# Patient Record
Sex: MALE | Race: WHITE | NOT HISPANIC OR LATINO | Employment: OTHER | URBAN - METROPOLITAN AREA
[De-identification: names, ages, dates, MRNs, and addresses within clinical notes are randomized per-mention and may not be internally consistent; named-entity substitution may affect disease eponyms.]

---

## 2018-07-01 ENCOUNTER — APPOINTMENT (OUTPATIENT)
Dept: ULTRASOUND IMAGING | Facility: CLINIC | Age: 68
DRG: 392 | End: 2018-07-01
Attending: EMERGENCY MEDICINE
Payer: MEDICARE

## 2018-07-01 ENCOUNTER — APPOINTMENT (OUTPATIENT)
Dept: CT IMAGING | Facility: CLINIC | Age: 68
DRG: 392 | End: 2018-07-01
Attending: EMERGENCY MEDICINE
Payer: MEDICARE

## 2018-07-01 ENCOUNTER — HOSPITAL ENCOUNTER (INPATIENT)
Facility: CLINIC | Age: 68
LOS: 2 days | Discharge: HOME | DRG: 392 | End: 2018-07-03
Attending: EMERGENCY MEDICINE | Admitting: INTERNAL MEDICINE
Payer: MEDICARE

## 2018-07-01 ENCOUNTER — HOSPITAL ENCOUNTER (EMERGENCY)
Facility: CLINIC | Age: 68
Discharge: HOME | DRG: 392 | End: 2018-07-01
Attending: EMERGENCY MEDICINE | Admitting: EMERGENCY MEDICINE
Payer: MEDICARE

## 2018-07-01 VITALS
BODY MASS INDEX: 33.89 KG/M2 | WEIGHT: 287 LBS | HEIGHT: 77 IN | HEART RATE: 61 BPM | RESPIRATION RATE: 16 BRPM | SYSTOLIC BLOOD PRESSURE: 171 MMHG | TEMPERATURE: 97.8 F | OXYGEN SATURATION: 97 % | DIASTOLIC BLOOD PRESSURE: 97 MMHG

## 2018-07-01 DIAGNOSIS — R10.12 LEFT UPPER QUADRANT PAIN: Primary | ICD-10-CM

## 2018-07-01 DIAGNOSIS — I10 ESSENTIAL HYPERTENSION: ICD-10-CM

## 2018-07-01 DIAGNOSIS — R10.12 ABDOMINAL PAIN, LEFT UPPER QUADRANT: ICD-10-CM

## 2018-07-01 DIAGNOSIS — K85.90 ACUTE PANCREATITIS, UNSPECIFIED COMPLICATION STATUS, UNSPECIFIED PANCREATITIS TYPE: ICD-10-CM

## 2018-07-01 DIAGNOSIS — R03.0 ELEVATED BLOOD PRESSURE READING: ICD-10-CM

## 2018-07-01 PROBLEM — R10.9 ABDOMINAL PAIN: Status: ACTIVE | Noted: 2018-07-01

## 2018-07-01 LAB
ALBUMIN SERPL-MCNC: 3.5 G/DL (ref 3.4–5)
ALBUMIN SERPL-MCNC: 3.5 G/DL (ref 3.4–5)
ALBUMIN UR-MCNC: NEGATIVE MG/DL
ALBUMIN UR-MCNC: NEGATIVE MG/DL
ALP SERPL-CCNC: 83 U/L (ref 40–150)
ALP SERPL-CCNC: 84 U/L (ref 40–150)
ALT SERPL W P-5'-P-CCNC: 27 U/L (ref 0–70)
ALT SERPL W P-5'-P-CCNC: 27 U/L (ref 0–70)
ANION GAP SERPL CALCULATED.3IONS-SCNC: 7 MMOL/L (ref 3–14)
ANION GAP SERPL CALCULATED.3IONS-SCNC: 8 MMOL/L (ref 3–14)
APPEARANCE UR: CLEAR
APPEARANCE UR: CLEAR
AST SERPL W P-5'-P-CCNC: 15 U/L (ref 0–45)
AST SERPL W P-5'-P-CCNC: 18 U/L (ref 0–45)
BASOPHILS # BLD AUTO: 0.1 10E9/L (ref 0–0.2)
BASOPHILS # BLD AUTO: 0.1 10E9/L (ref 0–0.2)
BASOPHILS NFR BLD AUTO: 0.8 %
BASOPHILS NFR BLD AUTO: 1 %
BILIRUB SERPL-MCNC: 0.4 MG/DL (ref 0.2–1.3)
BILIRUB SERPL-MCNC: 0.4 MG/DL (ref 0.2–1.3)
BILIRUB UR QL STRIP: NEGATIVE
BILIRUB UR QL STRIP: NEGATIVE
BUN SERPL-MCNC: 16 MG/DL (ref 7–30)
BUN SERPL-MCNC: 16 MG/DL (ref 7–30)
CALCIUM SERPL-MCNC: 8 MG/DL (ref 8.5–10.1)
CALCIUM SERPL-MCNC: 8.6 MG/DL (ref 8.5–10.1)
CHLORIDE SERPL-SCNC: 106 MMOL/L (ref 94–109)
CHLORIDE SERPL-SCNC: 107 MMOL/L (ref 94–109)
CO2 SERPL-SCNC: 27 MMOL/L (ref 20–32)
CO2 SERPL-SCNC: 29 MMOL/L (ref 20–32)
COLOR UR AUTO: ABNORMAL
COLOR UR AUTO: YELLOW
CREAT SERPL-MCNC: 0.65 MG/DL (ref 0.66–1.25)
CREAT SERPL-MCNC: 0.8 MG/DL (ref 0.66–1.25)
DIFFERENTIAL METHOD BLD: NORMAL
DIFFERENTIAL METHOD BLD: NORMAL
EOSINOPHIL # BLD AUTO: 0.4 10E9/L (ref 0–0.7)
EOSINOPHIL # BLD AUTO: 0.4 10E9/L (ref 0–0.7)
EOSINOPHIL NFR BLD AUTO: 3.5 %
EOSINOPHIL NFR BLD AUTO: 4.4 %
ERYTHROCYTE [DISTWIDTH] IN BLOOD BY AUTOMATED COUNT: 13.3 % (ref 10–15)
ERYTHROCYTE [DISTWIDTH] IN BLOOD BY AUTOMATED COUNT: 13.3 % (ref 10–15)
ETHANOL SERPL-MCNC: <0.01 G/DL
GFR SERPL CREATININE-BSD FRML MDRD: >90 ML/MIN/1.7M2
GFR SERPL CREATININE-BSD FRML MDRD: >90 ML/MIN/1.7M2
GLUCOSE SERPL-MCNC: 102 MG/DL (ref 70–99)
GLUCOSE SERPL-MCNC: 97 MG/DL (ref 70–99)
GLUCOSE UR STRIP-MCNC: NEGATIVE MG/DL
GLUCOSE UR STRIP-MCNC: NEGATIVE MG/DL
HCT VFR BLD AUTO: 47.6 % (ref 40–53)
HCT VFR BLD AUTO: 47.6 % (ref 40–53)
HGB BLD-MCNC: 15.9 G/DL (ref 13.3–17.7)
HGB BLD-MCNC: 15.9 G/DL (ref 13.3–17.7)
HGB UR QL STRIP: NEGATIVE
HGB UR QL STRIP: NEGATIVE
IMM GRANULOCYTES # BLD: 0 10E9/L (ref 0–0.4)
IMM GRANULOCYTES # BLD: 0 10E9/L (ref 0–0.4)
IMM GRANULOCYTES NFR BLD: 0.2 %
IMM GRANULOCYTES NFR BLD: 0.2 %
KETONES UR STRIP-MCNC: NEGATIVE MG/DL
KETONES UR STRIP-MCNC: NEGATIVE MG/DL
LACTATE BLD-SCNC: 2 MMOL/L (ref 0.7–2)
LEUKOCYTE ESTERASE UR QL STRIP: NEGATIVE
LEUKOCYTE ESTERASE UR QL STRIP: NEGATIVE
LIPASE SERPL-CCNC: 756 U/L (ref 73–393)
LIPASE SERPL-CCNC: 92 U/L (ref 73–393)
LYMPHOCYTES # BLD AUTO: 3.2 10E9/L (ref 0.8–5.3)
LYMPHOCYTES # BLD AUTO: 3.6 10E9/L (ref 0.8–5.3)
LYMPHOCYTES NFR BLD AUTO: 35.9 %
LYMPHOCYTES NFR BLD AUTO: 36.2 %
MCH RBC QN AUTO: 28.4 PG (ref 26.5–33)
MCH RBC QN AUTO: 28.7 PG (ref 26.5–33)
MCHC RBC AUTO-ENTMCNC: 33.4 G/DL (ref 31.5–36.5)
MCHC RBC AUTO-ENTMCNC: 33.4 G/DL (ref 31.5–36.5)
MCV RBC AUTO: 85 FL (ref 78–100)
MCV RBC AUTO: 86 FL (ref 78–100)
MONOCYTES # BLD AUTO: 0.7 10E9/L (ref 0–1.3)
MONOCYTES # BLD AUTO: 0.8 10E9/L (ref 0–1.3)
MONOCYTES NFR BLD AUTO: 7.8 %
MONOCYTES NFR BLD AUTO: 8.2 %
MUCOUS THREADS #/AREA URNS LPF: PRESENT /LPF
MUCOUS THREADS #/AREA URNS LPF: PRESENT /LPF
NEUTROPHILS # BLD AUTO: 4.5 10E9/L (ref 1.6–8.3)
NEUTROPHILS # BLD AUTO: 5.2 10E9/L (ref 1.6–8.3)
NEUTROPHILS NFR BLD AUTO: 50 %
NEUTROPHILS NFR BLD AUTO: 51.8 %
NITRATE UR QL: NEGATIVE
NITRATE UR QL: NEGATIVE
NRBC # BLD AUTO: 0 10*3/UL
NRBC # BLD AUTO: 0 10*3/UL
NRBC BLD AUTO-RTO: 0 /100
NRBC BLD AUTO-RTO: 0 /100
PH UR STRIP: 7 PH (ref 5–7)
PH UR STRIP: 8 PH (ref 5–7)
PLATELET # BLD AUTO: 209 10E9/L (ref 150–450)
PLATELET # BLD AUTO: 220 10E9/L (ref 150–450)
POTASSIUM SERPL-SCNC: 3.3 MMOL/L (ref 3.4–5.3)
POTASSIUM SERPL-SCNC: 3.5 MMOL/L (ref 3.4–5.3)
PROT SERPL-MCNC: 6.6 G/DL (ref 6.8–8.8)
PROT SERPL-MCNC: 6.6 G/DL (ref 6.8–8.8)
RBC # BLD AUTO: 5.54 10E12/L (ref 4.4–5.9)
RBC # BLD AUTO: 5.59 10E12/L (ref 4.4–5.9)
RBC #/AREA URNS AUTO: 1 /HPF (ref 0–2)
RBC #/AREA URNS AUTO: 1 /HPF (ref 0–2)
SODIUM SERPL-SCNC: 142 MMOL/L (ref 133–144)
SODIUM SERPL-SCNC: 142 MMOL/L (ref 133–144)
SOURCE: ABNORMAL
SOURCE: ABNORMAL
SP GR UR STRIP: 1.01 (ref 1–1.03)
SP GR UR STRIP: 1.01 (ref 1–1.03)
SQUAMOUS #/AREA URNS AUTO: <1 /HPF (ref 0–1)
SQUAMOUS #/AREA URNS AUTO: <1 /HPF (ref 0–1)
TROPONIN I BLD-MCNC: 0.03 UG/L (ref 0–0.1)
UROBILINOGEN UR STRIP-MCNC: 0 MG/DL (ref 0–2)
UROBILINOGEN UR STRIP-MCNC: 2 MG/DL (ref 0–2)
WBC # BLD AUTO: 10.1 10E9/L (ref 4–11)
WBC # BLD AUTO: 8.9 10E9/L (ref 4–11)
WBC #/AREA URNS AUTO: 0 /HPF (ref 0–5)
WBC #/AREA URNS AUTO: <1 /HPF (ref 0–5)

## 2018-07-01 PROCEDURE — 25000128 H RX IP 250 OP 636: Performed by: EMERGENCY MEDICINE

## 2018-07-01 PROCEDURE — 99285 EMERGENCY DEPT VISIT HI MDM: CPT | Mod: 25

## 2018-07-01 PROCEDURE — 25000125 ZZHC RX 250: Performed by: EMERGENCY MEDICINE

## 2018-07-01 PROCEDURE — 83690 ASSAY OF LIPASE: CPT | Performed by: EMERGENCY MEDICINE

## 2018-07-01 PROCEDURE — 99223 1ST HOSP IP/OBS HIGH 75: CPT | Mod: AI | Performed by: INTERNAL MEDICINE

## 2018-07-01 PROCEDURE — 93005 ELECTROCARDIOGRAM TRACING: CPT

## 2018-07-01 PROCEDURE — 80053 COMPREHEN METABOLIC PANEL: CPT | Performed by: EMERGENCY MEDICINE

## 2018-07-01 PROCEDURE — 96361 HYDRATE IV INFUSION ADD-ON: CPT

## 2018-07-01 PROCEDURE — 83605 ASSAY OF LACTIC ACID: CPT | Performed by: EMERGENCY MEDICINE

## 2018-07-01 PROCEDURE — 96375 TX/PRO/DX INJ NEW DRUG ADDON: CPT

## 2018-07-01 PROCEDURE — A9270 NON-COVERED ITEM OR SERVICE: HCPCS | Mod: GY | Performed by: EMERGENCY MEDICINE

## 2018-07-01 PROCEDURE — 81001 URINALYSIS AUTO W/SCOPE: CPT | Performed by: EMERGENCY MEDICINE

## 2018-07-01 PROCEDURE — 74176 CT ABD & PELVIS W/O CONTRAST: CPT

## 2018-07-01 PROCEDURE — 85025 COMPLETE CBC W/AUTO DIFF WBC: CPT | Performed by: EMERGENCY MEDICINE

## 2018-07-01 PROCEDURE — 12000000 ZZH R&B MED SURG/OB

## 2018-07-01 PROCEDURE — 84484 ASSAY OF TROPONIN QUANT: CPT

## 2018-07-01 PROCEDURE — 80320 DRUG SCREEN QUANTALCOHOLS: CPT | Performed by: EMERGENCY MEDICINE

## 2018-07-01 PROCEDURE — 83036 HEMOGLOBIN GLYCOSYLATED A1C: CPT | Performed by: EMERGENCY MEDICINE

## 2018-07-01 PROCEDURE — 96374 THER/PROPH/DIAG INJ IV PUSH: CPT

## 2018-07-01 PROCEDURE — 25000132 ZZH RX MED GY IP 250 OP 250 PS 637: Mod: GY | Performed by: EMERGENCY MEDICINE

## 2018-07-01 PROCEDURE — 76705 ECHO EXAM OF ABDOMEN: CPT

## 2018-07-01 RX ORDER — METOPROLOL TARTRATE 50 MG
50 TABLET ORAL 2 TIMES DAILY
Status: DISCONTINUED | OUTPATIENT
Start: 2018-07-01 | End: 2018-07-02

## 2018-07-01 RX ORDER — HYDRALAZINE HYDROCHLORIDE 20 MG/ML
10 INJECTION INTRAMUSCULAR; INTRAVENOUS EVERY 6 HOURS PRN
Status: DISCONTINUED | OUTPATIENT
Start: 2018-07-01 | End: 2018-07-03

## 2018-07-01 RX ORDER — SODIUM CHLORIDE, SODIUM LACTATE, POTASSIUM CHLORIDE, CALCIUM CHLORIDE 600; 310; 30; 20 MG/100ML; MG/100ML; MG/100ML; MG/100ML
1000 INJECTION, SOLUTION INTRAVENOUS CONTINUOUS
Status: DISCONTINUED | OUTPATIENT
Start: 2018-07-01 | End: 2018-07-01

## 2018-07-01 RX ORDER — NALOXONE HYDROCHLORIDE 0.4 MG/ML
.1-.4 INJECTION, SOLUTION INTRAMUSCULAR; INTRAVENOUS; SUBCUTANEOUS
Status: DISCONTINUED | OUTPATIENT
Start: 2018-07-01 | End: 2018-07-03 | Stop reason: HOSPADM

## 2018-07-01 RX ORDER — AMOXICILLIN 250 MG
1 CAPSULE ORAL 2 TIMES DAILY PRN
Status: DISCONTINUED | OUTPATIENT
Start: 2018-07-01 | End: 2018-07-03 | Stop reason: HOSPADM

## 2018-07-01 RX ORDER — POTASSIUM CHLORIDE 1.5 G/1.58G
20 POWDER, FOR SOLUTION ORAL 2 TIMES DAILY
Status: ON HOLD | COMMUNITY
End: 2018-07-02

## 2018-07-01 RX ORDER — HYDROMORPHONE HYDROCHLORIDE 1 MG/ML
.3-.5 INJECTION, SOLUTION INTRAMUSCULAR; INTRAVENOUS; SUBCUTANEOUS
Status: DISCONTINUED | OUTPATIENT
Start: 2018-07-01 | End: 2018-07-02

## 2018-07-01 RX ORDER — HYDROCODONE BITARTRATE AND ACETAMINOPHEN 5; 325 MG/1; MG/1
1 TABLET ORAL EVERY 4 HOURS PRN
Qty: 10 TABLET | Refills: 0 | Status: SHIPPED | OUTPATIENT
Start: 2018-07-01 | End: 2021-12-03

## 2018-07-01 RX ORDER — POTASSIUM CHLORIDE 29.8 MG/ML
20 INJECTION INTRAVENOUS
Status: DISCONTINUED | OUTPATIENT
Start: 2018-07-01 | End: 2018-07-03 | Stop reason: HOSPADM

## 2018-07-01 RX ORDER — NICOTINE POLACRILEX 4 MG
15-30 LOZENGE BUCCAL
Status: DISCONTINUED | OUTPATIENT
Start: 2018-07-01 | End: 2018-07-03 | Stop reason: HOSPADM

## 2018-07-01 RX ORDER — POTASSIUM CL/LIDO/0.9 % NACL 10MEQ/0.1L
10 INTRAVENOUS SOLUTION, PIGGYBACK (ML) INTRAVENOUS
Status: DISCONTINUED | OUTPATIENT
Start: 2018-07-01 | End: 2018-07-03 | Stop reason: HOSPADM

## 2018-07-01 RX ORDER — MORPHINE SULFATE 4 MG/ML
4 INJECTION, SOLUTION INTRAMUSCULAR; INTRAVENOUS
Status: DISCONTINUED | OUTPATIENT
Start: 2018-07-01 | End: 2018-07-01 | Stop reason: HOSPADM

## 2018-07-01 RX ORDER — ONDANSETRON 2 MG/ML
4 INJECTION INTRAMUSCULAR; INTRAVENOUS EVERY 30 MIN PRN
Status: DISCONTINUED | OUTPATIENT
Start: 2018-07-01 | End: 2018-07-01 | Stop reason: HOSPADM

## 2018-07-01 RX ORDER — KETOROLAC TROMETHAMINE 30 MG/ML
30 INJECTION, SOLUTION INTRAMUSCULAR; INTRAVENOUS ONCE
Status: COMPLETED | OUTPATIENT
Start: 2018-07-01 | End: 2018-07-01

## 2018-07-01 RX ORDER — OXYCODONE HYDROCHLORIDE 5 MG/1
5-10 TABLET ORAL
Status: DISCONTINUED | OUTPATIENT
Start: 2018-07-01 | End: 2018-07-03 | Stop reason: HOSPADM

## 2018-07-01 RX ORDER — POTASSIUM CHLORIDE 1500 MG/1
20-40 TABLET, EXTENDED RELEASE ORAL
Status: DISCONTINUED | OUTPATIENT
Start: 2018-07-01 | End: 2018-07-03 | Stop reason: HOSPADM

## 2018-07-01 RX ORDER — DEXTROSE MONOHYDRATE 25 G/50ML
25-50 INJECTION, SOLUTION INTRAVENOUS
Status: DISCONTINUED | OUTPATIENT
Start: 2018-07-01 | End: 2018-07-03 | Stop reason: HOSPADM

## 2018-07-01 RX ORDER — KETOROLAC TROMETHAMINE 15 MG/ML
10 INJECTION, SOLUTION INTRAMUSCULAR; INTRAVENOUS ONCE
Status: COMPLETED | OUTPATIENT
Start: 2018-07-01 | End: 2018-07-01

## 2018-07-01 RX ORDER — HYDROMORPHONE HYDROCHLORIDE 1 MG/ML
0.5 INJECTION, SOLUTION INTRAMUSCULAR; INTRAVENOUS; SUBCUTANEOUS
Status: DISCONTINUED | OUTPATIENT
Start: 2018-07-01 | End: 2018-07-02

## 2018-07-01 RX ORDER — SODIUM CHLORIDE 9 MG/ML
INJECTION, SOLUTION INTRAVENOUS CONTINUOUS
Status: DISCONTINUED | OUTPATIENT
Start: 2018-07-01 | End: 2018-07-03 | Stop reason: HOSPADM

## 2018-07-01 RX ORDER — MAGNESIUM SULFATE HEPTAHYDRATE 40 MG/ML
4 INJECTION, SOLUTION INTRAVENOUS EVERY 4 HOURS PRN
Status: DISCONTINUED | OUTPATIENT
Start: 2018-07-01 | End: 2018-07-03 | Stop reason: HOSPADM

## 2018-07-01 RX ORDER — POTASSIUM CHLORIDE 7.45 MG/ML
10 INJECTION INTRAVENOUS
Status: DISCONTINUED | OUTPATIENT
Start: 2018-07-01 | End: 2018-07-03 | Stop reason: HOSPADM

## 2018-07-01 RX ORDER — AMOXICILLIN 250 MG
2 CAPSULE ORAL 2 TIMES DAILY PRN
Status: DISCONTINUED | OUTPATIENT
Start: 2018-07-01 | End: 2018-07-03 | Stop reason: HOSPADM

## 2018-07-01 RX ORDER — ONDANSETRON 2 MG/ML
4 INJECTION INTRAMUSCULAR; INTRAVENOUS EVERY 6 HOURS PRN
Status: DISCONTINUED | OUTPATIENT
Start: 2018-07-01 | End: 2018-07-03 | Stop reason: HOSPADM

## 2018-07-01 RX ORDER — ONDANSETRON 4 MG/1
4 TABLET, ORALLY DISINTEGRATING ORAL EVERY 6 HOURS PRN
Status: DISCONTINUED | OUTPATIENT
Start: 2018-07-01 | End: 2018-07-03 | Stop reason: HOSPADM

## 2018-07-01 RX ORDER — POTASSIUM CHLORIDE 1.5 G/1.58G
20-40 POWDER, FOR SOLUTION ORAL
Status: DISCONTINUED | OUTPATIENT
Start: 2018-07-01 | End: 2018-07-03 | Stop reason: HOSPADM

## 2018-07-01 RX ORDER — ONDANSETRON 2 MG/ML
4 INJECTION INTRAMUSCULAR; INTRAVENOUS EVERY 30 MIN PRN
Status: DISCONTINUED | OUTPATIENT
Start: 2018-07-01 | End: 2018-07-02

## 2018-07-01 RX ORDER — POLYETHYLENE GLYCOL 3350 17 G/17G
17 POWDER, FOR SOLUTION ORAL DAILY PRN
Status: DISCONTINUED | OUTPATIENT
Start: 2018-07-01 | End: 2018-07-03 | Stop reason: HOSPADM

## 2018-07-01 RX ADMIN — Medication 0.5 MG: at 22:28

## 2018-07-01 RX ADMIN — SODIUM CHLORIDE, POTASSIUM CHLORIDE, SODIUM LACTATE AND CALCIUM CHLORIDE 1000 ML: 600; 310; 30; 20 INJECTION, SOLUTION INTRAVENOUS at 22:59

## 2018-07-01 RX ADMIN — MORPHINE SULFATE 4 MG: 4 INJECTION INTRAVENOUS at 04:16

## 2018-07-01 RX ADMIN — KETOROLAC TROMETHAMINE 10 MG: 15 INJECTION, SOLUTION INTRAMUSCULAR; INTRAVENOUS at 03:50

## 2018-07-01 RX ADMIN — SODIUM CHLORIDE 1000 ML: 9 INJECTION, SOLUTION INTRAVENOUS at 03:51

## 2018-07-01 RX ADMIN — Medication 0.5 MG: at 22:57

## 2018-07-01 RX ADMIN — ONDANSETRON 4 MG: 2 INJECTION, SOLUTION INTRAMUSCULAR; INTRAVENOUS at 21:22

## 2018-07-01 RX ADMIN — MORPHINE SULFATE 4 MG: 4 INJECTION INTRAVENOUS at 03:50

## 2018-07-01 RX ADMIN — LIDOCAINE HYDROCHLORIDE 30 ML: 20 SOLUTION ORAL; TOPICAL at 21:22

## 2018-07-01 RX ADMIN — SODIUM CHLORIDE, POTASSIUM CHLORIDE, SODIUM LACTATE AND CALCIUM CHLORIDE 1000 ML: 600; 310; 30; 20 INJECTION, SOLUTION INTRAVENOUS at 21:22

## 2018-07-01 RX ADMIN — ONDANSETRON 4 MG: 2 INJECTION INTRAMUSCULAR; INTRAVENOUS at 03:51

## 2018-07-01 RX ADMIN — KETOROLAC TROMETHAMINE 30 MG: 30 INJECTION, SOLUTION INTRAMUSCULAR at 22:28

## 2018-07-01 ASSESSMENT — ENCOUNTER SYMPTOMS
DYSURIA: 0
APPETITE CHANGE: 1
VOMITING: 0
DYSURIA: 0
VOMITING: 0
ABDOMINAL PAIN: 1
DIFFICULTY URINATING: 0
NAUSEA: 1
ABDOMINAL PAIN: 1
NAUSEA: 1
FEVER: 0
HEMATURIA: 0
FLANK PAIN: 1
DIFFICULTY URINATING: 0
FREQUENCY: 0
FLANK PAIN: 0
HEMATURIA: 0
FREQUENCY: 0

## 2018-07-01 NOTE — ED PROVIDER NOTES
"  History     Chief Complaint:  Flank Pain    HPI   Federico Chamberlain is a 67 year old male with a history of HTN, hypercholesterolemia who presents to the emergency department for evaluation of flank pain. The patient reports he has had left flank pain since yesterday which has progressively increased in intensity, prompting his visit today. He indicates the pain is constant and does not appear to radiate a great deal. He states he has had nausea accompanying the pain. He denies any fever, urinary symptoms, recent fall or trauma, or history of the same. No pain with breathing.     Allergies:  NKDA     Medications:    Atorvastatin  Lisinopril  Metformin  Metoprolol  Klor-con    Past Medical History:    Hypercholesterolemia  HTN    Past Surgical History:    Rotator cuff surgery, left shoulder    Family History:    No past pertinent family history.    Social History:  Presents with wife.  Nonsmoker.     Review of Systems   Constitutional: Negative for fever.   Gastrointestinal: Positive for abdominal pain and nausea. Negative for vomiting.   Genitourinary: Positive for flank pain. Negative for difficulty urinating, dysuria, frequency, hematuria and urgency.   All other systems reviewed and are negative.    Physical Exam     Patient Vitals for the past 24 hrs:   BP Temp Temp src Pulse Resp SpO2 Height Weight   07/01/18 0600 - - - - 16 - - -   07/01/18 0551 - - - - - 97 % - -   07/01/18 0550 (!) 171/97 - - - - 97 % - -   07/01/18 0306 (!) 199/114 - - - - - - -   07/01/18 0303 (!) 214/118 97.8  F (36.6  C) Oral 61 18 99 % 1.956 m (6' 5\") 130.2 kg (287 lb)       Physical Exam  Nursing note and vitals reviewed.  Constitutional: Cooperative. Uncomfortable appearing.  HENT:   Mouth/Throat: Mucous membranes are normal.   Cardiovascular: Normal rate, regular rhythm and normal heart sounds.  No murmur.  Pulmonary/Chest: Effort normal and breath sounds normal. No respiratory distress. No wheezes. No rales.   Abdominal: Soft. Normal " appearance and bowel sounds are normal. No distension. LUQ tenderness. There is no rigidity and no guarding.   Musculoskeletal: No LE edema.   Neurological: Alert. Oriented x4  Skin: Skin is warm and dry. No rash noted.   Psychiatric: Normal mood and affect.     Emergency Department Course     Imaging:  Radiographic findings were communicated with the patient who voiced understanding of the findings.    CT Abdomen/Pelvis w/o IV contrast-stone protocol:   1. No acute abnormality. No bowel obstruction or inflammation.  2. Single right renal stone. No ureteral stone or hydronephrosis.  3. Prostate gland enlargement. As per radiology.     Laboratory:  UA with micro: Mucous Present o/w negative    CBC: WBC: 10.1, HGB: 15.9, PLT: 209  CMP: Potassium 3.3 (L), Creatinine 0.65 (L), Protein Total 6.6 (L), o/w WNL   Lipase: 92    Lactic acid: 2.0    Interventions:  0350 Toradol 10 mg IV  0351 NS 1L IV BOLUS   Zofran 4 mg IV  0416 Morphine 4 mg IV    Emergency Department Course:  Nursing notes and vitals reviewed. 0336 I performed an exam of the patient as documented above.     The patient provided a urine sample here in the emergency department. This was sent for laboratory testing, findings above.     IV inserted. Medicine administered as documented above. Blood drawn. This was sent to the lab for further testing, results above.    The patient was sent for a CT Abd/Pelvis while in the emergency department, findings above.     0512 I rechecked the patient and discussed the results of his workup thus far. Patient no longer has tenderness in his LUQ.    Findings and plan explained to the Patient. Patient discharged home with instructions regarding supportive care, medications, and reasons to return. The importance of close follow-up was reviewed. The patient was prescribed Norco.    I personally reviewed the laboratory results with the Patient and answered all related questions prior to discharge.     Impression & Plan       Medical Decision Making:  Federico Chamberlain is a 67 year old male who awoke from sleep with left flank pain. This localized to his left upper quadrant. He was initially tender. No splenic enlargement on exam, and his CT is actually unremarkable. No evidence of hydronephrosis. No evidence of bowel obstruction, inflammatory process, or other acute abnormality. I did consider pulmonary equivalents such as pneumonia or PE. He has no respiratory or pulmonary symptoms. On repeat exam, he has absolutely no tenderness. He has been hypertensive during his stay, but this has been an ongoing issue for him. It sounds like his PCP is adjusting his medications. With him having no pain, and reassuring labs, urine, and imaging, there is no indication for admission at this time. I will send him home with a short course of pain medications and appropriate return precautions. I suspect either a recently passed kidney stone versus partial bowel obstruction that resolves prior to imaging. Certainly, peptic ulcer disease could present with sudden pain as well. But the reassuring lactic acid and resolution of his symptoms, it is unlikely to represent mesenteric ischemia. Patient is comfortable with this discussion, as is his wife. Otherwise will follow up with a regular physician.    Diagnosis:    ICD-10-CM   1. Abdominal pain, left upper quadrant R10.12   2. Elevated blood pressure reading R03.0       Disposition:  discharged to home    Discharge Medications:  New Prescriptions    HYDROCODONE-ACETAMINOPHEN (NORCO) 5-325 MG PER TABLET    Take 1 tablet by mouth every 4 hours as needed for pain     Kentrell ALTAMIRANO, am serving as a scribe on 7/1/2018 at 3:36 AM to personally document services performed by Cristobal Unger MD based on my observations and the provider's statements to me.     Kentrell Stratton  7/1/2018   [facility] EMERGENCY DEPARTMENT       Cristobal Unger MD  07/01/18 0657

## 2018-07-01 NOTE — IP AVS SNAPSHOT
[place] [id number] [facility]    [address]    [facility] [address]    Phone:  [phone]    Fax:  [phone]                                       After Visit Summary   7/1/2018    Federico Chamberlain    MRN: [id number]           After Visit Summary Signature Page     I have received my discharge instructions, and my questions have been answered. I have discussed any challenges I see with this plan with the nurse or doctor.    ..........................................................................................................................................  Patient/Patient Representative Signature      ..........................................................................................................................................  Patient Representative Print Name and Relationship to Patient    ..................................................               ................................................  Date                                            Time    ..........................................................................................................................................  Reviewed by Signature/Title    ...................................................              ..............................................  Date                                                            Time

## 2018-07-01 NOTE — ED NOTES
"Pt's pain has subsided. Pt states \"This is the best I've felt all week. That second shot really did it!\" MD aware.  "

## 2018-07-01 NOTE — ED AVS SNAPSHOT
[facility] Emergency Department    [address] [place] [address]    Phone:  [phone]    Fax:  [phone]                                       Federico Chamberlain   MRN: [id number]    Department:  [facility] Emergency Department   Date of Visit:  7/1/2018           Patient Information     Date Of Birth          1950        Your diagnoses for this visit were:     Abdominal pain, left upper quadrant     Elevated blood pressure reading        You were seen by Cristobal Unger MD.      Follow-up Information     Follow up with Nathalie Quiroz MD.    Specialty:  Internal Medicine    Why:  As needed    Contact information:    [facility]  [address] [place] [address]  [phone]          Discharge Instructions       Discharge Instructions  Abdominal Pain    Abdominal pain (belly pain) can be caused by many things. Your evaluation today does not show the exact cause for your pain. Your provider today has decided that it is unlikely your pain is due to a life threatening problem, or a problem requiring surgery or hospital admission. Sometimes those problems cannot be found right away, so it is very important that you follow up as directed.  Sometimes only the changes which occur over time allow the cause of your pain to be found.    Generally, every Emergency Department visit should have a follow-up clinic visit with either a primary or a specialty clinic/provider. Please follow-up as instructed by your emergency provider today. With abdominal pain, we often recommend very close follow-up, such as the following day.    ADULTS:  Return to the Emergency Department right away if:      You get an oral temperature above 102oF or as directed by your provider.    You have blood in your stools. This may be bright red or appear as black, tarry stools.      You keep vomiting (throwing up) or cannot drink liquids.    You see blood when you vomit.     You cannot have a  "bowel movement or you cannot pass gas.    Your stomach gets bloated or bigger.    Your skin or the whites of your eyes look yellow.    You faint.    You have bloody, frequent or painful urination (peeing).    You have new symptoms or anything that worries you.    MORE INFORMATION:    Appendicitis:  A possible cause of abdominal pain in any person who still has their appendix is acute appendicitis. Appendicitis is often hard to diagnose.  Testing does not always rule out early appendicitis or other causes of abdominal pain. Close follow-up with your provider and re-evaluations may be needed to figure out the reason for your abdominal pain.    Follow-up:  It is very important that you make an appointment with your clinic and go to the appointment.  If you do not follow-up with your primary provider, it may result in missing an important development which could result in permanent injury or disability and/or lasting pain.  If there is any problem keeping your appointment, call your provider or return to the Emergency Department.    Medications:  Take your medications as directed by your provider today.  Before using over-the-counter medications, ask your provider and make sure to take the medications as directed.  If you have any questions about medications, ask your provider.    Diet:  Resume your normal diet as much as possible, but do not eat fried, fatty or spicy foods while you have pain.  Do not drink alcohol or have caffeine.  Do not smoke tobacco.    Probiotics: If you have been given an antibiotic, you may want to also take a probiotic pill or eat yogurt with live cultures. Probiotics have \"good bacteria\" to help your intestines stay healthy. Studies have shown that probiotics help prevent diarrhea (loose stools) and other intestine problems (including C. diff infection) when you take antibiotics. You can buy these without a prescription in the pharmacy section of the store.     If you were given a " prescription for medicine here today, be sure to read all of the information (including the package insert) that comes with your prescription.  This will include important information about the medicine, its side effects, and any warnings that you need to know about.  The pharmacist who fills the prescription can provide more information and answer questions you may have about the medicine.  If you have questions or concerns that the pharmacist cannot address, please call or return to the Emergency Department.       Remember that you can always come back to the Emergency Department if you are not able to see your regular provider in the amount of time listed above, if you get any new symptoms, or if there is anything that worries you.      24 Hour Appointment Hotline       To make an appointment at any [facility], call [phone] ([phone]). If you don't have a family doctor or clinic, we will help you find one. [facility] are conveniently located to serve the needs of you and your family.             Review of your medicines      START taking        Dose / Directions Last dose taken    HYDROcodone-acetaminophen 5-325 MG per tablet   Commonly known as:  NORCO   Dose:  1 tablet   Quantity:  10 tablet        Take 1 tablet by mouth every 4 hours as needed for pain   Refills:  0          Our records show that you are taking the medicines listed below. If these are incorrect, please call your family doctor or clinic.        Dose / Directions Last dose taken    ATORVASTATIN CALCIUM PO        Refills:  0        LISINOPRIL PO        Refills:  0        METFORMIN HCL PO        Refills:  0        METOPROLOL TARTRATE PO        Refills:  0        potassium chloride 20 MEQ Packet   Commonly known as:  KLOR-CON   Dose:  20 mEq        Take 20 mEq by mouth 2 times daily   Refills:  0                Information about OPIOIDS     PRESCRIPTION OPIOIDS: WHAT YOU NEED TO KNOW   We gave you an opioid (narcotic) pain  medicine. It is important to manage your pain, but opioids are not always the best choice. You should first try all the other options your care team gave you. Take this medicine for as short a time (and as few doses) as possible.     These medicines have risks:    DO NOT drive when on new or higher doses of pain medicine. These medicines can affect your alertness and reaction times, and you could be arrested for driving under the influence (DUI). If you need to use opioids long-term, talk to your care team about driving.    DO NOT operate heave machinery    DO NOT do any other dangerous activities while taking these medicines.     DO NOT drink any alcohol while taking these medicines.      If the opioid prescribed includes acetaminophen, DO NOT take with any other medicines that contain acetaminophen. Read all labels carefully. Look for the word  acetaminophen  or  Tylenol.  Ask your pharmacist if you have questions or are unsure.    You can get addicted to pain medicines, especially if you have a history of addiction (chemical, alcohol or substance dependence). Talk to your care team about ways to reduce this risk.    Store your pills in a secure place, locked if possible. We will not replace any lost or stolen medicine. If you don t finish your medicine, please throw away (dispose) as directed by your pharmacist. The [place] [employer]ution [employer] has more information about safe disposal: https://www.pca.[facility].[place].us/living-green/managing-unwanted-medications.     All opioids tend to cause constipation. Drink plenty of water and eat foods that have a lot of fiber, such as fruits, vegetables, prune juice, apple juice and high-fiber cereal. Take a laxative (Miralax, milk of magnesia, Colace, Senna) if you don t move your bowels at least every other day.         Prescriptions were sent or printed at these locations (1 Prescription)                   Other Prescriptions                Printed at Department/Unit  printer (1 of 1)         HYDROcodone-acetaminophen (NORCO) 5-325 MG per tablet                Procedures and tests performed during your visit     CBC with platelets differential    CT Abdomen Pelvis without Contrast (stone protocol)    Comprehensive metabolic panel    Lactic acid whole blood    Lipase    Peripheral IV: Standard    Pulse oximetry nursing    UA with Microscopic      Orders Needing Specimen Collection     None      Pending Results     No orders found from 6/29/2018 to 7/2/2018.            Pending Culture Results     No orders found from 6/29/2018 to 7/2/2018.            Pending Results Instructions     If you had any lab results that were not finalized at the time of your Discharge, you can call the ED Lab Result RN at [phone]. You will be contacted by this team for any positive Lab results or changes in treatment. The nurses are available 7 days a week from 10A to 6:30P.  You can leave a message 24 hours per day and they will return your call.        Test Results From Your Hospital Stay        7/1/2018  4:06 AM      Component Results     Component Value Ref Range & Units Status    WBC 10.1 4.0 - 11.0 10e9/L Final    RBC Count 5.59 4.4 - 5.9 10e12/L Final    Hemoglobin 15.9 13.3 - 17.7 g/dL Final    Hematocrit 47.6 40.0 - 53.0 % Final    MCV 85 78 - 100 fl Final    MCH 28.4 26.5 - 33.0 pg Final    MCHC 33.4 31.5 - 36.5 g/dL Final    RDW 13.3 10.0 - 15.0 % Final    Platelet Count 209 150 - 450 10e9/L Final    Diff Method Automated Method  Final    % Neutrophils 51.8 % Final    % Lymphocytes 35.9 % Final    % Monocytes 7.8 % Final    % Eosinophils 3.5 % Final    % Basophils 0.8 % Final    % Immature Granulocytes 0.2 % Final    Nucleated RBCs 0 0 /100 Final    Absolute Neutrophil 5.2 1.6 - 8.3 10e9/L Final    Absolute Lymphocytes 3.6 0.8 - 5.3 10e9/L Final    Absolute Monocytes 0.8 0.0 - 1.3 10e9/L Final    Absolute Eosinophils 0.4 0.0 - 0.7 10e9/L Final    Absolute Basophils 0.1 0.0 - 0.2 10e9/L  Final    Abs Immature Granulocytes 0.0 0 - 0.4 10e9/L Final    Absolute Nucleated RBC 0.0  Final         7/1/2018  4:12 AM      Component Results     Component Value Ref Range & Units Status    Sodium 142 133 - 144 mmol/L Final    Potassium 3.3 (L) 3.4 - 5.3 mmol/L Final    Chloride 107 94 - 109 mmol/L Final    Carbon Dioxide 27 20 - 32 mmol/L Final    Anion Gap 8 3 - 14 mmol/L Final    Glucose 97 70 - 99 mg/dL Final    Urea Nitrogen 16 7 - 30 mg/dL Final    Creatinine 0.65 (L) 0.66 - 1.25 mg/dL Final    GFR Estimate >90 >60 mL/min/1.7m2 Final    Non  GFR Calc    GFR Estimate If Black >90 >60 mL/min/1.7m2 Final    African American GFR Calc    Calcium 8.6 8.5 - 10.1 mg/dL Final    Bilirubin Total 0.4 0.2 - 1.3 mg/dL Final    Albumin 3.5 3.4 - 5.0 g/dL Final    Protein Total 6.6 (L) 6.8 - 8.8 g/dL Final    Alkaline Phosphatase 83 40 - 150 U/L Final    ALT 27 0 - 70 U/L Final    AST 15 0 - 45 U/L Final         7/1/2018  4:12 AM      Component Results     Component Value Ref Range & Units Status    Lipase 92 73 - 393 U/L Final         7/1/2018  3:50 AM      Component Results     Component Value Ref Range & Units Status    Lactic Acid 2.0 0.7 - 2.0 mmol/L Final         7/1/2018  4:40 AM      Narrative     CT ABDOMEN/PELVIS WITHOUT CONTRAST   7/1/2018 4:14 AM     HISTORY: Left upper quadrant/left flank pain.     TECHNIQUE: Noncontrast CT abdomen and pelvis was performed. Radiation  dose for this scan was reduced using automated exposure control,  adjustment of the mA and/or kV according to patient size, or iterative  reconstruction technique.    COMPARISON: None.    FINDINGS:    Abdomen: The lung bases are unremarkable. Evaluation of the solid  abdominal organs is limited by the lack of intravenous contrast. There  are several small low-attenuation liver lesions which are  indeterminate, but likely cysts. The spleen, gallbladder, pancreas and  adrenal glands are normal in appearance. There is a 0.5 cm  stone in  the mid right kidney. No left-sided urinary stones. No hydronephrosis  on either side. There are a few renal cysts bilaterally. There is no  abdominal or pelvic lymph node enlargement.    Pelvis: The prostate gland is enlarged. The urinary bladder appears  normal. There is a moderate amount of stool throughout the colon.  There is no bowel obstruction or inflammation. No free intraperitoneal  gas or fluid. Postoperative changes in the lumbar spine.        Impression     IMPRESSION:  1. No acute abnormality. No bowel obstruction or inflammation.  2. Single right renal stone. No ureteral stone or hydronephrosis.  3. Prostate gland enlargement.    MANASA ALAN MD         7/1/2018  5:04 AM      Component Results     Component Value Ref Range & Units Status    Color Urine Yellow  Final    Appearance Urine Clear  Final    Glucose Urine Negative NEG^Negative mg/dL Final    Bilirubin Urine Negative NEG^Negative Final    Ketones Urine Negative NEG^Negative mg/dL Final    Specific Gravity Urine 1.015 1.003 - 1.035 Final    Blood Urine Negative NEG^Negative Final    pH Urine 7.0 5.0 - 7.0 pH Final    Protein Albumin Urine Negative NEG^Negative mg/dL Final    Urobilinogen mg/dL 2.0 0.0 - 2.0 mg/dL Final    Nitrite Urine Negative NEG^Negative Final    Leukocyte Esterase Urine Negative NEG^Negative Final    Source Midstream Urine  Final    WBC Urine 0 0 - 5 /HPF Final    RBC Urine 1 0 - 2 /HPF Final    Squamous Epithelial /HPF Urine <1 0 - 1 /HPF Final    Mucous Urine Present (A) NEG^Negative /LPF Final                Clinical Quality Measure: Blood Pressure Screening     Your blood pressure was checked while you were in the emergency department today. The last reading we obtained was  BP: (!) 199/114 . Please read the guidelines below about what these numbers mean and what you should do about them.  If your systolic blood pressure (the top number) is less than 120 and your diastolic blood pressure (the bottom  "number) is less than 80, then your blood pressure is normal. There is nothing more that you need to do about it.  If your systolic blood pressure (the top number) is 120-139 or your diastolic blood pressure (the bottom number) is 80-89, your blood pressure may be higher than it should be. You should have your blood pressure rechecked within a year by a primary care provider.  If your systolic blood pressure (the top number) is 140 or greater or your diastolic blood pressure (the bottom number) is 90 or greater, you may have high blood pressure. High blood pressure is treatable, but if left untreated over time it can put you at risk for heart attack, stroke, or kidney failure. You should have your blood pressure rechecked by a primary care provider within the next 4 weeks.  If your provider in the emergency department today gave you specific instructions to follow-up with your doctor or provider even sooner than that, you should follow that instruction and not wait for up to 4 weeks for your follow-up visit.        Thank you for choosing [facility]       Thank you for choosing [facility] for your care. Our goal is always to provide you with excellent care. Hearing back from our patients is one way we can continue to improve our services. Please take a few minutes to complete the written survey that you may receive in the mail after you visit with us. Thank you!        [employer] Information     [employer] lets you send messages to your doctor, view your test results, renew your prescriptions, schedule appointments and more. To sign up, go to [email or web address] . Click on \"Log in\" on the left side of the screen, which will take you to the Welcome page. Then click on \"Sign up Now\" on the right side of the page.     You will be asked to enter the access code listed below, as well as some personal information. Please follow the directions to create your username and password.     Your access code is: [email or web address]  Expires: " 2018  5:33 AM     Your access code will  in 90 days. If you need help or a new code, please call your [facility] clinic or [phone].        Care EveryWhere ID     This is your Care EveryWhere ID. This could be used by other organizations to access your [facility] medical records  [id number]        Equal Access to Services     JAMES OCAMPO : Jess Fisher, wajorge braswell, qaliliam welchalángel more, carlos escobar . So [facility] [phone].    ATENCIÓN: Si habla español, tiene a mcfadden disposición servicios gratuitos de asistencia lingüística. Llame al [phone].    We comply with applicable federal civil rights laws and [place] laws. We do not discriminate on the basis of race, color, national origin, age, disability, sex, sexual orientation, or gender identity.            After Visit Summary       This is your record. Keep this with you and show to your community pharmacist(s) and doctor(s) at your next visit.

## 2018-07-01 NOTE — ED AVS SNAPSHOT
[facility] Emergency Department    [address]    [facility] [address]    Phone:  [phone]    Fax:  [phone]                                       Federico Chamberlain   MRN: [id number]    Department:  [facility] Emergency Department   Date of Visit:  7/1/2018           After Visit Summary Signature Page     I have received my discharge instructions, and my questions have been answered. I have discussed any challenges I see with this plan with the nurse or doctor.    ..........................................................................................................................................  Patient/Patient Representative Signature      ..........................................................................................................................................  Patient Representative Print Name and Relationship to Patient    ..................................................               ................................................  Date                                            Time    ..........................................................................................................................................  Reviewed by Signature/Title    ...................................................              ..............................................  Date                                                            Time

## 2018-07-01 NOTE — IP AVS SNAPSHOT
MRN:[id number]                      After Visit Summary   7/1/2018    Federico Chamberlain    MRN: [id number]           Thank you!     Thank you for choosing [facility] for your care. Our goal is always to provide you with excellent care. Hearing back from our patients is one way we can continue to improve our services. Please take a few minutes to complete the written survey that you may receive in the mail after you visit. If you would like to speak to someone directly about your visit please contact Patient Relations at [phone]. Thank you!          Patient Information     Date Of Birth          1950        Designated Caregiver       Most Recent Value    Caregiver    Will someone help with your care after discharge? yes    Name of designated caregiver Beryl Oneal [ex-wife]    Phone number of caregiver [phone]    Caregiver address same as pt      About your hospital stay     You were admitted on:  July 1, 2018 You last received care in the:  [place] [address] [facility]    You were discharged on:  July 3, 2018       Who to Call     For medical emergencies, please call 911.  For non-urgent questions about your medical care, please call your primary care provider or clinic, [phone]          Attending Provider     Provider Specialty    Marin Wadsworth MD Emergency Medicine    [facility]       Primary Care Provider Office Phone # Fax #    Nathalie Rendon [phone]      After Care Instructions     Activity       Your activity upon discharge: activity as tolerated            Diet       Follow this diet upon discharge: I recommend clear liquid diet today and advance as tolerated over the next 24-48 hours.                  Follow-up Appointments     Follow-up and recommended labs and tests        Follow up with primary care provider, NATHALIE RENDON, within 7 days for hospital follow- up.  The following labs/tests are  "recommended: BMP and BP evaluation.  Please try the Voltaren gel and Lidoderm patches for pain control.  I am hopeful and optimistic that your abdominal pain will resolve with conservative management and supportive cares over the following few days.  If abdominal pain persists please see her primary care physician to assist with further evaluation.                  Pending Results     No orders found from 2018 to 2018.            Statement of Approval     Ordered          18 1547  I have reviewed and agree with all the recommendations and orders detailed in this document.  EFFECTIVE NOW     Approved and electronically signed by:  Cristobal Stevenson DO             Admission Information     Date & Time Provider Department Dept. Phone    2018 Mayank Barriga MD [place] [address] [facility] [phone]      Your Vitals Were     Blood Pressure Pulse Temperature Respirations Height Weight    159/88 (BP Location: Left arm) 53 97.9  F (36.6  C) (Oral) 16 1.956 m (6' 5\") 134.3 kg (296 lb 1.6 oz)    Pulse Oximetry BMI (Body Mass Index)                96% 35.11 kg/m2          [employer] Information     [employer] lets you send messages to your doctor, view your test results, renew your prescriptions, schedule appointments and more. To sign up, go to [email or web address] . Click on \"Log in\" on the left side of the screen, which will take you to the Welcome page. Then click on \"Sign up Now\" on the right side of the page.     You will be asked to enter the access code listed below, as well as some personal information. Please follow the directions to create your username and password.     Your access code is: [email or web address]  Expires: 2018  5:33 AM     Your access code will  in 90 days. If you need help or a new code, please call your [place] clinic or [phone].        Care EveryWhere ID     This is your Care EveryWhere ID. This could be used by other organizations to access your " [facility] medical records  [id number]        Equal Access to Services     JNGAYE TERRENCE : Hadii aad ku hadligiajuanita Sojuliano, waaxda luqadaha, qaybta kaalmada argenis, carlos benitezstephanieleland cavazos. So [facility] [phone].    ATENCIÓN: Si habla español, tiene a mcfadden disposición servicios gratuitos de asistencia lingüística. Llame al [phone].    We comply with applicable federal civil rights laws and [place] laws. We do not discriminate on the basis of race, color, national origin, age, disability, sex, sexual orientation, or gender identity.               Review of your medicines      START taking        Dose / Directions    amLODIPine 10 MG tablet   Commonly known as:  NORVASC   Used for:  Essential hypertension        Dose:  10 mg   Start taking on:  7/4/2018   Take 1 tablet (10 mg) by mouth daily   Quantity:  30 tablet   Refills:  1       diclofenac 1 % Gel topical gel   Commonly known as:  VOLTAREN        Dose:  2 g   Place 2 g onto the skin 3 times daily   Quantity:  1 Tube   Refills:  1       Lidocaine 4 % Patch   Commonly known as:  LIDOCARE        Dose:  1 patch   Place 1 patch onto the skin every 24 hours   Quantity:  10 patch   Refills:  1       losartan 100 MG tablet   Commonly known as:  COZAAR   Used for:  Essential hypertension        Dose:  100 mg   Start taking on:  7/4/2018   Take 1 tablet (100 mg) by mouth daily   Quantity:  30 tablet   Refills:  1         CONTINUE these medicines which have NOT CHANGED        Dose / Directions    ACETAMINOPHEN PO        Dose:  1000 mg   Take 1,000 mg by mouth nightly as needed for pain   Refills:  0       ASPIRIN PO        Dose:  81 mg   Take 81 mg by mouth daily   Refills:  0       ATORVASTATIN CALCIUM PO        Dose:  40 mg   Take 40 mg by mouth daily   Refills:  0       CHLORTHALIDONE PO        Dose:  25 mg   Take 25 mg by mouth daily   Refills:  0       HYDROcodone-acetaminophen 5-325 MG per tablet   Commonly known as:  NORCO        Dose:  1 tablet    Take 1 tablet by mouth every 4 hours as needed for pain   Quantity:  10 tablet   Refills:  0       IBUPROFEN PO        Take by mouth every 8 hours as needed for moderate pain   Refills:  0       IMITREX PO        Dose:  100 mg   Take 100 mg by mouth as needed for migraine   Refills:  0       KLOR-CON 10 MEQ tablet   Generic drug:  potassium chloride        Dose:  10 mEq   Take 10 mEq by mouth daily   Refills:  0       METFORMIN HCL PO        Dose:  500 mg   Take 500 mg by mouth 2 times daily (with meals)   Refills:  0       MIRAPEX PO        Dose:  0.5 mg   Take 0.5 mg by mouth At Bedtime   Refills:  0       VITAMIN D (CHOLECALCIFEROL) PO        Dose:  2000 Units   Take 2,000 Units by mouth daily   Refills:  0         STOP taking     LISINOPRIL PO           TOPROL XL PO                Where to get your medicines      These medications were sent to [facility] [place], [place] - [address] [address]  [address] [facility] [address]     Phone:  [phone]     amLODIPine 10 MG tablet    diclofenac 1 % Gel topical gel    Lidocaine 4 % Patch    losartan 100 MG tablet                Protect others around you: Learn how to safely use, store and throw away your medicines at www.disposemymeds.org.             Medication List: This is a list of all your medications and when to take them. Check marks below indicate your daily home schedule. Keep this list as a reference.      Medications           Morning Afternoon Evening Bedtime As Needed    ACETAMINOPHEN PO   Take 1,000 mg by mouth nightly as needed for pain   Last time this was given:  975 mg on 7/3/2018 10:11 AM   Next Dose Due:  Take as directed.                                   amLODIPine 10 MG tablet   Commonly known as:  NORVASC   Take 1 tablet (10 mg) by mouth daily   Start taking on:  7/4/2018   Last time this was given:  10 mg on 7/3/2018 10:12 AM   Next Dose Due:  Tomorrow morning, 7/4.                                   ASPIRIN  PO   Take 81 mg by mouth daily   Last time this was given:  Not given during this hospital stay.   Next Dose Due:  Take as directed.                                ATORVASTATIN CALCIUM PO   Take 40 mg by mouth daily   Last time this was given:  Not given during this hospital stay.   Next Dose Due:  Take as directed.                                CHLORTHALIDONE PO   Take 25 mg by mouth daily   Last time this was given:  25 mg on 7/3/2018 10:12 AM   Next Dose Due:  Tomorrow morning, 7/4.                                   diclofenac 1 % Gel topical gel   Commonly known as:  VOLTAREN   Place 2 g onto the skin 3 times daily   Last time this was given:  Not given during this hospital stay.   Next Dose Due:  Take as directed.                                         HYDROcodone-acetaminophen 5-325 MG per tablet   Commonly known as:  NORCO   Take 1 tablet by mouth every 4 hours as needed for pain   Last time this was given:  Not given during this hospital stay.   Next Dose Due:  Take as directed.                                   IBUPROFEN PO   Take by mouth every 8 hours as needed for moderate pain   Last time this was given:  Not given during this hospital stay.   Next Dose Due:  Take as directed.                                IMITREX PO   Take 100 mg by mouth as needed for migraine   Last time this was given:  Not given during this hospital stay.   Next Dose Due:  Take as directed.                                KLOR-CON 10 MEQ tablet   Take 10 mEq by mouth daily   Generic drug:  potassium chloride   Last time this was given:  Not given during this hospital stay.   Next Dose Due:  Take as directed.                                Lidocaine 4 % Patch   Commonly known as:  LIDOCARE   Place 1 patch onto the skin every 24 hours   Last time this was given:  Not given during this hospital stay.   Next Dose Due:  This evening, 7/3.                                   losartan 100 MG tablet   Commonly known as:  COZAAR   Take 1  tablet (100 mg) by mouth daily   Start taking on:  7/4/2018   Last time this was given:  100 mg on 7/3/2018 10:12 AM   Next Dose Due:  Tomorrow morning, 7/4.                                   METFORMIN HCL PO   Take 500 mg by mouth 2 times daily (with meals)   Last time this was given:  Not given during this hospital stay.   Next Dose Due:  Take as directed.                                      MIRAPEX PO   Take 0.5 mg by mouth At Bedtime   Last time this was given:  0.5 mg on 7/2/2018  9:16 PM   Next Dose Due:  Tonight at bedtime.                                   VITAMIN D (CHOLECALCIFEROL) PO   Take 2,000 Units by mouth daily   Last time this was given:  Not given during this hospital stay.   Next Dose Due:  Take as directed.                                          More Information        * Abdominal Pain,Uncertain Cause [Male]  Based on your visit today, the exact cause of your abdominal (stomach) pain is not clear. Your exam and tests do not indicate a dangerous cause at this time. However, the signs of a serious problem may take more time to appear. Although your exam was reassuring today, sometimes early in the course of many conditions, exam and lab tests can appear normal. Therefore, it is important for you to watch for any new symptoms or worsening of your condition.  Causes:  It may not be obvious what caused your symptoms. Pay attention to things that do seem to make your symptoms worse or better and discuss this with your doctor when you follow up.  Diagnosis:  The evaluation of abdominal pain in the emergency department may only require an exam by the doctor or it may include blood, urine or imaging studies, depending on many factors. Sometimes exams and tests can identify a cause but in many cases, a clear cause is not found. Further testing at follow up visits may help to suggest a clear diagnosis.  Home Care:    Rest as much as possible until your next exam.    Try to avoid any medications (unless  otherwise directed by your doctor), foods, activities, or other factors that you may have contributed to your symptoms.    Try to eat foods that you know that you have tolerated well in the past. Certain diets may be recommended for some conditions that cause abdominal pain. However, since the cause of your symptoms may not be clear, discuss your diet more with your primary care provider or specialist for further recommendations.     Eating several small meals per day as opposed to 2 or 3 larger meals may help.    Monitor closely for anything that may make your symptoms worse or better. Pay close attention to symptoms below that may indicate worsening of your condition.  Follow Up And Precautions:  See your doctor or this facility as instructed (or sooner, if your symptoms are not improving). In some cases, you may need more testing.  Contact Your Doctor Or Seek Medical Attention  if any of the following occur:    Pain is becoming worse    You are unable to take your medications because of too much vomiting    Swelling of the abdomen    Fever of 101 F (38.3 C) or higher, or as directed by your health care provider    Blood in vomit or bowel movements (dark red or black color)    Jaundice (yellow color of eyes and skin)    New onset of weakness, dizziness or fainting    New onset of chest, arm, back, neck or jaw pain    4962-1837 [employer]. [address] [place] [address], [place] [address]. All rights reserved. This information is not intended as a substitute for professional medical care. Always follow your healthcare professional's instructions.  This information has been modified by your health care provider with permission from the publisher.                Understanding the Pain Response  Your pain is important. It can slow healing and keep you from being active. You may have acute or chronic pain. Both types of pain respond to treatment. Work with your healthcare professional. Together you can find  relief.  Types of pain  Acute pain is caused by a health problem or injury. The pain usually goes away when its cause is treated. You may have pain:    From an illness or injury that needs emergency care    After an operation, such as heart surgery    During and after the birth of your baby  Chronic pain lasts 3 to 6 months or more. It can be caused by a health problem or injury, like arthritis or a shoulder strain. Chronic pain can also exist without a clear cause.  Your perception of pain  Pain is a complex phenomenon that involves many of the chemicals found naturally in the spinal cord and brain. All pain signals travel to the brain. The brain sends back signals to protect the body. The brain also makes its own painkillers (endorphins). These can help reduce the pain.     1. Pain starts in 1 or more parts of the body. In some cases, the site of the pain is far from its source.  2. Pain signals move through nerves and up the spinal cord.  3. The brain reads the signals as pain. Natural painkillers are released.  4. The feeling of pain can be reduced in this way.  Date Last Reviewed: 5/1/2017 2000-2017 [employer]. [address] [place] [address]. All rights reserved. This information is not intended as a substitute for professional medical care. Always follow your healthcare professional's instructions.                Complementary Care for Pain  You may find pain relief with complementary care. Look for a licensed or certified professional. And always tell your healthcare professional that you are using complementary care.    Massage  Massage can increase circulation and relaxation. This can help relieve stress and pain.  Biofeedback  Biofeedback uses instruments to measure the body's physiological activity, like heart rate and muscle activity. This information is used to help you learn to control certain functions, such as relaxing muscles and helping reduce  pain.   Chiropractic  Chiropractic adjusts the spine and joints. It may help reduce back, neck, or joint pain. Chiropractic may also use mild electrical stimulation, massage, heat, or ultrasound (sound waves).  Acupuncture  Acupuncture uses thin needles to help treat pain. The treatment may release the body s own painkillers.  Distraction  Distraction helps you focus on something besides pain. Try reading a book, watching a movie, or talking with family. Or visit a local attraction.  Meditation  Meditation helps you focus on words, objects, or ideas. Doing this can calm you and decrease stress.  Relaxation  Relaxation includes methods like listening to soothing music or relaxation tapes. You might try slow, deep breathing. Imagine a calm scene, like an ocean or mountain, as you breathe.  Date Last Reviewed: 5/1/2017 2000-2017 [employer]. [address] [place] [address]. All rights reserved. This information is not intended as a substitute for professional medical care. Always follow your healthcare professional's instructions.                Medicine for Pain  Medicines can help to block pain, decrease inflammation, and treat related problems. More than one medicine may be used to treat your pain. Medicines may be changed as you feel better, or if they cause side effects.  Medicines What they do Possible side effects   Non-opioid NSAIDs, aspirin, acetaminophen Reduce pain chemicals at the site of pain. NSAIDs can reduce joint and soft tissue inflammation. Nausea, stomach pain, ulcers, indigestion, bleeding, kidney, and liver problems. Certain NSAIDs may increase the risk for cardiovascular disease in some people. Talk with your healthcare provider.   Opioids (morphine and similar medicines often called narcotics) Reduce feelings or perception of pain. Used for moderate to severe pain. Nausea, vomiting, itching, drowsiness, constipation, slowed breathing   Other medicines (corticosteroids,  antinausea, antidepressant, and antiseizure medicines) Reduce swelling, burning or tingling pain, or certain side effects of pain medicines, such as nausea or vomiting Your healthcare provider will explain the possible side effects of these medicines.   Anesthetics (local, injected) include lidocaine, benzocaine, and medicines used by anesthesiologists Stop pain signals from reaching the brain by blocking feeling in the treated area Nausea, low blood pressure, fever, slowed breathing, fainting, seizures, heart attack   When to call your healthcare provider  Call your healthcare provider right away (or have a family member call) if you have:    Unrelieved pain    Side effects, including constipation or uncontrolled nausea, that interfere with daily activities  If you have extreme sleepiness or breathing problems, call 911.   Other precautions    Ask your healthcare provider or pharmacist how to get rid of your pain medicines safely when you stop using them.    Never share your pain medicines with anyone.    Store your medicines in a safe place so they can t be stolen. If you think your medicine has been stolen or lost, tell your healthcare provider right away.  Date Last Reviewed: 5/1/2017 2000-2017 [employer]. [address] [place] [address]. All rights reserved. This information is not intended as a substitute for professional medical care. Always follow your healthcare professional's instructions.                Acute Pain, Uncertain Cause  Pain can be caused by many conditions that range from very minor to very serious. In some cases, though, pain comes and goes with no apparent cause.  We were not able to find the exact cause for your pain. At this time there is no sign of any serious illness causing your pain. More tests may be needed to determine the cause. In many cases, pain like this goes away by itself.  Home care  Take any medicines as prescribed. If another medicine was not  prescribed for pain, you can take an over-the-counter pain medicine such as ibuprofen or acetaminophen. Use these as directed on the label.    Follow-up care  Follow up with your healthcare provider or our staff as directed.  When to seek medical advice  Call your healthcare provider for any of the following:    Pain changes in pattern    Pain doesn't lessen or gets worse    New symptoms appear    Fever of 100.4 F (38 C) or higher, or as directed by your healthcare provider  Date Last Reviewed: 7/26/2015 2000-2017 [employer]. [address] [place] [address]. All rights reserved. This information is not intended as a substitute for professional medical care. Always follow your healthcare professional's instructions.

## 2018-07-01 NOTE — ED TRIAGE NOTES
Pt reports left flank pain starting yesterday, got much worse today. Pt tried half an old percocet with no relief. Pt is nauseated, no vomiting.

## 2018-07-02 ENCOUNTER — APPOINTMENT (OUTPATIENT)
Dept: CT IMAGING | Facility: CLINIC | Age: 68
DRG: 392 | End: 2018-07-02
Attending: INTERNAL MEDICINE
Payer: MEDICARE

## 2018-07-02 PROBLEM — Z71.89 ACP (ADVANCE CARE PLANNING): Chronic | Status: ACTIVE | Noted: 2018-07-02

## 2018-07-02 LAB
ALBUMIN SERPL-MCNC: 3 G/DL (ref 3.4–5)
ALP SERPL-CCNC: 66 U/L (ref 40–150)
ALT SERPL W P-5'-P-CCNC: 23 U/L (ref 0–70)
ANION GAP SERPL CALCULATED.3IONS-SCNC: 4 MMOL/L (ref 3–14)
AST SERPL W P-5'-P-CCNC: 15 U/L (ref 0–45)
BASOPHILS # BLD AUTO: 0.1 10E9/L (ref 0–0.2)
BASOPHILS NFR BLD AUTO: 0.8 %
BILIRUB SERPL-MCNC: 0.6 MG/DL (ref 0.2–1.3)
BUN SERPL-MCNC: 16 MG/DL (ref 7–30)
CALCIUM SERPL-MCNC: 7.6 MG/DL (ref 8.5–10.1)
CHLORIDE SERPL-SCNC: 107 MMOL/L (ref 94–109)
CHOLEST SERPL-MCNC: 92 MG/DL
CO2 SERPL-SCNC: 30 MMOL/L (ref 20–32)
CREAT SERPL-MCNC: 0.73 MG/DL (ref 0.66–1.25)
DIFFERENTIAL METHOD BLD: NORMAL
EOSINOPHIL # BLD AUTO: 0.3 10E9/L (ref 0–0.7)
EOSINOPHIL NFR BLD AUTO: 4.4 %
ERYTHROCYTE [DISTWIDTH] IN BLOOD BY AUTOMATED COUNT: 13.5 % (ref 10–15)
GFR SERPL CREATININE-BSD FRML MDRD: >90 ML/MIN/1.7M2
GLUCOSE BLDC GLUCOMTR-MCNC: 83 MG/DL (ref 70–99)
GLUCOSE BLDC GLUCOMTR-MCNC: 89 MG/DL (ref 70–99)
GLUCOSE BLDC GLUCOMTR-MCNC: 91 MG/DL (ref 70–99)
GLUCOSE BLDC GLUCOMTR-MCNC: 92 MG/DL (ref 70–99)
GLUCOSE BLDC GLUCOMTR-MCNC: 98 MG/DL (ref 70–99)
GLUCOSE BLDC GLUCOMTR-MCNC: 99 MG/DL (ref 70–99)
GLUCOSE SERPL-MCNC: 91 MG/DL (ref 70–99)
HBA1C MFR BLD: 6.1 % (ref 0–5.6)
HCT VFR BLD AUTO: 43 % (ref 40–53)
HDLC SERPL-MCNC: 32 MG/DL
HGB BLD-MCNC: 14.3 G/DL (ref 13.3–17.7)
IMM GRANULOCYTES # BLD: 0 10E9/L (ref 0–0.4)
IMM GRANULOCYTES NFR BLD: 0.3 %
INTERPRETATION ECG - MUSE: NORMAL
LDLC SERPL CALC-MCNC: <1 MG/DL
LIPASE SERPL-CCNC: 136 U/L (ref 73–393)
LYMPHOCYTES # BLD AUTO: 2.5 10E9/L (ref 0.8–5.3)
LYMPHOCYTES NFR BLD AUTO: 33.5 %
MCH RBC QN AUTO: 28.5 PG (ref 26.5–33)
MCHC RBC AUTO-ENTMCNC: 33.3 G/DL (ref 31.5–36.5)
MCV RBC AUTO: 86 FL (ref 78–100)
MONOCYTES # BLD AUTO: 0.6 10E9/L (ref 0–1.3)
MONOCYTES NFR BLD AUTO: 7.4 %
NEUTROPHILS # BLD AUTO: 4 10E9/L (ref 1.6–8.3)
NEUTROPHILS NFR BLD AUTO: 53.6 %
NONHDLC SERPL-MCNC: 60 MG/DL
NRBC # BLD AUTO: 0 10*3/UL
NRBC BLD AUTO-RTO: 0 /100
PLATELET # BLD AUTO: 183 10E9/L (ref 150–450)
POTASSIUM SERPL-SCNC: 3.2 MMOL/L (ref 3.4–5.3)
POTASSIUM SERPL-SCNC: 4 MMOL/L (ref 3.4–5.3)
PROT SERPL-MCNC: 5.6 G/DL (ref 6.8–8.8)
RBC # BLD AUTO: 5.02 10E12/L (ref 4.4–5.9)
SODIUM SERPL-SCNC: 141 MMOL/L (ref 133–144)
TRIGL SERPL-MCNC: 297 MG/DL
WBC # BLD AUTO: 7.4 10E9/L (ref 4–11)

## 2018-07-02 PROCEDURE — 12000000 ZZH R&B MED SURG/OB

## 2018-07-02 PROCEDURE — A9270 NON-COVERED ITEM OR SERVICE: HCPCS | Mod: GY | Performed by: INTERNAL MEDICINE

## 2018-07-02 PROCEDURE — 80061 LIPID PANEL: CPT | Performed by: INTERNAL MEDICINE

## 2018-07-02 PROCEDURE — A9270 NON-COVERED ITEM OR SERVICE: HCPCS | Mod: GY | Performed by: HOSPITALIST

## 2018-07-02 PROCEDURE — 99233 SBSQ HOSP IP/OBS HIGH 50: CPT | Performed by: HOSPITALIST

## 2018-07-02 PROCEDURE — 25000132 ZZH RX MED GY IP 250 OP 250 PS 637: Mod: GY | Performed by: HOSPITALIST

## 2018-07-02 PROCEDURE — 80053 COMPREHEN METABOLIC PANEL: CPT | Performed by: INTERNAL MEDICINE

## 2018-07-02 PROCEDURE — 25000128 H RX IP 250 OP 636: Performed by: INTERNAL MEDICINE

## 2018-07-02 PROCEDURE — 25000132 ZZH RX MED GY IP 250 OP 250 PS 637: Mod: GY | Performed by: INTERNAL MEDICINE

## 2018-07-02 PROCEDURE — 36415 COLL VENOUS BLD VENIPUNCTURE: CPT | Performed by: HOSPITALIST

## 2018-07-02 PROCEDURE — 74160 CT ABDOMEN W/CONTRAST: CPT

## 2018-07-02 PROCEDURE — 83690 ASSAY OF LIPASE: CPT | Performed by: INTERNAL MEDICINE

## 2018-07-02 PROCEDURE — 25000128 H RX IP 250 OP 636: Performed by: HOSPITALIST

## 2018-07-02 PROCEDURE — 74178 CT ABD&PLV WO CNTR FLWD CNTR: CPT

## 2018-07-02 PROCEDURE — 00000146 ZZHCL STATISTIC GLUCOSE BY METER IP

## 2018-07-02 PROCEDURE — 85025 COMPLETE CBC W/AUTO DIFF WBC: CPT | Performed by: INTERNAL MEDICINE

## 2018-07-02 PROCEDURE — 84132 ASSAY OF SERUM POTASSIUM: CPT | Performed by: HOSPITALIST

## 2018-07-02 RX ORDER — PRAMIPEXOLE DIHYDROCHLORIDE 0.5 MG/1
0.5 TABLET ORAL AT BEDTIME
Status: DISCONTINUED | OUTPATIENT
Start: 2018-07-02 | End: 2018-07-03 | Stop reason: HOSPADM

## 2018-07-02 RX ORDER — CHLORTHALIDONE 25 MG/1
25 TABLET ORAL DAILY
Status: DISCONTINUED | OUTPATIENT
Start: 2018-07-02 | End: 2018-07-03 | Stop reason: HOSPADM

## 2018-07-02 RX ORDER — MORPHINE SULFATE 4 MG/ML
2-4 INJECTION, SOLUTION INTRAMUSCULAR; INTRAVENOUS
Status: DISCONTINUED | OUTPATIENT
Start: 2018-07-02 | End: 2018-07-03 | Stop reason: HOSPADM

## 2018-07-02 RX ORDER — IOPAMIDOL 755 MG/ML
100 INJECTION, SOLUTION INTRAVASCULAR ONCE
Status: COMPLETED | OUTPATIENT
Start: 2018-07-02 | End: 2018-07-02

## 2018-07-02 RX ORDER — POTASSIUM CHLORIDE 750 MG/1
20 TABLET, EXTENDED RELEASE ORAL DAILY
COMMUNITY
End: 2023-01-01

## 2018-07-02 RX ORDER — PRAMIPEXOLE DIHYDROCHLORIDE 1 MG/1
3 TABLET ORAL AT BEDTIME
COMMUNITY

## 2018-07-02 RX ORDER — PRAMIPEXOLE DIHYDROCHLORIDE 0.25 MG/1
0.5 TABLET ORAL
Status: DISCONTINUED | OUTPATIENT
Start: 2018-07-02 | End: 2018-07-02

## 2018-07-02 RX ORDER — LOSARTAN POTASSIUM 50 MG/1
50 TABLET ORAL DAILY
Status: DISCONTINUED | OUTPATIENT
Start: 2018-07-02 | End: 2018-07-03

## 2018-07-02 RX ADMIN — Medication 0.5 MG: at 00:52

## 2018-07-02 RX ADMIN — MORPHINE SULFATE 4 MG: 4 INJECTION INTRAVENOUS at 21:21

## 2018-07-02 RX ADMIN — CHLORTHALIDONE 25 MG: 25 TABLET ORAL at 10:43

## 2018-07-02 RX ADMIN — SODIUM CHLORIDE, PRESERVATIVE FREE 65 ML: 5 INJECTION INTRAVENOUS at 16:29

## 2018-07-02 RX ADMIN — IOPAMIDOL 100 ML: 755 INJECTION, SOLUTION INTRAVENOUS at 16:29

## 2018-07-02 RX ADMIN — OXYCODONE HYDROCHLORIDE 10 MG: 5 TABLET ORAL at 04:59

## 2018-07-02 RX ADMIN — PRAMIPEXOLE DIHYDROCHLORIDE 0.5 MG: 0.25 TABLET ORAL at 00:57

## 2018-07-02 RX ADMIN — SODIUM CHLORIDE: 9 INJECTION, SOLUTION INTRAVENOUS at 19:59

## 2018-07-02 RX ADMIN — HYDRALAZINE HYDROCHLORIDE 10 MG: 20 INJECTION INTRAMUSCULAR; INTRAVENOUS at 00:59

## 2018-07-02 RX ADMIN — Medication 1 MG: at 21:21

## 2018-07-02 RX ADMIN — Medication 1 MG: at 00:58

## 2018-07-02 RX ADMIN — MORPHINE SULFATE 4 MG: 4 INJECTION INTRAVENOUS at 12:31

## 2018-07-02 RX ADMIN — SODIUM CHLORIDE: 9 INJECTION, SOLUTION INTRAVENOUS at 00:55

## 2018-07-02 RX ADMIN — LOSARTAN POTASSIUM 50 MG: 50 TABLET ORAL at 10:43

## 2018-07-02 RX ADMIN — Medication 0.5 MG: at 06:01

## 2018-07-02 RX ADMIN — PRAMIPEXOLE DIHYDROCHLORIDE 0.5 MG: 0.5 TABLET ORAL at 21:16

## 2018-07-02 RX ADMIN — Medication 10 MEQ: at 10:45

## 2018-07-02 RX ADMIN — Medication 10 MEQ: at 12:04

## 2018-07-02 RX ADMIN — Medication 10 MEQ: at 14:27

## 2018-07-02 RX ADMIN — SENNOSIDES AND DOCUSATE SODIUM 1 TABLET: 8.6; 5 TABLET ORAL at 21:21

## 2018-07-02 RX ADMIN — OXYCODONE HYDROCHLORIDE 10 MG: 5 TABLET ORAL at 20:32

## 2018-07-02 RX ADMIN — MORPHINE SULFATE 2 MG: 4 INJECTION INTRAVENOUS at 09:33

## 2018-07-02 RX ADMIN — Medication 10 MEQ: at 13:11

## 2018-07-02 RX ADMIN — Medication 0.5 MG: at 04:05

## 2018-07-02 RX ADMIN — SODIUM CHLORIDE: 9 INJECTION, SOLUTION INTRAVENOUS at 07:21

## 2018-07-02 ASSESSMENT — PAIN DESCRIPTION - DESCRIPTORS: DESCRIPTORS: CRAMPING

## 2018-07-02 ASSESSMENT — ACTIVITIES OF DAILY LIVING (ADL)
ADLS_ACUITY_SCORE: 15
ADLS_ACUITY_SCORE: 9

## 2018-07-02 NOTE — ED PROVIDER NOTES
"  History     Chief Complaint:  Abdominal Pain    HPI   Federico Chamberlain is a 67 year old male with a medical history of hypertension who presents with abdominal pain. The patient reports an onset of localized left upper quadrant pain that he describes as sharp, and would intermittently get a \"jolt\" of pain. He was seen last night in the emergency department for these symptoms and had a CT abdomen pelvis, as well as basic laboratory studies including lactic acid and urine specimen were obtained. Findings are noted below. He was given Toradol, IV fluids, and morphine during his stay. He was discharged home with a prescription for Norco, but states that it has not helped his pain. Patient currently has some nausea but has not vomited. He notes he hasn't had a bowel movement in 1.5 days but states he does not feel like he has to go and has decreased appetite. No known alleviating or exacerbating factors. Denies urinary symptoms. Of note, his blood pressure has been in 140s systolic and had an appointment with his primary care doctor 2 weeks ago and states his blood pressure was high between 160-170s systolic without any pain. He also states his metoprolol was adjusted.    ED Visit laboratory and CT findings (06/30/18)  CT Abdomen/Pelvis w/o IV contrast-stone protocol:   1. No acute abnormality. No bowel obstruction or inflammation.  2. Single right renal stone. No ureteral stone or hydronephrosis.  3. Prostate gland enlargement. As per radiology.      UA with micro: Mucous Present o/w negative  CBC: WBC: 10.1, HGB: 15.9, PLT: 209  CMP: Potassium 3.3 (L), Creatinine 0.65 (L), Protein Total 6.6 (L), o/w WNL   Lipase: 92  Lactic acid: 2.0    Allergies:  No known drug allergies     Medications:    Atorvastatin calcium  Lisinopril  Metoprolol tartrate    Past Medical History:    Hypercholesterolemia   Hypertension    Past Surgical History:    Appendectomy  Back surgery  Orthopedic surgery    Family History:    History " "reviewed. No pertinent family history.      Social History:  Smoking status: Never smoker  Alcohol use: No   Marital Status:   [4]  PCP: Nathalie Quiroz      Review of Systems   Constitutional: Positive for appetite change.   Gastrointestinal: Positive for abdominal pain and nausea. Negative for vomiting.   Genitourinary: Negative for difficulty urinating, dysuria, flank pain, frequency and hematuria.   All other systems reviewed and are negative.    Physical Exam   BP (!) 187/98  Pulse 59  Temp 97.7  F (36.5  C) (Oral)  Resp 20  Ht 1.956 m (6' 5\")  Wt 131.5 kg (290 lb)  SpO2 100%  BMI 34.39 kg/m2      BP (!) 191/103  Pulse 59  Temp 97.7  F (36.5  C) (Oral)  Resp 20  Ht 1.956 m (6' 5\")  Wt 131.5 kg (290 lb)  SpO2 100%  BMI 34.39 kg/m2     Physical Exam  General: The patient is alert, in no respiratory distress.    HENT: Mucous membranes moist.    Cardiovascular: Regular rate and rhythm. Good pulses in all four extremities. Normal capillary refill and skin turgor.     Respiratory: Lungs are clear. No nasal flaring. No retractions. No wheezing, no crackles.    Gastrointestinal: Left upper quadrant tenderness. No guarding, no rebound. No palpable hernias.     Musculoskeletal: No gross deformity.     Skin: No rashes or petechiae.     Neurologic: The patient is alert and oriented x3. GCS 15. No testable cranial nerve deficit. Follows commands with clear and appropriate speech. Gives appropriate answers. Good strength in all extremities. No gross neurologic deficit. Gross sensation intact. Pupils are round and reactive. No meningismus.     Lymphatic: No cervical adenopathy. No lower extremity swelling.    Psychiatric: The patient is non-tearful.     Emergency Department Course   ECG (21:19:04)  Sinus bradycardia. Right bundle branch block. Left anterior fascicular block. Bifascicular block. Moderate voltage criteria for LVH, may be normal variant.   Agree with computer interpretation.   Read at 2121.   Rate " 56 bpm. AR interval 198. QRS duration 164. QT/QTc 528/509. P-R-T axes 34 -59 -48.     Imaging:  Radiographic findings were communicated with the patient who voiced understanding of the findings.  US Abdomen Limited  Negative abdominal ultrasound.  As read by Radiology.     Laboratory:  CBC: WNL (WBC 8.9, HGB 15.9, )   CMP: Glucose 102 (H), Calcium 8.0 (L), Protein total 6.6 (L) (Creatinine 0.80)  Lipase: 756 (H)  Alcohol ethyl: <0.01  Troponin (2127): 0.03  UA: pH 8.0 (L), Mucous Present    Interventions:  2122: Lactated ringers bolus 1L IV  2122: Zofran 4 mg IV  2122: GI cocktail 30 mLs oral  2228: Toradol 30 mg IV    Emergency Department Course:  Past medical records, nursing notes, and vitals reviewed.  2057: I performed an exam of the patient and obtained history, as documented above.   2119: ECG obtained, findings as noted above.    2127: IV inserted and blood drawn for basic laboratory. Troponin obtained. Results as noted above.     Patient was given the above medications here in the emergency department.   The patient was sent for a US abdomen while in the emergency department, findings above.   2212: Lactated ringers bolus 1L IV given.  2245: I discussed the case with Dr. Barriga, hospitalist service who accepts the patient for further care, monitoring, and treatment.     Impression & Plan    Medical Decision Making:  The patient was evaluated last night for abdominal pain. It was felt to be more flank, he did have a kidney stone inside the right kidney. There were no signs of any ureteral stones, diverticulitis, or other issues. He was sent home with pain medication. He reports pain is continued. He's had nausea but no vomiting. I question about alcohol and did ultrasound him considering pancreatitis. He in fact does have pancreatitis here but no signs of alcoholic induced nor traumatic nor infective. Otherwise he is stable. Due to his need for pain control and continued IV fluids, he was admitted  to the hospital in good condition.     Diagnosis:    ICD-10-CM   1. Acute pancreatitis, unspecified complication status, unspecified pancreatitis type K85.90   2. Essential hypertension I10     Disposition:  Admitted to the hospitalist service under the care of Dr. Barriga.    Ami Burton  7/1/2018   [facility] EMERGENCY DEPARTMENT  I, Ami Burton, am serving as a scribe at 8:57 PM on 7/1/2018 to document services personally performed by Marin Wadsworth MD based on my observations and the provider's statements to me.       Marin Wadsworth MD  07/02/18 2128

## 2018-07-02 NOTE — CONSULTS
GASTROENTEROLOGY CONSULTATION      Federico Chamberlain  [id number] [facility] [address]  67 year old male     Admission Date/Time: 7/1/2018  Primary Care Provider: Nathalie Quiroz  Referring / Attending Physician:  Dr. Barriga     We were asked to see the patient in consultation by Dr. Barriga for evaluation of abdominal pain.        HPI:  Federico Chamberlain is a 67 year old male with medical history of diabetes mellitus, hypertension, hyperlipidemia, and depression who presents to the hospital with ongoing left upper quadrant abdominal pain.    Patient reports he has been experiencing intermittent pain in his upper abdomen for 2-3 days.  He did go to the emergency room for the first time 2 days ago.  A noncontrast CT scan looking for kidney stones was largely unremarkable.  He then had ultrasound without any gallbladder findings and no biliary dilation.  There was mention of moderate fatty liver disease.  LFTs are normal.  He was sent home but then returned back to the emergency room with more severe abdominal pain and nausea.  Repeat evaluation did show an elevated lipase at 756.  This has since returned to normal.  No fevers or chills.  No diarrhea.  He does tend towards constipation.  Does not smoke.  He estimates to servings of alcohol per month.  Triglycerides have been mildly elevated in the past.  He has been requiring IV pain medication.  No previous episodes of pancreatitis.  He denies any history of gallbladder disease.       PAST MEDICAL HISTORY:  Patient Active Problem List    Diagnosis Date Noted     Abdominal pain 07/01/2018     Priority: Medium          ROS: A comprehensive ten point review of systems was negative aside from those in mentioned in the HPI.       MEDICATIONS:   Prior to Admission medications    Medication Sig Start Date End Date Taking? Authorizing Provider   ACETAMINOPHEN PO Take 1,000 mg by mouth nightly as needed for pain   Yes Unknown, Entered By History   ASPIRIN PO Take 81 mg by mouth  "daily   Yes Unknown, Entered By History   ATORVASTATIN CALCIUM PO Take 40 mg by mouth daily    Yes Reported, Patient   CHLORTHALIDONE PO Take 25 mg by mouth daily   Yes Unknown, Entered By History   HYDROcodone-acetaminophen (NORCO) 5-325 MG per tablet Take 1 tablet by mouth every 4 hours as needed for pain 7/1/18  Yes Cristobal Unger MD   IBUPROFEN PO Take by mouth every 8 hours as needed for moderate pain   Yes Unknown, Entered By History   LISINOPRIL PO Take 40 mg by mouth daily    Yes Reported, Patient   METFORMIN HCL PO Take 500 mg by mouth 2 times daily (with meals)    Yes Reported, Patient   Metoprolol Succinate (TOPROL XL PO) Take 100 mg by mouth daily   Yes Unknown, Entered By History   potassium chloride (KLOR-CON) 10 MEQ tablet Take 10 mEq by mouth daily   Yes Unknown, Entered By History   Pramipexole Dihydrochloride (MIRAPEX PO) Take 0.5 mg by mouth At Bedtime   Yes Unknown, Entered By History   SUMAtriptan Succinate (IMITREX PO) Take 100 mg by mouth as needed for migraine    Yes Unknown, Entered By History   VITAMIN D, CHOLECALCIFEROL, PO Take 2,000 Units by mouth daily   Yes Unknown, Entered By History        ALLERGIES: No Known Allergies     SOCIAL HISTORY:  Social History   Substance Use Topics     Smoking status: Never Smoker     Smokeless tobacco: Never Used     Alcohol use No        FAMILY HISTORY: Non contributory        PHYSICAL EXAM:     /78  Pulse 50  Temp 96.4  F (35.8  C) (Oral)  Resp 16  Ht 1.956 m (6' 5\")  Wt 134.3 kg (296 lb 1.6 oz)  SpO2 96%  BMI 35.11 kg/m2     PHYSICAL EXAM:  GENERAL: NAD  SKIN: no suspicious lesions, rashes, jaundice  HEAD: Normocephalic. Atraumatic.  NECK: Neck supple. No adenopathy.   EYES: No scleral icterus  RESPIRATORY: Good transmission. CTA bilaterally.   CARDIOVASCULAR: RRR, normal S1, S2,  No murmur appreciated  GASTROINTESTINAL: +BS, soft, mild LUQ TTP, non distended, no guarding/rebound  JOINT/EXTREMITIES:  no gross deformities noted, normal " muscle tone  NEURO: CN 2-12 grossly intact, no focal deficits  PSYCH: Normal affect       ADDITIONAL COMMENTS:   I reviewed the patient's new clinical lab test results.   Recent Labs   Lab Test  07/02/18 0636 07/01/18 2128 07/01/18   0345   WBC  7.4  8.9  10.1   HGB  14.3  15.9  15.9   MCV  86  86  85   PLT  183  220  209     Recent Labs   Lab Test  07/02/18 0636 07/01/18 2128 07/01/18   0345   POTASSIUM  3.2*  3.5  3.3*   CHLORIDE  107  106  107   CO2  30  29  27   BUN  16  16  16   ANIONGAP  4  7  8     Recent Labs   Lab Test  07/02/18 0636 07/01/18 2228 07/01/18 2128 07/01/18 0448  07/01/18   0345   ALBUMIN  3.0*   --   3.5   --   3.5   BILITOTAL  0.6   --   0.4   --   0.4   ALT  23   --   27   --   27   AST  15   --   18   --   15   PROTEIN   --   Negative   --   Negative   --    LIPASE  136   --   756*   --   92        IMAGING / ENDOSCOPY    ULTRASOUND ABDOMEN LIMITED  7/1/2018 10:16 PM   FINDINGS:  Liver is moderately fatty infiltrated as evidenced by a  diffuse increase in echogenicity without focal lesions. Gallbladder is  normal without stones or sludge. Extrahepatic bile duct is normal in  diameter. Pancreas is completely obscured. Right kidney is normal in  size. There is no hydronephrosis. Simple cysts in the liver and  kidney. Proximal aorta and IVC are nonaneurysmal.   IMPRESSION: Negative abdominal ultrasound.    CONSULTATION ASSESSMENT AND PLAN:    Federico Chamberlain is a 67 year old with medical history of diabetes mellitus, hypertension, hyperlipidemia, and depression who presents to the hospital with ongoing left upper quadrant abdominal pain.    1.  Acute pancreatitis: Diagnosis based on left upper quadrant pain and lipase of 756.  Unclear etiology at this time.  Will check for autoimmune etiology with IgG4.  No fevers or chills.  No new medications or supplements.  He does use lisinopril.  Plan will be to continue IV fluids, pain control, p.o.  Can try clear liquids at  lunchtime if abdominal pain improving.    --If worsening of abdominal discomfort is low threshold for CT of his abdomen and pelvis with IV contrast.  --Suggest outpatient endoscopic ultrasound in approximately 6 weeks.      I discussed the patient plan with Dr. Crooks. Thank you for asking us to participate in the care of this patient.    Trupti Marcos PA-C  [facility]

## 2018-07-02 NOTE — ED TRIAGE NOTES
Seen last night for same  Left upper abd pain  rates 8/10  Not getting any better worse today  Has tried the norco but no improvement  Slight nausea no vomiting here for eval

## 2018-07-02 NOTE — ED NOTES
[facility]  ED Nurse Handoff Report    Federico Chamberlain is a 67 year old male   ED Chief complaint: Abdominal Pain  . ED Diagnosis:   Final diagnoses:   Acute pancreatitis, unspecified complication status, unspecified pancreatitis type   Essential hypertension     Allergies: No Known Allergies    Code Status: Full Code  Activity level - Baseline/Home:  Independent. Activity Level - Current:   Stand with Assist. Lift room needed: No. Bariatric: No   Needed: No   Isolation: No. Infection: Not Applicable.     Vital Signs:   Vitals:    07/01/18 2237 07/01/18 2238 07/01/18 2245 07/01/18 2246   BP:       Pulse:       Resp:       Temp:       TempSrc:       SpO2: 99% 99% 99% 100%   Weight:       Height:           Cardiac Rhythm:  ,      Pain level: 0-10 Pain Scale: 8  Patient confused: No. Patient Falls Risk: Yes.   Elimination Status: Has voided , ambulatory to bathroom   Patient Report - Initial Complaint: Abdominal pain. Focused Assessment:   21:30 Gastrointestinal Gastrointestinal - GI WDL:  WDL except; all Nausea/Vomiting Signs/Symptoms: nausea intermittent All Quadrants Bowel Sounds: audible and active in all quadrants Last Bowel Movement:  (Pt reports no changes in bowel or bladder. ) GI Signs/Symptoms: abdominal pain; nausea Gastrointestinal Comment: Pt reports onset LUQ abdominal pain starting two nights ago, accompanied nausea, but denies emesis. Pt also denies chest pain and SOB, had workup yesterday, dx with right kidney stone.         Tests Performed: US, Labs, UA. Abnormal Results:   Labs Ordered and Resulted from Time of ED Arrival Up to the Time of Departure from the ED   COMPREHENSIVE METABOLIC PANEL - Abnormal; Notable for the following:        Result Value    Glucose 102 (*)     Calcium 8.0 (*)     Protein Total 6.6 (*)     All other components within normal limits   LIPASE - Abnormal; Notable for the following:     Lipase 756 (*)     All other components within normal limits    ROUTINE UA WITH MICROSCOPIC - Abnormal; Notable for the following:     pH Urine 8.0 (*)     Mucous Urine Present (*)     All other components within normal limits   CBC WITH PLATELETS DIFFERENTIAL   ALCOHOL ETHYL   VITAL SIGNS   PERIPHERAL IV CATHETER   ISTAT TROPONIN NURSING POCT   TROPONIN POCT     US Abdomen Limited   Final Result   IMPRESSION: Negative abdominal ultrasound.      TRACI GORDILLO MD      .   Treatments provided: Pain control, monitoring, IV fluids   Family Comments: Family not at bedside   OBS brochure/video discussed/provided to patient:  N/A  ED Medications:   Medications   lactated ringers BOLUS 1,000 mL (1,000 mLs Intravenous New Bag 7/1/18 2122)     Followed by   lactated ringers infusion (not administered)   ondansetron (ZOFRAN) injection 4 mg (4 mg Intravenous Given 7/1/18 2122)   HYDROmorphone (PF) (DILAUDID) injection 0.5 mg (0.5 mg Intravenous Given 7/1/18 2228)   lidocaine (viscous) (XYLOCAINE) 2 % 15 mL, alum & mag hydroxide-simethicone (MYLANTA ES/MAALOX  ES) 15 mL GI Cocktail (30 mLs Oral Given 7/1/18 2122)   ketorolac (TORADOL) injection 30 mg (30 mg Intravenous Given 7/1/18 2228)     Drips infusing:  No  For the majority of the shift, the patient's behavior Green. Interventions performed were NA.     Severe Sepsis OR Septic Shock Diagnosis Present: No      ED Nurse Name/Phone Number: Bobby Garza,   10:51 PM  RECEIVING UNIT ED HANDOFF REVIEW    Above ED Nurse Handoff Report was reviewed: Yes  Reviewed by: Prachi Baca on July 1, 2018 at 11:10 PM

## 2018-07-02 NOTE — PROVIDER NOTIFICATION
"Paged MD: Pt is very anxious about CT results, asking if MD can look at it from today. Also demanding food, \"I'm going to rip out my IV to go down to get food.\" Explained to patient that the CT scan results are pending, takes sometime to look them over.     MD ordered clear liquid diet.     Paged MD: Pt is demanding to talk to MD about CT scan. He is very upset at the moment.    MD talked to patient and explained we will give CT results to him as soon as possible.  "

## 2018-07-02 NOTE — PROGRESS NOTES
Patient is heading to CT via wheelchair, saline locked IV and patient voided before leaving. Will resume potassium replacement when patient comes back to unit.

## 2018-07-02 NOTE — PHARMACY-ADMISSION MEDICATION HISTORY
Admission medication history interview status for this patient is complete. See [facility] admission navigator for allergy information, prior to admission medications and immunization status.     Medication history interview source(s):Patient  Medication history resources (including written lists, pill bottles, clinic record):called pharmacy/Care Everywhere  Primary pharmacy:[facility]    Changes made to PTA medication list:  Added: Mirapex, Imitrex, Chlorthalidone, ASA, Vitamin D, Ibuprofen, Acetaminophen  Deleted:   Changed: Potassium, lisinopril, Atorvastatin, metformin, metoprolol    Actions taken by pharmacist (provider contacted, etc):sticky note for MD     Additional medication history information:Patient had fluoxeting RX filled - only took a couple and is not taking any longer    Medication reconciliation/reorder completed by provider prior to medication history? No    Do you take OTC medications (eg tylenol, ibuprofen, fish oil, eye/ear drops, etc)? Y(Y/N)    For patients on insulin therapy: NA (Y/N)      Prior to Admission medications    Medication Sig Last Dose Taking? Auth Provider   ACETAMINOPHEN PO Take 1,000 mg by mouth nightly as needed for pain Past Week at Unknown time Yes Unknown, Entered By History   ASPIRIN PO Take 81 mg by mouth daily 7/1/2018 at Unknown time Yes Unknown, Entered By History   ATORVASTATIN CALCIUM PO Take 40 mg by mouth daily  7/1/2018 at Unknown time Yes Reported, Patient   CHLORTHALIDONE PO Take 25 mg by mouth daily 7/1/2018 at Unknown time Yes Unknown, Entered By History   HYDROcodone-acetaminophen (NORCO) 5-325 MG per tablet Take 1 tablet by mouth every 4 hours as needed for pain 7/1/2018 at Unknown time Yes Cristobal Unger MD   IBUPROFEN PO Take by mouth every 8 hours as needed for moderate pain Past Month at Unknown time Yes Unknown, Entered By History   LISINOPRIL PO Take 40 mg by mouth daily  7/1/2018 at Unknown time Yes Reported, Patient   METFORMIN HCL PO Take 500 mg by mouth  2 times daily (with meals)  7/1/2018 at Unknown time Yes Reported, Patient   Metoprolol Succinate (TOPROL XL PO) Take 100 mg by mouth daily 7/1/2018 at Unknown time Yes Unknown, Entered By History   potassium chloride (KLOR-CON) 10 MEQ tablet Take 10 mEq by mouth daily 7/1/2018 at Unknown time Yes Unknown, Entered By History   Pramipexole Dihydrochloride (MIRAPEX PO) Take 0.5 mg by mouth At Bedtime 6/30/2018 Yes Unknown, Entered By History   SUMAtriptan Succinate (IMITREX PO) Take 100 mg by mouth as needed for migraine  Past Month at Unknown time Yes Unknown, Entered By History   VITAMIN D, CHOLECALCIFEROL, PO Take 2,000 Units by mouth daily 7/1/2018 at Unknown time Yes Unknown, Entered By History

## 2018-07-02 NOTE — PLAN OF CARE
Problem: Patient Care Overview  Goal: Plan of Care/Patient Progress Review  Outcome: No Change  Alert and oriented. Complains of abdominal pain. Dilaudid and oxycodone given. Patient states less intense pain, but not really improving to less than 5/10. MD notified and changed to Morphine. Up in room independently. Glucose overnight. 99 and 98, no insulin needed.

## 2018-07-02 NOTE — PLAN OF CARE
Problem: Patient Care Overview  Goal: Plan of Care/Patient Progress Review  Outcome: No Change  A&Ox4, up independently, NPO except meds and ice chips, voiding without difficulty, abd.pain controlled with 4mg of morphine, IVF, K replaced- recheck this evening, blood sugars- 92 & 83 this shift.

## 2018-07-02 NOTE — PROGRESS NOTES
[facility]    Hospitalist Progress Note  Name: Federico Chamberlain    MRN: [id number]  Provider:  Cristobal Stevenson DO, MPH  Date of Service: 07/02/2018    Summary of Stay: Federico Chamberlain is a 67 year old male with a history of hypertension, diabetes mellitus, hyperlipidemia, restless leg syndrome, depression admitted on 7/1/2018 with left upper quadrant abdominal pain found to have elevated lipase of 700 concerning for acute pancreatitis.    Problem List:   1. Left upper quadrant abdominal pain: Lipase is elevated to 700.  Potential etiology includes acute pancreatitis.  CT of the abdomen is unremarkable.  Ultrasound shows no biliary obstruction.  Alcohol and biliary obstruction appear unlikely explanations.  Triglyceride level is reasonable.  IgG4 being drawn to evaluate for autoimmune explanation.  Continue n.p.o. and IV fluids.  Analgesics available as well.  Appreciate GI consultation.  We will also ask pain team to evaluate.  Musculoskeletal etiology could also be considered given somewhat atypical presentation for acute pink otitis.  Request pain team evaluation as he would like to discontinue opiate medications.  2. Hypertension: Blood pressure uncontrolled.  Has bradycardia as well.  Stop metoprolol.  Continue chlorthalidone and initiate losartan.  Hold lisinopril as potential etiology of pancreatitis.  3. Diabetes mellitus: Hold metformin.  Low sliding scale insulin.  Blood sugars reasonable.  4. Restless leg syndrome: Continue Mirapex.    DVT Prophylaxis: Pneumatic Compression Devices  Code Status: Full Code  Disposition: Expected discharge in 2 days to home. Goals prior to discharge include monitor pain.   Incidental Findings: None.  Family updated today: No     Interval History   Assumed care from previous hospitalist. The history was fully reviewed.  The patient reports doing well.  Still some left upper quadrant tenderness.  No chest pain or shortness of breath.  No nausea, vomiting, diarrhea,  constipation.  No fevers.  No other specific complaints identified.     -Data reviewed today: I personally reviewed all new labs and imaging results over the last 24 hours.     Physical Exam   Temp: 96.4  F (35.8  C) Temp src: Oral BP: (!) 161/96 Pulse: 50 Heart Rate: 51 Resp: 16 SpO2: 96 % O2 Device: None (Room air)    Vitals:    07/01/18 2048 07/01/18 2334   Weight: 131.5 kg (290 lb) 134.3 kg (296 lb 1.6 oz)     Vital Signs with Ranges  Temp:  [96.4  F (35.8  C)-98  F (36.7  C)] 96.4  F (35.8  C)  Pulse:  [50-63] 50  Heart Rate:  [48-58] 51  Resp:  [16-20] 16  BP: (145-203)/() 161/96  SpO2:  [96 %-100 %] 96 %  I/O last 3 completed shifts:  In: -   Out: 150 [Urine:150]    GENERAL: No apparent distress. Awake, alert, and fully oriented.  HEENT: Normocephalic, atraumatic. Extraocular movements intact.  CARDIOVASCULAR: Regular rate and rhythm without murmurs or rubs. No S3.  PULMONARY: Clear bilaterally.  GASTROINTESTINAL: Soft, LUQ TTP, non-distended. Bowel sounds normoactive.   EXTREMITIES: No cyanosis or clubbing. No edema.  NEUROLOGICAL: CN 2-12 grossly intact, no focal neurological deficits.  DERMATOLOGICAL: No rash, ulcer, bruising, nor jaundice.     Medications     sodium chloride 150 mL/hr at 07/02/18 0721       chlorthalidone (HYGROTON) tablet 25 mg  25 mg Oral Daily     insulin aspart  1-4 Units Subcutaneous Q4H     losartan  50 mg Oral Daily     pramipexole  0.5 mg Oral At Bedtime     Data     Laboratory:    Recent Labs  Lab 07/02/18  0636 07/01/18 2128 07/01/18  0345   WBC 7.4 8.9 10.1   HGB 14.3 15.9 15.9   HCT 43.0 47.6 47.6   MCV 86 86 85    220 209       Recent Labs  Lab 07/02/18  0636 07/01/18 2128 07/01/18  0345    142 142   POTASSIUM 3.2* 3.5 3.3*   CHLORIDE 107 106 107   CO2 30 29 27   ANIONGAP 4 7 8   GLC 91 102* 97   BUN 16 16 16   CR 0.73 0.80 0.65*   GFRESTIMATED >90 >90 >90   GFRESTBLACK >90 >90 >90   LAURA 7.6* 8.0* 8.6       Recent Labs  Lab 07/02/18  0636 07/01/18  9702  07/01/18  0345   AST 15 18 15   ALT 23 27 27   ALKPHOS 66 84 83   BILITOTAL 0.6 0.4 0.4       Recent Labs  Lab 07/02/18  0636 07/01/18  2128 07/01/18  0345   LIPASE 136 756* 92     No results for input(s): CULT in the last 168 hours.    Imaging:  Recent Results (from the past 24 hour(s))   US Abdomen Limited    Narrative    ULTRASOUND ABDOMEN LIMITED  7/1/2018 10:16 PM     HISTORY: RUQ pain.     COMPARISON: None.    FINDINGS:  Liver is moderately fatty infiltrated as evidenced by a  diffuse increase in echogenicity without focal lesions. Gallbladder is  normal without stones or sludge. Extrahepatic bile duct is normal in  diameter. Pancreas is completely obscured. Right kidney is normal in  size. There is no hydronephrosis. Simple cysts in the liver and  kidney. Proximal aorta and IVC are nonaneurysmal.      Impression    IMPRESSION: Negative abdominal ultrasound.    MD Cristobal SMALLS DO MPH  [facility] Hospitalist  [address] [place] [address]  Pager: [phone]  07/02/2018

## 2018-07-02 NOTE — CONSULTS
Discharge Planner   Discharge Plans in progress: Met with pt. Consult placed because pt wanted to ensure [facility] had his insurance  Barriers to discharge plan: none. Pt has Medicare and a BC BS supplement,.   Follow up plan: will clear consult. No other needs at this time       Entered by: Corinne C. White 07/02/2018 1:16 PM

## 2018-07-02 NOTE — PROGRESS NOTES
"[facility] HEALTH SERVICES  SPIRITUAL ASSESSMENT Progress Note  [facility] [address]    PRIMARY FOCUS:     Goals of care    Symptom/pain management    Emotional/spiritual/Rastafari distress    Support for coping    ILLNESS CIRCUMSTANCES:   Reviewed documentation. Reflective conversation shared with Federico which integrated elements of illness and family narratives.     Context of Serious Illness/Symptom(s) - Federico was admitted for abdominal pain of unknown etiology    Persons/Resources Involved - Son    DISTRESS:     Emotional/Existential/Relational Distress - Federico talked about the difficulty of being estranged from his oldest son.    Spiritual/Taoism Distress - Federico talked about his disillusionment with the Amish Nondenominational. He switched to Amish when he got  and now that he is  he is not feeling that he fits in the [employer]ish Nond[employer] anymore. He is struggling to find where he does fit. He is exploring Methodism and Yazidi currently.    Social/Cultural/Economic Distress - Federico just retired last Friday which leaves insurance in question.    SPIRIT (Coping):     Quaker/Lexy - Questioning right now where he belongs    Spiritual Practice(s) - None currently    Emotional/Existential/Relational Connections - His son. He did mention that he still lives with his ex-wife.    SENSE-MAKING:    Goals of Care - Federico would like a diagnosis as to what is causing his abdominal pain. He has restorative goals. He would like to go home and enjoy [facility]. Federico had requested HCD information. I provided him the form and discussed it with him. He wants his son in [place] to be his HCA so he chose to take the form with him until he and his son can discuss his wishes.    Meaning/Sense-Making - Federico stated that he is \"searching\" right now for a deeper meaning to life. He is currently Amish but says he is a \"non-practicing Amish.\" He says he doesn't know where he fits or belongs, yet he wants a sense of community. He is considering " "both [employer] and the local [employer] [facility] in [place]. Federico just retired from work  as   last Friday, and ended up in the hospital 2 days later. He would like to get this abdominal pain resolved so he can enjoy his half-way.  He did say that he still lives with his ex-wife so they have a divorce that \"works.\"    PLAN: Federico has the HCD form and was advised that if he needs any further assistance [facility] is available to help.We are also available for any further spiritual or emotional needs we can assist with.    Rea Jacob  Chaplain Resident  Pager [phone]    "

## 2018-07-02 NOTE — H&P
[facility]  Hospitalist Admission Note  Name: Federico Chamberlain    MRN: [id number]  YOB: 1950    Age: 67 year old  Date of admission: 7/1/2018  Primary care provider: Nathalie Quiroz            Assessment and Plan:   Federico Chamberlain is a 67 year old male with known history significant for diabetes mellitus type 2 non-insulin requiring, hypertension, major depressive disorder, restless leg syndrome, dyslipidemia, known hypertriglyceridemia and no longer on maintenance medication, chronic left foot drop,  who presented for the second time the emergency room due to recurrent abdominal pain of at least 2 days duration    1.  Abdominal pain likely secondary to acute pancreatitis.  -Patient denies heavy alcohol use.  He stated that he drinks likely 1-2 beers in a month.  -No obvious gallbladder pathology and imaging done earlier.  -Patient has known prior history of hypertriglyceridemia and no longer on any medications for it.  We will recheck his triglycerides as his last level earlier this year was more than 222.  -He is on numerous blood pressure medications and has been on lisinopril for quite some time..  This potentially can also be possible etiology of his pancreatitis.  However I am leaning towards more on his hypertriglyceridemia.  -Requested formal GI evaluation as well and will await their further recommendations.  -Continue pain control, keep him n.p.o., aggressive and generous IV fluid support.  -As needed antiemetics.  -IV Pepcid.    2.  History of diabetes mellitus type 2 non-insulin-requiring.  -Hold his home regimen of metformin.  Monitor glucose levels and insulin sliding scale on board    3.  Hypertension-resume back his home regimen of metoprolol.  Holding his diuretics and current lisinopril.  -IV hydralazine if needed with parameters in place.  4.  History of restless leg syndrome-resume his home Mirapex    Current working diagnosis, plan of care, need for hospitalization were  discussed in detail with our patient.  He was provided with the avenue to ask questions that I have answered the best of my ability.    Code status: full  Admit to inpatient  Prophylaxis: PCD's  Disposition: Home but needing at least 2 inpatient hospitalization days          Chief Complaint:   Abdominal pain of almost 2 days duration       Source of Information:   Patient with good reliability  Discussion with ED physician  Review of E chart records         History of Present Illness:   Federico Chamberlain is a 67 year old male with known history significant for diabetes mellitus type 2 non-insulin requiring, hypertension, major depressive disorder, restless leg syndrome, dyslipidemia, known hypertriglyceridemia and no longer on maintenance medication, chronic left foot drop,  who presented for the second time the emergency room due to recurrent abdominal pain of at least 2 days duration.  He he initially described his abdominal pain as left flank pain and was workup for renal stones last night with normal labs and findings of nonobstructive renal stone on the right side and was subsequently discharged.  He presented back in the emergency room for complaints of abdominal pain, with earlier nausea but no actual vomiting.  He also noted that his blood pressure was creeping up and presented with uncontrolled blood pressure levels.  During his stay in the emergency room multiple workup were pursued with unremarkable abdominal ultrasound but lipase level was elevated at more than 700 as compared to normal findings last night.  He was diagnosed with acute pancreatitis hence this referral to us for further management care.  During the time exam he already received pain regimen, generous IV fluids started to feel a little better with his earlier abdominal pain.          Past Medical History:     Past Medical History:   Diagnosis Date     Hypercholesterolemia      Hypertension              Past Surgical History:     Past Surgical  "History:   Procedure Laterality Date     APPENDECTOMY       BACK SURGERY       ORTHOPEDIC SURGERY               Social History:     Social History   Substance Use Topics     Smoking status: Never Smoker     Smokeless tobacco: Never Used     Alcohol use No             Family History:   Family history was fully reviewed and non-contributory in this case.         Allergies:   No Known Allergies          Medications:     Prior to Admission medications    Medication Sig Last Dose Taking? Auth Provider   ATORVASTATIN CALCIUM PO    Reported, Patient   HYDROcodone-acetaminophen (NORCO) 5-325 MG per tablet Take 1 tablet by mouth every 4 hours as needed for pain   Cristobal Unger MD   LISINOPRIL PO    Reported, Patient   METFORMIN HCL PO    Reported, Patient   METOPROLOL TARTRATE PO    Reported, Patient   potassium chloride (KLOR-CON) 20 MEQ Packet Take 20 mEq by mouth 2 times daily   Reported, Patient             Review of Systems:   A Comprehensive greater than 10 system review of systems was carried out.  Pertinent positives and negatives are noted above.  Otherwise negative for contributory information.           Physical Exam:   Blood pressure (!) 175/92, pulse 59, temperature 98  F (36.7  C), temperature source Oral, resp. rate 18, height 1.956 m (6' 5\"), weight 134.3 kg (296 lb 1.6 oz), SpO2 97 %.  Wt Readings from Last 1 Encounters:   07/01/18 134.3 kg (296 lb 1.6 oz)     Exam:  GENERAL: No apparent distress. Awake, alert, and fully oriented.  HEENT: Normocephalic, atraumatic. Extraocular movements intact.  CARDIOVASCULAR: Regular rate and rhythm without murmurs or rubs. No JVD  PULMONARY: Clear to auscultation, no wheezes, crackles  ABDOMINAL: Soft, tender epigastric area, no hepatomegaly  EXTREMITIES: No cyanosis or clubbing. No edema.  NEUROLOGICAL: CN 2-12 grossly intact, awake and alert x3, spontaneous and coherent speech. no focal neurological deficits.  DERMATOLOGICAL: No rash, ulcer, ecchymoses, " jaundice.  Psych: not agitation, not combative, pleasant mood  Lymph nodes: no obvious palpable  cervical or axillary lymphadenopathy         Data:   EKG:    Sinus bradycardia at 56 bpm, right bundle branch block, left anterior fascicular block  Imaging:  Results for orders placed or performed during the hospital encounter of 07/01/18   US Abdomen Limited    Narrative    ULTRASOUND ABDOMEN LIMITED  7/1/2018 10:16 PM     HISTORY: RUQ pain.     COMPARISON: None.    FINDINGS:  Liver is moderately fatty infiltrated as evidenced by a  diffuse increase in echogenicity without focal lesions. Gallbladder is  normal without stones or sludge. Extrahepatic bile duct is normal in  diameter. Pancreas is completely obscured. Right kidney is normal in  size. There is no hydronephrosis. Simple cysts in the liver and  kidney. Proximal aorta and IVC are nonaneurysmal.      Impression    IMPRESSION: Negative abdominal ultrasound.    TRACI GORDILLO MD       Labs:  No results for input(s): CULT in the last 168 hours.    Recent Labs  Lab 07/01/18 2128 07/01/18  0345   WBC 8.9 10.1   HGB 15.9 15.9   HCT 47.6 47.6   MCV 86 85    209       Recent Labs  Lab 07/01/18 2128 07/01/18  0345    142   POTASSIUM 3.5 3.3*   CHLORIDE 106 107   CO2 29 27   ANIONGAP 7 8   * 97   BUN 16 16   CR 0.80 0.65*   GFRESTIMATED >90 >90   GFRESTBLACK >90 >90   LAURA 8.0* 8.6   PROTTOTAL 6.6* 6.6*   ALBUMIN 3.5 3.5   BILITOTAL 0.4 0.4   ALKPHOS 84 83   AST 18 15   ALT 27 27     No results for input(s): SED, CRP in the last 168 hours.    Recent Labs  Lab 07/01/18 2128 07/01/18  0345   * 97     No results for input(s): INR in the last 168 hours.    Recent Labs  Lab 07/01/18 2128 07/01/18  0345   LIPASE 756* 92       Recent Labs  Lab 07/01/18 2127   TROPONIN 0.03       Recent Labs  Lab 07/01/18 2228   COLOR Straw   APPEARANCE Clear   URINEGLC Negative   URINEBILI Negative   URINEKETONE Negative   SG 1.011   UBLD Negative   URINEPH 8.0*    PROTEIN Negative   NITRITE Negative   LEUKEST Negative   RBCU 1   WBCU <1

## 2018-07-03 VITALS
WEIGHT: 296.1 LBS | BODY MASS INDEX: 34.96 KG/M2 | RESPIRATION RATE: 16 BRPM | HEIGHT: 77 IN | DIASTOLIC BLOOD PRESSURE: 88 MMHG | TEMPERATURE: 97.9 F | SYSTOLIC BLOOD PRESSURE: 159 MMHG | HEART RATE: 53 BPM | OXYGEN SATURATION: 96 %

## 2018-07-03 LAB
ANION GAP SERPL CALCULATED.3IONS-SCNC: 8 MMOL/L (ref 3–14)
BUN SERPL-MCNC: 10 MG/DL (ref 7–30)
CALCIUM SERPL-MCNC: 8.3 MG/DL (ref 8.5–10.1)
CHLORIDE SERPL-SCNC: 106 MMOL/L (ref 94–109)
CO2 SERPL-SCNC: 27 MMOL/L (ref 20–32)
CREAT SERPL-MCNC: 0.64 MG/DL (ref 0.66–1.25)
ERYTHROCYTE [DISTWIDTH] IN BLOOD BY AUTOMATED COUNT: 13.5 % (ref 10–15)
GFR SERPL CREATININE-BSD FRML MDRD: >90 ML/MIN/1.7M2
GLUCOSE BLDC GLUCOMTR-MCNC: 100 MG/DL (ref 70–99)
GLUCOSE BLDC GLUCOMTR-MCNC: 101 MG/DL (ref 70–99)
GLUCOSE BLDC GLUCOMTR-MCNC: 103 MG/DL (ref 70–99)
GLUCOSE BLDC GLUCOMTR-MCNC: 89 MG/DL (ref 70–99)
GLUCOSE BLDC GLUCOMTR-MCNC: 96 MG/DL (ref 70–99)
GLUCOSE SERPL-MCNC: 94 MG/DL (ref 70–99)
HCT VFR BLD AUTO: 49.9 % (ref 40–53)
HGB BLD-MCNC: 16.6 G/DL (ref 13.3–17.7)
IGG SERPL-MCNC: 912 MG/DL (ref 695–1620)
IGG1 SER-MCNC: 621 MG/DL (ref 300–856)
IGG2 SER-MCNC: 186 MG/DL (ref 158–761)
IGG3 SER-MCNC: 25 MG/DL (ref 24–192)
IGG4 SER-MCNC: 24 MG/DL (ref 11–86)
INTERPRETATION ECG - MUSE: NORMAL
MAGNESIUM SERPL-MCNC: 1.9 MG/DL (ref 1.6–2.3)
MCH RBC QN AUTO: 28.6 PG (ref 26.5–33)
MCHC RBC AUTO-ENTMCNC: 33.3 G/DL (ref 31.5–36.5)
MCV RBC AUTO: 86 FL (ref 78–100)
PLATELET # BLD AUTO: 198 10E9/L (ref 150–450)
POTASSIUM SERPL-SCNC: 3.5 MMOL/L (ref 3.4–5.3)
RBC # BLD AUTO: 5.81 10E12/L (ref 4.4–5.9)
SODIUM SERPL-SCNC: 141 MMOL/L (ref 133–144)
TROPONIN I SERPL-MCNC: 0.02 UG/L (ref 0–0.04)
WBC # BLD AUTO: 8 10E9/L (ref 4–11)

## 2018-07-03 PROCEDURE — 25000132 ZZH RX MED GY IP 250 OP 250 PS 637: Mod: GY | Performed by: HOSPITALIST

## 2018-07-03 PROCEDURE — 84484 ASSAY OF TROPONIN QUANT: CPT | Performed by: HOSPITALIST

## 2018-07-03 PROCEDURE — A9270 NON-COVERED ITEM OR SERVICE: HCPCS | Mod: GY | Performed by: NURSE PRACTITIONER

## 2018-07-03 PROCEDURE — 82787 IGG 1 2 3 OR 4 EACH: CPT | Performed by: HOSPITALIST

## 2018-07-03 PROCEDURE — 40000275 ZZH STATISTIC RCP TIME EA 10 MIN

## 2018-07-03 PROCEDURE — 83735 ASSAY OF MAGNESIUM: CPT | Performed by: HOSPITALIST

## 2018-07-03 PROCEDURE — 25000128 H RX IP 250 OP 636: Performed by: HOSPITALIST

## 2018-07-03 PROCEDURE — 00000146 ZZHCL STATISTIC GLUCOSE BY METER IP

## 2018-07-03 PROCEDURE — 99239 HOSP IP/OBS DSCHRG MGMT >30: CPT | Performed by: HOSPITALIST

## 2018-07-03 PROCEDURE — A9270 NON-COVERED ITEM OR SERVICE: HCPCS | Mod: GY | Performed by: INTERNAL MEDICINE

## 2018-07-03 PROCEDURE — 25000128 H RX IP 250 OP 636: Performed by: INTERNAL MEDICINE

## 2018-07-03 PROCEDURE — 82784 ASSAY IGA/IGD/IGG/IGM EACH: CPT | Performed by: HOSPITALIST

## 2018-07-03 PROCEDURE — 99222 1ST HOSP IP/OBS MODERATE 55: CPT | Performed by: NURSE PRACTITIONER

## 2018-07-03 PROCEDURE — A9270 NON-COVERED ITEM OR SERVICE: HCPCS | Mod: GY | Performed by: HOSPITALIST

## 2018-07-03 PROCEDURE — 93005 ELECTROCARDIOGRAM TRACING: CPT

## 2018-07-03 PROCEDURE — 25000132 ZZH RX MED GY IP 250 OP 250 PS 637: Mod: GY | Performed by: NURSE PRACTITIONER

## 2018-07-03 PROCEDURE — 25000125 ZZHC RX 250: Performed by: HOSPITALIST

## 2018-07-03 PROCEDURE — 25000132 ZZH RX MED GY IP 250 OP 250 PS 637: Mod: GY | Performed by: INTERNAL MEDICINE

## 2018-07-03 PROCEDURE — 85027 COMPLETE CBC AUTOMATED: CPT | Performed by: HOSPITALIST

## 2018-07-03 PROCEDURE — 80048 BASIC METABOLIC PNL TOTAL CA: CPT | Performed by: HOSPITALIST

## 2018-07-03 RX ORDER — LIDOCAINE 4 G/G
1 PATCH TOPICAL EVERY 24 HOURS
Qty: 10 PATCH | Refills: 1 | Status: SHIPPED | OUTPATIENT
Start: 2018-07-03 | End: 2022-12-19

## 2018-07-03 RX ORDER — AMLODIPINE BESYLATE 10 MG/1
10 TABLET ORAL DAILY
Qty: 30 TABLET | Refills: 1 | Status: ON HOLD | OUTPATIENT
Start: 2018-07-04 | End: 2022-12-20

## 2018-07-03 RX ORDER — MAGNESIUM CARB/ALUMINUM HYDROX 105-160MG
296 TABLET,CHEWABLE ORAL ONCE
Status: DISCONTINUED | OUTPATIENT
Start: 2018-07-03 | End: 2018-07-03 | Stop reason: HOSPADM

## 2018-07-03 RX ORDER — LOSARTAN POTASSIUM 100 MG/1
100 TABLET ORAL DAILY
Status: DISCONTINUED | OUTPATIENT
Start: 2018-07-03 | End: 2018-07-03 | Stop reason: HOSPADM

## 2018-07-03 RX ORDER — LIDOCAINE 4 G/G
1 PATCH TOPICAL
Status: DISCONTINUED | OUTPATIENT
Start: 2018-07-03 | End: 2018-07-03 | Stop reason: HOSPADM

## 2018-07-03 RX ORDER — CLONIDINE HYDROCHLORIDE 0.1 MG/1
0.1 TABLET ORAL EVERY 4 HOURS PRN
Status: DISCONTINUED | OUTPATIENT
Start: 2018-07-03 | End: 2018-07-03 | Stop reason: HOSPADM

## 2018-07-03 RX ORDER — HYDROXYZINE HYDROCHLORIDE 25 MG/1
25 TABLET, FILM COATED ORAL EVERY 6 HOURS PRN
Status: DISCONTINUED | OUTPATIENT
Start: 2018-07-03 | End: 2018-07-03 | Stop reason: HOSPADM

## 2018-07-03 RX ORDER — MAGNESIUM CARB/ALUMINUM HYDROX 105-160MG
296 TABLET,CHEWABLE ORAL ONCE
Status: COMPLETED | OUTPATIENT
Start: 2018-07-03 | End: 2018-07-03

## 2018-07-03 RX ORDER — ACETAMINOPHEN 325 MG/1
975 TABLET ORAL 3 TIMES DAILY
Status: DISCONTINUED | OUTPATIENT
Start: 2018-07-03 | End: 2018-07-03 | Stop reason: HOSPADM

## 2018-07-03 RX ORDER — LOSARTAN POTASSIUM 100 MG/1
100 TABLET ORAL DAILY
Qty: 30 TABLET | Refills: 1 | Status: ON HOLD | OUTPATIENT
Start: 2018-07-04 | End: 2018-07-08

## 2018-07-03 RX ORDER — AMLODIPINE BESYLATE 10 MG/1
10 TABLET ORAL DAILY
Status: DISCONTINUED | OUTPATIENT
Start: 2018-07-03 | End: 2018-07-03 | Stop reason: HOSPADM

## 2018-07-03 RX ADMIN — ONDANSETRON 4 MG: 2 INJECTION INTRAMUSCULAR; INTRAVENOUS at 04:38

## 2018-07-03 RX ADMIN — LOSARTAN POTASSIUM 100 MG: 100 TABLET ORAL at 10:12

## 2018-07-03 RX ADMIN — ACETAMINOPHEN 975 MG: 325 TABLET, FILM COATED ORAL at 10:11

## 2018-07-03 RX ADMIN — MORPHINE SULFATE 4 MG: 4 INJECTION INTRAVENOUS at 04:35

## 2018-07-03 RX ADMIN — AMLODIPINE BESYLATE 10 MG: 10 TABLET ORAL at 10:12

## 2018-07-03 RX ADMIN — CHLORTHALIDONE 25 MG: 25 TABLET ORAL at 10:12

## 2018-07-03 RX ADMIN — MAGESIUM CITRATE 296 ML: 1.75 LIQUID ORAL at 10:39

## 2018-07-03 RX ADMIN — SODIUM CHLORIDE: 9 INJECTION, SOLUTION INTRAVENOUS at 02:41

## 2018-07-03 RX ADMIN — SODIUM CHLORIDE: 9 INJECTION, SOLUTION INTRAVENOUS at 10:18

## 2018-07-03 RX ADMIN — PANTOPRAZOLE SODIUM 40 MG: 40 INJECTION, POWDER, FOR SOLUTION INTRAVENOUS at 10:12

## 2018-07-03 RX ADMIN — HYDRALAZINE HYDROCHLORIDE 10 MG: 20 INJECTION INTRAMUSCULAR; INTRAVENOUS at 04:04

## 2018-07-03 RX ADMIN — CLONIDINE HYDROCHLORIDE 0.1 MG: 0.1 TABLET ORAL at 11:33

## 2018-07-03 RX ADMIN — OXYCODONE HYDROCHLORIDE 10 MG: 5 TABLET ORAL at 00:04

## 2018-07-03 RX ADMIN — ACETAMINOPHEN 975 MG: 325 TABLET, FILM COATED ORAL at 16:10

## 2018-07-03 ASSESSMENT — ACTIVITIES OF DAILY LIVING (ADL)
ADLS_ACUITY_SCORE: 9

## 2018-07-03 ASSESSMENT — PAIN DESCRIPTION - DESCRIPTORS: DESCRIPTORS: SHARP

## 2018-07-03 NOTE — PLAN OF CARE
Problem: Patient Care Overview  Goal: Plan of Care/Patient Progress Review  Outcome: Improving  Ambulatory Status:  Pt up independent.  VS:  /90 (pt was anxious all shift), P 50, R 16, 98% on RA, and temp 97.8F  Pain: stomach pain, PO oxycodone 10mg given x1 and IV morphine given x1  Resp: LS clear  GI:  no nausea and poor appetite w/ clear diet, cramping increased after eating (encouraged patient go back to NPO with ice chips).  Passing flatus.  Last BM days ago, started senna at HS.  :  Voiding well  Skin:  WDL  Tx:  NS running at 150 mL/hr, BS checks, and CT scan (pending results)  Labs:  BS 91 and 89, K was 3.2 (replaced IV) recheck was 4, and lipase in 130s  Consults:  GI/Pain  Disposition:  TBD

## 2018-07-03 NOTE — PLAN OF CARE
"Problem: Pancreatitis, Acute/Chronic (Adult)  Goal: Signs and Symptoms of Listed Potential Problems Will be Absent, Minimized or Managed (Pancreatitis, Acute/Chronic)  Signs and symptoms of listed potential problems will be absent, minimized or managed by discharge/transition of care (reference Pancreatitis, Acute/Chronic (Adult) CPG).   Outcome: Declining  A&Ox4, up ad bette  Anxious, repeated requests  LDA: PIV infusing  BPmax 190/97, PRN hydralazine, recheck 168/88  Pt c/o \"chest tightness\", see previous note  Pain: LUQ, PRN oxy & IV morphine  C/o nausea, PRN zofran x1  BG 89 & 101, no SSI needed   CMS intact  Voiding freq amt, +gas, no BM  CLD, encouraging only sips & ice chips d/t pain  Followed by GI   Plan: Continue IVF, pain control, monitor BPs  Will continue to monitor          "

## 2018-07-03 NOTE — PROGRESS NOTES
[facility]    Hospitalist Progress Note  Name: Federico Chamberlain    MRN: [id number]  Provider:  Cristobal Stevenson DO, MPH  Date of Service: 07/03/2018    Summary of Stay: Federico Chamberlain is a 67 year old male with a history of hypertension, diabetes mellitus, hyperlipidemia, restless leg syndrome, depression admitted on 7/1/2018 with left upper quadrant abdominal pain of unclear etiology.    Problem List:   1. Left upper quadrant abdominal pain: Unclear etiology.  Not classic symptoms nor presentation for acute pancreatitis.  CT scan unremarkable.  Nonspecific elevation in the lipase but now resolved.  Discussed with Dr. Crooks of gastroenterology and Mehnaz Charles of the palliative and pain service.  Will give magnesium citrate in an effort to clean out his bowel as constipation might be contributed.  Also some concern about possible functional abdominal pain.  Adjusting pain medications today.  2. Hypertension: Blood pressure uncontrolled.  Has bradycardia as well.  We will continue chlorthalidone 25 mg daily.  Increase losartan to 100 mg's daily.  Start amlodipine 10 mg daily.  Anxiety and pain are likely contributing.  3. Diabetes mellitus: Hold metformin.  Low sliding scale insulin.  Blood sugars reasonable.  4. Restless leg syndrome: Continue Mirapex.    DVT Prophylaxis: Pneumatic Compression Devices  Code Status: Full Code  Disposition: Expected discharge in 1 days to home. Goals prior to discharge include monitor pain and advance diet.   Incidental Findings: None.  Family updated today: No     Interval History   The patient reports doing well.  Still some left upper quadrant tenderness.  No chest pain or shortness of breath.  No nausea, vomiting, diarrhea, constipation.  No fevers.  Quite anxious.  No other specific complaints identified.     -Data reviewed today: I personally reviewed all new labs and imaging results over the last 24 hours.     Physical Exam   Temp: 97.6  F (36.4  C) Temp src: Oral BP: (!)  185/102 Pulse: 53 Heart Rate: 60 Resp: 24 SpO2: 97 % O2 Device: None (Room air)    Vitals:    07/01/18 2048 07/01/18 2334   Weight: 131.5 kg (290 lb) 134.3 kg (296 lb 1.6 oz)     Vital Signs with Ranges  Temp:  [97.6  F (36.4  C)-97.8  F (36.6  C)] 97.6  F (36.4  C)  Pulse:  [53] 53  Heart Rate:  [49-60] 60  Resp:  [16-24] 24  BP: (168-195)/() 185/102  SpO2:  [97 %-98 %] 97 %  I/O last 3 completed shifts:  In: 3215 [P.O.:220; I.V.:2995]  Out: 2200 [Urine:2200]    GENERAL: No apparent distress. Awake, alert, and fully oriented.  Anxious.  HEENT: Normocephalic, atraumatic. Extraocular movements intact.  CARDIOVASCULAR: Regular rate and rhythm without murmurs or rubs. No S3.  PULMONARY: Clear bilaterally.  GASTROINTESTINAL: Soft, LUQ TTP, non-distended. Bowel sounds normoactive.   EXTREMITIES: No cyanosis or clubbing. No edema.  NEUROLOGICAL: CN 2-12 grossly intact, no focal neurological deficits.  DERMATOLOGICAL: No rash, ulcer, bruising, nor jaundice.     Medications     sodium chloride 100 mL/hr at 07/03/18 1018       acetaminophen  975 mg Oral TID     amLODIPine  10 mg Oral Daily     chlorthalidone (HYGROTON) tablet 25 mg  25 mg Oral Daily     diclofenac  2 g Transdermal TID     insulin aspart  1-4 Units Subcutaneous Q4H     lidocaine  1 patch Transdermal Q24h    And     [START ON 7/4/2018] lidocaine   Transdermal Q24H    And     lidocaine   Transdermal Q8H     losartan  100 mg Oral Daily     pantoprazole (PROTONIX) IV  40 mg Intravenous Daily with breakfast     pramipexole  0.5 mg Oral At Bedtime     Data     Laboratory:    Recent Labs  Lab 07/03/18  0555 07/02/18  0636 07/01/18 2128   WBC 8.0 7.4 8.9   HGB 16.6 14.3 15.9   HCT 49.9 43.0 47.6   MCV 86 86 86    183 220       Recent Labs  Lab 07/03/18  0555 07/02/18 2039 07/02/18 0636 07/01/18 2128     --  141 142   POTASSIUM 3.5 4.0 3.2* 3.5   CHLORIDE 106  --  107 106   CO2 27  --  30 29   ANIONGAP 8  --  4 7   GLC 94  --  91 102*   BUN 10   --  16 16   CR 0.64*  --  0.73 0.80   GFRESTIMATED >90  --  >90 >90   GFRESTBLACK >90  --  >90 >90   LAURA 8.3*  --  7.6* 8.0*       Recent Labs  Lab 07/02/18  0636 07/01/18 2128 07/01/18  0345   AST 15 18 15   ALT 23 27 27   ALKPHOS 66 84 83   BILITOTAL 0.6 0.4 0.4       Recent Labs  Lab 07/02/18  0636 07/01/18 2128 07/01/18  0345   LIPASE 136 756* 92     No results for input(s): CULT in the last 168 hours.    Imaging:  Recent Results (from the past 24 hour(s))   CT Abdomen w/o & w & Pelvis w Contrast    Narrative    CT ABDOMEN WITH AND WITHOUT, CT PELVIS WITH CONTRAST July 2, 2018 4:36  PM     HISTORY: Left upper quadrant pain.      COMPARISON: CT abdomen/pelvis without contrast 7/1/2018.    TECHNIQUE: Initial noncontrast axial images are performed through the  pancreas. Thin section axial images are then performed during  pancreatic arterial phase following intravenous contrast  administration. Portal venous phase images are then performed from the  lung bases to the symphysis. Additional coronal reformatted images are  performed. 100 mL of Isovue 370 is given intravenously. Radiation dose  for this scan was reduced using automated exposure control, adjustment  of the mA and/or kV according to patient size, or iterative  reconstruction technique.     FINDINGS: The lung bases are clear.    Abdomen: Initial thin section noncontrast imaging through the pancreas  shows no evidence of focal mass or fluid collection. No abnormal  calcifications are appreciated. Spleen is normal in size. No  perisplenic hemorrhage. No ascites. Bilateral renal cysts are present  with a nonobstructing stone right renal collecting system on series 2,  image 120 measuring 0.5 cm as seen on recent noncontrast CT. The renal  cysts bilaterally measure approximately 2-3 cm in size and all appear  to be water density consistent with simple cysts. A few scattered  calcified plaques are noted involving the abdominal aorta. A few  low-attenuation  liver lesions are present again most likely cysts  unchanged since prior study.    Following contrast administration, imaging through the abdomen and  pelvis during pancreatic phase of the contrast bolus shows no evidence  of pancreatic mass or surrounding inflammation. No obvious pancreatic  ductal dilatation. No enhancement is appreciated within the liver or  renal cysts. No decreased cortical enhancement in the kidneys to  suggest pyelonephritis. No gastric wall thickening or mass is  appreciated. Duodenum is normal in caliber and course. Fecal debris is  scattered throughout the colon without significant distention.  Possible mild constipation. No evidence of diverticulitis. Appendix  not visualized. The spleen enhances normally without evidence of  infarct. The liver, gallbladder and adrenal glands are otherwise  within normal limits. No enlarged lymph nodes. No evidence of aortic  aneurysm. Portal veins and hepatic veins appear patent.    Pelvis: Bladder is decompressed. The prostate gland and rectum are  unremarkable. There is a trace amount of free pelvic fluid of  uncertain etiology. No enlarged pelvic or inguinal lymph nodes. No  evidence of abdominal wall hernia. Bone window examination  demonstrates posterior spinal fusion hardware from L3 through L5.  Degenerative spine changes are also noted. Large hook osteophytes are  noted in the lumbar spine above the level of the fusion causing some  degree of narrowing of the spinal canal. A larger osteophyte posterior  right facet at T11-T12 is present causing more significant spinal  canal stenosis on series 3, image 21.      Impression    IMPRESSION:  1. No acute intra-abdominal or pelvic abnormality to account for  patient's symptoms. Liver and renal cysts are present. Nonobstructing  right renal collecting system stone is unchanged. No pancreatic mass,  cyst or inflammation is appreciated. Spleen enhances normally.  2. Mid to lower lumbar fusion with  posterior hardware. Degenerative  facet changes in the upper lumbar and lower thoracic spine as  described causing some degree of spinal canal narrowing at these  levels. Follow-up lumbar spine MRI if clinically warranted for further  assessment.  3. Trace amount of free pelvic fluid of uncertain etiology. Mild  constipation but no bowel obstruction or diverticulitis. Appendix not  visualized.    MD Cristobal HESTER DO MPH  [facility] Hospitalist  [address] [place] [place] [address]  Pager: [phone]  07/03/2018

## 2018-07-03 NOTE — PROGRESS NOTES
"GASTROENTEROLOGY PROGRESS NOTE        SUBJECTIVE:  Ongoing LUQ pain, no nausea/ vomiting, fever or chills.      OBJECTIVE:    BP (!) 195/95 (BP Location: Left arm)  Pulse 50  Temp 97.6  F (36.4  C) (Oral)  Resp 24  Ht 1.956 m (6' 5\")  Wt 134.3 kg (296 lb 1.6 oz)  SpO2 97%  BMI 35.11 kg/m2  Temp (24hrs), Av.7  F (36.5  C), Min:97.6  F (36.4  C), Max:97.8  F (36.6  C)    Patient Vitals for the past 72 hrs:   Weight   18 2334 134.3 kg (296 lb 1.6 oz)   18 2048 131.5 kg (290 lb)       Intake/Output Summary (Last 24 hours) at 18 1010  Last data filed at 18 0600   Gross per 24 hour   Intake             3215 ml   Output             2200 ml   Net             1015 ml        PHYSICAL EXAM     Constitutional: NAD  Abdomen: soft, mild LUQ TTP         Additional Comments:  ROS, FH, SH: See initial GI consult for details.     I have reviewed the patient's new clinical lab results:     Recent Labs   Lab Test  18   0555  18   0636  18   WBC  8.0  7.4  8.9   HGB  16.6  14.3  15.9   MCV  86  86  86   PLT  198  183  220     Recent Labs   Lab Test  18   0555  189  18   0636  18   2128   POTASSIUM  3.5  4.0  3.2*  3.5   CHLORIDE  106   --   107  106   CO2  27   --   30  29   BUN  10   --   16  16   ANIONGAP  8   --   4  7     Recent Labs   Lab Test  18   0636  18   2228  18   2128  18   0448  18   0345   ALBUMIN  3.0*   --   3.5   --   3.5   BILITOTAL  0.6   --   0.4   --   0.4   ALT  23   --   27   --   27   AST  15   --   18   --   15   PROTEIN   --   Negative   --   Negative   --    LIPASE  136   --   756*   --   92        ASSESSMENT/ PLAN  Federico Chamberlain is a 67 year old with medical history of diabetes mellitus, hypertension, hyperlipidemia, and depression who presents to the hospital with ongoing left upper quadrant abdominal pain.     1.  LUQ abdominal pain : Initial thought to be acute pancreatitis although CT " without evidence of pancreatitis. Initial lipase of 756 on admission although now normal. Will check for autoimmune etiology with IgG4. Agree with pain management consult. Would also give magnesium citrate to treat possible constipation.     Discussed with Dr. Crooks.  Trupti Marcos PA-C  [facility]

## 2018-07-03 NOTE — PLAN OF CARE
Problem: Patient Care Overview  Goal: Plan of Care/Patient Progress Review  Outcome: Adequate for Discharge Date Met: 07/03/18  Pt to D/C to home.  Pt provided with d/c instructions, including new medications, when medications were last given, and when to take them again.  Pt also informed to f/u with primary care provider in 1 week. Gave patient handout about pain control and pancreatisis. Explained clear liquid diet for today and advance as tolerated and other information on discharge packet. Pt verbalized understanding of all d/c and f/u instructions.  All questions were answered at this time.  Copy of paperwork sent with pt.  Medications were sent with pt (norvasc, voltaren gel, lidocare patch, and cozaar).  Patient to provide transport.  All personal belongings sent with pt.

## 2018-07-03 NOTE — PROGRESS NOTES
Feeling better off narcotics.  Still no clear reason for pain.  Has not had results from mag citrate and had problems with constipation after narcotics in the past.  Will give another bottle of mag citrate now.  If this is not helping, can do Miralax at home.  He wants to go home.

## 2018-07-03 NOTE — PROVIDER NOTIFICATION
MD paged concerning pt report of chest tightness. Pt relating it to IV hydralazine for high BP. Also given IV morphine & zofran. BP still elevated, 190/97. Will await any new orders.     Addendum: MD to order stat EKG & troponin. Pt aware of care. Will continue to monitor.

## 2018-07-03 NOTE — PROGRESS NOTES
Discussed with Dr rCooks, plan to clean out bowel and address possible constipation as issue with Mg citrate. EGD/c-scope thought to be low yield.  Cristobal Stevenson  July 3, 2018

## 2018-07-03 NOTE — PLAN OF CARE
Problem: Patient Care Overview  Goal: Plan of Care/Patient Progress Review  Pain rated 3-4 all shift and declined any narcotics, c/o constipation and seen by GI, completed 1 bottle of mag citrate and around 3pm was able to start having a BM, no c/o nausea and tolerating clear liquids, up independently in room, patients observed behavior is anxious, BP elevated and received scheduled BP meds and prn clonidine, he did not like the prn hydralazine given earlier as it caused him chest discomfort, no c/o headache, probable d/c later today if ok with Dr. Stevenson, passed along to Deepa INFANTE to let MD know he was able to have BM and hoping to go home

## 2018-07-05 ENCOUNTER — APPOINTMENT (OUTPATIENT)
Dept: GENERAL RADIOLOGY | Facility: CLINIC | Age: 68
End: 2018-07-05
Attending: EMERGENCY MEDICINE
Payer: MEDICARE

## 2018-07-05 ENCOUNTER — HOSPITAL ENCOUNTER (EMERGENCY)
Facility: CLINIC | Age: 68
Discharge: HOME | End: 2018-07-05
Attending: EMERGENCY MEDICINE | Admitting: EMERGENCY MEDICINE
Payer: MEDICARE

## 2018-07-05 VITALS
BODY MASS INDEX: 34.39 KG/M2 | TEMPERATURE: 98 F | DIASTOLIC BLOOD PRESSURE: 148 MMHG | SYSTOLIC BLOOD PRESSURE: 166 MMHG | OXYGEN SATURATION: 98 % | WEIGHT: 290 LBS | RESPIRATION RATE: 14 BRPM

## 2018-07-05 DIAGNOSIS — R07.9 CHEST PAIN, UNSPECIFIED TYPE: ICD-10-CM

## 2018-07-05 DIAGNOSIS — B02.9 HERPES ZOSTER WITHOUT COMPLICATION: ICD-10-CM

## 2018-07-05 DIAGNOSIS — E87.6 HYPOKALEMIA: ICD-10-CM

## 2018-07-05 LAB
ANION GAP SERPL CALCULATED.3IONS-SCNC: 10 MMOL/L (ref 3–14)
BASOPHILS # BLD AUTO: 0.1 10E9/L (ref 0–0.2)
BASOPHILS NFR BLD AUTO: 1 %
BUN SERPL-MCNC: 19 MG/DL (ref 7–30)
CALCIUM SERPL-MCNC: 8.2 MG/DL (ref 8.5–10.1)
CHLORIDE SERPL-SCNC: 110 MMOL/L (ref 94–109)
CO2 SERPL-SCNC: 24 MMOL/L (ref 20–32)
CREAT SERPL-MCNC: 0.68 MG/DL (ref 0.66–1.25)
DIFFERENTIAL METHOD BLD: NORMAL
EOSINOPHIL # BLD AUTO: 0.3 10E9/L (ref 0–0.7)
EOSINOPHIL NFR BLD AUTO: 4.4 %
ERYTHROCYTE [DISTWIDTH] IN BLOOD BY AUTOMATED COUNT: 13.6 % (ref 10–15)
GFR SERPL CREATININE-BSD FRML MDRD: >90 ML/MIN/1.7M2
GLUCOSE SERPL-MCNC: 106 MG/DL (ref 70–99)
HCT VFR BLD AUTO: 47.2 % (ref 40–53)
HGB BLD-MCNC: 15.9 G/DL (ref 13.3–17.7)
IMM GRANULOCYTES # BLD: 0 10E9/L (ref 0–0.4)
IMM GRANULOCYTES NFR BLD: 0.1 %
LYMPHOCYTES # BLD AUTO: 2.3 10E9/L (ref 0.8–5.3)
LYMPHOCYTES NFR BLD AUTO: 28.9 %
MCH RBC QN AUTO: 28.5 PG (ref 26.5–33)
MCHC RBC AUTO-ENTMCNC: 33.7 G/DL (ref 31.5–36.5)
MCV RBC AUTO: 85 FL (ref 78–100)
MONOCYTES # BLD AUTO: 0.7 10E9/L (ref 0–1.3)
MONOCYTES NFR BLD AUTO: 8.9 %
NEUTROPHILS # BLD AUTO: 4.4 10E9/L (ref 1.6–8.3)
NEUTROPHILS NFR BLD AUTO: 56.7 %
NRBC # BLD AUTO: 0 10*3/UL
NRBC BLD AUTO-RTO: 0 /100
PLATELET # BLD AUTO: 220 10E9/L (ref 150–450)
POTASSIUM SERPL-SCNC: 3.3 MMOL/L (ref 3.4–5.3)
RBC # BLD AUTO: 5.58 10E12/L (ref 4.4–5.9)
SODIUM SERPL-SCNC: 144 MMOL/L (ref 133–144)
TROPONIN I SERPL-MCNC: 0.02 UG/L (ref 0–0.04)
WBC # BLD AUTO: 7.8 10E9/L (ref 4–11)

## 2018-07-05 PROCEDURE — 80048 BASIC METABOLIC PNL TOTAL CA: CPT | Performed by: EMERGENCY MEDICINE

## 2018-07-05 PROCEDURE — 25000132 ZZH RX MED GY IP 250 OP 250 PS 637: Mod: GY | Performed by: EMERGENCY MEDICINE

## 2018-07-05 PROCEDURE — 99285 EMERGENCY DEPT VISIT HI MDM: CPT | Mod: 25

## 2018-07-05 PROCEDURE — 85025 COMPLETE CBC W/AUTO DIFF WBC: CPT | Performed by: EMERGENCY MEDICINE

## 2018-07-05 PROCEDURE — 71046 X-RAY EXAM CHEST 2 VIEWS: CPT

## 2018-07-05 PROCEDURE — 84484 ASSAY OF TROPONIN QUANT: CPT | Performed by: EMERGENCY MEDICINE

## 2018-07-05 PROCEDURE — 93005 ELECTROCARDIOGRAM TRACING: CPT

## 2018-07-05 PROCEDURE — A9270 NON-COVERED ITEM OR SERVICE: HCPCS | Mod: GY | Performed by: EMERGENCY MEDICINE

## 2018-07-05 RX ORDER — POTASSIUM CHLORIDE 1500 MG/1
20 TABLET, EXTENDED RELEASE ORAL DAILY
Qty: 7 TABLET | Refills: 0 | Status: ON HOLD | OUTPATIENT
Start: 2018-07-05 | End: 2018-07-08

## 2018-07-05 RX ORDER — POTASSIUM CHLORIDE 1500 MG/1
40 TABLET, EXTENDED RELEASE ORAL ONCE
Status: COMPLETED | OUTPATIENT
Start: 2018-07-05 | End: 2018-07-05

## 2018-07-05 RX ADMIN — POTASSIUM CHLORIDE 40 MEQ: 1500 TABLET, EXTENDED RELEASE ORAL at 15:21

## 2018-07-05 ASSESSMENT — ENCOUNTER SYMPTOMS
DIAPHORESIS: 0
SHORTNESS OF BREATH: 0
NAUSEA: 0
VOMITING: 0

## 2018-07-05 NOTE — ED AVS SNAPSHOT
[facility] Emergency Department    [address] [place] [address]    Phone:  [phone]    Fax:  [phone]                                       Federico Chamberlain   MRN: [id number]    Department:  [facility] Emergency Department   Date of Visit:  7/5/2018           Patient Information     Date Of Birth          1950        Your diagnoses for this visit were:     Chest pain, unspecified type     Herpes zoster without complication     Hypokalemia        You were seen by Marin Beaulieu MD.      Follow-up Information     Follow up with Nathalie Quiroz MD.    Specialty:  Internal Medicine    Contact information:    [facility]  [address] [facility]  [place] [address]  [phone]          Follow up with [facility] Emergency Department.    Specialty:  EMERGENCY MEDICINE    Why:  As needed    Contact information:    [address] [place] [id number] [phone]        Discharge Instructions       Take the below medications as prescribed.      New Prescriptions    POTASSIUM CHLORIDE ER 20 MEQ TBCR    Take 1 tablet (20 mEq) by mouth daily for 7 days         Take valacyclovir as previously prescribed.    You will be called to schedule an outpatient stress test.    Please return to the emergency department as needed for new or worsening symptoms including fainting, new or worsening chest pain, severe shortness of breath, vomiting and unable to keep anything down, any other concerning symptoms.      Discharge Instructions  Chest Pain    You have been seen today for chest pain or discomfort.  At this time, your provider has found no signs that your chest pain is due to a serious or life-threatening condition, (or you have declined more testing and/or admission to the hospital). However, sometimes there is a serious problem that does not show up right away. Your evaluation today may not be complete and you may need further testing  and evaluation.     Generally, every Emergency Department visit should have a follow-up clinic visit with either a primary or a specialty clinic/provider. Please follow-up as instructed by your emergency provider today.  Return to the Emergency Department if:    Your chest pain changes, gets worse, starts to happen more often, or comes with less activity.    You are newly short of breath.    You get very weak or tired.    You pass out or faint.    You have any new symptoms, like fever, cough, numb legs, or you cough up blood.    You have anything else that worries you.    Until you follow-up with your regular provider, please do the following:    Take one aspirin daily unless you have an allergy or are told not to by your provider.    If a stress test appointment has been made, go to the appointment.    If you have questions, contact your regular provider.    Follow-up with your regular provider/clinic as directed; this is very important.    If you were given a prescription for medicine here today, be sure to read all of the information (including the package insert) that comes with your prescription.  This will include important information about the medicine, its side effects, and any warnings that you need to know about.  The pharmacist who fills the prescription can provide more information and answer questions you may have about the medicine.  If you have questions or concerns that the pharmacist cannot address, please call or return to the Emergency Department.       Remember that you can always come back to the Emergency Department if you are not able to see your regular provider in the amount of time listed above, if you get any new symptoms, or if there is anything that worries you.      Discharge References/Attachments     SHINGLES (HERPES ZOSTER) (ENGLISH)      24 Hour Appointment Hotline       To make an appointment at any [facility], call [phone] ([phone]). If you don't have a family  doctor or clinic, we will help you find one. [place] clinics are conveniently located to serve the needs of you and your family.          ED Discharge Orders     Follow-Up with Cardiologist           NM Lexiscan stress test (nuc card)                    Review of your medicines      CONTINUE these medicines which may have CHANGED, or have new prescriptions. If we are uncertain of the size of tablets/capsules you have at home, strength may be listed as something that might have changed.        Dose / Directions Last dose taken    * KLOR-CON 10 MEQ tablet   Dose:  10 mEq   What changed:  Another medication with the same name was added. Make sure you understand how and when to take each.   Generic drug:  potassium chloride        Take 10 mEq by mouth daily   Refills:  0        * Potassium Chloride ER 20 MEQ Tbcr   Dose:  20 mEq   What changed:  You were already taking a medication with the same name, and this prescription was added. Make sure you understand how and when to take each.   Quantity:  7 tablet        Take 1 tablet (20 mEq) by mouth daily for 7 days   Refills:  0        * Notice:  This list has 2 medication(s) that are the same as other medications prescribed for you. Read the directions carefully, and ask your doctor or other care provider to review them with you.      Our records show that you are taking the medicines listed below. If these are incorrect, please call your family doctor or clinic.        Dose / Directions Last dose taken    ACETAMINOPHEN PO   Dose:  1000 mg        Take 1,000 mg by mouth nightly as needed for pain   Refills:  0        amLODIPine 10 MG tablet   Commonly known as:  NORVASC   Dose:  10 mg   Quantity:  30 tablet        Take 1 tablet (10 mg) by mouth daily   Refills:  1        ASPIRIN PO   Dose:  81 mg        Take 81 mg by mouth daily   Refills:  0        ATORVASTATIN CALCIUM PO   Dose:  40 mg        Take 40 mg by mouth daily   Refills:  0        CHLORTHALIDONE PO   Dose:  25 mg         Take 25 mg by mouth daily   Refills:  0        diclofenac 1 % Gel topical gel   Commonly known as:  VOLTAREN   Dose:  2 g   Quantity:  1 Tube        Place 2 g onto the skin 3 times daily   Refills:  1        HYDROcodone-acetaminophen 5-325 MG per tablet   Commonly known as:  NORCO   Dose:  1 tablet   Quantity:  10 tablet        Take 1 tablet by mouth every 4 hours as needed for pain   Refills:  0        IBUPROFEN PO        Take by mouth every 8 hours as needed for moderate pain   Refills:  0        IMITREX PO   Dose:  100 mg        Take 100 mg by mouth as needed for migraine   Refills:  0        Lidocaine 4 % Patch   Commonly known as:  LIDOCARE   Dose:  1 patch   Quantity:  10 patch        Place 1 patch onto the skin every 24 hours   Refills:  1        losartan 100 MG tablet   Commonly known as:  COZAAR   Dose:  100 mg   Quantity:  30 tablet        Take 1 tablet (100 mg) by mouth daily   Refills:  1        METFORMIN HCL PO   Dose:  500 mg        Take 500 mg by mouth 2 times daily (with meals)   Refills:  0        MIRAPEX PO   Dose:  0.5 mg        Take 0.5 mg by mouth At Bedtime   Refills:  0        VITAMIN D (CHOLECALCIFEROL) PO   Dose:  2000 Units        Take 2,000 Units by mouth daily   Refills:  0                Prescriptions were sent or printed at these locations (1 Prescription)                   Other Prescriptions                Printed at Department/Unit printer (1 of 1)         Potassium Chloride ER 20 MEQ TBCR                Procedures and tests performed during your visit     Basic metabolic panel    CBC with platelets differential    EKG 12 lead    Troponin I    XR Chest 2 Views      Orders Needing Specimen Collection     None      Pending Results     Date and Time Order Name Status Description    7/5/2018 1343 EKG 12 lead Preliminary             Pending Culture Results     No orders found from 7/3/2018 to 7/6/2018.            Pending Results Instructions     If you had any lab results that were  not finalized at the time of your Discharge, you can call the ED Lab Result RN at [phone]. You will be contacted by this team for any positive Lab results or changes in treatment. The nurses are available 7 days a week from 10A to 6:30P.  You can leave a message 24 hours per day and they will return your call.        Test Results From Your Hospital Stay        7/5/2018  2:09 PM      Component Results     Component Value Ref Range & Units Status    WBC 7.8 4.0 - 11.0 10e9/L Final    RBC Count 5.58 4.4 - 5.9 10e12/L Final    Hemoglobin 15.9 13.3 - 17.7 g/dL Final    Hematocrit 47.2 40.0 - 53.0 % Final    MCV 85 78 - 100 fl Final    MCH 28.5 26.5 - 33.0 pg Final    MCHC 33.7 31.5 - 36.5 g/dL Final    RDW 13.6 10.0 - 15.0 % Final    Platelet Count 220 150 - 450 10e9/L Final    Diff Method Automated Method  Final    % Neutrophils 56.7 % Final    % Lymphocytes 28.9 % Final    % Monocytes 8.9 % Final    % Eosinophils 4.4 % Final    % Basophils 1.0 % Final    % Immature Granulocytes 0.1 % Final    Nucleated RBCs 0 0 /100 Final    Absolute Neutrophil 4.4 1.6 - 8.3 10e9/L Final    Absolute Lymphocytes 2.3 0.8 - 5.3 10e9/L Final    Absolute Monocytes 0.7 0.0 - 1.3 10e9/L Final    Absolute Eosinophils 0.3 0.0 - 0.7 10e9/L Final    Absolute Basophils 0.1 0.0 - 0.2 10e9/L Final    Abs Immature Granulocytes 0.0 0 - 0.4 10e9/L Final    Absolute Nucleated RBC 0.0  Final         7/5/2018  2:28 PM      Component Results     Component Value Ref Range & Units Status    Sodium 144 133 - 144 mmol/L Final    Potassium 3.3 (L) 3.4 - 5.3 mmol/L Final    Chloride 110 (H) 94 - 109 mmol/L Final    Carbon Dioxide 24 20 - 32 mmol/L Final    Anion Gap 10 3 - 14 mmol/L Final    Glucose 106 (H) 70 - 99 mg/dL Final    Urea Nitrogen 19 7 - 30 mg/dL Final    Creatinine 0.68 0.66 - 1.25 mg/dL Final    GFR Estimate >90 >60 mL/min/1.7m2 Final    Non  GFR Calc    GFR Estimate If Black >90 >60 mL/min/1.7m2 Final    African American GFR  Calc    Calcium 8.2 (L) 8.5 - 10.1 mg/dL Final         7/5/2018  2:28 PM      Component Results     Component Value Ref Range & Units Status    Troponin I ES 0.021 0.000 - 0.045 ug/L Final    The 99th percentile for upper reference range is 0.045 ug/L.  Troponin values   in the range of 0.045 - 0.120 ug/L may be associated with risks of adverse   clinical events.           7/5/2018  2:20 PM      Narrative     XR CHEST 2 VW 7/5/2018 2:10 PM    COMPARISON: None.    HISTORY: Chest pain.        Impression     IMPRESSION: Cardiac silhouette and pulmonary vasculature are within  normal limits. No focal airspace disease, pleural effusion or  pneumothorax.    CHARLA SANCHEZ MD                Clinical Quality Measure: Blood Pressure Screening     Your blood pressure was checked while you were in the emergency department today. The last reading we obtained was  BP: (!) 166/148 . Please read the guidelines below about what these numbers mean and what you should do about them.  If your systolic blood pressure (the top number) is less than 120 and your diastolic blood pressure (the bottom number) is less than 80, then your blood pressure is normal. There is nothing more that you need to do about it.  If your systolic blood pressure (the top number) is 120-139 or your diastolic blood pressure (the bottom number) is 80-89, your blood pressure may be higher than it should be. You should have your blood pressure rechecked within a year by a primary care provider.  If your systolic blood pressure (the top number) is 140 or greater or your diastolic blood pressure (the bottom number) is 90 or greater, you may have high blood pressure. High blood pressure is treatable, but if left untreated over time it can put you at risk for heart attack, stroke, or kidney failure. You should have your blood pressure rechecked by a primary care provider within the next 4 weeks.  If your provider in the emergency department today gave you specific  "instructions to follow-up with your doctor or provider even sooner than that, you should follow that instruction and not wait for up to 4 weeks for your follow-up visit.        Thank you for choosing [facility]       Thank you for choosing [facility] for your care. Our goal is always to provide you with excellent care. Hearing back from our patients is one way we can continue to improve our services. Please take a few minutes to complete the written survey that you may receive in the mail after you visit with us. Thank you!        [facility] Information     [employer] lets you send messages to your doctor, view your test results, renew your prescriptions, schedule appointments and more. To sign up, go to [email or web address] . Click on \"Log in\" on the left side of the screen, which will take you to the Welcome page. Then click on \"Sign up Now\" on the right side of the page.     You will be asked to enter the access code listed below, as well as some personal information. Please follow the directions to create your username and password.     Your access code is: [email or web address]  Expires: 2018  5:33 AM     Your access code will  in 90 days. If you need help or a new code, please call your [facility] clinic or [phone].        Care EveryWhere ID     This is your Care EveryWhere ID. This could be used by other organizations to access your [facility] medical records  [id number]        Equal Access to Services     JAMES OCAMPO : Hadii juanpablo Fisher, wajenniferda michaelle, qareinaldota rebekahalmacarlos matute . So [facility] [phone].    ATENCIÓN: Si habla español, tiene a mcfadden disposición servicios gratuitos de asistencia lingüística. Llame al [phone].    We comply with applicable federal civil rights laws and [place] laws. We do not discriminate on the basis of race, color, national origin, age, disability, sex, sexual orientation, or gender identity.            After Visit " Summary       This is your record. Keep this with you and show to your community pharmacist(s) and doctor(s) at your next visit.

## 2018-07-05 NOTE — DISCHARGE SUMMARY
Hospitalist Discharge Summary  [facility]    Federico Chamberlain MRN# [id number]   YOB: 1950 Age: 67 year old     Date of Admission:  7/1/2018  Date of Discharge:  7/3/2018  4:54 PM  Admitting Physician:  Mayank Barriga MD  Discharge Physician:  Cristobal Stevenson  Discharging Service:  Hospitalist     Primary Provider: Nathalie Quiroz          Discharge Diagnosis:   Left upper quadrant abdominal pain, unclear etiology but possibly constipation related  Elevated lipase, resolved  Hypertension, uncontrolled  Diabetes mellitus  Restless leg syndrome  Anxiety and depression             Discharge Disposition:   Discharged to home           Allergies:   No Known Allergies           Discharge Medications:   Discharge Medication List as of 7/3/2018  4:00 PM      START taking these medications    Details   amLODIPine (NORVASC) 10 MG tablet Take 1 tablet (10 mg) by mouth daily, Disp-30 tablet, R-1, E-Prescribe      diclofenac (VOLTAREN) 1 % GEL topical gel Place 2 g onto the skin 3 times dailyDisp-1 Tube, [id number]      Lidocaine (LIDOCARE) 4 % Patch Place 1 patch onto the skin every 24 hoursDisp-10 patch, [id number]      losartan (COZAAR) 100 MG tablet Take 1 tablet (100 mg) by mouth daily, Disp-30 tablet, R-1, E-Prescribe         CONTINUE these medications which have NOT CHANGED    Details   ACETAMINOPHEN PO Take 1,000 mg by mouth nightly as needed for pain, Historical      ASPIRIN PO Take 81 mg by mouth daily, Historical      ATORVASTATIN CALCIUM PO Take 40 mg by mouth daily , Historical      CHLORTHALIDONE PO Take 25 mg by mouth daily, Historical      HYDROcodone-acetaminophen (NORCO) 5-325 MG per tablet Take 1 tablet by mouth every 4 hours as needed for pain, Disp-10 tablet, R-0, Local Print      IBUPROFEN PO Take by mouth every 8 hours as needed for moderate pain, Historical      METFORMIN HCL PO Take 500 mg by mouth 2 times daily (with meals) , Historical      potassium chloride  "(KLOR-CON) 10 MEQ tablet Take 10 mEq by mouth daily, Historical      Pramipexole Dihydrochloride (MIRAPEX PO) Take 0.5 mg by mouth At Bedtime, Historical      SUMAtriptan Succinate (IMITREX PO) Take 100 mg by mouth as needed for migraine , Historical      VITAMIN D, CHOLECALCIFEROL, PO Take 2,000 Units by mouth daily, Historical         STOP taking these medications       LISINOPRIL PO Comments:   Reason for Stopping:         Metoprolol Succinate (TOPROL XL PO) Comments:   Reason for Stopping:                      Condition on Discharge:   Discharge condition: Stable   Discharge vitals: Blood pressure 159/88, pulse 53, temperature 97.9  F (36.6  C), temperature source Oral, resp. rate 16, height 1.956 m (6' 5\"), weight 134.3 kg (296 lb 1.6 oz), SpO2 96 %.   Code status on discharge: Full Code      BASIC PHYSICAL EXAMINATION:  GENERAL: No apparent distress.  CARDIOVASCULAR: Regular rate and rhythm without murmurs.  PULMONARY: Clear to auscultation bilaterally.   GASTROINTESTINAL: Abdomen soft, non-tender.  EXTREMITIES: No edema, pulses intact.  NEUROLOGIC: No focal deficits.            History of Illness:   See detailed admission note for full details.               Procedures excluding imaging which is summarized below:   Please see details in the electronic medical record.           Consultations:   GASTROENTEROLOGY IP CONSULT  ADVANCE DIRECTIVE IP CONSULT  SOCIAL WORK IP CONSULT  SOCIAL WORK IP CONSULT  PAIN MANAGEMENT ADULT IP CONSULT          Significant Results:   Results for orders placed or performed during the hospital encounter of 07/01/18   US Abdomen Limited    Narrative    ULTRASOUND ABDOMEN LIMITED  7/1/2018 10:16 PM     HISTORY: RUQ pain.     COMPARISON: None.    FINDINGS:  Liver is moderately fatty infiltrated as evidenced by a  diffuse increase in echogenicity without focal lesions. Gallbladder is  normal without stones or sludge. Extrahepatic bile duct is normal in  diameter. Pancreas is completely " obscured. Right kidney is normal in  size. There is no hydronephrosis. Simple cysts in the liver and  kidney. Proximal aorta and IVC are nonaneurysmal.      Impression    IMPRESSION: Negative abdominal ultrasound.    TRACI GORDILLO MD   CT Abdomen w/o & w & Pelvis w Contrast    Narrative    CT ABDOMEN WITH AND WITHOUT, CT PELVIS WITH CONTRAST July 2, 2018 4:36  PM     HISTORY: Left upper quadrant pain.      COMPARISON: CT abdomen/pelvis without contrast 7/1/2018.    TECHNIQUE: Initial noncontrast axial images are performed through the  pancreas. Thin section axial images are then performed during  pancreatic arterial phase following intravenous contrast  administration. Portal venous phase images are then performed from the  lung bases to the symphysis. Additional coronal reformatted images are  performed. 100 mL of Isovue 370 is given intravenously. Radiation dose  for this scan was reduced using automated exposure control, adjustment  of the mA and/or kV according to patient size, or iterative  reconstruction technique.     FINDINGS: The lung bases are clear.    Abdomen: Initial thin section noncontrast imaging through the pancreas  shows no evidence of focal mass or fluid collection. No abnormal  calcifications are appreciated. Spleen is normal in size. No  perisplenic hemorrhage. No ascites. Bilateral renal cysts are present  with a nonobstructing stone right renal collecting system on series 2,  image 120 measuring 0.5 cm as seen on recent noncontrast CT. The renal  cysts bilaterally measure approximately 2-3 cm in size and all appear  to be water density consistent with simple cysts. A few scattered  calcified plaques are noted involving the abdominal aorta. A few  low-attenuation liver lesions are present again most likely cysts  unchanged since prior study.    Following contrast administration, imaging through the abdomen and  pelvis during pancreatic phase of the contrast bolus shows no evidence  of  pancreatic mass or surrounding inflammation. No obvious pancreatic  ductal dilatation. No enhancement is appreciated within the liver or  renal cysts. No decreased cortical enhancement in the kidneys to  suggest pyelonephritis. No gastric wall thickening or mass is  appreciated. Duodenum is normal in caliber and course. Fecal debris is  scattered throughout the colon without significant distention.  Possible mild constipation. No evidence of diverticulitis. Appendix  not visualized. The spleen enhances normally without evidence of  infarct. The liver, gallbladder and adrenal glands are otherwise  within normal limits. No enlarged lymph nodes. No evidence of aortic  aneurysm. Portal veins and hepatic veins appear patent.    Pelvis: Bladder is decompressed. The prostate gland and rectum are  unremarkable. There is a trace amount of free pelvic fluid of  uncertain etiology. No enlarged pelvic or inguinal lymph nodes. No  evidence of abdominal wall hernia. Bone window examination  demonstrates posterior spinal fusion hardware from L3 through L5.  Degenerative spine changes are also noted. Large hook osteophytes are  noted in the lumbar spine above the level of the fusion causing some  degree of narrowing of the spinal canal. A larger osteophyte posterior  right facet at T11-T12 is present causing more significant spinal  canal stenosis on series 3, image 21.      Impression    IMPRESSION:  1. No acute intra-abdominal or pelvic abnormality to account for  patient's symptoms. Liver and renal cysts are present. Nonobstructing  right renal collecting system stone is unchanged. No pancreatic mass,  cyst or inflammation is appreciated. Spleen enhances normally.  2. Mid to lower lumbar fusion with posterior hardware. Degenerative  facet changes in the upper lumbar and lower thoracic spine as  described causing some degree of spinal canal narrowing at these  levels. Follow-up lumbar spine MRI if clinically warranted for  further  assessment.  3. Trace amount of free pelvic fluid of uncertain etiology. Mild  constipation but no bowel obstruction or diverticulitis. Appendix not  visualized.    LISBETH LEVINE MD       Transthoracic Echocardiogram Results:  No results found for this or any previous visit (from the past 4320 hour(s)).             Pending Results:   Unresulted Labs Ordered in the Past 30 Days of this Admission     No orders found from 5/2/2018 to 7/2/2018.                      Discharge Instructions and Follow-Up:   Discharge instructions and follow-up:   Discharge Procedure Orders  Follow-up and recommended labs and tests    Order Comments: Follow up with primary care provider, DANILO RENDON, within 7 days for hospital follow- up.  The following labs/tests are recommended: BMP and BP evaluation.  Please try the Voltaren gel and Lidoderm patches for pain control.  I am hopeful and optimistic that your abdominal pain will resolve with conservative management and supportive cares over the following few days.  If abdominal pain persists please see her primary care physician to assist with further evaluation.     Activity   Order Comments: Your activity upon discharge: activity as tolerated   Order Specific Question Answer Comments   Is discharge order? Yes      Full Code     Diet   Order Comments: Follow this diet upon discharge: I recommend clear liquid diet today and advance as tolerated over the next 24-48 hours.   Order Specific Question Answer Comments   Is discharge order? Yes                Hospital Course:   67-year-old male with a history of hypertension, diabetes mellitus, hyperlipidemia, restless leg syndrome, anxiety and depression was admitted 7/1 with left upper quadrant abdominal pain.  Liver function tests were normal.  Lipase was elevated at 700.  CT scan was recently performed and unremarkable.  Abdominal ultrasound was also unremarkable.  He was admitted initially with a working diagnosis of acute pancreatitis.   GI has been consulted.  CT scan was repeated and again is unremarkable.  His symptoms do not appear classic for acute pancreatitis.  Lipase elevation is now resolved.  He was given magnesium citrate for possible component of constipation and now feels better.      The patient does appear quite anxious about lack of a definitive diagnosis.  I have reassured him that no catastrophic process has been identified and I anticipate and am optimistic that his symptoms will respond to conservative therapy.  The pain team was also consulted.  The patient is requesting to eliminate narcotics which I think is a very good idea.  He is comfortable with discharge today using acetaminophen and topical products including Lidoderm and diclofenac for pain control.  He will monitor his symptoms and return to his PCP or the ED if they worsen.  He has had a good bowel movement and now feels better.      He also had significantly elevated blood pressure during his hospitalization and his antihypertensive regimen was altered.  He was bradycardic so I have discontinued his metoprolol.  I think he should remain on chlorthalidone and have replaced lisinopril with losartan as initially there was concern that lisinopril might be causing acute pancreatitis.  We have also reinitiated amlodipine which she was previously taking.  He will follow-up with his primary care provider for potentially further adjustments in this regimen.    The patient was seen, examined, and counseled on this day. The hospitalization and plan of care was reviewed with the patient extensively. All questions were addressed and the patient agreed to follow-up as noted above.      Total time spent in face to face contact with the patient and coordinating discharge was:  45 Minutes    Cristobal Stevenson DO, MPH  [facility]  [address] [place] [address]  Pager: [phone]  07/05/2018

## 2018-07-05 NOTE — ED NOTES
Pt was at . sent pt to ED due to EKG changes.  Also, pt c/o left sided chest pain.  Pt was given 162 mg ASA at clinic

## 2018-07-05 NOTE — ED AVS SNAPSHOT
[facility] Emergency Department    [address]    [facility] [address]    Phone:  [phone]    Fax:  [phone]                                       Federico Chamberlain   MRN: [id number]    Department:  [facility] Emergency Department   Date of Visit:  7/5/2018           After Visit Summary Signature Page     I have received my discharge instructions, and my questions have been answered. I have discussed any challenges I see with this plan with the nurse or doctor.    ..........................................................................................................................................  Patient/Patient Representative Signature      ..........................................................................................................................................  Patient Representative Print Name and Relationship to Patient    ..................................................               ................................................  Date                                            Time    ..........................................................................................................................................  Reviewed by Signature/Title    ...................................................              ..............................................  Date                                                            Time

## 2018-07-05 NOTE — DISCHARGE INSTRUCTIONS
Take the below medications as prescribed.      New Prescriptions    POTASSIUM CHLORIDE ER 20 MEQ TBCR    Take 1 tablet (20 mEq) by mouth daily for 7 days         Take valacyclovir as previously prescribed.    You will be called to schedule an outpatient stress test.    Please return to the emergency department as needed for new or worsening symptoms including fainting, new or worsening chest pain, severe shortness of breath, vomiting and unable to keep anything down, any other concerning symptoms.      Discharge Instructions  Chest Pain    You have been seen today for chest pain or discomfort.  At this time, your provider has found no signs that your chest pain is due to a serious or life-threatening condition, (or you have declined more testing and/or admission to the hospital). However, sometimes there is a serious problem that does not show up right away. Your evaluation today may not be complete and you may need further testing and evaluation.     Generally, every Emergency Department visit should have a follow-up clinic visit with either a primary or a specialty clinic/provider. Please follow-up as instructed by your emergency provider today.  Return to the Emergency Department if:    Your chest pain changes, gets worse, starts to happen more often, or comes with less activity.    You are newly short of breath.    You get very weak or tired.    You pass out or faint.    You have any new symptoms, like fever, cough, numb legs, or you cough up blood.    You have anything else that worries you.    Until you follow-up with your regular provider, please do the following:    Take one aspirin daily unless you have an allergy or are told not to by your provider.    If a stress test appointment has been made, go to the appointment.    If you have questions, contact your regular provider.    Follow-up with your regular provider/clinic as directed; this is very important.    If you were given a prescription for medicine  here today, be sure to read all of the information (including the package insert) that comes with your prescription.  This will include important information about the medicine, its side effects, and any warnings that you need to know about.  The pharmacist who fills the prescription can provide more information and answer questions you may have about the medicine.  If you have questions or concerns that the pharmacist cannot address, please call or return to the Emergency Department.       Remember that you can always come back to the Emergency Department if you are not able to see your regular provider in the amount of time listed above, if you get any new symptoms, or if there is anything that worries you.

## 2018-07-06 LAB — INTERPRETATION ECG - MUSE: NORMAL

## 2018-07-08 ENCOUNTER — HOSPITAL ENCOUNTER (OUTPATIENT)
Facility: CLINIC | Age: 68
Setting detail: OBSERVATION
Discharge: HOME | End: 2018-07-09
Attending: EMERGENCY MEDICINE | Admitting: INTERNAL MEDICINE
Payer: MEDICARE

## 2018-07-08 ENCOUNTER — APPOINTMENT (OUTPATIENT)
Dept: CT IMAGING | Facility: CLINIC | Age: 68
End: 2018-07-08
Attending: EMERGENCY MEDICINE
Payer: MEDICARE

## 2018-07-08 ENCOUNTER — APPOINTMENT (OUTPATIENT)
Dept: MRI IMAGING | Facility: CLINIC | Age: 68
End: 2018-07-08
Attending: EMERGENCY MEDICINE
Payer: MEDICARE

## 2018-07-08 DIAGNOSIS — B02.9 HERPES ZOSTER WITHOUT COMPLICATION: ICD-10-CM

## 2018-07-08 DIAGNOSIS — I16.0 HYPERTENSIVE URGENCY, MALIGNANT: Primary | ICD-10-CM

## 2018-07-08 DIAGNOSIS — N17.9 ACUTE KIDNEY INJURY (H): ICD-10-CM

## 2018-07-08 DIAGNOSIS — R47.89 EPISODE OF CHANGE IN SPEECH: ICD-10-CM

## 2018-07-08 LAB
ANION GAP SERPL CALCULATED.3IONS-SCNC: 8 MMOL/L (ref 3–14)
APTT PPP: 32 SEC (ref 22–37)
BASOPHILS # BLD AUTO: 0.1 10E9/L (ref 0–0.2)
BASOPHILS NFR BLD AUTO: 0.8 %
BUN SERPL-MCNC: 31 MG/DL (ref 7–30)
CALCIUM SERPL-MCNC: 9.4 MG/DL (ref 8.5–10.1)
CHLORIDE SERPL-SCNC: 101 MMOL/L (ref 94–109)
CO2 SERPL-SCNC: 28 MMOL/L (ref 20–32)
CREAT BLD-MCNC: 1.4 MG/DL (ref 0.66–1.25)
CREAT SERPL-MCNC: 1.33 MG/DL (ref 0.66–1.25)
DIFFERENTIAL METHOD BLD: ABNORMAL
EOSINOPHIL # BLD AUTO: 0.4 10E9/L (ref 0–0.7)
EOSINOPHIL NFR BLD AUTO: 3.6 %
ERYTHROCYTE [DISTWIDTH] IN BLOOD BY AUTOMATED COUNT: 13.7 % (ref 10–15)
GFR SERPL CREATININE-BSD FRML MDRD: 51 ML/MIN/1.7M2
GFR SERPL CREATININE-BSD FRML MDRD: 54 ML/MIN/1.7M2
GLUCOSE BLDC GLUCOMTR-MCNC: 100 MG/DL (ref 70–99)
GLUCOSE BLDC GLUCOMTR-MCNC: 131 MG/DL (ref 70–99)
GLUCOSE BLDC GLUCOMTR-MCNC: 95 MG/DL (ref 70–99)
GLUCOSE SERPL-MCNC: 100 MG/DL (ref 70–99)
HBA1C MFR BLD: 6 % (ref 0–5.6)
HCT VFR BLD AUTO: 52.1 % (ref 40–53)
HGB BLD-MCNC: 17.6 G/DL (ref 13.3–17.7)
IMM GRANULOCYTES # BLD: 0 10E9/L (ref 0–0.4)
IMM GRANULOCYTES NFR BLD: 0.3 %
INR PPP: 0.97 (ref 0.86–1.14)
LYMPHOCYTES # BLD AUTO: 2.8 10E9/L (ref 0.8–5.3)
LYMPHOCYTES NFR BLD AUTO: 29 %
MCH RBC QN AUTO: 28.5 PG (ref 26.5–33)
MCHC RBC AUTO-ENTMCNC: 33.8 G/DL (ref 31.5–36.5)
MCV RBC AUTO: 84 FL (ref 78–100)
MONOCYTES # BLD AUTO: 0.9 10E9/L (ref 0–1.3)
MONOCYTES NFR BLD AUTO: 8.8 %
NEUTROPHILS # BLD AUTO: 5.6 10E9/L (ref 1.6–8.3)
NEUTROPHILS NFR BLD AUTO: 57.5 %
NRBC # BLD AUTO: 0 10*3/UL
NRBC BLD AUTO-RTO: 0 /100
PLATELET # BLD AUTO: 278 10E9/L (ref 150–450)
POTASSIUM SERPL-SCNC: 3 MMOL/L (ref 3.4–5.3)
RBC # BLD AUTO: 6.18 10E12/L (ref 4.4–5.9)
SODIUM SERPL-SCNC: 137 MMOL/L (ref 133–144)
TROPONIN I SERPL-MCNC: <0.015 UG/L (ref 0–0.04)
WBC # BLD AUTO: 9.8 10E9/L (ref 4–11)

## 2018-07-08 PROCEDURE — 25000128 H RX IP 250 OP 636: Performed by: EMERGENCY MEDICINE

## 2018-07-08 PROCEDURE — 83036 HEMOGLOBIN GLYCOSYLATED A1C: CPT | Performed by: EMERGENCY MEDICINE

## 2018-07-08 PROCEDURE — 96374 THER/PROPH/DIAG INJ IV PUSH: CPT | Mod: 59

## 2018-07-08 PROCEDURE — 25000132 ZZH RX MED GY IP 250 OP 250 PS 637: Mod: GY | Performed by: INTERNAL MEDICINE

## 2018-07-08 PROCEDURE — A9270 NON-COVERED ITEM OR SERVICE: HCPCS | Mod: GY | Performed by: INTERNAL MEDICINE

## 2018-07-08 PROCEDURE — 70496 CT ANGIOGRAPHY HEAD: CPT

## 2018-07-08 PROCEDURE — 85610 PROTHROMBIN TIME: CPT | Performed by: EMERGENCY MEDICINE

## 2018-07-08 PROCEDURE — 93005 ELECTROCARDIOGRAM TRACING: CPT

## 2018-07-08 PROCEDURE — 99285 EMERGENCY DEPT VISIT HI MDM: CPT | Mod: 25

## 2018-07-08 PROCEDURE — 80048 BASIC METABOLIC PNL TOTAL CA: CPT | Performed by: EMERGENCY MEDICINE

## 2018-07-08 PROCEDURE — G0508 CRIT CARE TELEHEA CONSULT 60: HCPCS | Performed by: PSYCHIATRY & NEUROLOGY

## 2018-07-08 PROCEDURE — 99223 1ST HOSP IP/OBS HIGH 75: CPT | Mod: AI | Performed by: INTERNAL MEDICINE

## 2018-07-08 PROCEDURE — 70553 MRI BRAIN STEM W/O & W/DYE: CPT

## 2018-07-08 PROCEDURE — 85730 THROMBOPLASTIN TIME PARTIAL: CPT | Performed by: EMERGENCY MEDICINE

## 2018-07-08 PROCEDURE — A9585 GADOBUTROL INJECTION: HCPCS | Performed by: EMERGENCY MEDICINE

## 2018-07-08 PROCEDURE — 25000128 H RX IP 250 OP 636: Performed by: INTERNAL MEDICINE

## 2018-07-08 PROCEDURE — 84484 ASSAY OF TROPONIN QUANT: CPT | Performed by: EMERGENCY MEDICINE

## 2018-07-08 PROCEDURE — 82565 ASSAY OF CREATININE: CPT

## 2018-07-08 PROCEDURE — 70460 CT HEAD/BRAIN W/DYE: CPT

## 2018-07-08 PROCEDURE — A9270 NON-COVERED ITEM OR SERVICE: HCPCS | Mod: GY | Performed by: EMERGENCY MEDICINE

## 2018-07-08 PROCEDURE — 25000132 ZZH RX MED GY IP 250 OP 250 PS 637: Mod: GY | Performed by: EMERGENCY MEDICINE

## 2018-07-08 PROCEDURE — 85025 COMPLETE CBC W/AUTO DIFF WBC: CPT | Performed by: EMERGENCY MEDICINE

## 2018-07-08 PROCEDURE — 12000007 ZZH R&B INTERMEDIATE

## 2018-07-08 PROCEDURE — 96375 TX/PRO/DX INJ NEW DRUG ADDON: CPT

## 2018-07-08 PROCEDURE — 00000146 ZZHCL STATISTIC GLUCOSE BY METER IP

## 2018-07-08 PROCEDURE — 70450 CT HEAD/BRAIN W/O DYE: CPT

## 2018-07-08 PROCEDURE — 96361 HYDRATE IV INFUSION ADD-ON: CPT

## 2018-07-08 PROCEDURE — 25000131 ZZH RX MED GY IP 250 OP 636 PS 637: Mod: GY | Performed by: INTERNAL MEDICINE

## 2018-07-08 RX ORDER — OXYCODONE HYDROCHLORIDE 5 MG/1
10 TABLET ORAL ONCE
Status: COMPLETED | OUTPATIENT
Start: 2018-07-08 | End: 2018-07-08

## 2018-07-08 RX ORDER — AMOXICILLIN 250 MG
1 CAPSULE ORAL 2 TIMES DAILY PRN
Status: DISCONTINUED | OUTPATIENT
Start: 2018-07-08 | End: 2018-07-09 | Stop reason: HOSPADM

## 2018-07-08 RX ORDER — DEXTROSE MONOHYDRATE 25 G/50ML
25-50 INJECTION, SOLUTION INTRAVENOUS
Status: DISCONTINUED | OUTPATIENT
Start: 2018-07-08 | End: 2018-07-09 | Stop reason: HOSPADM

## 2018-07-08 RX ORDER — HYDROMORPHONE HYDROCHLORIDE 1 MG/ML
0.2 INJECTION, SOLUTION INTRAMUSCULAR; INTRAVENOUS; SUBCUTANEOUS
Status: DISCONTINUED | OUTPATIENT
Start: 2018-07-08 | End: 2018-07-09 | Stop reason: HOSPADM

## 2018-07-08 RX ORDER — BISACODYL 10 MG
10 SUPPOSITORY, RECTAL RECTAL DAILY PRN
Status: DISCONTINUED | OUTPATIENT
Start: 2018-07-08 | End: 2018-07-09 | Stop reason: HOSPADM

## 2018-07-08 RX ORDER — NALOXONE HYDROCHLORIDE 0.4 MG/ML
.1-.4 INJECTION, SOLUTION INTRAMUSCULAR; INTRAVENOUS; SUBCUTANEOUS
Status: DISCONTINUED | OUTPATIENT
Start: 2018-07-08 | End: 2018-07-09 | Stop reason: HOSPADM

## 2018-07-08 RX ORDER — LORAZEPAM 2 MG/ML
1 INJECTION INTRAMUSCULAR ONCE
Status: COMPLETED | OUTPATIENT
Start: 2018-07-08 | End: 2018-07-08

## 2018-07-08 RX ORDER — POTASSIUM CHLORIDE 29.8 MG/ML
20 INJECTION INTRAVENOUS
Status: DISCONTINUED | OUTPATIENT
Start: 2018-07-08 | End: 2018-07-09 | Stop reason: HOSPADM

## 2018-07-08 RX ORDER — AMLODIPINE BESYLATE 10 MG/1
10 TABLET ORAL DAILY
Status: DISCONTINUED | OUTPATIENT
Start: 2018-07-09 | End: 2018-07-09 | Stop reason: HOSPADM

## 2018-07-08 RX ORDER — HEPARIN SODIUM 5000 [USP'U]/.5ML
5000 INJECTION, SOLUTION INTRAVENOUS; SUBCUTANEOUS EVERY 12 HOURS
Status: DISCONTINUED | OUTPATIENT
Start: 2018-07-08 | End: 2018-07-09 | Stop reason: HOSPADM

## 2018-07-08 RX ORDER — POLYETHYLENE GLYCOL 3350 17 G/17G
17 POWDER, FOR SOLUTION ORAL DAILY PRN
Status: DISCONTINUED | OUTPATIENT
Start: 2018-07-08 | End: 2018-07-09 | Stop reason: HOSPADM

## 2018-07-08 RX ORDER — CARVEDILOL 6.25 MG/1
6.25 TABLET ORAL 2 TIMES DAILY WITH MEALS
Status: DISCONTINUED | OUTPATIENT
Start: 2018-07-08 | End: 2018-07-09 | Stop reason: HOSPADM

## 2018-07-08 RX ORDER — METOCLOPRAMIDE HYDROCHLORIDE 5 MG/ML
10 INJECTION INTRAMUSCULAR; INTRAVENOUS ONCE
Status: COMPLETED | OUTPATIENT
Start: 2018-07-08 | End: 2018-07-08

## 2018-07-08 RX ORDER — ONDANSETRON 2 MG/ML
4 INJECTION INTRAMUSCULAR; INTRAVENOUS EVERY 6 HOURS PRN
Status: DISCONTINUED | OUTPATIENT
Start: 2018-07-08 | End: 2018-07-09 | Stop reason: HOSPADM

## 2018-07-08 RX ORDER — NICOTINE POLACRILEX 4 MG
15-30 LOZENGE BUCCAL
Status: DISCONTINUED | OUTPATIENT
Start: 2018-07-08 | End: 2018-07-09 | Stop reason: HOSPADM

## 2018-07-08 RX ORDER — HYDRALAZINE HYDROCHLORIDE 20 MG/ML
10 INJECTION INTRAMUSCULAR; INTRAVENOUS EVERY 4 HOURS PRN
Status: DISCONTINUED | OUTPATIENT
Start: 2018-07-08 | End: 2018-07-09 | Stop reason: HOSPADM

## 2018-07-08 RX ORDER — ATORVASTATIN CALCIUM 40 MG/1
40 TABLET, FILM COATED ORAL DAILY
Status: DISCONTINUED | OUTPATIENT
Start: 2018-07-09 | End: 2018-07-09 | Stop reason: HOSPADM

## 2018-07-08 RX ORDER — LANOLIN ALCOHOL/MO/W.PET/CERES
6 CREAM (GRAM) TOPICAL
Status: DISCONTINUED | OUTPATIENT
Start: 2018-07-08 | End: 2018-07-09 | Stop reason: HOSPADM

## 2018-07-08 RX ORDER — AMOXICILLIN 250 MG
2 CAPSULE ORAL 2 TIMES DAILY PRN
Status: DISCONTINUED | OUTPATIENT
Start: 2018-07-08 | End: 2018-07-09 | Stop reason: HOSPADM

## 2018-07-08 RX ORDER — POTASSIUM CHLORIDE 1500 MG/1
20-40 TABLET, EXTENDED RELEASE ORAL
Status: DISCONTINUED | OUTPATIENT
Start: 2018-07-08 | End: 2018-07-09 | Stop reason: HOSPADM

## 2018-07-08 RX ORDER — ASPIRIN 81 MG/1
162 TABLET, CHEWABLE ORAL DAILY
Status: DISCONTINUED | OUTPATIENT
Start: 2018-07-09 | End: 2018-07-09 | Stop reason: HOSPADM

## 2018-07-08 RX ORDER — DOCUSATE SODIUM 100 MG/1
100 CAPSULE, LIQUID FILLED ORAL 2 TIMES DAILY
Status: DISCONTINUED | OUTPATIENT
Start: 2018-07-08 | End: 2018-07-09 | Stop reason: HOSPADM

## 2018-07-08 RX ORDER — SODIUM CHLORIDE 9 MG/ML
INJECTION, SOLUTION INTRAVENOUS CONTINUOUS
Status: DISCONTINUED | OUTPATIENT
Start: 2018-07-08 | End: 2018-07-09 | Stop reason: HOSPADM

## 2018-07-08 RX ORDER — DIPHENHYDRAMINE HYDROCHLORIDE 50 MG/ML
25 INJECTION INTRAMUSCULAR; INTRAVENOUS ONCE
Status: COMPLETED | OUTPATIENT
Start: 2018-07-08 | End: 2018-07-08

## 2018-07-08 RX ORDER — ONDANSETRON 4 MG/1
4 TABLET, ORALLY DISINTEGRATING ORAL EVERY 6 HOURS PRN
Status: DISCONTINUED | OUTPATIENT
Start: 2018-07-08 | End: 2018-07-09 | Stop reason: HOSPADM

## 2018-07-08 RX ORDER — LABETALOL HYDROCHLORIDE 5 MG/ML
10 INJECTION, SOLUTION INTRAVENOUS
Status: DISCONTINUED | OUTPATIENT
Start: 2018-07-08 | End: 2018-07-09 | Stop reason: HOSPADM

## 2018-07-08 RX ORDER — HYDROCODONE BITARTRATE AND ACETAMINOPHEN 5; 325 MG/1; MG/1
1-2 TABLET ORAL EVERY 4 HOURS PRN
Status: CANCELLED | OUTPATIENT
Start: 2018-07-08

## 2018-07-08 RX ORDER — IOPAMIDOL 755 MG/ML
500 INJECTION, SOLUTION INTRAVASCULAR ONCE
Status: COMPLETED | OUTPATIENT
Start: 2018-07-08 | End: 2018-07-08

## 2018-07-08 RX ORDER — MAGNESIUM SULFATE HEPTAHYDRATE 40 MG/ML
4 INJECTION, SOLUTION INTRAVENOUS EVERY 4 HOURS PRN
Status: DISCONTINUED | OUTPATIENT
Start: 2018-07-08 | End: 2018-07-09 | Stop reason: HOSPADM

## 2018-07-08 RX ORDER — PRAMIPEXOLE DIHYDROCHLORIDE 0.25 MG/1
0.5 TABLET ORAL AT BEDTIME
Status: DISCONTINUED | OUTPATIENT
Start: 2018-07-08 | End: 2018-07-09 | Stop reason: HOSPADM

## 2018-07-08 RX ORDER — POTASSIUM CHLORIDE 7.45 MG/ML
10 INJECTION INTRAVENOUS
Status: DISCONTINUED | OUTPATIENT
Start: 2018-07-08 | End: 2018-07-09 | Stop reason: HOSPADM

## 2018-07-08 RX ORDER — POTASSIUM CHLORIDE 1.5 G/1.58G
20-40 POWDER, FOR SOLUTION ORAL
Status: DISCONTINUED | OUTPATIENT
Start: 2018-07-08 | End: 2018-07-09 | Stop reason: HOSPADM

## 2018-07-08 RX ORDER — POTASSIUM CL/LIDO/0.9 % NACL 10MEQ/0.1L
10 INTRAVENOUS SOLUTION, PIGGYBACK (ML) INTRAVENOUS
Status: DISCONTINUED | OUTPATIENT
Start: 2018-07-08 | End: 2018-07-09 | Stop reason: HOSPADM

## 2018-07-08 RX ORDER — ACETAMINOPHEN 325 MG/1
650 TABLET ORAL EVERY 4 HOURS PRN
Status: DISCONTINUED | OUTPATIENT
Start: 2018-07-08 | End: 2018-07-09 | Stop reason: HOSPADM

## 2018-07-08 RX ORDER — HYDROXYZINE HYDROCHLORIDE 25 MG/1
25 TABLET, FILM COATED ORAL EVERY 6 HOURS PRN
Status: DISCONTINUED | OUTPATIENT
Start: 2018-07-08 | End: 2018-07-09 | Stop reason: HOSPADM

## 2018-07-08 RX ORDER — OXYCODONE HYDROCHLORIDE 5 MG/1
5-10 TABLET ORAL EVERY 4 HOURS PRN
Status: DISCONTINUED | OUTPATIENT
Start: 2018-07-08 | End: 2018-07-09 | Stop reason: HOSPADM

## 2018-07-08 RX ORDER — VALACYCLOVIR HYDROCHLORIDE 1 G/1
1000 TABLET, FILM COATED ORAL 3 TIMES DAILY
Status: DISCONTINUED | OUTPATIENT
Start: 2018-07-08 | End: 2018-07-09 | Stop reason: HOSPADM

## 2018-07-08 RX ORDER — GADOBUTROL 604.72 MG/ML
15 INJECTION INTRAVENOUS ONCE
Status: COMPLETED | OUTPATIENT
Start: 2018-07-08 | End: 2018-07-08

## 2018-07-08 RX ORDER — HYDROXYZINE HYDROCHLORIDE 50 MG/1
50 TABLET, FILM COATED ORAL EVERY 6 HOURS PRN
Status: DISCONTINUED | OUTPATIENT
Start: 2018-07-08 | End: 2018-07-09 | Stop reason: HOSPADM

## 2018-07-08 RX ADMIN — ACETAMINOPHEN 650 MG: 325 TABLET, FILM COATED ORAL at 22:58

## 2018-07-08 RX ADMIN — POTASSIUM CHLORIDE 20 MEQ: 1500 TABLET, EXTENDED RELEASE ORAL at 21:19

## 2018-07-08 RX ADMIN — DIPHENHYDRAMINE HYDROCHLORIDE 25 MG: 50 INJECTION, SOLUTION INTRAMUSCULAR; INTRAVENOUS at 14:44

## 2018-07-08 RX ADMIN — SODIUM CHLORIDE 1000 ML: 9 INJECTION, SOLUTION INTRAVENOUS at 14:40

## 2018-07-08 RX ADMIN — ACETAMINOPHEN 650 MG: 325 TABLET, FILM COATED ORAL at 18:44

## 2018-07-08 RX ADMIN — Medication 10 MG: at 18:59

## 2018-07-08 RX ADMIN — SODIUM CHLORIDE 80 ML: 900 INJECTION, SOLUTION INTRAVENOUS at 14:12

## 2018-07-08 RX ADMIN — CARVEDILOL 6.25 MG: 6.25 TABLET, FILM COATED ORAL at 18:49

## 2018-07-08 RX ADMIN — OXYCODONE HYDROCHLORIDE 10 MG: 5 TABLET ORAL at 16:53

## 2018-07-08 RX ADMIN — POTASSIUM CHLORIDE 40 MEQ: 1500 TABLET, EXTENDED RELEASE ORAL at 19:00

## 2018-07-08 RX ADMIN — DOCUSATE SODIUM 100 MG: 100 CAPSULE, LIQUID FILLED ORAL at 21:18

## 2018-07-08 RX ADMIN — OXYCODONE HYDROCHLORIDE 5 MG: 5 TABLET ORAL at 18:44

## 2018-07-08 RX ADMIN — PRAMIPEXOLE DIHYDROCHLORIDE 0.5 MG: 0.25 TABLET ORAL at 21:19

## 2018-07-08 RX ADMIN — IOPAMIDOL 120 ML: 755 INJECTION, SOLUTION INTRAVENOUS at 14:12

## 2018-07-08 RX ADMIN — LORAZEPAM 1 MG: 2 INJECTION INTRAMUSCULAR; INTRAVENOUS at 15:29

## 2018-07-08 RX ADMIN — OXYCODONE HYDROCHLORIDE 5 MG: 5 TABLET ORAL at 22:58

## 2018-07-08 RX ADMIN — GADOBUTROL 15 ML: 604.72 INJECTION INTRAVENOUS at 15:37

## 2018-07-08 RX ADMIN — HEPARIN SODIUM 5000 UNITS: 5000 INJECTION, SOLUTION INTRAVENOUS; SUBCUTANEOUS at 21:19

## 2018-07-08 RX ADMIN — SODIUM CHLORIDE: 9 INJECTION, SOLUTION INTRAVENOUS at 19:06

## 2018-07-08 RX ADMIN — METOCLOPRAMIDE HYDROCHLORIDE 10 MG: 5 INJECTION INTRAMUSCULAR; INTRAVENOUS at 14:51

## 2018-07-08 ASSESSMENT — ENCOUNTER SYMPTOMS
SPEECH DIFFICULTY: 1
LIGHT-HEADEDNESS: 1

## 2018-07-08 ASSESSMENT — PAIN DESCRIPTION - DESCRIPTORS: DESCRIPTORS: SHARP

## 2018-07-08 ASSESSMENT — ACTIVITIES OF DAILY LIVING (ADL): ADLS_ACUITY_SCORE: 10

## 2018-07-08 NOTE — IP AVS SNAPSHOT
[place] [address] [facility]    [address]    [facility] [address]    Phone:  [phone]    Fax:  [phone]                                       After Visit Summary   7/8/2018    Federico Chamberlain    MRN: [id number]           After Visit Summary Signature Page     I have received my discharge instructions, and my questions have been answered. I have discussed any challenges I see with this plan with the nurse or doctor.    ..........................................................................................................................................  Patient/Patient Representative Signature      ..........................................................................................................................................  Patient Representative Print Name and Relationship to Patient    ..................................................               ................................................  Date                                            Time    ..........................................................................................................................................  Reviewed by Signature/Title    ...................................................              ..............................................  Date                                                            Time

## 2018-07-08 NOTE — ED NOTES
Family came to the registration desk and told staff that patient isn't acting normal. Pt was found to be crying in the lobby and states he can't talk. Pt was able to get out of the chair normally and on his own into a wheelchair. Pt squinting eyes at this time.

## 2018-07-08 NOTE — ED NOTES
ED Nursing Note:    On the way to being roomed, patient started to complain of having difficulty speaking.  Speech noted to be hesitant and he wasw answering questions but not correctly, e.g. Could not remember his birthdate.      18 gauge IV started immediately.  ED MD evaluation within 5 minutes of arrival in room ( 1345).  Taken to CT at 1355, monitoring in place and accompanied by RN.  Once CT completed, he stated that he suddenly could speak better.  Vomited X1.  Continues to be somewhat nauseated.      Telemedicine evaluation is in progress now.

## 2018-07-08 NOTE — ED NOTES
"ED Nursing Note:    Benadryl and Reglan given for nausea.  Within a few seconds of the reglan administration, he suddenly stopped talking and indicated that he was unable to speak.  ED MD consulted and evaluated immediately.  This episode lasted a total of about 4 minutes and then resolved and he is now again speaking.  States that he still has a headache, but that his speech is \"80%\".      Remains in sinus bradycardia without dysrhythmia.    "

## 2018-07-08 NOTE — IP AVS SNAPSHOT
MRN:[id number]                      After Visit Summary   7/8/2018    Federico Chamberlain    MRN: [id number]           Thank you!     Thank you for choosing [facility] for your care. Our goal is always to provide you with excellent care. Hearing back from our patients is one way we can continue to improve our services. Please take a few minutes to complete the written survey that you may receive in the mail after you visit. If you would like to speak to someone directly about your visit please contact Patient Relations at [phone]. Thank you!          Patient Information     Date Of Birth          1950        Designated Caregiver       Most Recent Value    Caregiver    Will someone help with your care after discharge? yes    Name of designated caregiver Beryl    Phone number of caregiver [phone]    Caregiver address same at patient      About your hospital stay     You were admitted on:  July 8, 2018 You last received care in the:  [place] [address] [facility]    You were discharged on:  July 9, 2018        Reason for your hospital stay       Dr. Pa thought that the symptoms are strongly consistent with a Complex Migraine.                  Who to Call     For medical emergencies, please call 911.  For non-urgent questions about your medical care, please call your primary care provider or clinic, [phone]          Attending Provider     Provider Specialty    Marin Beaulieu MD Emergency Medicine    [facility], Alfonso Leyva MD Internal Medicine    Miguel Joseph MD Internal Medicine       Primary Care Provider Office Phone # Fax #    Nathalie Quiroz [phone]      After Care Instructions     Activity       Your activity upon discharge: activity as tolerated            Diet       Follow this diet upon discharge: Regular                  Follow-up Appointments     Follow-up and recommended labs and tests        Follow up with Dr. Quiroz or  "partners as planned.   Follow up with Neurology as planned for further discussion of Migraine control.                  Pending Results     Date and Time Order Name Status Description    2018 0927 Vitamin D In process             Statement of Approval     Ordered          18 1210  I have reviewed and agree with all the recommendations and orders detailed in this document.  EFFECTIVE NOW     Approved and electronically signed by:  Miguel Joseph MD             Admission Information     Date & Time Provider Department Dept. Phone    2018 Miguel Joseph MD [place] [address] [facility] [phone]      Your Vitals Were     Blood Pressure Pulse Temperature Respirations Height Weight    130/72 (BP Location: Right arm) 57 97  F (36.1  C) (Oral) 16 1.981 m (6' 6\") 131.5 kg (290 lb)    Pulse Oximetry BMI (Body Mass Index)                99% 33.51 kg/m2          MyChart Information     "[employer] lets you send messages to your doctor, view your test results, renew your prescriptions, schedule appointments and more. To sign up, go to [email or web address] . Click on \"Log in\" on the left side of the screen, which will take you to the Welcome page. Then click on \"Sign up Now\" on the right side of the page.     You will be asked to enter the access code listed below, as well as some personal information. Please follow the directions to create your username and password.     Your access code is: [email or web address]  Expires: 2018  5:33 AM     Your access code will  in 90 days. If you need help or a new code, please call your [facility] clinic or [phone].        Care EveryWhere ID     This is your Care EveryWhere ID. This could be used by other organizations to access your [facility] medical records  [id number]        Equal Access to Services     JAMES OCAMPO : Jess Fisher, lucila braswell, carlos diego. So [facility] " [phone].    ATENCIÓN: Si hermann espinoza, tiene a mcfadden disposición servicios gratuitos de asistencia lingüística. Jayro casanova [phone].    We comply with applicable federal civil rights laws and [place] laws. We do not discriminate on the basis of race, color, national origin, age, disability, sex, sexual orientation, or gender identity.               Review of your medicines      START taking        Dose / Directions    carvedilol 6.25 MG tablet   Commonly known as:  COREG        Dose:  6.25 mg   Take 1 tablet (6.25 mg) by mouth 2 times daily (with meals)   Quantity:  60 tablet   Refills:  0       gabapentin 300 MG capsule   Commonly known as:  NEURONTIN        Dose:  300 mg   Take 1 capsule (300 mg) by mouth At Bedtime   Quantity:  270 capsule   Refills:  1       oxyCODONE IR 5 MG tablet   Commonly known as:  ROXICODONE        Dose:  5 mg   Take 1 tablet (5 mg) by mouth every 4 hours as needed for moderate to severe pain   Quantity:  10 tablet   Refills:  0         CONTINUE these medicines which have NOT CHANGED        Dose / Directions    amLODIPine 10 MG tablet   Commonly known as:  NORVASC   Used for:  Essential hypertension        Dose:  10 mg   Take 1 tablet (10 mg) by mouth daily   Quantity:  30 tablet   Refills:  1       ASPIRIN PO        Dose:  81 mg   Take 81 mg by mouth daily   Refills:  0       ATORVASTATIN CALCIUM PO        Dose:  40 mg   Take 40 mg by mouth daily   Refills:  0       diclofenac 1 % Gel topical gel   Commonly known as:  VOLTAREN   Used for:  Left upper quadrant pain        Dose:  2 g   Place 2 g onto the skin 3 times daily   Quantity:  1 Tube   Refills:  1       diphenhydrAMINE-acetaminophen  MG tablet   Commonly known as:  TYLENOL PM        Dose:  2 tablet   Take 2 tablets by mouth nightly as needed for sleep   Refills:  0       HYDROcodone-acetaminophen 5-325 MG per tablet   Commonly known as:  NORCO        Dose:  1 tablet   Take 1 tablet by mouth every 4 hours as needed for  pain   Quantity:  10 tablet   Refills:  0       KLOR-CON 10 MEQ tablet   Generic drug:  potassium chloride        Dose:  10 mEq   Take 10 mEq by mouth daily   Refills:  0       Lidocaine 4 % Patch   Commonly known as:  LIDOCARE   Used for:  Left upper quadrant pain        Dose:  1 patch   Place 1 patch onto the skin every 24 hours   Quantity:  10 patch   Refills:  1       LISINOPRIL PO        Dose:  40 mg   Take 40 mg by mouth daily   Refills:  0       MELATONIN PO        Dose:  6 mg   Take 6 mg by mouth nightly as needed (sleep)   Refills:  0       METFORMIN HCL PO        Dose:  500 mg   Take 500 mg by mouth 2 times daily (with meals)   Refills:  0       MIRAPEX PO        Dose:  0.5 mg   Take 0.5 mg by mouth At Bedtime   Refills:  0       VALTREX PO   Indication:  Infection caused by the Varicella Zoster Virus        Dose:  1000 mg   Take 1,000 mg by mouth 3 times daily   Refills:  0       VITAMIN D (CHOLECALCIFEROL) PO        Dose:  2000 Units   Take 2,000 Units by mouth daily   Refills:  0         STOP taking     CHLORTHALIDONE PO                Where to get your medicines      These medications were sent to [facility] [address] [address]     Phone:  [phone]     carvedilol 6.25 MG tablet         Some of these will need a paper prescription and others can be bought over the counter. Ask your nurse if you have questions.     Bring a paper prescription for each of these medications     oxyCODONE IR 5 MG tablet       You don't need a prescription for these medications     gabapentin 300 MG capsule                Protect others around you: Learn how to safely use, store and throw away your medicines at www.disposemymeds.org.        ANTIBIOTIC INSTRUCTION     You've Been Prescribed an Antibiotic - Now What?  Your healthcare team thinks that you or your loved one might have an infection. Some infections can be treated with  antibiotics, which are powerful, life-saving drugs. Like all medications, antibiotics have side effects and should only be used when necessary. There are some important things you should know about your antibiotic treatment.      Your healthcare team may run tests before you start taking an antibiotic.    Your team may take samples (e.g., from your blood, urine or other areas) to run tests to look for bacteria. These test can be important to determine if you need an antibiotic at all and, if you do, which antibiotic will work best.      Within a few days, your healthcare team might change or even stop your antibiotic.    Your team may start you on an antibiotic while they are working to find out what is making you sick.    Your team might change your antibiotic because test results show that a different antibiotic would be better to treat your infection.    In some cases, once your team has more information, they learn that you do not need an antibiotic at all. They may find out that you don't have an infection, or that the antibiotic you're taking won't work against your infection. For example, an infection caused by a virus can't be treated with antibiotics. Staying on an antibiotic when you don't need it is more likely to be harmful than helpful.      You may experience side effects from your antibiotic.    Like all medications, antibiotics have side effects. Some of these can be serious.    Let you healthcare team know if you have any known allergies when you are admitted to the hospital.    One significant side effect of nearly all antibiotics is the risk of severe and sometimes deadly diarrhea caused by Clostridium difficile (C. Difficile). This occurs when a person takes antibiotics because some good germs are destroyed. Antibiotic use allows C. diificile to take over, putting patients at high risk for this serious infection.    As a patient or caregiver, it is important to understand your or your loved one's  antibiotic treatment. It is especially important for caregivers to speak up when patients can't speak for themselves. Here are some important questions to ask your healthcare team.    What infection is this antibiotic treating and how do you know I have that infection?    What side effects might occur from this antibiotic?    How long will I need to take this antibiotic?    Is it safe to take this antibiotic with other medications or supplements (e.g., vitamins) that I am taking?     Are there any special directions I need to know about taking this antibiotic? For example, should I take it with food?    How will I be monitored to know whether my infection is responding to the antibiotic?    What tests may help to make sure the right antibiotic is prescribed for me?      Information provided by:  www.cdc.gov/getsmart  U.S. Department of Health and Human Services  Centers for disease Control and Prevention  National Center for Emerging and Zoonotic Infectious Diseases  Division of Healthcare Quality Promotion        Information about OPIOIDS     PRESCRIPTION OPIOIDS: WHAT YOU NEED TO KNOW   We gave you an opioid (narcotic) pain medicine. It is important to manage your pain, but opioids are not always the best choice. You should first try all the other options your care team gave you. Take this medicine for as short a time (and as few doses) as possible.     These medicines have risks:    DO NOT drive when on new or higher doses of pain medicine. These medicines can affect your alertness and reaction times, and you could be arrested for driving under the influence (DUI). If you need to use opioids long-term, talk to your care team about driving.    DO NOT operate heave machinery    DO NOT do any other dangerous activities while taking these medicines.     DO NOT drink any alcohol while taking these medicines.      If the opioid prescribed includes acetaminophen, DO NOT take with any other medicines that contain  acetaminophen. Read all labels carefully. Look for the word  acetaminophen  or  Tylenol.  Ask your pharmacist if you have questions or are unsure.    You can get addicted to pain medicines, especially if you have a history of addiction (chemical, alcohol or substance dependence). Talk to your care team about ways to reduce this risk.    Store your pills in a secure place, locked if possible. We will not replace any lost or stolen medicine. If you don t finish your medicine, please throw away (dispose) as directed by your pharmacist. The [place] Pollution Control Agency has more information about safe disposal: https://www.pca.[facility].[place].us/living-green/managing-unwanted-medications.     All opioids tend to cause constipation. Drink plenty of water and eat foods that have a lot of fiber, such as fruits, vegetables, prune juice, apple juice and high-fiber cereal. Take a laxative (Miralax, milk of magnesia, Colace, Senna) if you don t move your bowels at least every other day.              Medication List: This is a list of all your medications and when to take them. Check marks below indicate your daily home schedule. Keep this list as a reference.      Medications           Morning Afternoon Evening Bedtime As Needed    amLODIPine 10 MG tablet   Commonly known as:  NORVASC   Take 1 tablet (10 mg) by mouth daily   Last time this was given:  10 mg on 7/9/2018  8:14 AM            8 AM                       ASPIRIN PO   Take 81 mg by mouth daily   Last time this was given:  162 mg on 7/9/2018  8:13 AM            8 AM                       ATORVASTATIN CALCIUM PO   Take 40 mg by mouth daily   Last time this was given:  40 mg on 7/9/2018  8:13 AM            8 AM                       carvedilol 6.25 MG tablet   Commonly known as:  COREG   Take 1 tablet (6.25 mg) by mouth 2 times daily (with meals)   Last time this was given:  6.25 mg on 7/9/2018  8:13 AM            8 AM           6 PM               diclofenac 1 % Gel  topical gel   Commonly known as:  VOLTAREN   Place 2 g onto the skin 3 times daily                                         diphenhydrAMINE-acetaminophen  MG tablet   Commonly known as:  TYLENOL PM   Take 2 tablets by mouth nightly as needed for sleep                                   gabapentin 300 MG capsule   Commonly known as:  NEURONTIN   Take 1 capsule (300 mg) by mouth At Bedtime                                   HYDROcodone-acetaminophen 5-325 MG per tablet   Commonly known as:  NORCO   Take 1 tablet by mouth every 4 hours as needed for pain                                   KLOR-CON 10 MEQ tablet   Take 10 mEq by mouth daily   Generic drug:  potassium chloride            8 AM                       Lidocaine 4 % Patch   Commonly known as:  LIDOCARE   Place 1 patch onto the skin every 24 hours                                   LISINOPRIL PO   Take 40 mg by mouth daily   Last time this was given:  40 mg on 7/9/2018 10:45 AM            8 AM                       MELATONIN PO   Take 6 mg by mouth nightly as needed (sleep)                                   METFORMIN HCL PO   Take 500 mg by mouth 2 times daily (with meals)            8 AM           6PM               MIRAPEX PO   Take 0.5 mg by mouth At Bedtime   Last time this was given:  0.5 mg on 7/8/2018  9:19 PM                                   oxyCODONE IR 5 MG tablet   Commonly known as:  ROXICODONE   Take 1 tablet (5 mg) by mouth every 4 hours as needed for moderate to severe pain   Last time this was given:  5 mg on 7/9/2018 11:37 AM                                   VALTREX PO   Take 1,000 mg by mouth 3 times daily   Last time this was given:  1,000 mg on 7/9/2018  8:13 AM                                         VITAMIN D (CHOLECALCIFEROL) PO   Take 2,000 Units by mouth daily                                             More Information        Patient Education    Carvedilol Oral capsule, extended-release    Carvedilol Oral tablet  Carvedilol Oral  tablet  What is this medicine?  CARVEDILOL (MARIO ve dil ol) is a beta-blocker. Beta-blockers reduce the workload on the heart and help it to beat more regularly. This medicine is used to treat high blood pressure and heart failure.  This medicine may be used for other purposes; ask your health care provider or pharmacist if you have questions.  What should I tell my health care provider before I take this medicine?  They need to know if you have any of these conditions:    circulation problems    diabetes    history of heart attack or heart disease    liver disease    lung or breathing disease, like asthma or emphysema    pheochromocytoma    slow or irregular heartbeat    thyroid disease    an unusual or allergic reaction to carvedilol, other beta-blockers, medicines, foods, dyes, or preservatives    pregnant or trying to get pregnant    breast-feeding  How should I use this medicine?  Take this medicine by mouth with a glass of water. Follow the directions on the prescription label. It is best to take the tablets with food. Take your doses at regular intervals. Do not take your medicine more often than directed. Do not stop taking except on the advice of your doctor or health care professional.  Talk to your pediatrician regarding the use of this medicine in children. Special care may be needed.  Overdosage: If you think you have taken too much of this medicine contact a poison control center or emergency room at once.  NOTE: This medicine is only for you. Do not share this medicine with others.  What if I miss a dose?  If you miss a dose, take it as soon as you can. If it is almost time for your next dose, take only that dose. Do not take double or extra doses.  What may interact with this medicine?  This medicine may interact with the following medications:    certain medicines for blood pressure, heart disease, irregular heart beat    certain medicines for depression, like fluoxetine or paroxetine    certain  medicines for diabetes, like glipizide or glyburide    cimetidine    clonidine    cyclosporine    digoxin    MAOIs like Carbex, Eldepryl, Marplan, Nardil, and Parnate    reserpine    rifampin  This list may not describe all possible interactions. Give your health care provider a list of all the medicines, herbs, non-prescription drugs, or dietary supplements you use. Also tell them if you smoke, drink alcohol, or use illegal drugs. Some items may interact with your medicine.  What should I watch for while using this medicine?  Check your heart rate and blood pressure regularly while you are taking this medicine. Ask your doctor or health care professional what your heart rate and blood pressure should be, and when you should contact him or her. Do not stop taking this medicine suddenly. This could lead to serious heart-related effects.  Contact your doctor or health care professional if you have difficulty breathing while taking this drug.  Check your weight daily. Ask your doctor or health care professional when you should notify him/her of any weight gain.  You may get drowsy or dizzy. Do not drive, use machinery, or do anything that requires mental alertness until you know how this medicine affects you. To reduce the risk of dizzy or fainting spells, do not sit or stand up quickly. Alcohol can make you more drowsy, and increase flushing and rapid heartbeats. Avoid alcoholic drinks.  If you have diabetes, check your blood sugar as directed. Tell your doctor if you have changes in your blood sugar while you are taking this medicine.  If you are going to have surgery, tell your doctor or health care professional that you are taking this medicine.  What side effects may I notice from receiving this medicine?  Side effects that you should report to your doctor or health care professional as soon as possible:    allergic reactions like skin rash, itching or hives, swelling of the face, lips, or tongue    breathing  problems    dark urine    irregular heartbeat    swollen legs or ankles    vomiting    yellowing of the eyes or skin  Side effects that usually do not require medical attention (report to your doctor or health care professional if they continue or are bothersome):    change in sex drive or performance    diarrhea    dry eyes (especially if wearing contact lenses)    dry, itching skin    headache    nausea    unusually tired  This list may not describe all possible side effects. Call your doctor for medical advice about side effects. You may report side effects to FDA at 6-522-VFO-0524.  Where should I keep my medicine?  Keep out of the reach of children.  Store at room temperature below 30 degrees C (86 degrees F). Protect from moisture. Keep container tightly closed. Throw away any unused medicine after the expiration date.  NOTE:This sheet is a summary. It may not cover all possible information. If you have questions about this medicine, talk to your doctor, pharmacist, or health care provider. Copyright  2016 Gold Standard                Gabapentin capsules or tablets  Brand Names: Active-PAC with Gabapentin, Neurontin  What is this medicine?  GABAPENTIN (GA ba pen tin) is used to control partial seizures in adults with epilepsy. It is also used to treat certain types of nerve pain.  How should I use this medicine?  Take this medicine by mouth with a glass of water. Follow the directions on the prescription label. You can take it with or without food. If it upsets your stomach, take it with food.Take your medicine at regular intervals. Do not take it more often than directed. Do not stop taking except on your doctor's advice.  If you are directed to break the 600 or 800 mg tablets in half as part of your dose, the extra half tablet should be used for the next dose. If you have not used the extra half tablet within 28 days, it should be thrown away.  A special MedGuide will be given to you by the pharmacist with  each prescription and refill. Be sure to read this information carefully each time.  Talk to your pediatrician regarding the use of this medicine in children. Special care may be needed.  What side effects may I notice from receiving this medicine?  Side effects that you should report to your doctor or health care professional as soon as possible:    allergic reactions like skin rash, itching or hives, swelling of the face, lips, or tongue    worsening of mood, thoughts or actions of suicide or dying  Side effects that usually do not require medical attention (report to your doctor or health care professional if they continue or are bothersome):    constipation    difficulty walking or controlling muscle movements    dizziness    nausea    slurred speech    tiredness    tremors    weight gain  What may interact with this medicine?  Do not take this medicine with any of the following medications:    other gabapentin products  This medicine may also interact with the following medications:    alcohol    antacids    antihistamines for allergy, cough and cold    certain medicines for anxiety or sleep    certain medicines for depression or psychotic disturbances    homatropine; hydrocodone    naproxen    narcotic medicines (opiates) for pain    phenothiazines like chlorpromazine, mesoridazine, prochlorperazine, thioridazine  What if I miss a dose?  If you miss a dose, take it as soon as you can. If it is almost time for your next dose, take only that dose. Do not take double or extra doses.  Where should I keep my medicine?  Keep out of reach of children.  This medicine may cause accidental overdose and death if it taken by other adults, children, or pets. Mix any unused medicine with a substance like cat litter or coffee grounds. Then throw the medicine away in a sealed container like a sealed bag or a coffee can with a lid. Do not use the medicine after the expiration date.  Store at room temperature between 15 and 30  degrees C (59 and 86 degrees F).  What should I tell my health care provider before I take this medicine?  They need to know if you have any of these conditions:    kidney disease    suicidal thoughts, plans, or attempt; a previous suicide attempt by you or a family member    an unusual or allergic reaction to gabapentin, other medicines, foods, dyes, or preservatives    pregnant or trying to get pregnant    breast-feeding  What should I watch for while using this medicine?  Visit your doctor or health care professional for regular checks on your progress. You may want to keep a record at home of how you feel your condition is responding to treatment. You may want to share this information with your doctor or health care professional at each visit. You should contact your doctor or health care professional if your seizures get worse or if you have any new types of seizures. Do not stop taking this medicine or any of your seizure medicines unless instructed by your doctor or health care professional. Stopping your medicine suddenly can increase your seizures or their severity.  Wear a medical identification bracelet or chain if you are taking this medicine for seizures, and carry a card that lists all your medications.  You may get drowsy, dizzy, or have blurred vision. Do not drive, use machinery, or do anything that needs mental alertness until you know how this medicine affects you. To reduce dizzy or fainting spells, do not sit or stand up quickly, especially if you are an older patient. Alcohol can increase drowsiness and dizziness. Avoid alcoholic drinks.  Your mouth may get dry. Chewing sugarless gum or sucking hard candy, and drinking plenty of water will help.  The use of this medicine may increase the chance of suicidal thoughts or actions. Pay special attention to how you are responding while on this medicine. Any worsening of mood, or thoughts of suicide or dying should be reported to your health care  professional right away.  Women who become pregnant while using this medicine may enroll in the [place] Antiepileptic Drug Pregnancy Registry by calling 1-723.289.7316. This registry collects information about the safety of antiepileptic drug use during pregnancy.  NOTE:This sheet is a summary. It may not cover all possible information. If you have questions about this medicine, talk to your doctor, pharmacist, or health care provider. Copyright  2018 [employer]                Oxycodone tablets or capsules  Brand Names: Dazidox, Endocodone, Oxaydo, OxyIR, Percolone, Roxicodone, ROXYBOND  What is this medicine?  OXYCODONE (ox i KOE done) is a pain reliever. It is used to treat moderate to severe pain.  How should I use this medicine?  Take this medicine by mouth with a glass of water. Follow the directions on the prescription label. You can take it with or without food. If it upsets your stomach, take it with food. Take your medicine at regular intervals. Do not take it more often than directed. Do not stop taking except on your doctor's advice.  Some brands of this medicine, like Oxecta, have special instructions. Ask your doctor or pharmacist if these directions are for you: Do not cut, crush or chew this medicine. Swallow only one tablet at a time. Do not wet, soak, or lick the tablet before you take it.  A special MedGuide will be given to you by the pharmacist with each prescription and refill. Be sure to read this information carefully each time.  Talk to your pediatrician regarding the use of this medicine in children. Special care may be needed.  What side effects may I notice from receiving this medicine?  Side effects that you should report to your doctor or health care professional as soon as possible:    allergic reactions like skin rash, itching or hives, swelling of the face, lips, or tongue    breathing problems    confusion    signs and symptoms of low blood pressure like dizziness; feeling faint  or lightheaded, falls; unusually weak or tired    trouble passing urine or change in the amount of urine    trouble swallowing  Side effects that usually do not require medical attention (report to your doctor or health care professional if they continue or are bothersome):    constipation    dry mouth    nausea, vomiting    tiredness  What may interact with this medicine?  This medicine may interact with the following medications:    alcohol    antihistamines for allergy, cough and cold    antiviral medicines for HIV or AIDS    atropine    certain antibiotics like clarithromycin, erythromycin, linezolid, rifampin    certain medicines for anxiety or sleep    certain medicines for bladder problems like oxybutynin, tolterodine    certain medicines for depression like amitriptyline, fluoxetine, sertraline    certain medicines for fungal infections like ketoconazole, itraconazole, voriconazole    certain medicines for migraine headache like almotriptan, eletriptan, frovatriptan, naratriptan, rizatriptan, sumatriptan, zolmitriptan    certain medicines for nausea or vomiting like dolasetron, ondansetron, palonosetron    certain medicines for Parkinson's disease like benztropine, trihexyphenidyl    certain medicines for seizures like phenobarbital, phenytoin, primidone    certain medicines for stomach problems like dicyclomine, hyoscyamine    certain medicines for travel sickness like scopolamine    diuretics    general anesthetics like halothane, isoflurane, methoxyflurane, propofol    ipratropium    local anesthetics like lidocaine, pramoxine, tetracaine    MAOIs like Carbex, Eldepryl, Marplan, Nardil, and Parnate    medicines that relax muscles for surgery    methylene blue    nilotinib    other narcotic medicines for pain or cough    phenothiazines like chlorpromazine, mesoridazine, prochlorperazine, thioridazine  What if I miss a dose?  If you miss a dose, take it as soon as you can. If it is almost time for your next  dose, take only that dose. Do not take double or extra doses.  Where should I keep my medicine?  Keep out of the reach of children. This medicine can be abused. Keep your medicine in a safe place to protect it from theft. Do not share this medicine with anyone. Selling or giving away this medicine is dangerous and against the law.  Store at room temperature between 15 and 30 degrees C (59 and 86 degrees F). Protect from light. Keep container tightly closed.  This medicine may cause accidental overdose and death if it is taken by other adults, children, or pets. Flush any unused medicine down the toilet to reduce the chance of harm. Do not use the medicine after the expiration date.  What should I tell my health care provider before I take this medicine?  They need to know if you have any of these conditions:    Ron's disease    brain tumor    head injury    heart disease    history of drug or alcohol abuse problem    if you often drink alcohol    kidney disease    liver disease    lung or breathing disease, like asthma    mental illness    pancreatic disease    seizures    thyroid disease    an unusual or allergic reaction to oxycodone, codeine, hydrocodone, morphine, other medicines, foods, dyes, or preservatives    pregnant or trying to get pregnant    breast-feeding  What should I watch for while using this medicine?  Tell your doctor or health care professional if your pain does not go away, if it gets worse, or if you have new or a different type of pain. You may develop tolerance to the medicine. Tolerance means that you will need a higher dose of the medicine for pain relief. Tolerance is normal and is expected if you take this medicine for a long time.  Do not suddenly stop taking your medicine because you may develop a severe reaction. Your body becomes used to the medicine. This does NOT mean you are addicted. Addiction is a behavior related to getting and using a drug for a non-medical reason. If you  have pain, you have a medical reason to take pain medicine. Your doctor will tell you how much medicine to take. If your doctor wants you to stop the medicine, the dose will be slowly lowered over time to avoid any side effects.  There are different types of narcotic medicines (opiates). If you take more than one type at the same time or if you are taking another medicine that also causes drowsiness, you may have more side effects. Give your health care provider a list of all medicines you use. Your doctor will tell you how much medicine to take. Do not take more medicine than directed. Call emergency for help if you have problems breathing or unusual sleepiness.  You may get drowsy or dizzy. Do not drive, use machinery, or do anything that needs mental alertness until you know how the medicine affects you. Do not stand or sit up quickly, especially if you are an older patient. This reduces the risk of dizzy or fainting spells. Alcohol may interfere with the effect of this medicine. Avoid alcoholic drinks.  This medicine will cause constipation. Try to have a bowel movement at least every 2 to 3 days. If you do not have a bowel movement for 3 days, call your doctor or health care professional.  Your mouth may get dry. Chewing sugarless gum or sucking hard candy, and drinking plenty of water may help. Contact your doctor if the problem does not go away or is severe.  NOTE:This sheet is a summary. It may not cover all possible information. If you have questions about this medicine, talk to your doctor, pharmacist, or health care provider. Copyright  2018 [employer]

## 2018-07-08 NOTE — H&P
[facility]  Hospitalist Admission Note  Name: Federico Chamberlain    MRN: [id number]  YOB: 1950    Age: 67 year old  Date of admission: 7/8/2018  Primary care provider: Nathalie Quiroz    Chief Complaint: Altered mental status    Federico Chamberlain is a 67 year old male with PMH including hypertension, migraines, dyslipidemia, type 2 diabetes who presents with speech difficulties, visual disturbance and headache.  He was diagnosed with shingles 2 days ago.  He has had increased pain and noted that around 1 PM today he was having visual changes and seeing spots when looking at his phone.  Reportedly he was talking with his ex-wife earlier today and was having difficulty expressing himself and speaking in so-called gibberish.    Code stroke was called in the emergency room and stroke neurology was contacted to direct his initial workup.  Reportedly had a nonfocal neuro exam other than some word finding difficulties in the emergency room.    Here in the emergency room his blood pressure was as high as 207/114.  CT scan of his head with and without contrast showed no acute intracranial pathology.  CT angiogram showed no significant stenoses.    Further workup was remarkable for creatinine of 1.4.  His last creatinine from July 5 was actually 0.68.    MRI has now returned negative as well.  He is being admitted for speech difficulties with visual disturbances which seem to rapidly resolved.  It is unclear if these are related to TIA, hypertensive emergency or complex migraine.  Stroke neurology will be consulted and overnight will control his blood pressure with goal systolic pressure of 160 and monitor for any recurrence of symptoms.    Assessment and Plan:   1. Speech difficulties with vision disturbance: As above.  MRI is negative for stroke which suggests this could be a TIA, complex migraine or related to encephalopathy from hypertensive emergency.  --stroke neurology following, will place formal  consult  --We will set goal blood pressure overnight to 160 systolic given negative MRI and resolution of symptoms though I defer to neurology if they recommended a phone call.  --IV hydration overnight  --Monitor on telemetry  --Neurochecks  --Continue aspirin  --TTE w/ bubble study    2.   Hypertensive emergency: It is unclear if this could be secondary to a migraine headache, a vascular event such as a TIA, medication noncompliance or pain related to his zoster infection. would favor keeping his blood pressure 160 systolic overnight with as needed blood pressure medications.  During his recent stay his metoprolol was stopped due to some bradycardia and lisinopril was changed to losartan due to some concern regarding possible pancreatitis though ultimately it seems that diagnosis was felt unlikely.  Chlorthalidone was added as well.  --Continue amlodipine 10 mg  --We will try Coreg 6.125 mg twice daily and monitor on telemetry for any evidence of bradycardia.  He notes that he was actually on 100 mg of metoprolol when he was having issues with side effects and bradycardia before.  I am hopeful that he will tolerate this better than metoprolol and it also might help with some anxiety and/or migraine or prevention.  --Hold chlorthalidone and losartan overnight.  Pending kidney function hopefully we can restart those soon.  --As needed IV hydralazine and IV labetalol available for use as needed.  --Could consider adding clonidine or Imdur if his blood pressure is difficult to control.    3.   RILEY: cr was 0.68 on 7/5, now is 1.4 today.  For now we will hold his losartan and chlorthalidone and hydrate overnight.  We will recheck a BMP in the morning.    4.   History of migraine: It is possible he could be having a complex migraine.     5.   Type 2 diabetes on metformin: We will hold his metformin overnight and treated with medium sliding scale insulin as needed.    6.  Recent diagnosis of zoster: He has a fairly  classic shingles type rash on his left lower chest/upper abdomen in a dermatomal distribution.  This is red but there is no apparent drainage noted.  He was started on acyclovir as an outpatient and will resume that here and monitor kidney function closely.  He does request pain medications as he has been unable to sleep.  --Scheduled Tylenol  --As needed oxycodone  --As needed hydroxyzine  --Continue home acyclovir, recheck kidney function to determine if dosing needs to be adjusted tomorrow.  He did miss 1 dose today which is likely okay given his RILEY.    DVT Prophylaxis: Pneumatic Compression Devices  Code Status: Full Code  Discharge Dispo: admit to inpatient status      History of Present Illness:  Federico Chamberlain is a 67 year old male with PMH including hypertension, migraines, dyslipidemia, type 2 diabetes who presents with altered mental status and headache.  He was diagnosed with shingles 2 days ago.  He has had increased pain and noted that around 1 PM today he was having visual changes and seeing spots when looking at his phone.  Reportedly he was talking with his ex-wife earlier today and was having difficulty expressing himself and speaking in so-called gibberish.    Code stroke was called in the emergency room and stroke neurology was contacted to direct his initial workup.  Reportedly had a nonfocal neuro exam other than some word finding difficulties in the emergency room.    Here in the emergency room his blood pressure was as high as 207/114.  CT scan of his head with and without contrast showed no acute intracranial pathology.  CT angiogram showed no significant stenoses.    Further workup was remarkable for creatinine of 1.4.  His last creatinine from July 5 was actually 0.68.    Of note he was actually recently admitted earlier this month for left upper quadrant abdominal pain, possibly related to acute pancreatitis though CT scan was reportedly unremarkable.  GI was consulted and it sounds as  though when he was discharged but diagnosis was not totally clear but he certainly was felt stable for discharge home.  His blood pressure regimen was adjusted during that admission with transition from lisinopril to losartan and also cessation of metoprolol due to some bradycardia.  Chlorthalidone appears to have been added to his regimen during that stay as well.    He was seen again in the emergency room on July 5 for chest pain and diagnosed with likely herpes zoster.  He was noted to be hypertensive at that time and he did admit to forgetting to take at least one of his antihypertensives.       Past Medical History:  Past Medical History:   Diagnosis Date     Hypercholesterolemia      Hypertension    Also notable for migraine, type 2 diabetes.  Past Surgical History:  Past Surgical History:   Procedure Laterality Date     APPENDECTOMY       BACK SURGERY       ORTHOPEDIC SURGERY       Social History:  Social History   Substance Use Topics     Smoking status: Never Smoker     Smokeless tobacco: Never Used     Alcohol use No     Social History     Social History Narrative     Family History:  No pertinent family history.    Allergies:  No Known Allergies  Medications:  Prior to Admission Medications   Prescriptions Last Dose Informant Patient Reported? Taking?   ACETAMINOPHEN PO   Yes No   Sig: Take 1,000 mg by mouth nightly as needed for pain   ASPIRIN PO   Yes No   Sig: Take 81 mg by mouth daily   ATORVASTATIN CALCIUM PO   Yes No   Sig: Take 40 mg by mouth daily    CHLORTHALIDONE PO   Yes No   Sig: Take 25 mg by mouth daily   HYDROcodone-acetaminophen (NORCO) 5-325 MG per tablet   No No   Sig: Take 1 tablet by mouth every 4 hours as needed for pain   Lidocaine (LIDOCARE) 4 % Patch   No No   Sig: Place 1 patch onto the skin every 24 hours   METFORMIN HCL PO   Yes No   Sig: Take 500 mg by mouth 2 times daily (with meals)    Potassium Chloride ER 20 MEQ TBCR   No No   Sig: Take 1 tablet (20 mEq) by mouth daily for  "7 days   Pramipexole Dihydrochloride (MIRAPEX PO)   Yes No   Sig: Take 0.5 mg by mouth At Bedtime   SUMAtriptan Succinate (IMITREX PO)   Yes No   Sig: Take 100 mg by mouth as needed for migraine    VITAMIN D, CHOLECALCIFEROL, PO   Yes No   Sig: Take 2,000 Units by mouth daily   amLODIPine (NORVASC) 10 MG tablet   No No   Sig: Take 1 tablet (10 mg) by mouth daily   diclofenac (VOLTAREN) 1 % GEL topical gel   No No   Sig: Place 2 g onto the skin 3 times daily   losartan (COZAAR) 100 MG tablet   No No   Sig: Take 1 tablet (100 mg) by mouth daily   potassium chloride (KLOR-CON) 10 MEQ tablet   Yes No   Sig: Take 10 mEq by mouth daily      Facility-Administered Medications: None         Review of Systems:  A Comprehensive greater than 10 system review of systems was carried out.  Pertinent positives and negatives are noted above.  Otherwise negative for contributory information.     Physical Exam:  Blood pressure (!) 178/93, pulse 60, temperature 96.5  F (35.8  C), temperature source Temporal, resp. rate 12, height 1.981 m (6' 6\"), weight 131.5 kg (290 lb), SpO2 100 %.  Wt Readings from Last 1 Encounters:   07/08/18 131.5 kg (290 lb)     Exam:  General: Alert, awake, no acute distress.  HEENT: NC/AT, eyes anicteric, external occular movements intact, face symmetric.  Dentition WNL, MM moist.  Cardiac: RRR, S1, S2.  No murmurs appreciated.  Pulmonary: Normal chest rise, normal work of breathing.  Lungs CTA BL  Abdomen: Left upper abdomen with rash compatible shingles.  This is red and papular with confluent areas of plaques.  No apparent drainage, follows a dermatomal distribution.  Soft, non-tender, non-distended.  Bowel Sounds Present.  No guarding.  Extremities: no deformities.  Warm, well perfused.  Skin: no rashes or lesions noted.  Warm and Dry.  Neuro: No focal deficits noted.  Speech clear.  Coordination and strength grossly normal.  Face symmetric.  Cranial nerves II through XII intact.  Psych: Appropriate " affect.    Data:    Imaging:  Recent Results (from the past 48 hour(s))   CT Head w/o Contrast    Narrative    CT SCAN OF THE HEAD WITHOUT CONTRAST   7/8/2018 2:09 PM     HISTORY: Code Stroke.     TECHNIQUE: Axial images of the head and coronal reformations without  IV contrast material. Radiation dose for this scan was reduced using  automated exposure control, adjustment of the mA and/or kV according  to patient size, or iterative reconstruction technique.    COMPARISON: Head CT 11/9/2007.    FINDINGS: There is no evidence of intracranial hemorrhage, mass, or  anomaly. The ventricles are normal in size, shape and configuration.  The brain parenchyma and subarachnoid spaces are normal.     The visualized portions of the sinuses and mastoids appear normal. The  bony calvarium and bones of the skull base appear intact.       Impression    IMPRESSION: No evidence of acute intracranial hemorrhage, mass, or  herniation. CT angiogram to follow.      MIKA MEREDITH MD   CT Head w Contrast    Narrative    CT BRAIN PERFUSION 7/8/2018 2:18 PM    HISTORY: Code Stroke.      TECHNIQUE: Time sequential axial CT images of the head were acquired  during the administration of 120mL Isovue-370 IV. Color perfusion maps  of the brain were created from this time sequential axial source data.      Radiation dose for this scan was reduced using automated exposure  control, adjustment of the mA and/or kV according to patient size, or  iterative reconstruction technique.    COMPARISON: None.    FINDINGS: Perfusion images failed due to poor time input curve. Images  are nondiagnostic.      Impression    IMPRESSION: Nondiagnostic CT perfusion images of the brain.     Results discussed with Marin Beaulieu at 2:34 PM on 7/8/2018.     MIKA MEREDITH MD   CTA Angiogram Head Neck    Narrative    CT ANGIOGRAM OF THE HEAD AND NECK WITH CONTRAST  7/8/2018 2:19 PM     HISTORY: Code Stroke.      TECHNIQUE: CT angiography with an injection of 120 mL  Isovue-370 IV  with scans through the head and neck. Images were transferred to a  separate 3-D workstation where multiplanar reformations and 3-D images  were created. Estimates of carotid stenoses are made relative to the  distal internal carotid artery diameters except as noted. Radiation  dose for this scan was reduced using automated exposure control,  adjustment of the mA and/or kV according to patient size, or iterative  reconstruction technique.    COMPARISON: None.     CT HEAD FINDINGS: No contrast enhancing lesions. Cerebral blood flow  is grossly normal.     CT ANGIOGRAM HEAD FINDINGS: The major intracranial arteries including  the proximal branches of the anterior cerebral, middle cerebral, and  posterior cerebral arteries appear patent without vascular cutoff. No  aneurysm identified. No significant stenosis.    The P1 segment of the right posterior cerebral artery is not  visualized and is likely hypoplastic or congenitally absent. The right  posterior cerebral artery is supplied by the patent right posterior  communicating artery.    CT ANGIOGRAM NECK FINDINGS:   Normal origin of the great vessels from the aortic arch.     Right carotid artery: The right common and internal carotid arteries  are patent. Mild atherosclerotic disease at the carotid bifurcation  and proximal internal carotid artery without stenosis.     Left carotid artery: The left common and internal carotid arteries are  patent. Mild atherosclerotic disease at the carotid bifurcation and  proximal internal carotid artery without stenosis.     Vertebral arteries: Vertebral arteries are patent without evidence of  dissection. No significant stenosis. The right vertebral artery is  dominant.    Other findings: Multilevel degenerative changes in the spine.       Impression    IMPRESSION:   1. Patent arteries in the neck without evidence of dissection. Mild  atherosclerotic disease in the carotid arteries bilaterally without  significant  stenosis by NASCET criteria.  2. Patent proximal major intracranial arteries without vascular  cutoff. No significant stenosis. No aneurysm identified.     Results discussed with Marin Beaulieu at 2:34 PM on 7/8/2018.     MIKA MEREDITH MD     Labs:    Recent Labs  Lab 07/08/18  1356 07/05/18  1351 07/03/18  0555   WBC 9.8 7.8 8.0   HGB 17.6 15.9 16.6   HCT 52.1 47.2 49.9   MCV 84 85 86    220 198          Lab Results   Component Value Date     07/08/2018     07/05/2018     07/03/2018    Lab Results   Component Value Date    CHLORIDE 101 07/08/2018    CHLORIDE 110 07/05/2018    CHLORIDE 106 07/03/2018    Lab Results   Component Value Date    BUN 31 07/08/2018    BUN 19 07/05/2018    BUN 10 07/03/2018      Lab Results   Component Value Date    POTASSIUM 3.0 07/08/2018    POTASSIUM 3.3 07/05/2018    POTASSIUM 3.5 07/03/2018    Lab Results   Component Value Date    CO2 28 07/08/2018    CO2 24 07/05/2018    CO2 27 07/03/2018    Lab Results   Component Value Date    CR 1.33 07/08/2018    CR 0.68 07/05/2018    CR 0.64 07/03/2018          Recent Labs  Lab 07/08/18  1356   INR 0.97     No results for input(s): LACT in the last 168 hours.    Recent Labs  Lab 07/02/18  0636 07/01/18  2128   LIPASE [id number]*         Rush Burns MD  Hospitalist  [facility]

## 2018-07-08 NOTE — ED NOTES
[facility]  ED Nurse Handoff Report    Federico Chamberlain is a 67 year old male   ED Chief complaint: Shingles and Altered Mental Status  . ED Diagnosis:   Final diagnoses:   Acute kidney injury (H)   Herpes zoster without complication   Episode of change in speech     Allergies: No Known Allergies    Code Status: Full Code  Activity level - Baseline/Home:  Independent. Activity Level - Current:   Independent. Lift room needed: No. Bariatric: No   Needed: No   Isolation: Yes. Infection:  Active Shingles  Other .     Vital Signs:   Vitals:    07/08/18 1448 07/08/18 1500 07/08/18 1645 07/08/18 1650   BP: (!) 207/114 (!) 178/93     Pulse:       Resp: 12 12 10 16   Temp:       TempSrc:       SpO2: 100% 100% 98% 100%   Weight:       Height:           Cardiac Rhythm:  ,      Pain level: 0-10 Pain Scale: 7  Patient confused: No. Patient Falls Risk: Yes.   Elimination Status: Has voided   Patient Report - Initial Complaint: Shingles Pain, headache.   Focused Assessment:  On the way to being roomed, patient started to complain of having difficulty speaking.  Speech noted to be hesitant and he wasw answering questions but not correctly, e.g. Could not remember his birthdate.       18 gauge IV started immediately.  ED MD evaluation within 5 minutes of arrival in room ( 1345).  Taken to CT at 1355, monitoring in place and accompanied by RN.  Once CT completed, he stated that he suddenly could speak better.  Vomited X1.  Continues to be somewhat nauseated.    Tests Performed: CT Head, MRI Head, CBC, BMP. Abnormal Results: Creatinine elevated 1.3  Treatments provided:   Family Comments: Ex wife here with patient  OBS brochure/video discussed/provided to patient:  N/A  ED Medications:   Medications   0.9% sodium chloride BOLUS (0 mLs Intravenous Stopped 7/8/18 1413)   iopamidol (ISOVUE-370) solution 500 mL (120 mLs Intravenous Given 7/8/18 1412)   0.9% sodium chloride BOLUS (0 mLs Intravenous Stopped 7/8/18  1642)   metoclopramide (REGLAN) injection 10 mg (10 mg Intravenous Given 7/8/18 1451)   diphenhydrAMINE (BENADRYL) injection 25 mg (25 mg Intravenous Given 7/8/18 1444)   LORazepam (ATIVAN) injection 1 mg (1 mg Intravenous Given 7/8/18 1529)   gadobutrol (GADAVIST) injection 15 mL (15 mLs Intravenous Given 7/8/18 1537)   oxyCODONE IR (ROXICODONE) tablet 10 mg (10 mg Oral Given 7/8/18 1653)     Drips infusing:  No  For the majority of the shift, the patient's behavior Green. Interventions performed were analgesia.     Severe Sepsis OR Septic Shock Diagnosis Present: No      ED Nurse Name/Phone Number: Pamela Reyes,   5:01 PM    RECEIVING UNIT ED HANDOFF REVIEW    Above ED Nurse Handoff Report was reviewed: Yes  Reviewed by: Monae Gallardo on July 8, 2018 at 5:18 PM

## 2018-07-08 NOTE — PHARMACY-ADMISSION MEDICATION HISTORY
Admission medication history interview status for this patient is complete. See [facility] admission navigator for allergy information, prior to admission medications and immunization status.     Medication history interview source(s):Patient  Medication history resources (including written lists, pill bottles, clinic record):None  Primary pharmacy: [facility] - [place], [place]    Changes made to PTA medication list:  Added: melatonin, lisinopril, Valtrex  Deleted: ibuprofen (pt uses Tylenol now), potassium chloride (removed duplicate entry, wrong dose), sumatriptan (pt discontinued due to adverse effects), losartan (pt does not recall taking this, see note below)  Changed: none    Actions taken by pharmacist (provider contacted, etc):None     Additional medication history information: Federico states he was prescribes Voltaren gel and lidocaine patch prior to his Shingles diagnosis and that he hasn't used Voltaren and only used 1 lidocaine patch which did not help. He said he uses lisinopril, chlorthalidone and amlodipine for blood pressure. He does not recognize losartan (Cozaar) and denies taking it. He doesn't take metoprolol either and said he was told to change while in the hospital recently but did not and will continue to take his blood pressure meds until he discusses with his primary care provider. He also said sumatriptan doesn't help with his migraines at all and just gave his adverse effects. He said Tylenol #3 was effective at treating his migraines but he ran out of that and the hydrocodone-APAP recently; hydrocodone-APAP prescribed for Shingles pain.    Medication reconciliation/reorder completed by provider prior to medication history? No    Do you take OTC medications (eg tylenol, ibuprofen, fish oil, eye/ear drops, etc)? Y     Prior to Admission medications    Medication Sig Last Dose Taking? Auth Provider   amLODIPine (NORVASC) 10 MG tablet Take 1 tablet (10 mg) by mouth daily 7/8/2018 at 0630 Yes Cristobal Stevenson  DO Abdirashid   ASPIRIN PO Take 81 mg by mouth daily 7/8/2018 at 0630 Yes Unknown, Entered By History   ATORVASTATIN CALCIUM PO Take 40 mg by mouth daily  7/8/2018 at 0630 Yes Reported, Patient   CHLORTHALIDONE PO Take 25 mg by mouth daily 7/8/2018 at 0630 Yes Unknown, Entered By History   diphenhydrAMINE-acetaminophen (TYLENOL PM)  MG tablet Take 2 tablets by mouth nightly as needed for sleep 7/7/2018 at PM Yes Reported, Patient   HYDROcodone-acetaminophen (NORCO) 5-325 MG per tablet Take 1 tablet by mouth every 4 hours as needed for pain 7/8/2018 at 0630 Yes Cristobal Unger MD   LISINOPRIL PO Take 40 mg by mouth daily 7/8/2018 at 0630 Yes Reported, Patient   MELATONIN PO Take 6 mg by mouth nightly as needed (sleep) 7/7/2018 at PM Yes Reported, Patient   METFORMIN HCL PO Take 500 mg by mouth 2 times daily (with meals)  7/8/2018 at 0630 Yes Reported, Patient   potassium chloride (KLOR-CON) 10 MEQ tablet Take 10 mEq by mouth daily 7/8/2018 at 0630 Yes Unknown, Entered By History   Pramipexole Dihydrochloride (MIRAPEX PO) Take 0.5 mg by mouth At Bedtime 7/7/2018 at PM Yes Unknown, Entered By History   ValACYclovir HCl (VALTREX PO) Take 1,000 mg by mouth 3 times daily 7/8/2018 at 0630 Yes Reported, Patient   VITAMIN D, CHOLECALCIFEROL, PO Take 2,000 Units by mouth daily 7/8/2018 at 0630 Yes Unknown, Entered By History   diclofenac (VOLTAREN) 1 % GEL topical gel Place 2 g onto the skin 3 times daily not started  Cristobal Stevenson DO   Lidocaine (LIDOCARE) 4 % Patch Place 1 patch onto the skin every 24 hours Past week  Cristobal Stevenson DO

## 2018-07-08 NOTE — CONSULTS
"[facility]      Stroke Code Note    Federico Chamberlain is a 67 year old male who with migraine aura, HTN, HL, DM who presents with pain for shingles.  This occurred at 12:45 while waiting in the  [facility] ED for evaluation of abdominal pain secondary to shingles.  His ex-wife is with him and reports that while waiting in the ED he told her \"I'm really f'ed up\" he had previously had difficulties with vision and then had difficulty speaking, with intact comprehension.  He has had language difficulties with migraines in the past per his wife.   A stroke code was activated for emergent evaluation.    His Head CT was unremarkable.  CT angiography shows no hemodynamically significant stenosis or proximal vessel occlusion, however, there is some irregularity intracranially which could be artifact or most commonly secondary to intracranial atherosclerosis vs. other process, CT Perfusion with artifact, but no clear areas of hypoperfusion.  Upon return from his scan, his language deficits had resolved.  He was nauseous and vomiting.  Glucose 100.        Stroke Code Data  Time notified 13:53   Time of first response 13:56   Time tele service began 14:13 (pt. still in scanner)   Time tele service ended 14:38   Last known normal 12:45   Time of discovery (or onset of symptoms)  12:45   Time head CT read by me 14:08   Was stroke code de-escalated? 14:55, due to symptoms resolution       Telestroke Service Details  Type of service telemedicine diagnostic assessment of acute neurological changes   Reason telemedicine is appropriate patient required immediate assistance from specialist   Mode of transmission secure interactive audio and video communication per Fauzia   Originating site (patient location) [facility]    Distant site (provider location) [facility]       TPA Treatment  Not given due to minor / isolated / quickly resolving stroke symptoms.    Stroke Scales      National Institutes " [facility] Stroke Scale (at presentation)   NIHSS done at:  time patient seen      Score    Level of consciousness:  (0)   Alert, keenly responsive    LOC questions:  (0)   Answers both questions correctly    LOC commands:      Best gaze:  (0)   Normal    Visual:  (0)   No visual loss    Facial palsy:  (0)   Normal symmetrical movements    Motor arm (left):  (0)   No drift    Motor arm (right):  (0)   No drift    Motor leg (left):  (0)   No drift    Motor leg (right):  (0)   No drift    Limb ataxia:  (0)   Absent    Sensory:  (0)   Normal- no sensory loss    Best language:  (0)   Normal- no aphasia    Dysarthria:  (0)   Normal    Extinction and inattention:  (0)   No abnormality        NIHSS Total Score:  0        Impression:  1. Rapidly resolving aphasia: TIA vs. Migraine aura    Recommendations  1. MRI Brain w/wo contrast  --further recommendations based upon findings  2. For shingles and post-herpetic neuralgia, consider upward titration of Gabapentin with acute pain relief if indicated      Marin Malone MD, MS  Vascular Neurology  July 8, 2018    Please contact the stroke service with any questions: link to Text page    I spent 65 minutes of critical care time supervising the patient's code stroke activation.   I personally reviewed all relevant labs and neuroimaging.    ____________________________________________________________________      Past Medical History:   Diagnosis Date     Hypercholesterolemia      Hypertension        No current facility-administered medications for this encounter.     Current Outpatient Prescriptions:      ACETAMINOPHEN PO, Take 1,000 mg by mouth nightly as needed for pain, Disp: , Rfl:      amLODIPine (NORVASC) 10 MG tablet, Take 1 tablet (10 mg) by mouth daily, Disp: 30 tablet, Rfl: 1     ASPIRIN PO, Take 81 mg by mouth daily, Disp: , Rfl:      ATORVASTATIN CALCIUM PO, Take 40 mg by mouth daily , Disp: , Rfl:      CHLORTHALIDONE PO, Take 25 mg by mouth daily, Disp: , Rfl:       diclofenac (VOLTAREN) 1 % GEL topical gel, Place 2 g onto the skin 3 times daily, Disp: 1 Tube, Rfl: 1     HYDROcodone-acetaminophen (NORCO) 5-325 MG per tablet, Take 1 tablet by mouth every 4 hours as needed for pain, Disp: 10 tablet, Rfl: 0     IBUPROFEN PO, Take by mouth every 8 hours as needed for moderate pain, Disp: , Rfl:      Lidocaine (LIDOCARE) 4 % Patch, Place 1 patch onto the skin every 24 hours, Disp: 10 patch, Rfl: 1     losartan (COZAAR) 100 MG tablet, Take 1 tablet (100 mg) by mouth daily, Disp: 30 tablet, Rfl: 1     METFORMIN HCL PO, Take 500 mg by mouth 2 times daily (with meals) , Disp: , Rfl:      potassium chloride (KLOR-CON) 10 MEQ tablet, Take 10 mEq by mouth daily, Disp: , Rfl:      Potassium Chloride ER 20 MEQ TBCR, Take 1 tablet (20 mEq) by mouth daily for 7 days, Disp: 7 tablet, Rfl: 0     Pramipexole Dihydrochloride (MIRAPEX PO), Take 0.5 mg by mouth At Bedtime, Disp: , Rfl:      SUMAtriptan Succinate (IMITREX PO), Take 100 mg by mouth as needed for migraine , Disp: , Rfl:      VITAMIN D, CHOLECALCIFEROL, PO, Take 2,000 Units by mouth daily, Disp: , Rfl:     Social history & family history reviewed, please refer to admission H&P    ROS: completed in detail and negative unless otherwise noted above.    Vital Signs:  B/P: 146/87, T: 96.5, P: 60, R: 16     Neuro:  Mental status: Awake, alert, attentive, oriented x3. Speech is fluent, comprehension and repetition intact. No dysarthria.  Good historian.  No neglect.  Cranial nerves: EOMI, visual fields full, face symmetric, facial sensation intact, shoulder shrug strong, tongue/uvula midline, hearing intact to conversation.  Motor: 5/5 strength in all 4 extremities without drift.  Sensory: Intact to light touch in face, arms, and legs  Coordination: Finger to nose intact bilaterally without dysmetria.  Normal heel-shin test bilaterally  Gait: deferred    Labs/Studies:  CBC:     Recent Labs  Lab 07/05/18  1351 07/03/18  0555 07/02/18  0636    WBC 7.8 8.0 7.4   RBC 5.58 5.81 5.02   HGB 15.9 16.6 14.3   HCT 47.2 49.9 43.0    198 183     Basic Metabolic Panel:   Recent Labs   Lab Test  07/05/18   1351  07/03/18   0555  07/02/18 2039 07/02/18   0636   NA  144  141   --   141   POTASSIUM  3.3*  3.5  4.0  3.2*   CHLORIDE  110*  106   --   107   CO2  24  27   --   30   BUN  19  10   --   16   CR  0.68  0.64*   --   0.73   GLC  106*  94   --   91   LAURA  8.2*  8.3*   --   7.6*     Liver panel:  Recent Labs   Lab Test  07/02/18   0636  07/01/18 2128  07/01/18   0345   PROTTOTAL  5.6*  6.6*  6.6*   ALBUMIN  3.0*  3.5  3.5   BILITOTAL  0.6  0.4  0.4   ALKPHOS  66  84  83   AST  15  18  15   ALT  23  27  27     INR:No lab results found.   Lipid Profile:  Recent Labs   Lab Test  07/02/18   0636   CHOL  92   HDL  32*   LDL  <1   TRIG  297*     A1C:   Recent Labs   Lab Test  07/01/18 2128   A1C  6.1*     Troponin I:   Recent Labs   Lab Test  07/05/18   1351  07/03/18   0555   TROPI  0.021  0.023         Imaging:  Relevant findings as per the Impression above.

## 2018-07-08 NOTE — ED NOTES
ED Nursing Note:    Returned from MRI.  States speech seems pretty normal now, but states that his headache is worse and rates it as 5/10.  States shingles pain is unchanged and rates it 7/10.    Wondering if he can have analgesia.  Message relayed to ED MD.

## 2018-07-09 VITALS
BODY MASS INDEX: 33.55 KG/M2 | TEMPERATURE: 97 F | OXYGEN SATURATION: 99 % | HEIGHT: 78 IN | HEART RATE: 57 BPM | WEIGHT: 290 LBS | SYSTOLIC BLOOD PRESSURE: 130 MMHG | RESPIRATION RATE: 16 BRPM | DIASTOLIC BLOOD PRESSURE: 72 MMHG

## 2018-07-09 PROBLEM — G43.109 MIGRAINE WITH AURA AND WITHOUT STATUS MIGRAINOSUS, NOT INTRACTABLE: Status: ACTIVE | Noted: 2018-07-09

## 2018-07-09 PROBLEM — G45.9 TIA (TRANSIENT ISCHEMIC ATTACK): Status: ACTIVE | Noted: 2018-07-09

## 2018-07-09 PROBLEM — B02.9 HERPES ZOSTER WITHOUT COMPLICATION: Status: ACTIVE | Noted: 2018-07-09

## 2018-07-09 PROBLEM — I16.0 HYPERTENSIVE URGENCY, MALIGNANT: Status: ACTIVE | Noted: 2018-07-09

## 2018-07-09 PROBLEM — N17.9 ACUTE KIDNEY INJURY (H): Status: ACTIVE | Noted: 2018-07-09

## 2018-07-09 LAB
ANION GAP SERPL CALCULATED.3IONS-SCNC: 4 MMOL/L (ref 3–14)
BUN SERPL-MCNC: 21 MG/DL (ref 7–30)
CALCIUM SERPL-MCNC: 8.2 MG/DL (ref 8.5–10.1)
CHLORIDE SERPL-SCNC: 105 MMOL/L (ref 94–109)
CO2 SERPL-SCNC: 29 MMOL/L (ref 20–32)
CREAT SERPL-MCNC: 0.91 MG/DL (ref 0.66–1.25)
DEPRECATED CALCIDIOL+CALCIFEROL SERPL-MC: 27 UG/L (ref 20–75)
ERYTHROCYTE [DISTWIDTH] IN BLOOD BY AUTOMATED COUNT: 13.5 % (ref 10–15)
GFR SERPL CREATININE-BSD FRML MDRD: 83 ML/MIN/1.7M2
GLUCOSE BLDC GLUCOMTR-MCNC: 79 MG/DL (ref 70–99)
GLUCOSE BLDC GLUCOMTR-MCNC: 95 MG/DL (ref 70–99)
GLUCOSE SERPL-MCNC: 91 MG/DL (ref 70–99)
HCT VFR BLD AUTO: 47.6 % (ref 40–53)
HGB BLD-MCNC: 15.8 G/DL (ref 13.3–17.7)
INTERPRETATION ECG - MUSE: NORMAL
MCH RBC QN AUTO: 28.2 PG (ref 26.5–33)
MCHC RBC AUTO-ENTMCNC: 33.2 G/DL (ref 31.5–36.5)
MCV RBC AUTO: 85 FL (ref 78–100)
PLATELET # BLD AUTO: 204 10E9/L (ref 150–450)
POTASSIUM SERPL-SCNC: 2.8 MMOL/L (ref 3.4–5.3)
POTASSIUM SERPL-SCNC: 3.3 MMOL/L (ref 3.4–5.3)
RBC # BLD AUTO: 5.61 10E12/L (ref 4.4–5.9)
SODIUM SERPL-SCNC: 138 MMOL/L (ref 133–144)
T4 FREE SERPL-MCNC: 1.29 NG/DL (ref 0.76–1.46)
TSH SERPL DL<=0.005 MIU/L-ACNC: 5.4 MU/L (ref 0.4–4)
WBC # BLD AUTO: 6.6 10E9/L (ref 4–11)

## 2018-07-09 PROCEDURE — 84439 ASSAY OF FREE THYROXINE: CPT | Performed by: INTERNAL MEDICINE

## 2018-07-09 PROCEDURE — G0378 HOSPITAL OBSERVATION PER HR: HCPCS

## 2018-07-09 PROCEDURE — 99217 ZZC OBSERVATION CARE DISCHARGE: CPT | Performed by: INTERNAL MEDICINE

## 2018-07-09 PROCEDURE — 00000146 ZZHCL STATISTIC GLUCOSE BY METER IP

## 2018-07-09 PROCEDURE — 36415 COLL VENOUS BLD VENIPUNCTURE: CPT | Performed by: INTERNAL MEDICINE

## 2018-07-09 PROCEDURE — 25000132 ZZH RX MED GY IP 250 OP 250 PS 637: Mod: GY | Performed by: INTERNAL MEDICINE

## 2018-07-09 PROCEDURE — 82306 VITAMIN D 25 HYDROXY: CPT | Performed by: INTERNAL MEDICINE

## 2018-07-09 PROCEDURE — 25000128 H RX IP 250 OP 636: Performed by: INTERNAL MEDICINE

## 2018-07-09 PROCEDURE — 84443 ASSAY THYROID STIM HORMONE: CPT | Performed by: INTERNAL MEDICINE

## 2018-07-09 PROCEDURE — A9270 NON-COVERED ITEM OR SERVICE: HCPCS | Mod: GY | Performed by: INTERNAL MEDICINE

## 2018-07-09 PROCEDURE — 80048 BASIC METABOLIC PNL TOTAL CA: CPT | Performed by: INTERNAL MEDICINE

## 2018-07-09 PROCEDURE — 84132 ASSAY OF SERUM POTASSIUM: CPT | Performed by: INTERNAL MEDICINE

## 2018-07-09 PROCEDURE — 85027 COMPLETE CBC AUTOMATED: CPT | Performed by: INTERNAL MEDICINE

## 2018-07-09 PROCEDURE — 84132 ASSAY OF SERUM POTASSIUM: CPT | Mod: 91 | Performed by: INTERNAL MEDICINE

## 2018-07-09 RX ORDER — GABAPENTIN 300 MG/1
300 CAPSULE ORAL AT BEDTIME
Qty: 270 CAPSULE | Refills: 1
Start: 2018-07-09 | End: 2022-12-19

## 2018-07-09 RX ORDER — OXYCODONE HYDROCHLORIDE 5 MG/1
5 TABLET ORAL EVERY 4 HOURS PRN
Qty: 10 TABLET | Refills: 0 | Status: SHIPPED | OUTPATIENT
Start: 2018-07-09 | End: 2021-12-03

## 2018-07-09 RX ORDER — CARVEDILOL 6.25 MG/1
6.25 TABLET ORAL 2 TIMES DAILY WITH MEALS
Qty: 60 TABLET | Refills: 0 | Status: ON HOLD | OUTPATIENT
Start: 2018-07-09 | End: 2022-12-20

## 2018-07-09 RX ORDER — LISINOPRIL 40 MG/1
40 TABLET ORAL DAILY
Status: DISCONTINUED | OUTPATIENT
Start: 2018-07-09 | End: 2018-07-09 | Stop reason: HOSPADM

## 2018-07-09 RX ORDER — CARVEDILOL 6.25 MG/1
6.25 TABLET ORAL 2 TIMES DAILY WITH MEALS
Qty: 60 TABLET | Refills: 0 | Status: SHIPPED | OUTPATIENT
Start: 2018-07-09 | End: 2018-07-09

## 2018-07-09 RX ADMIN — ASPIRIN 81 MG 162 MG: 81 TABLET ORAL at 08:13

## 2018-07-09 RX ADMIN — ATORVASTATIN CALCIUM 40 MG: 40 TABLET, FILM COATED ORAL at 08:13

## 2018-07-09 RX ADMIN — SODIUM CHLORIDE: 9 INJECTION, SOLUTION INTRAVENOUS at 04:39

## 2018-07-09 RX ADMIN — CARVEDILOL 6.25 MG: 6.25 TABLET, FILM COATED ORAL at 08:13

## 2018-07-09 RX ADMIN — OXYCODONE HYDROCHLORIDE 5 MG: 5 TABLET ORAL at 06:38

## 2018-07-09 RX ADMIN — POTASSIUM CHLORIDE 40 MEQ: 1500 TABLET, EXTENDED RELEASE ORAL at 08:22

## 2018-07-09 RX ADMIN — VALACYCLOVIR HYDROCHLORIDE 1000 MG: 1 TABLET, FILM COATED ORAL at 08:13

## 2018-07-09 RX ADMIN — VALACYCLOVIR HYDROCHLORIDE 1000 MG: 1 TABLET, FILM COATED ORAL at 00:39

## 2018-07-09 RX ADMIN — ACETAMINOPHEN 650 MG: 325 TABLET, FILM COATED ORAL at 11:37

## 2018-07-09 RX ADMIN — HEPARIN SODIUM 5000 UNITS: 5000 INJECTION, SOLUTION INTRAVENOUS; SUBCUTANEOUS at 08:16

## 2018-07-09 RX ADMIN — OXYCODONE HYDROCHLORIDE 5 MG: 5 TABLET ORAL at 11:37

## 2018-07-09 RX ADMIN — POTASSIUM CHLORIDE 40 MEQ: 1500 TABLET, EXTENDED RELEASE ORAL at 01:58

## 2018-07-09 RX ADMIN — DOCUSATE SODIUM 100 MG: 100 CAPSULE, LIQUID FILLED ORAL at 08:13

## 2018-07-09 RX ADMIN — ACETAMINOPHEN 650 MG: 325 TABLET, FILM COATED ORAL at 06:41

## 2018-07-09 RX ADMIN — AMLODIPINE BESYLATE 10 MG: 10 TABLET ORAL at 08:14

## 2018-07-09 RX ADMIN — POTASSIUM CHLORIDE 20 MEQ: 1500 TABLET, EXTENDED RELEASE ORAL at 10:45

## 2018-07-09 RX ADMIN — POTASSIUM CHLORIDE 40 MEQ: 1500 TABLET, EXTENDED RELEASE ORAL at 04:34

## 2018-07-09 RX ADMIN — LISINOPRIL 40 MG: 40 TABLET ORAL at 10:45

## 2018-07-09 ASSESSMENT — ACTIVITIES OF DAILY LIVING (ADL)
ADLS_ACUITY_SCORE: 10

## 2018-07-09 NOTE — PROVIDER NOTIFICATION
"Paged admitting hospitalist   /100, labetalol given, recheck 145/80. Pt also reported feeling \"down\"  "

## 2018-07-09 NOTE — UTILIZATION REVIEW
"Admission Status; Secondary Review Determination     Under the authority of the Utilization Management Committee, the utilization review process indicated a secondary review on the above patient.  The review outcome is based on review of the medical records, discussions with staff, and applying clinical experience noted on the date of the review.       (x) Observation Status Appropriate - This patient does not meet hospital inpatient criteria and is placed in observation status. If this patient's primary payer is Medicare and was admitted as an inpatient, Condition Code 44 should be used and patient status changed to \"observation\".     RATIONALE FOR DETERMINATION:  68-year-old male with history of migraines, hypertension who presented to the hospital due to painful shingles.  While awaiting visit developed some difficulties with speech visual disturbance and headache.  A code stroke was called with negative findings, initial acute kidney injury and hypertensive response to the pain was noted.  Patient is appropriate for observation care to ensure resolution of neurological symptoms as well as correction of acute kidney injury prior to discharge.      The severity of illness, intensity of service provided, expected LOS and risk for adverse outcome make the care appropriate for further observation; however, doesn't meet criteria for hospital inpatient admission. This was discussed with attending physician who concurred with this determination.    The information on this document is developed by the utilization review team in order for the business office to ensure compliance.  This only denotes the appropriateness of proper admission status and does not reflect the quality of care rendered.         The definitions of Inpatient Status and Observation Status used in making the determination above are those provided in the CMS Coverage Manual, Chapter 1 and Chapter 6, section 70.4.      Sincerely,     Daryn Vanegas MD  "   Physician Advisor  Utilization Review/ Case Management  [facility].

## 2018-07-09 NOTE — PLAN OF CARE
Problem: Pain, Acute (Adult)  Goal: Identify Related Risk Factors and Signs and Symptoms  Related risk factors and signs and symptoms are identified upon initiation of Human Response Clinical Practice Guideline (CPG).   Outcome: Improving  VSS. Shingles rash to left upper abdomen. Continuing valtrex. Oxycodone and tylenol x1 for pain. Tele - SB with 1st AVB and BBB. Neuros intact. K recheck 3.3 - 60 meq K given - recheck at 1445. NS at 100 ml/hr. Neurology following. Independent in room. Plan: discharging later today.       AVS reviewed with patient. Questions and concerns answered at this time. Patient packed up belongings and took with him. Discharge medications sent with patient. Patient discharged to front entrance at 2:11pm.

## 2018-07-09 NOTE — DISCHARGE SUMMARY
[facility] Discharge Summary    Federico Chamberlain MRN# [id number]   Age: 67 year old YOB: 1950     Date of Admission:  7/8/2018  Date of Discharge::  7/9/2018  Admitting Physician:  Miguel Joseph MD  Discharge Physician:  Miguel Joseph MD     Home clinic: [facility] [place]          Admission Diagnoses:   Acute kidney injury (H) [N17.9]  Episode of change in speech [R47.89]  Herpes zoster without complication [B02.9]          Discharge Diagnosis:   Principal Problem:    Migraine with aura and without status migrainosus, not intractable  Active Problems:    Word finding difficulty    Acute kidney injury (H)    Herpes zoster without complication    Hypertensive urgency, malignant    TIA (transient ischemic attack)            Procedures:   CXR  CT Head with and without contrast.   CTA Head and neck.  MR Brain with and without contrast.             Medications Prior to Admission:     Prescriptions Prior to Admission   Medication Sig Dispense Refill Last Dose     amLODIPine (NORVASC) 10 MG tablet Take 1 tablet (10 mg) by mouth daily 30 tablet 1 7/8/2018 at 0630     ASPIRIN PO Take 81 mg by mouth daily   7/8/2018 at 0630     ATORVASTATIN CALCIUM PO Take 40 mg by mouth daily    7/8/2018 at 0630     diphenhydrAMINE-acetaminophen (TYLENOL PM)  MG tablet Take 2 tablets by mouth nightly as needed for sleep   7/7/2018 at PM     HYDROcodone-acetaminophen (NORCO) 5-325 MG per tablet Take 1 tablet by mouth every 4 hours as needed for pain 10 tablet 0 7/8/2018 at 0630     LISINOPRIL PO Take 40 mg by mouth daily   7/8/2018 at 0630     MELATONIN PO Take 6 mg by mouth nightly as needed (sleep)   7/7/2018 at PM     METFORMIN HCL PO Take 500 mg by mouth 2 times daily (with meals)    7/8/2018 at 0630     potassium chloride (KLOR-CON) 10 MEQ tablet Take 10 mEq by mouth daily   7/8/2018 at 0630     Pramipexole Dihydrochloride (MIRAPEX PO) Take 0.5 mg by mouth At Bedtime   7/7/2018 at PM     ValACYclovir  HCl (VALTREX PO) Take 1,000 mg by mouth 3 times daily   7/8/2018 at 0630     VITAMIN D, CHOLECALCIFEROL, PO Take 2,000 Units by mouth daily   7/8/2018 at 0630     diclofenac (VOLTAREN) 1 % GEL topical gel Place 2 g onto the skin 3 times daily 1 Tube 1 not started     Lidocaine (LIDOCARE) 4 % Patch Place 1 patch onto the skin every 24 hours 10 patch 1 Past week     [DISCONTINUED] CHLORTHALIDONE PO Take 25 mg by mouth daily   7/8/2018 at 0630             Discharge Medications:     Current Discharge Medication List      START taking these medications    Details   carvedilol (COREG) 6.25 MG tablet Take 1 tablet (6.25 mg) by mouth 2 times daily (with meals)  Qty: 60 tablet, Refills: 0    Associated Diagnoses: Hypertensive urgency, malignant      gabapentin (NEURONTIN) 300 MG capsule Take 1 capsule (300 mg) by mouth At Bedtime  Qty: 270 capsule, Refills: 1    Associated Diagnoses: Herpes zoster without complication      oxyCODONE IR (ROXICODONE) 5 MG tablet Take 1 tablet (5 mg) by mouth every 4 hours as needed for moderate to severe pain  Qty: 10 tablet, Refills: 0    Associated Diagnoses: Herpes zoster without complication         CONTINUE these medications which have NOT CHANGED    Details   amLODIPine (NORVASC) 10 MG tablet Take 1 tablet (10 mg) by mouth daily  Qty: 30 tablet, Refills: 1    Associated Diagnoses: Essential hypertension      ASPIRIN PO Take 81 mg by mouth daily      ATORVASTATIN CALCIUM PO Take 40 mg by mouth daily       diphenhydrAMINE-acetaminophen (TYLENOL PM)  MG tablet Take 2 tablets by mouth nightly as needed for sleep      HYDROcodone-acetaminophen (NORCO) 5-325 MG per tablet Take 1 tablet by mouth every 4 hours as needed for pain  Qty: 10 tablet, Refills: 0      LISINOPRIL PO Take 40 mg by mouth daily      MELATONIN PO Take 6 mg by mouth nightly as needed (sleep)      METFORMIN HCL PO Take 500 mg by mouth 2 times daily (with meals)       potassium chloride (KLOR-CON) 10 MEQ tablet Take 10  mEq by mouth daily      Pramipexole Dihydrochloride (MIRAPEX PO) Take 0.5 mg by mouth At Bedtime      ValACYclovir HCl (VALTREX PO) Take 1,000 mg by mouth 3 times daily      VITAMIN D, CHOLECALCIFEROL, PO Take 2,000 Units by mouth daily      diclofenac (VOLTAREN) 1 % GEL topical gel Place 2 g onto the skin 3 times daily  Qty: 1 Tube, Refills: 1    Associated Diagnoses: Left upper quadrant pain      Lidocaine (LIDOCARE) 4 % Patch Place 1 patch onto the skin every 24 hours  Qty: 10 patch, Refills: 1    Associated Diagnoses: Left upper quadrant pain         STOP taking these medications       CHLORTHALIDONE PO Comments:   Reason for Stopping:                     Consultations:   Consultation during this admission received from Stroke Neurology Team.          Hospital Course:   I refer the reader to the apprehensive history physical examination completed by Dr. Burns at the time of admission for details of the initial evaluation.    Mr. Chamberlain was admitted to a medical bed on telemetry and remained without additional or recurrent neurologic symptoms.  He was, however, quite bothered by the shingles pain over the left in approximately the T6-T9 dermatomes.    After his initial consultation with the stroke neurology service from [facility], the patient was followed up by Dr. Remberto Pa who also reviewed the history with the patient.  It was Dr. Pa's sense, especially considering the MRI was negative for stroke, the initial visual disturbance was probably an aura and that the speech difficulty fit best with a complex migraine.      We spent much of our time on the date of discharge reviewing his rather complex recent history of chest pain due to Shingles, management of his Migraines and his evident severely elevated BP.  He seems to have tolerated the Coreg well, which is the only change in his antihypertensive regimen.     I did give him a total of 10 tablets of Oxycodone IR 5 mg, and he is aware of the risks  "associated with that medication. He has agreed to try the Gabapentin again, but noted that he did not tolerate it before.    /72 (BP Location: Right arm)  Pulse 57  Temp 97  F (36.1  C) (Oral)  Resp 16  Ht 1.981 m (6' 6\")  Wt 131.5 kg (290 lb)  SpO2 99%  BMI 33.51 kg/m2  At the time of discharge, Mr. Chamberlain is alert, coherent and in NAD. He is articulate and fully appropriate. He was able to get up and get changed without assistance. He was able to ambulate in the room also without evident instability or weakness.           Discharge Instructions and Follow-Up:   Discharge diet: Regular   Discharge activity: Activity as tolerated   Discharge follow-up: As planned with PMD and with Neurology.           Discharge Disposition:   Discharged to home      Attestation:  I have reviewed today's vital signs, notes, medications, labs and imaging.  Total time: 45 minutes    Miguel Joseph MD     "

## 2018-07-09 NOTE — PLAN OF CARE
Problem: Patient Care Overview  Goal: Plan of Care/Patient Progress Review  Outcome: No Change  A&Ox4, independent, tolerating mod cho diet, blood sugars- 95 & 131, tele- SB w/ 1st AVB/BBB/ long QT, lungs clear, BS active, voiding without difficulty, shingles rash on upper left side of abdomen, pain managed with tylenol and oxycodone, IVF, wears brace on left foot for foot drop, potassium replaced- recheck 0120 tomorrow

## 2018-07-09 NOTE — PLAN OF CARE
Problem: Patient Care Overview  Goal: Plan of Care/Patient Progress Review  Outcome: No Change  A/Ox4, independent in room, mod cho diet, BS 79, tele- SB - BBB, lungs clear, shingles rash on upper left side of abdomen, pain managed with tylenol and oxycodone, IVF, wears brace on left foot for foot drop, potassium 2.8, oral replacement recheck 0830, continue with plan of care.

## 2020-08-04 ENCOUNTER — HOSPITAL ENCOUNTER (EMERGENCY)
Facility: CLINIC | Age: 70
Discharge: HOME | End: 2020-08-04
Attending: EMERGENCY MEDICINE | Admitting: EMERGENCY MEDICINE
Payer: COMMERCIAL

## 2020-08-04 ENCOUNTER — APPOINTMENT (OUTPATIENT)
Dept: GENERAL RADIOLOGY | Facility: CLINIC | Age: 70
End: 2020-08-04
Attending: EMERGENCY MEDICINE
Payer: COMMERCIAL

## 2020-08-04 VITALS
WEIGHT: 315 LBS | SYSTOLIC BLOOD PRESSURE: 143 MMHG | HEART RATE: 45 BPM | HEIGHT: 78 IN | RESPIRATION RATE: 28 BRPM | OXYGEN SATURATION: 99 % | TEMPERATURE: 97.6 F | DIASTOLIC BLOOD PRESSURE: 95 MMHG | BODY MASS INDEX: 36.45 KG/M2

## 2020-08-04 DIAGNOSIS — R06.02 SHORTNESS OF BREATH: ICD-10-CM

## 2020-08-04 DIAGNOSIS — R94.31 NONSPECIFIC ABNORMAL ELECTROCARDIOGRAM (ECG) (EKG): ICD-10-CM

## 2020-08-04 DIAGNOSIS — R00.1 BRADYCARDIA: ICD-10-CM

## 2020-08-04 DIAGNOSIS — E87.6 HYPOKALEMIA: ICD-10-CM

## 2020-08-04 LAB
ANION GAP SERPL CALCULATED.3IONS-SCNC: 7 MMOL/L (ref 3–14)
BASOPHILS # BLD AUTO: 0.1 10E9/L (ref 0–0.2)
BASOPHILS NFR BLD AUTO: 0.9 %
BUN SERPL-MCNC: 18 MG/DL (ref 7–30)
CALCIUM SERPL-MCNC: 8.6 MG/DL (ref 8.5–10.1)
CHLORIDE SERPL-SCNC: 108 MMOL/L (ref 94–109)
CO2 SERPL-SCNC: 28 MMOL/L (ref 20–32)
CREAT SERPL-MCNC: 0.92 MG/DL (ref 0.66–1.25)
DIFFERENTIAL METHOD BLD: NORMAL
EOSINOPHIL # BLD AUTO: 0.5 10E9/L (ref 0–0.7)
EOSINOPHIL NFR BLD AUTO: 6 %
ERYTHROCYTE [DISTWIDTH] IN BLOOD BY AUTOMATED COUNT: 13.8 % (ref 10–15)
GFR SERPL CREATININE-BSD FRML MDRD: 85 ML/MIN/{1.73_M2}
GLUCOSE SERPL-MCNC: 106 MG/DL (ref 70–99)
HCT VFR BLD AUTO: 44.8 % (ref 40–53)
HGB BLD-MCNC: 14.6 G/DL (ref 13.3–17.7)
IMM GRANULOCYTES # BLD: 0 10E9/L (ref 0–0.4)
IMM GRANULOCYTES NFR BLD: 0.4 %
LYMPHOCYTES # BLD AUTO: 2.5 10E9/L (ref 0.8–5.3)
LYMPHOCYTES NFR BLD AUTO: 30.8 %
MCH RBC QN AUTO: 29 PG (ref 26.5–33)
MCHC RBC AUTO-ENTMCNC: 32.6 G/DL (ref 31.5–36.5)
MCV RBC AUTO: 89 FL (ref 78–100)
MONOCYTES # BLD AUTO: 0.6 10E9/L (ref 0–1.3)
MONOCYTES NFR BLD AUTO: 7.5 %
NEUTROPHILS # BLD AUTO: 4.4 10E9/L (ref 1.6–8.3)
NEUTROPHILS NFR BLD AUTO: 54.4 %
NRBC # BLD AUTO: 0 10*3/UL
NRBC BLD AUTO-RTO: 0 /100
NT-PROBNP SERPL-MCNC: 483 PG/ML (ref 0–900)
PLATELET # BLD AUTO: 234 10E9/L (ref 150–450)
POTASSIUM SERPL-SCNC: 3.2 MMOL/L (ref 3.4–5.3)
RBC # BLD AUTO: 5.04 10E12/L (ref 4.4–5.9)
SODIUM SERPL-SCNC: 143 MMOL/L (ref 133–144)
TROPONIN I SERPL-MCNC: <0.015 UG/L (ref 0–0.04)
WBC # BLD AUTO: 8.1 10E9/L (ref 4–11)

## 2020-08-04 PROCEDURE — 25000132 ZZH RX MED GY IP 250 OP 250 PS 637: Mod: GY | Performed by: EMERGENCY MEDICINE

## 2020-08-04 PROCEDURE — 83880 ASSAY OF NATRIURETIC PEPTIDE: CPT | Performed by: EMERGENCY MEDICINE

## 2020-08-04 PROCEDURE — 93005 ELECTROCARDIOGRAM TRACING: CPT

## 2020-08-04 PROCEDURE — 71046 X-RAY EXAM CHEST 2 VIEWS: CPT

## 2020-08-04 PROCEDURE — 85025 COMPLETE CBC W/AUTO DIFF WBC: CPT | Performed by: EMERGENCY MEDICINE

## 2020-08-04 PROCEDURE — 80048 BASIC METABOLIC PNL TOTAL CA: CPT | Performed by: EMERGENCY MEDICINE

## 2020-08-04 PROCEDURE — 84484 ASSAY OF TROPONIN QUANT: CPT | Performed by: EMERGENCY MEDICINE

## 2020-08-04 PROCEDURE — 99285 EMERGENCY DEPT VISIT HI MDM: CPT | Mod: 25

## 2020-08-04 RX ORDER — POTASSIUM CHLORIDE 1500 MG/1
40 TABLET, EXTENDED RELEASE ORAL ONCE
Status: COMPLETED | OUTPATIENT
Start: 2020-08-04 | End: 2020-08-04

## 2020-08-04 RX ADMIN — POTASSIUM CHLORIDE 40 MEQ: 1500 TABLET, EXTENDED RELEASE ORAL at 21:44

## 2020-08-04 ASSESSMENT — ENCOUNTER SYMPTOMS
SHORTNESS OF BREATH: 1
FEVER: 0
COUGH: 0

## 2020-08-04 ASSESSMENT — MIFFLIN-ST. JEOR: SCORE: 2354.76

## 2020-08-04 NOTE — ED AVS SNAPSHOT
[facility] Emergency Department  [address]  [facility] [address]  Phone:  [phone]  Fax:  [phone]                                    Federico Chamberlain   MRN: [id number]    Department:  [facility] Emergency Department   Date of Visit:  8/4/2020           After Visit Summary Signature Page    I have received my discharge instructions, and my questions have been answered. I have discussed any challenges I see with this plan with the nurse or doctor.    ..........................................................................................................................................  Patient/Patient Representative Signature      ..........................................................................................................................................  Patient Representative Print Name and Relationship to Patient    ..................................................               ................................................  Date                                   Time    ..........................................................................................................................................  Reviewed by Signature/Title    ...................................................              ..............................................  Date                                               Time          22EPIC Rev 08/18

## 2020-08-05 LAB — INTERPRETATION ECG - MUSE: NORMAL

## 2020-08-05 NOTE — ED PROVIDER NOTES
History   Chief Complaint  Shortness of Breath    The history is provided by the patient.      Federico Chamberlain is a 70 year old male with a history of hypertension, hypercholesteremia, and type 2 diabetes who presents for evaluation of dyspnea. Federico describes a couple of months of shortness of breath that has increased in frequency and worsened in severity. His dyspnea is somewhat episodic and seems to improve with activity though he has noticed it at rest and with exertion.  He is unsure if it is worse when supine. He has had a few months of bilateral leg edema although he has not noticed a recent increase in his weight. He denies chest pain with occasional chest tightness when he feels like he can't take a full breath. He denies fever, cough, recent travel or surgery. He has no known sick contacts and already had a negative COVID-19 07/16/20. He was referred to the ER by nurse triage today when he called to schedule an appointment with his primary care provider.     Allergies  No Known Allergies    Medications  Amlodipine  Atorvastatin  Gabapentin  Lisinopril   Metformin  Mirapex  Aspirin 81 mg  Hydralazine   Atenolol  Chlorthalidone  Metoprolol succinate  Lexapro    Past Medical History  Hypercholesterolemia  Hypertension   Prostate cancer  Bilateral carpal tunnel syndrome  Insomnia  Depression  Left foot drop  Erectile dysfunction  Type 2 diabetes  Adenomatous polyp of colon  Osteoarthritis  Gout     Past Surgical History  Appendectomy   Lumbar spine fusion  Lumbar laminectomy  Tonsillectomy and adenoidectomy   Orthopedic surgery     Family History  History reviewed. No pertinent family history.    Social History  The patient was unaccompanied to the ED.  Smoking Status: never  Smokeless Tobacco: never  Alcohol Use: no  Drug Use: no    Review of Systems   Constitutional: Negative for fever and unexpected weight change.   Respiratory: Positive for chest tightness and shortness of breath. Negative for cough.   "  Cardiovascular: Positive for leg swelling. Negative for chest pain.   All other systems reviewed and are negative.    Physical Exam     Patient Vitals for the past 24 hrs:   BP Temp Temp src Pulse Heart Rate Resp SpO2 Height Weight   08/04/20 2130 (!) 143/95 -- -- (!) 45 (!) 43 -- 99 % -- --   08/04/20 2100 136/85 -- -- (!) 47 (!) 48 28 97 % -- --   08/04/20 2045 (!) 141/80 -- -- 50 -- -- 100 % -- --   08/04/20 2030 (!) 151/97 -- -- (!) 48 (!) 49 28 99 % -- --   08/04/20 1945 (!) 184/87 97.6  F (36.4  C) Temporal -- 55 18 100 % 1.981 m (6' 6\") 145.2 kg (320 lb)       Physical Exam  General: Well-developed and well-nourished. Well appearing elderly  man. Cooperative.  Head:  Atraumatic.  Eyes:  Conjunctivae, lids, and sclerae are normal.  Neck:  Supple. Normal range of motion.  CV:  Bradycardic rate and regular rhythm. Normal heart sounds with no murmurs, rubs, or gallops detected.  Resp:  No respiratory distress. Clear to auscultation bilaterally without decreased breath sounds, wheezing, rales, or rhonchi.  GI:  Soft. Non-distended. Non-tender.    MS:  Normal ROM. Trace - 1+ bilateral lower extremity edema.  Skin:  Warm. Non-diaphoretic. No pallor.  Neuro:  Awake. A&Ox3. Normal strength.  Psych: Normal mood and affect. Normal speech.  Vitals reviewed.    Emergency Department Course   EKG  Indication: shortness of breath  Time: 2057  Rate 46 bpm. DE interval 216. QRS duration 180. QT/QTc 570/498.   Sinus bradycardia with 1st degree AV block  Right bundle branch block  Left anterior fascicular block   ** Bifascicular block **  Moderate voltage criteria for LVH, may be normal variant  T wave abnormality, consider inferior ischemia   Abnormal ECG   No acute ST changes.  No significant change compared to prior, dated 07/08/2018.  Read time: 2058  Read by Edith Mccabe MD    Imaging:  Radiology findings were communicated with the patient who voiced understanding of the findings.    XR chest 2 " views:  IMPRESSION: Negative chest.  Readings per Radiology    Laboratory:  Laboratory findings were communicated with the patient who voiced understanding of the findings.    BMP: potassium 3.2 (L), glucose 106 (H) o/w WNL (Creatinine 0.92)  BNP: 483  Troponin (Collected 2030): <0.015  CBC: AWNL (WBC 8.1, HGB 14.6, )    Interventions:  2144 Klor-Con 40 mEq PO    Emergency Department Course:  Past medical records, nursing notes, and vitals reviewed.    2035 I physically examined the patient as documented above.    EKG obtained in the ED, see results above.     IV was inserted and blood was drawn for laboratory testing, results above.    The patient was sent for radiographs while in the emergency department, results above.     2205 Ambulatory pulse oximetry was 97-99%.     2212 I rechecked the patient and discussed the findings of his workup thus far. He is feeling about same.      Findings and plan explained to the patient. He was discharged home with instructions regarding supportive care, medications, and reasons to return. The importance of close follow-up was reviewed.     I personally reviewed the laboratory and imaging results with the patient and answered all related questions prior to discharge.     Impression & Plan   Covid-19  Federico Chamberlain was evaluated during a global COVID-19 pandemic, which necessitated consideration that the patient might be at risk for infection with the SARS-CoV-2 virus that causes COVID-19.   Applicable protocols for evaluation were followed during the patient's care. COVID-19 was considered as part of the patient's evaluation. The plan for testing is: a test was obtained at a previous visit and reviewed & considered today.    Medical Decision Making:  Federico is a 70-year-old man who has had a couple months of shortness of breath that is worsened in frequency and severity ultimately prompting his call to nurse triage who referred him to the emergency department.  This is not  "simply with exertion nor is it simply when supine remarking that it seemed bad when he was sitting in triage.  He does have a couple of months of lower extremity edema without weight change, chest pain, fever, cough, or any other new or concerning symptoms.  He appears well on exam.  His lungs are clear and he is in no respiratory distress.  He is not hypoxic.  He does have trace to 1+ pitting edema but does not appear grossly significantly volume overloaded.  EKG is reassuring without acute ST changes or arrhythmias.  He does have a bifascicular block but has had this in the past.  Indeed T wave changes seen on prior EKG actually appear improved.  He is bradycardic.  I highly doubt pulmonary embolism given intermittent nature of his shortness of breath and chronicity of symptoms, particularly without hypoxia or tachycardia.  His BNP is normal but I suspect that his shortness of breath could be related to heart failure.  Troponin is undetectable.  His laboratory studies are otherwise significant for hypokalemia to 3.2 which was repleted with 40 mEq potassium chloride by mouth.  He ambulated with pulse oximetry and had no hypoxia.  Chest x-ray is without acute pathology such as pneumonia, pneumothorax, pleural effusion, or pulmonary edema.  The exact etiology for his dyspnea is unclear.  Again, I have some concern for heart failure and he agrees to outpatient echocardiogram.  However, his dyspnea might actually be related to anxiety or some other more benign pathology as he describes it as \"episodes\" that make him need to sit or stand and has improvement with some activity.  He agrees to follow-up with his primary care provider as soon as possible and to return if he has worsening symptoms or new concerns.  All his questions were answered and he verbalized understanding.  Amenable to discharge with outpatient echocardiogram.    Diagnosis:    ICD-10-CM    1. Shortness of breath  R06.02 Echocardiogram Complete   2. " Hypokalemia  E87.6    3. Bradycardia  R00.1 Echocardiogram Complete   4. Nonspecific abnormal electrocardiogram (ECG) (EKG)  R94.31 Echocardiogram Complete     Disposition:  Discharged home.    Scribe Disclosure:  IRosalia, am serving as a scribe at 8:31 PM on 8/4/2020 to document services personally performed by Edith Mccabe MD based on my observations and the provider's statements to me.      Edith Mccabe MD  08/06/20 2983

## 2020-08-05 NOTE — ED TRIAGE NOTES
Shortness of breath for the past two months. Negative Covid test one week ago at [facility] in [place]. States symptoms are progressing; the shortness of breath seems worse whenever he has an  Episode and the episodes are getting closer together.     Denies fevers, cough, diarrhea or rashes.

## 2020-08-05 NOTE — DISCHARGE INSTRUCTIONS
You will be called to arrange outpatient echocardiogram.  Increase dietary potassium.  Return immediately with worsening symptoms of any kind or development of any new concerns.  Follow-up with primary care provider as soon as possible.

## 2020-08-05 NOTE — ED NOTES
"Patient up to walk in garcia, stated \"feels better when standing\" oxygen sats  97-99% for duration of short walk  "

## 2020-08-06 ASSESSMENT — ENCOUNTER SYMPTOMS
UNEXPECTED WEIGHT CHANGE: 0
CHEST TIGHTNESS: 1

## 2021-12-03 ENCOUNTER — HOSPITAL ENCOUNTER (EMERGENCY)
Facility: CLINIC | Age: 71
Discharge: HOME | End: 2021-12-03
Attending: EMERGENCY MEDICINE | Admitting: EMERGENCY MEDICINE
Payer: COMMERCIAL

## 2021-12-03 VITALS
BODY MASS INDEX: 33.51 KG/M2 | SYSTOLIC BLOOD PRESSURE: 134 MMHG | WEIGHT: 290 LBS | RESPIRATION RATE: 16 BRPM | DIASTOLIC BLOOD PRESSURE: 74 MMHG | OXYGEN SATURATION: 99 % | HEART RATE: 78 BPM | TEMPERATURE: 97.2 F

## 2021-12-03 DIAGNOSIS — R11.2 NON-INTRACTABLE VOMITING WITH NAUSEA, UNSPECIFIED VOMITING TYPE: ICD-10-CM

## 2021-12-03 LAB
ALBUMIN SERPL-MCNC: 3.2 G/DL (ref 3.4–5)
ALP SERPL-CCNC: 80 U/L (ref 40–150)
ALT SERPL W P-5'-P-CCNC: 25 U/L (ref 0–70)
ANION GAP SERPL CALCULATED.3IONS-SCNC: 7 MMOL/L (ref 3–14)
AST SERPL W P-5'-P-CCNC: 14 U/L (ref 0–45)
BASOPHILS # BLD AUTO: 0 10E3/UL (ref 0–0.2)
BASOPHILS NFR BLD AUTO: 0 %
BILIRUB SERPL-MCNC: 1.2 MG/DL (ref 0.2–1.3)
BUN SERPL-MCNC: 18 MG/DL (ref 7–30)
CALCIUM SERPL-MCNC: 8.5 MG/DL (ref 8.5–10.1)
CHLORIDE BLD-SCNC: 107 MMOL/L (ref 94–109)
CO2 SERPL-SCNC: 26 MMOL/L (ref 20–32)
CREAT SERPL-MCNC: 0.76 MG/DL (ref 0.66–1.25)
EOSINOPHIL # BLD AUTO: 0.2 10E3/UL (ref 0–0.7)
EOSINOPHIL NFR BLD AUTO: 3 %
ERYTHROCYTE [DISTWIDTH] IN BLOOD BY AUTOMATED COUNT: 13.2 % (ref 10–15)
GFR SERPL CREATININE-BSD FRML MDRD: >90 ML/MIN/1.73M2
GLUCOSE BLD-MCNC: 100 MG/DL (ref 70–99)
HCT VFR BLD AUTO: 48.3 % (ref 40–53)
HGB BLD-MCNC: 15.6 G/DL (ref 13.3–17.7)
IMM GRANULOCYTES # BLD: 0 10E3/UL
IMM GRANULOCYTES NFR BLD: 0 %
LIPASE SERPL-CCNC: 56 U/L (ref 73–393)
LYMPHOCYTES # BLD AUTO: 0.9 10E3/UL (ref 0.8–5.3)
LYMPHOCYTES NFR BLD AUTO: 12 %
MCH RBC QN AUTO: 28.1 PG (ref 26.5–33)
MCHC RBC AUTO-ENTMCNC: 32.3 G/DL (ref 31.5–36.5)
MCV RBC AUTO: 87 FL (ref 78–100)
MONOCYTES # BLD AUTO: 0.5 10E3/UL (ref 0–1.3)
MONOCYTES NFR BLD AUTO: 7 %
NEUTROPHILS # BLD AUTO: 5.6 10E3/UL (ref 1.6–8.3)
NEUTROPHILS NFR BLD AUTO: 78 %
NRBC # BLD AUTO: 0 10E3/UL
NRBC BLD AUTO-RTO: 0 /100
PLATELET # BLD AUTO: 217 10E3/UL (ref 150–450)
POTASSIUM BLD-SCNC: 3.2 MMOL/L (ref 3.4–5.3)
PROT SERPL-MCNC: 6.8 G/DL (ref 6.8–8.8)
RBC # BLD AUTO: 5.56 10E6/UL (ref 4.4–5.9)
SODIUM SERPL-SCNC: 140 MMOL/L (ref 133–144)
WBC # BLD AUTO: 7.3 10E3/UL (ref 4–11)

## 2021-12-03 PROCEDURE — 83690 ASSAY OF LIPASE: CPT | Performed by: EMERGENCY MEDICINE

## 2021-12-03 PROCEDURE — 250N000011 HC RX IP 250 OP 636: Performed by: EMERGENCY MEDICINE

## 2021-12-03 PROCEDURE — 84075 ASSAY ALKALINE PHOSPHATASE: CPT | Performed by: EMERGENCY MEDICINE

## 2021-12-03 PROCEDURE — 96374 THER/PROPH/DIAG INJ IV PUSH: CPT

## 2021-12-03 PROCEDURE — 96361 HYDRATE IV INFUSION ADD-ON: CPT

## 2021-12-03 PROCEDURE — 258N000003 HC RX IP 258 OP 636: Performed by: EMERGENCY MEDICINE

## 2021-12-03 PROCEDURE — 85004 AUTOMATED DIFF WBC COUNT: CPT | Performed by: EMERGENCY MEDICINE

## 2021-12-03 PROCEDURE — 36415 COLL VENOUS BLD VENIPUNCTURE: CPT | Performed by: EMERGENCY MEDICINE

## 2021-12-03 PROCEDURE — 99284 EMERGENCY DEPT VISIT MOD MDM: CPT | Mod: 25

## 2021-12-03 RX ORDER — ONDANSETRON 4 MG/1
4 TABLET, ORALLY DISINTEGRATING ORAL EVERY 8 HOURS PRN
Qty: 10 TABLET | Refills: 0 | Status: SHIPPED | OUTPATIENT
Start: 2021-12-03 | End: 2021-12-06

## 2021-12-03 RX ORDER — LORAZEPAM 2 MG/ML
0.5 INJECTION INTRAMUSCULAR ONCE
Status: DISCONTINUED | OUTPATIENT
Start: 2021-12-03 | End: 2021-12-03

## 2021-12-03 RX ORDER — ONDANSETRON 4 MG/1
4 TABLET, ORALLY DISINTEGRATING ORAL ONCE
Status: COMPLETED | OUTPATIENT
Start: 2021-12-03 | End: 2021-12-03

## 2021-12-03 RX ORDER — ONDANSETRON 2 MG/ML
4 INJECTION INTRAMUSCULAR; INTRAVENOUS ONCE
Status: COMPLETED | OUTPATIENT
Start: 2021-12-03 | End: 2021-12-03

## 2021-12-03 RX ADMIN — SODIUM CHLORIDE 1000 ML: 9 INJECTION, SOLUTION INTRAVENOUS at 04:50

## 2021-12-03 RX ADMIN — ONDANSETRON 4 MG: 4 TABLET, ORALLY DISINTEGRATING ORAL at 03:23

## 2021-12-03 RX ADMIN — ONDANSETRON 4 MG: 2 INJECTION INTRAMUSCULAR; INTRAVENOUS at 04:50

## 2021-12-03 ASSESSMENT — ENCOUNTER SYMPTOMS
VOMITING: 1
NAUSEA: 1
CHILLS: 0
DYSURIA: 0
COUGH: 0
DIARRHEA: 0
ABDOMINAL PAIN: 0
FEVER: 0

## 2021-12-03 NOTE — ED PROVIDER NOTES
History   Chief Complaint:  Nausea & Vomiting       HPI   Federico Chamberlain is a 71 year old male who presents for evaluation of nausea and vomiting.  He felt a little unwell last night but did not have any nausea or vomiting.  Around midnight, he developed multiple episodes of vomiting.  He has not had any diarrhea.  He does not have any abdominal pain.  No fevers or chills.  No known sick contacts.    Review of Systems   Constitutional: Negative for chills and fever.   Respiratory: Negative for cough.    Cardiovascular: Negative for chest pain.   Gastrointestinal: Positive for nausea and vomiting. Negative for abdominal pain and diarrhea.   Genitourinary: Negative for dysuria.   All other systems reviewed and are negative.        Allergies:  No Known Allergies    Medications:  Amlodipine   Aspirin 81 mg  Atorvastatin   Carvedilol   Gabapentin   Lisinopril   Melatonin   Metformin   Potassium chloride   Pramipexole Dihydrochloride    Valacyclovir      Past Medical History:     Acute kidney injury  Aortic dilation   Hypercholesterolemia  Hypertension  Hypertensive urgency  Migraine with aura  Osteoarthritis   Prostate cancer   TIA  Type 2 diabetes mellitus     Past Surgical History:    Appendectomy   Lumbar spinal fusion   Orthopedic surgery    Family History:    Heart failure  MI    Social History:  Presents unaccompanied     Physical Exam     Patient Vitals for the past 24 hrs:   BP Temp Temp src Pulse Resp SpO2 Weight   12/03/21 0321 (!) 149/85 -- -- -- -- -- --   12/03/21 0320 -- 97.2  F (36.2  C) Temporal 76 16 97 % 131.5 kg (290 lb)       Physical Exam  Constitutional: Well appearing.  HEENT: Atraumatic.  Moist mucous membranes.  Neck: Soft.  Supple.   Cardiac: Regular rate and rhythm.  No murmur or rub.  Respiratory: Clear to auscultation bilaterally.  No respiratory distress.   Abdomen: Soft with minimal RLQ tenderness to palpation.  No rebound or guarding.  Nondistended.  Musculoskeletal: No edema.  Normal  range of motion.  Neurologic: Alert and oriented x3.  Normal tone and bulk.  Normal gait.  Skin: No rashes.  No edema.  Psych: Normal affect.  Normal behavior.    Emergency Department Course     Laboratory:  Labs Ordered and Resulted from Time of ED Arrival to Time of ED Departure   COMPREHENSIVE METABOLIC PANEL - Abnormal       Result Value    Sodium 140      Potassium 3.2 (*)     Chloride 107      Carbon Dioxide (CO2) 26      Anion Gap 7      Urea Nitrogen 18      Creatinine 0.76      Calcium 8.5      Glucose 100 (*)     Alkaline Phosphatase 80      AST 14      ALT 25      Protein Total 6.8      Albumin 3.2 (*)     Bilirubin Total 1.2      GFR Estimate >90     LIPASE - Abnormal    Lipase 56 (*)    CBC WITH PLATELETS AND DIFFERENTIAL    WBC Count 7.3      RBC Count 5.56      Hemoglobin 15.6      Hematocrit 48.3      MCV 87      MCH 28.1      MCHC 32.3      RDW 13.2      Platelet Count 217      % Neutrophils 78      % Lymphocytes 12      % Monocytes 7      % Eosinophils 3      % Basophils 0      % Immature Granulocytes 0      NRBCs per 100 WBC 0      Absolute Neutrophils 5.6      Absolute Lymphocytes 0.9      Absolute Monocytes 0.5      Absolute Eosinophils 0.2      Absolute Basophils 0.0      Absolute Immature Granulocytes 0.0      Absolute NRBCs 0.0     ROUTINE UA WITH MICROSCOPIC REFLEX TO CULTURE        Procedures      Emergency Department Course:  Reviewed:  I reviewed nursing notes, vitals, past medical history, Care Everywhere and [facility]    Interventions:  Medications   ondansetron (ZOFRAN-ODT) ODT tab 4 mg (4 mg Oral Given 12/3/21 0323)   0.9% sodium chloride BOLUS (0 mLs Intravenous Stopped 12/3/21 0622)   ondansetron (ZOFRAN) injection 4 mg (4 mg Intravenous Given 12/3/21 0450)       Disposition:  The patient was discharged to home.     Impression & Plan   Medical Decision Making:  Federico Chamberlain is a 71-year-old man who is afebrile and hemodynamically stable.  His abdomen soft with minimal right lower  quadrant tenderness to palpation.  Did not have any pain in that area prior to palpation.  We discussed plan for lab work-up and CT scan and he is in agreement.  Labs are noted as above and are revealing.  He is given the above intervention with good improvement.  CT scan was ordered, however, the patient stated he was feeling a lot better and did not want the CT scan.  On reevaluation, he has no abdominal tenderness to palpation.  I discussed the option of continue with CT scan versus discharge home after surgery shows admission discussion, he preferred discharge home.  He will go home with Zofran and supportive care and close primary care follow-up.  Discussed likely viral GI illness at this time.  He was given strict return precautions including change his mind about a CT scan.  He is in no distress at time of discharge.    Diagnosis:    ICD-10-CM    1. Non-intractable vomiting with nausea, unspecified vomiting type  R11.2        Discharge Medications:  Discharge Medication List as of 12/3/2021  6:22 AM      START taking these medications    Details   ondansetron (ZOFRAN ODT) 4 MG ODT tab Take 1 tablet (4 mg) by mouth every 8 hours as needed for nausea or vomiting, Disp-10 tablet, R-0, E-Prescribe             Scribe Disclosure:  Sabi ALTAMIRANO, am serving as a scribe at 3:51 AM on 12/3/2021 to document services personally performed by Giancarlo Garrison MD based on my observations and the provider's statements to me.              Giancarlo Garrison MD  12/03/21 0751       Giancarlo Garrison MD  12/23/21 0011

## 2022-12-19 ENCOUNTER — APPOINTMENT (OUTPATIENT)
Dept: MRI IMAGING | Facility: CLINIC | Age: 72
End: 2022-12-19
Attending: NURSE PRACTITIONER
Payer: COMMERCIAL

## 2022-12-19 ENCOUNTER — HOSPITAL ENCOUNTER (OUTPATIENT)
Facility: CLINIC | Age: 72
Setting detail: OBSERVATION
Discharge: HOME | End: 2022-12-20
Attending: NURSE PRACTITIONER | Admitting: INTERNAL MEDICINE
Payer: COMMERCIAL

## 2022-12-19 DIAGNOSIS — R79.89 ELEVATED TROPONIN: ICD-10-CM

## 2022-12-19 DIAGNOSIS — I16.0 HYPERTENSIVE URGENCY, MALIGNANT: ICD-10-CM

## 2022-12-19 DIAGNOSIS — S01.01XA SCALP LACERATION, INITIAL ENCOUNTER: ICD-10-CM

## 2022-12-19 DIAGNOSIS — R55 SYNCOPE: ICD-10-CM

## 2022-12-19 DIAGNOSIS — I10 BENIGN ESSENTIAL HYPERTENSION: Primary | ICD-10-CM

## 2022-12-19 DIAGNOSIS — M62.81 GENERALIZED MUSCLE WEAKNESS: ICD-10-CM

## 2022-12-19 PROBLEM — H57.813 BROW PTOSIS, BILATERAL: Status: ACTIVE | Noted: 2021-06-23

## 2022-12-19 PROBLEM — F33.1 MODERATE EPISODE OF RECURRENT MAJOR DEPRESSIVE DISORDER (H): Status: ACTIVE | Noted: 2018-02-15

## 2022-12-19 PROBLEM — G47.9 SLEEP DIFFICULTIES: Status: ACTIVE | Noted: 2018-08-20

## 2022-12-19 PROBLEM — H02.403 DROOPY EYELID, BILATERAL: Status: ACTIVE | Noted: 2021-06-23

## 2022-12-19 PROBLEM — G89.29 CHRONIC HEADACHES: Status: ACTIVE | Noted: 2018-02-14

## 2022-12-19 PROBLEM — G25.81 RESTLESS LEGS SYNDROME (RLS): Status: ACTIVE | Noted: 2022-10-25

## 2022-12-19 PROBLEM — R51.9 CHRONIC HEADACHES: Status: ACTIVE | Noted: 2018-02-14

## 2022-12-19 PROBLEM — R29.898 LEFT LEG WEAKNESS: Status: ACTIVE | Noted: 2019-07-12

## 2022-12-19 PROBLEM — H53.40 VISUAL FIELD DEFECT: Status: ACTIVE | Noted: 2021-06-23

## 2022-12-19 PROBLEM — C61 PROSTATE CANCER (H): Status: ACTIVE | Noted: 2019-08-08

## 2022-12-19 PROBLEM — G56.03 BILATERAL CARPAL TUNNEL SYNDROME: Status: ACTIVE | Noted: 2019-06-17

## 2022-12-19 PROBLEM — T23.001A BURN OF RIGHT HAND: Status: ACTIVE | Noted: 2019-06-12

## 2022-12-19 PROBLEM — H57.819 BROW PTOSIS: Status: ACTIVE | Noted: 2021-07-25

## 2022-12-19 PROBLEM — I77.819 AORTIC DILATATION (H): Status: ACTIVE | Noted: 2020-08-17

## 2022-12-19 PROBLEM — M21.372 LEFT FOOT DROP: Status: ACTIVE | Noted: 2018-02-14

## 2022-12-19 PROBLEM — G47.00 INSOMNIA: Status: ACTIVE | Noted: 2018-02-15

## 2022-12-19 PROBLEM — Z98.1 S/P LUMBAR SPINAL FUSION: Status: ACTIVE | Noted: 2018-02-14

## 2022-12-19 LAB
ALBUMIN SERPL BCG-MCNC: 4.1 G/DL (ref 3.5–5.2)
ALP SERPL-CCNC: 80 U/L (ref 40–129)
ALT SERPL W P-5'-P-CCNC: 29 U/L (ref 10–50)
ANION GAP SERPL CALCULATED.3IONS-SCNC: 13 MMOL/L (ref 7–15)
AST SERPL W P-5'-P-CCNC: 24 U/L (ref 10–50)
BASOPHILS # BLD AUTO: 0 10E3/UL (ref 0–0.2)
BASOPHILS NFR BLD AUTO: 1 %
BILIRUB SERPL-MCNC: 0.7 MG/DL
BUN SERPL-MCNC: 28.7 MG/DL (ref 8–23)
CALCIUM SERPL-MCNC: 9.1 MG/DL (ref 8.8–10.2)
CHLORIDE SERPL-SCNC: 103 MMOL/L (ref 98–107)
CK SERPL-CCNC: 123 U/L (ref 39–308)
CREAT SERPL-MCNC: 0.85 MG/DL (ref 0.67–1.17)
DEPRECATED HCO3 PLAS-SCNC: 25 MMOL/L (ref 22–29)
EOSINOPHIL # BLD AUTO: 0.3 10E3/UL (ref 0–0.7)
EOSINOPHIL NFR BLD AUTO: 4 %
ERYTHROCYTE [DISTWIDTH] IN BLOOD BY AUTOMATED COUNT: 14.3 % (ref 10–15)
GFR SERPL CREATININE-BSD FRML MDRD: >90 ML/MIN/1.73M2
GLUCOSE SERPL-MCNC: 137 MG/DL (ref 70–99)
HCT VFR BLD AUTO: 45.9 % (ref 40–53)
HGB BLD-MCNC: 14.6 G/DL (ref 13.3–17.7)
HOLD SPECIMEN: NORMAL
HOLD SPECIMEN: NORMAL
IMM GRANULOCYTES # BLD: 0 10E3/UL
IMM GRANULOCYTES NFR BLD: 0 %
LYMPHOCYTES # BLD AUTO: 1.7 10E3/UL (ref 0.8–5.3)
LYMPHOCYTES NFR BLD AUTO: 22 %
MCH RBC QN AUTO: 28.7 PG (ref 26.5–33)
MCHC RBC AUTO-ENTMCNC: 31.8 G/DL (ref 31.5–36.5)
MCV RBC AUTO: 90 FL (ref 78–100)
MONOCYTES # BLD AUTO: 0.5 10E3/UL (ref 0–1.3)
MONOCYTES NFR BLD AUTO: 7 %
NEUTROPHILS # BLD AUTO: 5.1 10E3/UL (ref 1.6–8.3)
NEUTROPHILS NFR BLD AUTO: 66 %
NRBC # BLD AUTO: 0 10E3/UL
NRBC BLD AUTO-RTO: 0 /100
PLATELET # BLD AUTO: 206 10E3/UL (ref 150–450)
POTASSIUM SERPL-SCNC: 3.6 MMOL/L (ref 3.4–5.3)
PROT SERPL-MCNC: 6.3 G/DL (ref 6.4–8.3)
RBC # BLD AUTO: 5.09 10E6/UL (ref 4.4–5.9)
SODIUM SERPL-SCNC: 141 MMOL/L (ref 136–145)
TROPONIN T SERPL HS-MCNC: 24 NG/L
TROPONIN T SERPL HS-MCNC: 26 NG/L
TROPONIN T SERPL HS-MCNC: 29 NG/L
WBC # BLD AUTO: 7.7 10E3/UL (ref 4–11)

## 2022-12-19 PROCEDURE — G0378 HOSPITAL OBSERVATION PER HR: HCPCS

## 2022-12-19 PROCEDURE — 70544 MR ANGIOGRAPHY HEAD W/O DYE: CPT

## 2022-12-19 PROCEDURE — A9585 GADOBUTROL INJECTION: HCPCS | Performed by: NURSE PRACTITIONER

## 2022-12-19 PROCEDURE — 96361 HYDRATE IV INFUSION ADD-ON: CPT

## 2022-12-19 PROCEDURE — 99220 PR INITIAL OBSERVATION CARE,LEVEL III: CPT | Performed by: INTERNAL MEDICINE

## 2022-12-19 PROCEDURE — 250N000013 HC RX MED GY IP 250 OP 250 PS 637: Performed by: INTERNAL MEDICINE

## 2022-12-19 PROCEDURE — 84484 ASSAY OF TROPONIN QUANT: CPT | Performed by: NURSE PRACTITIONER

## 2022-12-19 PROCEDURE — 70549 MR ANGIOGRAPH NECK W/O&W/DYE: CPT

## 2022-12-19 PROCEDURE — 36415 COLL VENOUS BLD VENIPUNCTURE: CPT | Performed by: NURSE PRACTITIONER

## 2022-12-19 PROCEDURE — 255N000002 HC RX 255 OP 636: Performed by: NURSE PRACTITIONER

## 2022-12-19 PROCEDURE — 70553 MRI BRAIN STEM W/O & W/DYE: CPT

## 2022-12-19 PROCEDURE — 250N000013 HC RX MED GY IP 250 OP 250 PS 637: Performed by: NURSE PRACTITIONER

## 2022-12-19 PROCEDURE — 96360 HYDRATION IV INFUSION INIT: CPT

## 2022-12-19 PROCEDURE — 99285 EMERGENCY DEPT VISIT HI MDM: CPT | Mod: 25

## 2022-12-19 PROCEDURE — 258N000003 HC RX IP 258 OP 636: Performed by: NURSE PRACTITIONER

## 2022-12-19 PROCEDURE — 85025 COMPLETE CBC W/AUTO DIFF WBC: CPT | Performed by: NURSE PRACTITIONER

## 2022-12-19 PROCEDURE — 36415 COLL VENOUS BLD VENIPUNCTURE: CPT | Performed by: INTERNAL MEDICINE

## 2022-12-19 PROCEDURE — 82550 ASSAY OF CK (CPK): CPT | Performed by: NURSE PRACTITIONER

## 2022-12-19 PROCEDURE — 80053 COMPREHEN METABOLIC PANEL: CPT | Performed by: NURSE PRACTITIONER

## 2022-12-19 PROCEDURE — 84484 ASSAY OF TROPONIN QUANT: CPT | Performed by: INTERNAL MEDICINE

## 2022-12-19 PROCEDURE — 93005 ELECTROCARDIOGRAM TRACING: CPT

## 2022-12-19 RX ORDER — SILDENAFIL CITRATE 20 MG/1
20 TABLET ORAL PRN
COMMUNITY
End: 2023-01-01

## 2022-12-19 RX ORDER — HYDRALAZINE HYDROCHLORIDE 50 MG/1
25 TABLET, FILM COATED ORAL 3 TIMES DAILY
Status: ON HOLD | COMMUNITY
End: 2022-12-20

## 2022-12-19 RX ORDER — ONDANSETRON 2 MG/ML
4 INJECTION INTRAMUSCULAR; INTRAVENOUS EVERY 6 HOURS PRN
Status: DISCONTINUED | OUTPATIENT
Start: 2022-12-19 | End: 2022-12-20 | Stop reason: HOSPADM

## 2022-12-19 RX ORDER — PROCHLORPERAZINE MALEATE 5 MG
5 TABLET ORAL EVERY 6 HOURS PRN
Status: DISCONTINUED | OUTPATIENT
Start: 2022-12-19 | End: 2022-12-20 | Stop reason: HOSPADM

## 2022-12-19 RX ORDER — PROCHLORPERAZINE 25 MG
12.5 SUPPOSITORY, RECTAL RECTAL EVERY 12 HOURS PRN
Status: DISCONTINUED | OUTPATIENT
Start: 2022-12-19 | End: 2022-12-20 | Stop reason: HOSPADM

## 2022-12-19 RX ORDER — PRAMIPEXOLE DIHYDROCHLORIDE 1 MG/1
3 TABLET ORAL AT BEDTIME
Status: DISCONTINUED | OUTPATIENT
Start: 2022-12-19 | End: 2022-12-20 | Stop reason: HOSPADM

## 2022-12-19 RX ORDER — MULTIPLE VITAMINS W/ MINERALS TAB 9MG-400MCG
1 TAB ORAL AT BEDTIME
Status: ON HOLD | COMMUNITY
End: 2023-09-24

## 2022-12-19 RX ORDER — ASPIRIN 81 MG/1
81 TABLET ORAL DAILY
Status: DISCONTINUED | OUTPATIENT
Start: 2022-12-20 | End: 2022-12-20 | Stop reason: HOSPADM

## 2022-12-19 RX ORDER — CYCLOBENZAPRINE HCL 5 MG
5 TABLET ORAL 3 TIMES DAILY PRN
Status: ON HOLD | COMMUNITY
End: 2023-09-24

## 2022-12-19 RX ORDER — ACETAMINOPHEN 325 MG/1
650 TABLET ORAL EVERY 6 HOURS PRN
Status: DISCONTINUED | OUTPATIENT
Start: 2022-12-19 | End: 2022-12-20 | Stop reason: HOSPADM

## 2022-12-19 RX ORDER — CARVEDILOL 12.5 MG/1
12.5 TABLET ORAL 2 TIMES DAILY WITH MEALS
Status: DISCONTINUED | OUTPATIENT
Start: 2022-12-19 | End: 2022-12-20 | Stop reason: HOSPADM

## 2022-12-19 RX ORDER — GABAPENTIN 800 MG/1
800 TABLET ORAL 3 TIMES DAILY
COMMUNITY

## 2022-12-19 RX ORDER — ASPIRIN 325 MG
325 TABLET ORAL ONCE
Status: COMPLETED | OUTPATIENT
Start: 2022-12-19 | End: 2022-12-19

## 2022-12-19 RX ORDER — AMOXICILLIN 250 MG
1 CAPSULE ORAL 2 TIMES DAILY
Status: DISCONTINUED | OUTPATIENT
Start: 2022-12-19 | End: 2022-12-20 | Stop reason: HOSPADM

## 2022-12-19 RX ORDER — TADALAFIL 5 MG/1
5 TABLET ORAL EVERY MORNING
Status: ON HOLD | COMMUNITY
End: 2023-01-06

## 2022-12-19 RX ORDER — LISINOPRIL 40 MG/1
40 TABLET ORAL AT BEDTIME
Status: DISCONTINUED | OUTPATIENT
Start: 2022-12-19 | End: 2022-12-20 | Stop reason: HOSPADM

## 2022-12-19 RX ORDER — CHLORTHALIDONE 25 MG/1
25 TABLET ORAL DAILY
Status: ON HOLD | COMMUNITY
End: 2022-12-20

## 2022-12-19 RX ORDER — ATORVASTATIN CALCIUM 40 MG/1
40 TABLET, FILM COATED ORAL AT BEDTIME
Status: DISCONTINUED | OUTPATIENT
Start: 2022-12-19 | End: 2022-12-20 | Stop reason: HOSPADM

## 2022-12-19 RX ORDER — AMOXICILLIN 250 MG
2 CAPSULE ORAL 2 TIMES DAILY
Status: DISCONTINUED | OUTPATIENT
Start: 2022-12-19 | End: 2022-12-20 | Stop reason: HOSPADM

## 2022-12-19 RX ORDER — CYCLOBENZAPRINE HCL 5 MG
5 TABLET ORAL EVERY MORNING
COMMUNITY
End: 2023-01-01

## 2022-12-19 RX ORDER — IBUPROFEN 800 MG/1
800 TABLET, FILM COATED ORAL EVERY 8 HOURS PRN
COMMUNITY
End: 2023-01-01

## 2022-12-19 RX ORDER — SILVER SULFADIAZINE 10 MG/G
CREAM TOPICAL DAILY PRN
Status: ON HOLD | COMMUNITY
End: 2023-09-24

## 2022-12-19 RX ORDER — FERROUS SULFATE 325(65) MG
325 TABLET ORAL
COMMUNITY

## 2022-12-19 RX ORDER — GABAPENTIN 400 MG/1
800 CAPSULE ORAL 3 TIMES DAILY
Status: DISCONTINUED | OUTPATIENT
Start: 2022-12-19 | End: 2022-12-20 | Stop reason: HOSPADM

## 2022-12-19 RX ORDER — ONDANSETRON 4 MG/1
4 TABLET, ORALLY DISINTEGRATING ORAL EVERY 6 HOURS PRN
Status: DISCONTINUED | OUTPATIENT
Start: 2022-12-19 | End: 2022-12-20 | Stop reason: HOSPADM

## 2022-12-19 RX ORDER — IBUPROFEN 400 MG/1
800 TABLET, FILM COATED ORAL EVERY 8 HOURS PRN
Status: DISCONTINUED | OUTPATIENT
Start: 2022-12-19 | End: 2022-12-20 | Stop reason: HOSPADM

## 2022-12-19 RX ORDER — ASPIRIN 81 MG/1
81 TABLET ORAL DAILY
COMMUNITY

## 2022-12-19 RX ORDER — POLYETHYLENE GLYCOL 3350 17 G/17G
17 POWDER, FOR SOLUTION ORAL DAILY PRN
Status: DISCONTINUED | OUTPATIENT
Start: 2022-12-19 | End: 2022-12-20 | Stop reason: HOSPADM

## 2022-12-19 RX ORDER — SPIRONOLACTONE 25 MG/1
25 TABLET ORAL DAILY
Status: ON HOLD | COMMUNITY
End: 2023-01-06

## 2022-12-19 RX ORDER — ACETAMINOPHEN 650 MG/1
650 SUPPOSITORY RECTAL EVERY 6 HOURS PRN
Status: DISCONTINUED | OUTPATIENT
Start: 2022-12-19 | End: 2022-12-20 | Stop reason: HOSPADM

## 2022-12-19 RX ORDER — SEMAGLUTIDE 1.34 MG/ML
0.5 INJECTION, SOLUTION SUBCUTANEOUS
COMMUNITY

## 2022-12-19 RX ORDER — CYCLOBENZAPRINE HCL 5 MG
5 TABLET ORAL 3 TIMES DAILY PRN
Status: DISCONTINUED | OUTPATIENT
Start: 2022-12-19 | End: 2022-12-20 | Stop reason: HOSPADM

## 2022-12-19 RX ORDER — GADOBUTROL 604.72 MG/ML
10 INJECTION INTRAVENOUS ONCE
Status: COMPLETED | OUTPATIENT
Start: 2022-12-19 | End: 2022-12-19

## 2022-12-19 RX ADMIN — GABAPENTIN 800 MG: 400 CAPSULE ORAL at 22:01

## 2022-12-19 RX ADMIN — PRAMIPEXOLE DIHYDROCHLORIDE 3 MG: 1 TABLET ORAL at 22:44

## 2022-12-19 RX ADMIN — CARVEDILOL 12.5 MG: 12.5 TABLET, FILM COATED ORAL at 22:02

## 2022-12-19 RX ADMIN — ATORVASTATIN CALCIUM 40 MG: 40 TABLET, FILM COATED ORAL at 22:02

## 2022-12-19 RX ADMIN — ASPIRIN 325 MG ORAL TABLET 325 MG: 325 PILL ORAL at 19:20

## 2022-12-19 RX ADMIN — LISINOPRIL 40 MG: 40 TABLET ORAL at 22:01

## 2022-12-19 RX ADMIN — GADOBUTROL 10 ML: 604.72 INJECTION INTRAVENOUS at 17:05

## 2022-12-19 RX ADMIN — SENNOSIDES AND DOCUSATE SODIUM 2 TABLET: 50; 8.6 TABLET ORAL at 22:01

## 2022-12-19 RX ADMIN — SODIUM CHLORIDE 1000 ML: 9 INJECTION, SOLUTION INTRAVENOUS at 13:22

## 2022-12-19 RX ADMIN — SODIUM CHLORIDE 1000 ML: 9 INJECTION, SOLUTION INTRAVENOUS at 19:36

## 2022-12-19 ASSESSMENT — ENCOUNTER SYMPTOMS
CHILLS: 0
ARTHRALGIAS: 0
SHORTNESS OF BREATH: 0
FEVER: 0
VOICE CHANGE: 0
TREMORS: 0
HEADACHES: 0
NECK PAIN: 0
STRIDOR: 0
FATIGUE: 0
WHEEZING: 0
CONFUSION: 0
PHOTOPHOBIA: 0
NAUSEA: 1
WOUND: 1
CHEST TIGHTNESS: 1
VOMITING: 1
NECK STIFFNESS: 0
COUGH: 0
LIGHT-HEADEDNESS: 1
FACIAL ASYMMETRY: 0
DIAPHORESIS: 0
ABDOMINAL PAIN: 0
SPEECH DIFFICULTY: 0
SORE THROAT: 0
PALPITATIONS: 1
SEIZURES: 0
TROUBLE SWALLOWING: 0
NUMBNESS: 0
BACK PAIN: 1
DIZZINESS: 0
WEAKNESS: 0

## 2022-12-19 ASSESSMENT — ACTIVITIES OF DAILY LIVING (ADL)
ADLS_ACUITY_SCORE: 35
ADLS_ACUITY_SCORE: 20
ADLS_ACUITY_SCORE: 35
ADLS_ACUITY_SCORE: 35

## 2022-12-19 NOTE — PHARMACY-ADMISSION MEDICATION HISTORY
Admission medication history interview status for this patient is complete. See [facility] admission navigator for allergy information, prior to admission medications and immunization status.     Medication history interview done, indicate source(s): Patient  Medication history resources (including written lists, pill bottles, clinic record):med list  Pharmacy: -    Changes made to PTA medication list:  Added: hydralazine, tadalafil, spironolactone, chlorthalidone, iron, flexeril, mvi, ozempic, sildenafil, silver sulf.  Changed: potassium chloride, carvedilol, amlodipine, pramipexole, voltaren, gabapentin  Reported as Not Taking: -  Removed: melatonin, lidoderm, valtrex    Actions taken by pharmacist (provider contacted, etc):None     Additional medication history information:None    Medication reconciliation/reorder completed by provider prior to medication history?  no   (Y/N)     For patients on insulin therapy:   Do you use sliding scale insulin based on blood sugars?   What is your pre-meal insulin coverage?    Do you typically eat three meals a day?   How many times do you check your blood glucose per day?   How many episodes of hypoglycemia do you typically have per month?   Do you have a Continuous Glucose Monitor (CGM)?      Prior to Admission medications    Medication Sig Last Dose Taking? Auth Provider Long Term End Date   amLODIPine (NORVASC) 10 MG tablet Take 1 tablet (10 mg) by mouth daily  Patient taking differently: Take 5 mg by mouth At Bedtime 12/18/2022 Yes Cristobal Stevenson, DO Yes    aspirin 81 MG EC tablet Take 81 mg by mouth daily 12/19/2022 at am Yes Unknown, Entered By History     ATORVASTATIN CALCIUM PO Take 40 mg by mouth At Bedtime 12/18/2022 Yes Reported, Patient Yes    carvedilol (COREG) 6.25 MG tablet Take 1 tablet (6.25 mg) by mouth 2 times daily (with meals)  Patient taking differently: Take 12.5 mg by mouth 2 times daily (with meals) 12/19/2022 at x1 Yes Miguel Joseph MD Yes     chlorthalidone (HYGROTON) 25 MG tablet Take 25 mg by mouth daily 12/19/2022 at am Yes Unknown, Entered By History No    cyclobenzaprine (FLEXERIL) 5 MG tablet Take 5 mg by mouth every morning 12/19/2022 Yes Unknown, Entered By History     cyclobenzaprine (FLEXERIL) 5 MG tablet Take 5 mg by mouth 3 times daily as needed for muscle spasms  Yes Unknown, Entered By History     diclofenac (VOLTAREN) 1 % GEL topical gel Place 2 g onto the skin 3 times daily  Patient taking differently: Place 2 g onto the skin 3 times daily as needed  Yes Cristobal Stevenson,      ferrous sulfate (FEROSUL) 325 (65 Fe) MG tablet Take 325 mg by mouth daily (with breakfast) 12/19/2022 Yes Unknown, Entered By History     gabapentin (NEURONTIN) 800 MG tablet Take 800 mg by mouth 3 times daily 12/19/2022 at x1 Yes Unknown, Entered By History Yes    hydrALAZINE (APRESOLINE) 50 MG tablet Take 25 mg by mouth 3 times daily 12/19/2022 at x1 Yes Unknown, Entered By History     ibuprofen (ADVIL/MOTRIN) 800 MG tablet Take 800 mg by mouth every 8 hours as needed for moderate pain (4-6)  Yes Unknown, Entered By History No    LISINOPRIL PO Take 40 mg by mouth At Bedtime 12/18/2022 Yes Reported, Patient No    METFORMIN HCL PO Take 500 mg by mouth daily (with breakfast) 12/19/2022 at am Yes Reported, Patient Yes    multivitamin w/minerals (MULTI-VITAMIN) tablet Take 1 tablet by mouth At Bedtime 12/18/2022 Yes Unknown, Entered By History     potassium chloride ER (K-TAB/KLOR-CON) 10 MEQ CR tablet Take 10 mEq by mouth 2 times daily 12/19/2022 at x1 Yes Unknown, Entered By History     Pramipexole Dihydrochloride (MIRAPEX PO) Take 3 mg by mouth At Bedtime 12/18/2022 Yes Unknown, Entered By History     semaglutide (OZEMPIC, 1 MG/DOSE,) 2 MG/1.5ML pen Inject 1 mg Subcutaneous every 7 days On saturday Past Week Yes Unknown, Entered By History     sildenafil (REVATIO) 20 MG tablet Take 20 mg by mouth as needed  Yes Unknown, Entered By History Yes    silver  sulfADIAZINE (SILVADENE) 1 % external cream Apply topically daily as needed  Yes Unknown, Entered By History     spironolactone (ALDACTONE) 25 MG tablet Take 25 mg by mouth daily 12/19/2022 at am Yes Unknown, Entered By History Yes    tadalafil (CIALIS) 5 MG tablet Take 5 mg by mouth every morning 12/19/2022 Yes Unknown, Entered By History Yes    VITAMIN D, CHOLECALCIFEROL, PO Take 4,000 Units by mouth daily 12/19/2022 at am Yes Unknown, Entered By History

## 2022-12-19 NOTE — ED TRIAGE NOTES
Patient states he fell and hit head at 3 am. Syncope after blood pressure dropping 87 systolic. Patient states he does not fully remember the fall and had LOC. Laceration op of head. Patient had another eisode of almost passing out yesterday afternoon.   ABC intact alert and no distress.     Triage Assessment     Row Name 12/19/22 1235       Triage Assessment (Adult)    Airway WDL WDL       Respiratory WDL    Respiratory WDL WDL       Cardiac WDL    Cardiac WDL WDL       Peripheral/Neurovascular WDL    Peripheral Neurovascular WDL WDL       Cognitive/Neuro/Behavioral WDL    Cognitive/Neuro/Behavioral WDL WDL

## 2022-12-19 NOTE — CONSULTS
"    [facility]    Stroke Telephone Note    I was called by Pat Flanagan on 12/19/22 regarding patient Federico Chamberlain. The patient is a 72 year old male hx of TIA migraine with aura. Yesterday afternoon 2 pm he was sitting and he felt \"adrenaline rush\" and could not move any extremities for 10 minutes. 3 AM 12/19/22 he was working at computer and had similar episode was able to stand up and walk to bathroom and had syncope and late on vomited.     Imaging Findings   Pending    Impression  Syncope   Transient neurologic deficits    Recommendations   MRI brain with and without contrast  MRA head and neck with and without contrast  Call once MRI is completed. Based on description symptoms appear less likely to be vascular in nature. However the possibility of stroke can not be rule out completely. If there is no acute stroke on MRI then further evaluation of event should be considered.     6:33 PM  MRI negative for acute stroke  MRA showed R A3 stenosis on comparison with CTA in 2018 this appears better ( factoring in difference in imaging modalities).  Continue home aspirin  Given lack of focal findings on description less likely to be TIA  Further work up per primary team      My recommendations are based on the information provided over the phone by Federico Chamberlain's in-person providers. They are not intended to replace the clinical judgment of his in-person providers. I was not requested to personally see or examine the patient at this time.    John Michel MD  Vascular Neurology  To page me or covering stroke neurology team member, click here: AMCOM   Choose \"On Call\" tab at top, then search dropdown box for \"Neurology Adult\", select location, press Enter, then look for stroke/neuro ICU/telestroke.           "

## 2022-12-19 NOTE — ED PROVIDER NOTES
"  History     Chief Complaint:  Head Injury and Syncope       HPI   Federico Chamberlain is a 72 year old male, with history of TIA (7/8/18)(atorvastain), migraine with aura, hypertension (amlodipine, lisinopril), DM II, prostate cancer, aortic dilitation ( who presents with episodes of altered mental status and syncope.  Patient states that 2 PM yesterday he was sitting on some steps when he felt \"a weird feeling that I think was my heart\" and states that he was unable to move for approximately 10 minutes.  He denies syncope, palpitations, headache, vision changes at that time, but states he felt as if he had a \"huge adrenaline rush\".  After 10 minutes this episode passed and he was able to get up and perform his normal activities.  He was doing work at 3 AM last night when he had a similar feeling of \"adrenaline rush\".  He took his blood pressure which was 87/50.  He then got up to go to the bathroom and had an episode of syncope.  He does not know how long this episode lasted but woke up on the floor and crawled to the bathroom.  He then had an episode of vomiting.  He noted blood on his forehead and has a laceration to his forehead.  He went to sleep after that and when he woke this morning he denies focal numbness or weakness, vision changes, speech changes, chest pain, shortness of breath, and pain other than over his head where he struck his head.  He drove himself to the emergency department.    8/13/20 Echocardiogram  CONCLUSIONS:  Global and regional left ventricular function is normal.   Left ventricular ejection fraction is visually estimated at 55%.   Moderate dilation of the aorta is present involving the ascending   aorta. (Maximal dimension 4.8 cm).   Mild mitral regurgitation.      ROS:  Review of Systems   Constitutional: Negative for chills, diaphoresis, fatigue and fever.   HENT: Negative for congestion, sore throat, tinnitus, trouble swallowing and voice change.    Eyes: Negative for photophobia and " visual disturbance.   Respiratory: Positive for chest tightness. Negative for cough, shortness of breath, wheezing and stridor.    Cardiovascular: Positive for palpitations. Negative for chest pain.   Gastrointestinal: Positive for nausea and vomiting. Negative for abdominal pain.   Musculoskeletal: Positive for back pain (chronic). Negative for arthralgias, neck pain and neck stiffness.   Skin: Positive for wound.   Neurological: Positive for syncope and light-headedness. Negative for dizziness, tremors, seizures, facial asymmetry, speech difficulty, weakness, numbness and headaches.   Psychiatric/Behavioral: Negative for confusion.   All other systems reviewed and are negative.    Allergies:  No Known Allergies     Medications:    amLODIPine (NORVASC) 10 MG tablet  ASPIRIN PO  ATORVASTATIN CALCIUM PO  carvedilol (COREG) 6.25 MG tablet  diclofenac (VOLTAREN) 1 % GEL topical gel  gabapentin (NEURONTIN) 300 MG capsule  Lidocaine (LIDOCARE) 4 % Patch  LISINOPRIL PO  MELATONIN PO  METFORMIN HCL PO  potassium chloride (KLOR-CON) 10 MEQ tablet  Pramipexole Dihydrochloride (MIRAPEX PO)  ValACYclovir HCl (VALTREX PO)  VITAMIN D, CHOLECALCIFEROL, PO        Past Medical History:    Past Medical History:   Diagnosis Date     Hypercholesterolemia      Hypertension        Past Surgical History:    Past Surgical History:   Procedure Laterality Date     APPENDECTOMY       BACK SURGERY       ORTHOPEDIC SURGERY          Family History:    family history is not on file.    Social History:   reports that he has never smoked. He has never used smokeless tobacco. He reports that he does not drink alcohol and does not use drugs.  PCP: Luis Alberto Aponte     Physical Exam     Patient Vitals for the past 24 hrs:   BP Temp Temp src Pulse Resp SpO2   12/19/22 1234 122/73 97  F (36.1  C) Temporal 67 20 100 %        Physical Exam  Nursing notes reviewed. Vitals reviewed.  General: Alert. Well kept.  Eyes:  Conjunctiva non-injected,  non-icteric.  Neck/Throat: Moist mucous membranes. Normal voice.  Cardiac: Regular rhythm. Normal heart sounds.  Pulmonary: Clear and equal breath sounds bilaterally.   Abdomen: soft and non-tender.  Musculoskeletal: Normal gross range of motion of all 4 extremities.   Skin: Warm and dry.  3 cm laceration at the midline scalp without gaping but with tissue loss.+  Psych: Affect normal. Good eye contact.  Neurological:  Mental: alert and oriented x 4, follows commands, no aphasia or dysarthria.   Cranial Nerves:  CN II: visual acuity - able to accurately count fingers with each eye. Visual fields intact  CN III, IV, VI: EOM intact, pupils equal and reactive  CN V: facial sensation nl  CN VII: face symmetric, no facial droop  CN VIII: hearing normal  CN IX: palate elevation symmetric, uvula at midline  CN XI SCM normal, shoulder shrug nl  CN XII: tongue midline  Motor: Strength: 5/5 in all major groups of all extremities. Normal tone. No abnormal movements. No pronator drift b/l..  Coordination: FNF and heel-shin tests intact b/l.   Gait:  Normal.  +  Emergency Department Course   EC: 49: 31  Sinus rhythm with first-degree AV block  Right bundle branch block  Left anterior fascicular block  ** Bifascicular Block**  Minimal voltage criteria for LVH, may be normal variant  Septal infarct, age indeterminate  Ventricular rate 60  FL interval 232  QRS duration 190  QT/QTc 516/516  PRT axes 98 -56 -30  No significant change compared to 2020 read by  Dr. Tiera Leyva [id number].    Imaging:  MRA Neck (Carotids) wo & w Contrast   Final Result   IMPRESSION:   HEAD MRI:    1.  No acute intracranial finding.      HEAD MRA:    1.  Mild to moderate stenosis of the A3 segment of the right SHERIDAN.   2.  No large vessel occlusion or aneurysm.      NECK MRA:   1.  No carotid or vertebral artery hemodynamically significant stenosis, dissection, or pseudoaneurysm.      MRA Brain ([place]) wo Contrast   Final Result    IMPRESSION:   HEAD MRI:    1.  No acute intracranial finding.      HEAD MRA:    1.  Mild to moderate stenosis of the A3 segment of the right SHERIDAN.   2.  No large vessel occlusion or aneurysm.      NECK MRA:   1.  No carotid or vertebral artery hemodynamically significant stenosis, dissection, or pseudoaneurysm.      MR Brain w/o & w Contrast   Final Result   IMPRESSION:   HEAD MRI:    1.  No acute intracranial finding.      HEAD MRA:    1.  Mild to moderate stenosis of the A3 segment of the right SHERIDAN.   2.  No large vessel occlusion or aneurysm.      NECK MRA:   1.  No carotid or vertebral artery hemodynamically significant stenosis, dissection, or pseudoaneurysm.       Report per radiology    Laboratory:  Labs Ordered and Resulted from Time of ED Arrival to Time of ED Departure   TROPONIN T, HIGH SENSITIVITY - Abnormal       Result Value    Troponin T, High Sensitivity 29 (*)    COMPREHENSIVE METABOLIC PANEL - Abnormal    Sodium 141      Potassium 3.6      Chloride 103      Carbon Dioxide (CO2) 25      Anion Gap 13      Urea Nitrogen 28.7 (*)     Creatinine 0.85      Calcium 9.1      Glucose 137 (*)     Alkaline Phosphatase 80      AST 24      ALT 29      Protein Total 6.3 (*)     Albumin 4.1      Bilirubin Total 0.7      GFR Estimate >90     TROPONIN T, HIGH SENSITIVITY - Abnormal    Troponin T, High Sensitivity 24 (*)    CK TOTAL - Normal         CBC WITH PLATELETS AND DIFFERENTIAL    WBC Count 7.7      RBC Count 5.09      Hemoglobin 14.6      Hematocrit 45.9      MCV 90      MCH 28.7      MCHC 31.8      RDW 14.3      Platelet Count 206      % Neutrophils 66      % Lymphocytes 22      % Monocytes 7      % Eosinophils 4      % Basophils 1      % Immature Granulocytes 0      NRBCs per 100 WBC 0      Absolute Neutrophils 5.1      Absolute Lymphocytes 1.7      Absolute Monocytes 0.5      Absolute Eosinophils 0.3      Absolute Basophils 0.0      Absolute Immature Granulocytes 0.0      Absolute NRBCs 0.0           Emergency Department Course:  Reviewed:  I reviewed nursing notes, vitals, past medical history and Care Everywhere    Assessments:  1254 I obtained history and examined the patient as noted above.   1345 I rechecked the patient and explained findings.     Consults:   1330 I spoke with John Michel, stroke neurology, who recommended MRI  1829 I spoke with John Michel, stroke neurology, who reviewed imaging results and notes findings are not likely the cause of his symptoms and no intervention needed from stroke neurology at this time.  1900 I spoke with Dr. Ramirez, hospitalist.    Interventions:  Medications   0.9% sodium chloride BOLUS (1,000 mLs Intravenous New Bag 12/19/22 1936)   0.9% sodium chloride BOLUS (0 mLs Intravenous Stopped 12/19/22 1400)   gadobutrol (GADAVIST) injection 10 mL (10 mLs Intravenous Given 12/19/22 1705)   aspirin (ASA) tablet 325 mg (325 mg Oral Given 12/19/22 1920)      Disposition:  The patient was admitted to the hospital under the care of Dr. Ramirez.     Impression & Plan    Medical Decision Making:  Federico Chamberlain is a 72 year old male, with history of TIA (7/8/18)(atorvastain), migraine with aura, hypertension (amlodipine, lisinopril), DM II, prostate cancer, aortic dilitation ( who presents with episodes of altered mental status and syncope.  The patient's neurologic exam is without acute deficit on initial examination.  I did speak with stroke neurology who recommended MRI/MRA which was completed and showed acute intracranial finding, but did show mild to moderate stenosis of the A3 segment of the right ICA without occlusion.  I did discuss these findings with stroke neurology who noted this is unlikely to be cause of his symptoms and no intervention needed at this time as patient is on aspirin.  EKG shows sinus rhythm with a first-degree AV block, right bundle branch block, left anterior fascicular block.  There is no significant change from prior.  His troponin does  return for mild elevation pain.  Lab work is otherwise unremarkable.  Patient has not had echocardiogram since 2020 and has never had episode of syncope.  With concern for his seated syncope, I spoke with Dr. Ramirez, hospitalist, who accepts for observation admission.  The patient is in agreement with this plan.  His laceration was cleaned and dressed.  I am unable to close the scalp laceration due to tissue loss and will need to heal by secondary intention.  This was discussed with the patient and he is in agreement with this plan.    Diagnosis:    ICD-10-CM    1. Syncope  R55       2. Generalized muscle weakness  M62.81     episodic resolved      3. Elevated troponin  R77.8       4. Scalp laceration, initial encounter  S01.01XA            12/19/2022   Pat Flanagan, Pat Genao, CNP  12/19/22 1955       Pat Flanagan CNP  12/19/22 2004

## 2022-12-20 ENCOUNTER — APPOINTMENT (OUTPATIENT)
Dept: CARDIOLOGY | Facility: CLINIC | Age: 72
End: 2022-12-20
Attending: INTERNAL MEDICINE
Payer: COMMERCIAL

## 2022-12-20 ENCOUNTER — APPOINTMENT (OUTPATIENT)
Dept: CARDIOLOGY | Facility: CLINIC | Age: 72
End: 2022-12-20
Attending: PHYSICIAN ASSISTANT
Payer: COMMERCIAL

## 2022-12-20 VITALS
DIASTOLIC BLOOD PRESSURE: 84 MMHG | WEIGHT: 315 LBS | HEART RATE: 63 BPM | SYSTOLIC BLOOD PRESSURE: 158 MMHG | BODY MASS INDEX: 37.19 KG/M2 | OXYGEN SATURATION: 95 % | TEMPERATURE: 98.2 F | HEIGHT: 77 IN | RESPIRATION RATE: 18 BRPM

## 2022-12-20 LAB
ATRIAL RATE - MUSE: 60 BPM
CORTIS SERPL-MCNC: 4 UG/DL
DIASTOLIC BLOOD PRESSURE - MUSE: NORMAL MMHG
INTERPRETATION ECG - MUSE: NORMAL
LVEF ECHO: NORMAL
P AXIS - MUSE: 98 DEGREES
PR INTERVAL - MUSE: 232 MS
QRS DURATION - MUSE: 190 MS
QT - MUSE: 516 MS
QTC - MUSE: 516 MS
R AXIS - MUSE: -56 DEGREES
SYSTOLIC BLOOD PRESSURE - MUSE: NORMAL MMHG
T AXIS - MUSE: -30 DEGREES
TSH SERPL DL<=0.005 MIU/L-ACNC: 1.54 UIU/ML (ref 0.3–4.2)
VENTRICULAR RATE- MUSE: 60 BPM

## 2022-12-20 PROCEDURE — 36415 COLL VENOUS BLD VENIPUNCTURE: CPT | Performed by: INTERNAL MEDICINE

## 2022-12-20 PROCEDURE — 250N000013 HC RX MED GY IP 250 OP 250 PS 637: Performed by: PHYSICIAN ASSISTANT

## 2022-12-20 PROCEDURE — 250N000013 HC RX MED GY IP 250 OP 250 PS 637: Performed by: INTERNAL MEDICINE

## 2022-12-20 PROCEDURE — 84443 ASSAY THYROID STIM HORMONE: CPT | Performed by: INTERNAL MEDICINE

## 2022-12-20 PROCEDURE — 82533 TOTAL CORTISOL: CPT | Performed by: INTERNAL MEDICINE

## 2022-12-20 PROCEDURE — 99217 PR OBSERVATION CARE DISCHARGE: CPT | Performed by: PHYSICIAN ASSISTANT

## 2022-12-20 PROCEDURE — G0378 HOSPITAL OBSERVATION PER HR: HCPCS

## 2022-12-20 PROCEDURE — 93270 REMOTE 30 DAY ECG REV/REPORT: CPT

## 2022-12-20 PROCEDURE — 999N000208 ECHOCARDIOGRAM COMPLETE

## 2022-12-20 PROCEDURE — 93306 TTE W/DOPPLER COMPLETE: CPT | Mod: 26 | Performed by: INTERNAL MEDICINE

## 2022-12-20 PROCEDURE — 255N000002 HC RX 255 OP 636: Performed by: INTERNAL MEDICINE

## 2022-12-20 PROCEDURE — 93272 ECG/REVIEW INTERPRET ONLY: CPT | Performed by: PHYSICIAN ASSISTANT

## 2022-12-20 RX ORDER — CYCLOBENZAPRINE HCL 5 MG
5 TABLET ORAL EVERY MORNING
Status: DISCONTINUED | OUTPATIENT
Start: 2022-12-20 | End: 2022-12-20 | Stop reason: HOSPADM

## 2022-12-20 RX ORDER — CARVEDILOL 25 MG/1
25 TABLET ORAL 2 TIMES DAILY WITH MEALS
Qty: 60 TABLET | Refills: 0 | Status: ON HOLD | OUTPATIENT
Start: 2022-12-20 | End: 2023-01-06

## 2022-12-20 RX ORDER — CHLORTHALIDONE 25 MG/1
25 TABLET ORAL DAILY
Status: DISCONTINUED | OUTPATIENT
Start: 2022-12-20 | End: 2022-12-20 | Stop reason: HOSPADM

## 2022-12-20 RX ORDER — CHLORTHALIDONE 25 MG/1
50 TABLET ORAL DAILY
Qty: 30 TABLET | Refills: 0 | Status: ON HOLD | OUTPATIENT
Start: 2022-12-20 | End: 2023-01-06

## 2022-12-20 RX ORDER — AMLODIPINE BESYLATE 5 MG/1
5 TABLET ORAL AT BEDTIME
Status: DISCONTINUED | OUTPATIENT
Start: 2022-12-20 | End: 2022-12-20 | Stop reason: HOSPADM

## 2022-12-20 RX ADMIN — METFORMIN HYDROCHLORIDE 500 MG: 500 TABLET ORAL at 08:42

## 2022-12-20 RX ADMIN — CHLORTHALIDONE 25 MG: 25 TABLET ORAL at 09:41

## 2022-12-20 RX ADMIN — HUMAN ALBUMIN MICROSPHERES AND PERFLUTREN 3 ML: 10; .22 INJECTION, SOLUTION INTRAVENOUS at 10:01

## 2022-12-20 RX ADMIN — ASPIRIN 81 MG: 81 TABLET, COATED ORAL at 08:42

## 2022-12-20 RX ADMIN — SENNOSIDES AND DOCUSATE SODIUM 2 TABLET: 50; 8.6 TABLET ORAL at 08:42

## 2022-12-20 RX ADMIN — GABAPENTIN 800 MG: 400 CAPSULE ORAL at 08:42

## 2022-12-20 RX ADMIN — CARVEDILOL 12.5 MG: 12.5 TABLET, FILM COATED ORAL at 08:42

## 2022-12-20 RX ADMIN — CYCLOBENZAPRINE HYDROCHLORIDE 5 MG: 5 TABLET, FILM COATED ORAL at 08:41

## 2022-12-20 ASSESSMENT — ACTIVITIES OF DAILY LIVING (ADL)
ADLS_ACUITY_SCORE: 20

## 2022-12-20 NOTE — PLAN OF CARE
"PRIMARY DIAGNOSIS: \"SYNCOPE/EVLEVATED TROPONIN\"   OUTPATIENT/OBSERVATION GOALS TO BE MET BEFORE DISCHARGE:  1. ADLs back to baseline: No    2. Activity and level of assistance: Up with standby assistance.    3. Pain status: Pain free.    4. Return to near baseline physical activity: Yes     Discharge Planner Nurse   Safe discharge environment identified: Yes  Barriers to discharge: No       Entered by: Fatou Vargas RN 12/20/2022 10:31 AM   Pt alert and oriented X 4, denies SOB, abdominal sounds present X 4. VSS on RA. Pt ambulate to the bathroom. PIV SL. Laceration on top of scalp and R upper arm. Skin open to air. Pt ate 100 % breakfast. ECHO completed this morning. Pt denies pain.   BP (!) 158/84 (BP Location: Left arm)   Pulse 63   Temp 98.2  F (36.8  C) (Oral)   Resp 18   Ht 1.956 m (6' 5\")   Wt 143.3 kg (316 lb)   SpO2 95%   BMI 37.47 kg/m      Please review provider order for any additional goals.   Nurse to notify provider when observation goals have been met and patient is ready for discharge.    "

## 2022-12-20 NOTE — PROVIDER NOTIFICATION
10:09 PM  Provider;x  Message:Pt is asking for bedtime hydralazine and KLOR, states he takes it @ home, thanks.

## 2022-12-20 NOTE — PLAN OF CARE
PRIMARY DIAGNOSIS: SYNCOPE/ ELEVATED TROPONIN/ SCALP LACERATION  OUTPATIENT/OBSERVATION GOALS TO BE MET BEFORE DISCHARGE:  1. Ruled out acute coronary syndrome (negative or stable Troponin):   Troponin 29, 24,26  2. Pain Status: Pain free.  3. Appropriate provocative testing performed: Yes  - Stress Test Procedure:   - Interpretation of cardiac rhythm per telemetry tech: SR 70s    4. Cleared by Consultants (if applicable):Yes  5. Return to near baseline physical activity: Yes  Discharge Planner Nurse   Safe discharge environment identified: Yes  Barriers to discharge: No       Entered by: Rafiq Pickett RN 12/20/2022 12:52 AM     Please review provider order for any additional goals.   Nurse to notify provider when observation goals have been met and patient is ready for discharge.  Goal Outcome Evaluation:

## 2022-12-20 NOTE — DISCHARGE SUMMARY
[facility]  Hospitalist Discharge Summary      Date of Admission:  12/19/2022  Date of Discharge:  12/20/2022  Discharging Provider: Vannesa Copeland PA-C  Discharge Service: Hospitalist Service    Discharge Diagnoses   Syncope  Hypotension  Falls     Follow-ups Needed After Discharge   Follow-up Appointments     Follow-up and recommended labs and tests       Follow up with primary care provider, Luis Alberto Aponte, within 7 days for   hospital follow- up.  The following labs/tests are recommended: BMP in   7-14 days.    Stop Amlodipine and hydralazine  Recommend maximizing dosing of current regimen before adding additional   agents.  Continue Lisinopril and spironolactone.  Increase chlorthalidone to 50 mg daily, recheck BMP in 1-2 weeks  Increase Coreg to 25 mg twice daily  Continue to monitor blood pressure at home             Unresulted Labs Ordered in the Past 30 Days of this Admission     No orders found for last 31 day(s).      These results will be followed up by PCP    Discharge Disposition   Discharged to home  Condition at discharge: Stable      Hospital Course   Federico Chamberlain is a 72 year old male with a past medical history including hypertension, on multiple blood pressure medications; hypercholesterolemia, previous history of speech difficulty with vision disturbance, hypertensive urgency, and diabetes mellitus type 2 on metformin, admitted on 12/19/2022 with syncope and fall.  The patient stated around 2:00 pm on 12/18, he was sitting on a step when he had a weird feeling and his heart was pounding.  He was unable to move for approximately 10 minutes.  At that time he did not fall down. He did not have any headache or vision change. The episode passed after 10 minutes and he was able to get up and perform his normal activities.  Around 3:00 a.m. 12/19, he had similar feeling and he took his blood pressure, which was 87/50.  He then got up to go to the bathroom and had an  episode of syncope and fell down.  He does not remember what happened or how long it lasted lasted, but he woke up on the floor and crawled to his bedroom. Patient lives alone and it was unwitnessed. He also had one episode of vomiting.  He then noted  blood on his forehead and a laceration of his forehead. Patient did not call for help or EMS, but went back to sleep. He woke up in the morning without any significant weakness or vision change or speech difficulty.  He also denied any chest pain, no palpitation.    He then drove himself to the Emergency Room.  In the ED, VSS. His blood pressure was soft but normal. Labs were unremarkable other than mildly elevated troponin. MRI and MRA of the brain was fairly unremarkable, mild to mod stenosis noted at the A3 segment of the R SHERIDAN. He was admitted to [facility] for further syncope work up.   Tele was unremarkable. Echocardiogram obtained and showed grade I diastolic dysfunction with EF 50-55%. Suspect his syncopal episode was due to hypotension. He is on multiple antihypertensives, some not at max dosing. It is recommended that he stop Amlodipine (pt does not like this medication due to fluid retention) and stop hydralazine. Will continue Lisionpril and spironolactone at previous dosing. Will increase Chlorthalidone to 50 mg and increase Coreg to 25 mg BID. He will follow up with PCP in 1-2 weeks and repeat BMP in 1-2 weeks as well. Continue potassium supplement. Continue monitoring BP at home. If blood pressures remain elevated, titrate current meds to max dose before adding additional agents. PCP to also look into Cialis and Revatio dosing as this may contribute to syncope.       Consultations This Hospital Stay   None    Code Status   Full Code    Time Spent on this Encounter   I, Vannesa Copeland PA-C, personally saw the patient today and spent greater than 30 minutes discharging this patient.       Vannesa Copeland PA-C  [facility] OBSERVATION  DEPT  [address]  [facility] [address]  Phone: [phone]  ______________________________________________________________________    Physical Exam   Vital Signs: Temp: 98.2  F (36.8  C) Temp src: Oral BP: (!) 158/84 Pulse: 63   Resp: 18 SpO2: 95 % O2 Device: None (Room air)    Weight: 316 lbs 0 oz    GENERAL:  Comfortable.  PSYCH: pleasant, oriented, No acute distress.  HEART:  Normal S1, S2 with no murmur, no pericardial rub, gallops or S3 or S4.  LUNGS:  Clear to auscultation, normal Respiratory effort. No wheezing, rales or ronchi.  EXTREMITIES:  Able to ambulate independently.  SKIN:  Dry to touch, No rash, wound or ulcerations.  NEUROLOGIC:  Grossly intact         Primary Care Physician   Luis Alberto Aponte    Discharge Orders      Reason for your hospital stay    Syncope likely due to hypotension. Echocardiogram obtained and unchanged from previous. Your home antihypertensive regimen adjusted on discharge and blood pressure was improved.     Follow-up and recommended labs and tests     Follow up with primary care provider, Luis Alberto Aponte, within 7 days for hospital follow- up.  The following labs/tests are recommended: BMP in 7-14 days.    Stop Amlodipine and hydralazine  Recommend maximizing dosing of current regimen before adding additional agents.  Continue Lisinopril and spironolactone.  Increase chlorthalidone to 50 mg daily, recheck BMP in 1-2 weeks  Increase Coreg to 25 mg twice daily  Continue to monitor blood pressure at home     Activity    Your activity upon discharge: activity as tolerated     When to contact your care team    Call your primary doctor if you have any of the following: temperature greater than 101, shortness of breath, chest pain, or recurrent syncope.     Diet    Follow this diet upon discharge: Regular       Significant Results and Procedures   Most Recent 3 CBC's:Recent Labs   Lab Test 12/19/22  1314 12/03/21  0447 08/04/20  2030   WBC 7.7 7.3 8.1   HGB 14.6 15.6  14.6   MCV 90 87 89    217 234     Most Recent 3 BMP's:Recent Labs   Lab Test 12/19/22  1314 12/03/21  0447 08/04/20  2030    140 143   POTASSIUM 3.6 3.2* 3.2*   CHLORIDE 103 107 108   CO2 25 26 28   BUN 28.7* 18 18   CR 0.85 0.76 0.92   ANIONGAP 13 7 7   LAURA 9.1 8.5 8.6   * 100* 106*     Most Recent 2 LFT's:Recent Labs   Lab Test 12/19/22  1314 12/03/21  0447   AST 24 14   ALT 29 25   ALKPHOS 80 80   BILITOTAL 0.7 1.2     Most Recent 3 Troponin's:Recent Labs   Lab Test 08/04/20  2030 07/08/18  1356 07/05/18  1351 07/03/18  0555 07/01/18  2127   TROPI <0.015 <0.015 0.021   < >  --    TROPONIN  --   --   --   --  0.03    < > = values in this interval not displayed.     7-Day Micro Results     No results found for the last 168 hours.        Most Recent TSH and T4:Recent Labs   Lab Test 12/20/22  0627 07/09/18  0728   TSH 1.54 5.40*   T4  --  1.29     Most Recent Urinalysis:Recent Labs   Lab Test 07/01/18  2228   COLOR Straw   APPEARANCE Clear   URINEGLC Negative   URINEBILI Negative   URINEKETONE Negative   SG 1.011   UBLD Negative   URINEPH 8.0*   PROTEIN Negative   NITRITE Negative   LEUKEST Negative   RBCU 1   WBCU <1     Most Recent CPK:Recent Labs   Lab Test 12/19/22  1314      ,   Results for orders placed or performed during the hospital encounter of 12/19/22   MR Brain w/o & w Contrast    Narrative    EXAM: MR BRAIN W/O and W CONTRAST, MRA NECK (CAROTIDS) W/O and W CONTRAST, MRA BRAIN ([place]) W/O CONTRAST  LOCATION: [facility]  DATE/TIME: 12/19/2022 5:06 PM    INDICATION: Transient ischemic attack.  COMPARISON: MRI brain dated 07/08/2018.  CONTRAST: 10mL Gadavist.   TECHNIQUE:   1) Routine multiplanar multisequence head MRI without and with intravenous contrast.  2) 3D time-of-flight head MRA without intravenous contrast.  3) Neck MRA without and with IV contrast. Stenosis measurements made according to NASCET criteria unless otherwise  specified.    FINDINGS:  HEAD MRI:  INTRACRANIAL CONTENTS: No acute or subacute infarct. No intracranial mass, acute hemorrhage, or extra-axial fluid collections. Several nonspecific T2/FLAIR hyperintensities within the cerebral white matter, most consistent with mild chronic microvascular   ischemic change. Normal ventricles and sulci. Normal position of the cerebellar tonsils. No pathologic intracranial contrast enhancement.    SELLA: No abnormality accounting for technique.    OSSEOUS STRUCTURES/SOFT TISSUES: Normal marrow signal. Partially imaged region of T2 hyperintensity relative to CSF without suppression on FLAIR imaging within the right anterolateral aspect of the spinal canal at C1 level with corresponding curvilinear   enhancement on postcontrast images (series 5 image 1 and series 8 image 1). This is indeterminate although favored to represent a prominent epidural venous structure or, less likely, CSF flow artifact. The major intracranial vascular flow voids are   maintained. Prominent left superficial middle cerebral vein and vein of Sonia.    ORBITS: No abnormality accounting for technique.     SINUSES/MASTOIDS: Left sphenoid, bilateral maxillary, and bilateral ethmoid sinus mucosal thickening. No mastoid effusion.     HEAD MRA:   ANTERIOR CIRCULATION: Mild to moderate stenosis involving the A3 segment of the right anterior cerebral artery, which remains patent. No high-grade stenosis within the bilateral intracranial internal carotid and middle cerebral arteries. No arterial   occlusion, saccular aneurysm, or high flow vascular malformation. Fetal origin of the right posterior cerebral artery from the anterior circulation.    POSTERIOR CIRCULATION: No high-grade stenosis, occlusion, aneurysm, or high flow vascular malformation. Balanced vertebral arteries supply a normal basilar artery.     NECK MRA:   RIGHT CAROTID: No hemodynamically significant stenosis or dissection.    LEFT CAROTID: No  hemodynamically significant stenosis or dissection.    VERTEBRAL ARTERIES: No focal high-grade stenosis. Within the limitation of motion artifact, no evidence of vertebral artery dissection. Dominant right and smaller left vertebral arteries.    AORTIC ARCH: Classic aortic arch anatomy with no significant stenosis at the origin of the great vessels.      Impression    IMPRESSION:  HEAD MRI:   1.  No acute intracranial finding.    HEAD MRA:   1.  Mild to moderate stenosis of the A3 segment of the right SHERIDAN.  2.  No large vessel occlusion or aneurysm.    NECK MRA:  1.  No carotid or vertebral artery hemodynamically significant stenosis, dissection, or pseudoaneurysm.   MRA Brain ([place]) wo Contrast    Narrative    EXAM: MR BRAIN W/O and W CONTRAST, MRA NECK (CAROTIDS) W/O and W CONTRAST, MRA BRAIN ([place]) W/O CONTRAST  LOCATION: [facility]  DATE/TIME: 12/19/2022 5:06 PM    INDICATION: Transient ischemic attack.  COMPARISON: MRI brain dated 07/08/2018.  CONTRAST: 10mL Gadavist.   TECHNIQUE:   1) Routine multiplanar multisequence head MRI without and with intravenous contrast.  2) 3D time-of-flight head MRA without intravenous contrast.  3) Neck MRA without and with IV contrast. Stenosis measurements made according to NASCET criteria unless otherwise specified.    FINDINGS:  HEAD MRI:  INTRACRANIAL CONTENTS: No acute or subacute infarct. No intracranial mass, acute hemorrhage, or extra-axial fluid collections. Several nonspecific T2/FLAIR hyperintensities within the cerebral white matter, most consistent with mild chronic microvascular   ischemic change. Normal ventricles and sulci. Normal position of the cerebellar tonsils. No pathologic intracranial contrast enhancement.    SELLA: No abnormality accounting for technique.    OSSEOUS STRUCTURES/SOFT TISSUES: Normal marrow signal. Partially imaged region of T2 hyperintensity relative to CSF without suppression on FLAIR imaging  within the right anterolateral aspect of the spinal canal at C1 level with corresponding curvilinear   enhancement on postcontrast images (series 5 image 1 and series 8 image 1). This is indeterminate although favored to represent a prominent epidural venous structure or, less likely, CSF flow artifact. The major intracranial vascular flow voids are   maintained. Prominent left superficial middle cerebral vein and vein of Sonia.    ORBITS: No abnormality accounting for technique.     SINUSES/MASTOIDS: Left sphenoid, bilateral maxillary, and bilateral ethmoid sinus mucosal thickening. No mastoid effusion.     HEAD MRA:   ANTERIOR CIRCULATION: Mild to moderate stenosis involving the A3 segment of the right anterior cerebral artery, which remains patent. No high-grade stenosis within the bilateral intracranial internal carotid and middle cerebral arteries. No arterial   occlusion, saccular aneurysm, or high flow vascular malformation. Fetal origin of the right posterior cerebral artery from the anterior circulation.    POSTERIOR CIRCULATION: No high-grade stenosis, occlusion, aneurysm, or high flow vascular malformation. Balanced vertebral arteries supply a normal basilar artery.     NECK MRA:   RIGHT CAROTID: No hemodynamically significant stenosis or dissection.    LEFT CAROTID: No hemodynamically significant stenosis or dissection.    VERTEBRAL ARTERIES: No focal high-grade stenosis. Within the limitation of motion artifact, no evidence of vertebral artery dissection. Dominant right and smaller left vertebral arteries.    AORTIC ARCH: Classic aortic arch anatomy with no significant stenosis at the origin of the great vessels.      Impression    IMPRESSION:  HEAD MRI:   1.  No acute intracranial finding.    HEAD MRA:   1.  Mild to moderate stenosis of the A3 segment of the right SHERIDAN.  2.  No large vessel occlusion or aneurysm.    NECK MRA:  1.  No carotid or vertebral artery hemodynamically significant stenosis,  dissection, or pseudoaneurysm.   MRA Neck (Carotids) wo & w Contrast    Narrative    EXAM: MR BRAIN W/O and W CONTRAST, MRA NECK (CAROTIDS) W/O and W CONTRAST, MRA BRAIN ([place]) W/O CONTRAST  LOCATION: [facility]  DATE/TIME: 12/19/2022 5:06 PM    INDICATION: Transient ischemic attack.  COMPARISON: MRI brain dated 07/08/2018.  CONTRAST: 10mL Gadavist.   TECHNIQUE:   1) Routine multiplanar multisequence head MRI without and with intravenous contrast.  2) 3D time-of-flight head MRA without intravenous contrast.  3) Neck MRA without and with IV contrast. Stenosis measurements made according to NASCET criteria unless otherwise specified.    FINDINGS:  HEAD MRI:  INTRACRANIAL CONTENTS: No acute or subacute infarct. No intracranial mass, acute hemorrhage, or extra-axial fluid collections. Several nonspecific T2/FLAIR hyperintensities within the cerebral white matter, most consistent with mild chronic microvascular   ischemic change. Normal ventricles and sulci. Normal position of the cerebellar tonsils. No pathologic intracranial contrast enhancement.    SELLA: No abnormality accounting for technique.    OSSEOUS STRUCTURES/SOFT TISSUES: Normal marrow signal. Partially imaged region of T2 hyperintensity relative to CSF without suppression on FLAIR imaging within the right anterolateral aspect of the spinal canal at C1 level with corresponding curvilinear   enhancement on postcontrast images (series 5 image 1 and series 8 image 1). This is indeterminate although favored to represent a prominent epidural venous structure or, less likely, CSF flow artifact. The major intracranial vascular flow voids are   maintained. Prominent left superficial middle cerebral vein and vein of Sonia.    ORBITS: No abnormality accounting for technique.     SINUSES/MASTOIDS: Left sphenoid, bilateral maxillary, and bilateral ethmoid sinus mucosal thickening. No mastoid effusion.     HEAD MRA:   ANTERIOR  CIRCULATION: Mild to moderate stenosis involving the A3 segment of the right anterior cerebral artery, which remains patent. No high-grade stenosis within the bilateral intracranial internal carotid and middle cerebral arteries. No arterial   occlusion, saccular aneurysm, or high flow vascular malformation. Fetal origin of the right posterior cerebral artery from the anterior circulation.    POSTERIOR CIRCULATION: No high-grade stenosis, occlusion, aneurysm, or high flow vascular malformation. Balanced vertebral arteries supply a normal basilar artery.     NECK MRA:   RIGHT CAROTID: No hemodynamically significant stenosis or dissection.    LEFT CAROTID: No hemodynamically significant stenosis or dissection.    VERTEBRAL ARTERIES: No focal high-grade stenosis. Within the limitation of motion artifact, no evidence of vertebral artery dissection. Dominant right and smaller left vertebral arteries.    AORTIC ARCH: Classic aortic arch anatomy with no significant stenosis at the origin of the great vessels.      Impression    IMPRESSION:  HEAD MRI:   1.  No acute intracranial finding.    HEAD MRA:   1.  Mild to moderate stenosis of the A3 segment of the right SHERIDAN.  2.  No large vessel occlusion or aneurysm.    NECK MRA:  1.  No carotid or vertebral artery hemodynamically significant stenosis, dissection, or pseudoaneurysm.   Echocardiogram Complete     Value    LVEF  50-55%    [facility]    [id number]  [facility]  [id number]  [id number]^WANDER^ARMANDO^JACE     [facility]  Echocardiography Laboratory  [address] [place] [address]     Name: AMADEO HAYNES  MRN: [id number]  : 1950  Study Date: 2022 10:26 AM  Age: 72 yrs  Gender: Male  Patient Location: [facility]  Reason For Study: Syncope  Ordering Physician: ARMANDO VIRAMONTES  Performed By: Julianne Stanley     BSA: 2.7 m2  Height: 77 in  Weight: 319  lb  ______________________________________________________________________________  Procedure  Complete Portable Echo Adult. Optison (NDC #3656-4273) given intravenously.  ______________________________________________________________________________  Interpretation Summary     The visual ejection fraction is 50-55%.  There are regional wall motion abnormalities as specified.  Mild aortic root dilatation.  The ascending aorta is Moderately dilated.  The study was technically difficult.  ______________________________________________________________________________  Left Ventricle  The left ventricle is normal in size. There is normal left ventricular wall  thickness. Diastolic Doppler findings (E/E' ratio and/or other parameters)  suggest left ventricular filling pressures are indeterminate. Grade I or early  diastolic dysfunction. The visual ejection fraction is 50-55%. Septal motion  is consistent with conduction abnormality. The mid anterolateral and mid  inferolateral walls appear modeartely hypokinetic. There are regional wall  motion abnormalities as specified.     Right Ventricle  The right ventricle is normal in size and function.     Atria  Normal left atrial size. Right atrial size is normal. There is no color  Doppler evidence of an atrial shunt.     Mitral Valve  There is mild (1+) mitral regurgitation.     Tricuspid Valve  There is trace tricuspid regurgitation. Right ventricular systolic pressure  could not be approximated due to inadequate tricuspid regurgitation.     Aortic Valve  The aortic valve is trileaflet. There is moderate trileaflet aortic sclerosis.  No aortic regurgitation is present. No hemodynamically significant valvular  aortic stenosis.     Pulmonic Valve  There is no pulmonic valvular regurgitation. Normal pulmonic valve velocity.     Vessels  Mild aortic root dilatation. The ascending aorta is Moderately dilated. (4.7  cm). IVC diameter and respiratory changes fall into an  intermediate range  suggesting an RA pressure of 8 mmHg.     Pericardium  There is no pericardial effusion.     Rhythm  Sinus rhythm was noted.  ______________________________________________________________________________  MMode/2D Measurements & Calculations  IVSd: 1.1 cm  LVIDd: 5.3 cm  LVIDs: 3.8 cm  LVPWd: 0.95 cm  FS: 28.7 %  LV mass(C)d: 215.0 grams  LV mass(C)dI: 78.8 grams/m2  Ao root diam: 4.1 cm  LVOT diam: 2.6 cm  LVOT area: 5.3 cm2  LA Volume (BP): 60.5 ml  LA Volume Index (BP): 22.2 ml/m2     RWT: 0.36     Doppler Measurements & Calculations  MV E max kvng: 60.8 cm/sec  MV A max kvng: 76.3 cm/sec  MV E/A: 0.80  MV dec slope: 226.0 cm/sec2  MV dec time: 0.27 sec  PA acc time: 0.15 sec  E/E' av.7  Lateral E/e': 9.1  Medial E/e': 10.3     ______________________________________________________________________________  Report approved by: Roland Puckett 2022 10:29 AM               Discharge Medications   Current Discharge Medication List      CONTINUE these medications which have CHANGED    Details   carvedilol (COREG) 25 MG tablet Take 1 tablet (25 mg) by mouth 2 times daily (with meals) for 30 days  Qty: 60 tablet, Refills: 0    Associated Diagnoses: Hypertensive urgency, malignant      chlorthalidone (HYGROTON) 25 MG tablet Take 2 tablets (50 mg) by mouth daily  Qty: 30 tablet, Refills: 0    Associated Diagnoses: Benign essential hypertension         CONTINUE these medications which have NOT CHANGED    Details   aspirin 81 MG EC tablet Take 81 mg by mouth daily      ATORVASTATIN CALCIUM PO Take 40 mg by mouth At Bedtime      !! cyclobenzaprine (FLEXERIL) 5 MG tablet Take 5 mg by mouth every morning      !! cyclobenzaprine (FLEXERIL) 5 MG tablet Take 5 mg by mouth 3 times daily as needed for muscle spasms      diclofenac (VOLTAREN) 1 % GEL topical gel Place 2 g onto the skin 3 times daily  Qty: 1 Tube, Refills: 1    Associated Diagnoses: Left upper quadrant pain      ferrous sulfate  (FEROSUL) 325 (65 Fe) MG tablet Take 325 mg by mouth daily (with breakfast)      gabapentin (NEURONTIN) 800 MG tablet Take 800 mg by mouth 3 times daily      ibuprofen (ADVIL/MOTRIN) 800 MG tablet Take 800 mg by mouth every 8 hours as needed for moderate pain (4-6)      LISINOPRIL PO Take 40 mg by mouth At Bedtime      METFORMIN HCL PO Take 500 mg by mouth daily (with breakfast)      multivitamin w/minerals (MULTI-VITAMIN) tablet Take 1 tablet by mouth At Bedtime      potassium chloride ER (K-TAB/KLOR-CON) 10 MEQ CR tablet Take 10 mEq by mouth 2 times daily      Pramipexole Dihydrochloride (MIRAPEX PO) Take 3 mg by mouth At Bedtime      semaglutide (OZEMPIC, 1 MG/DOSE,) 2 MG/1.5ML pen Inject 1 mg Subcutaneous every 7 days On saturday      sildenafil (REVATIO) 20 MG tablet Take 20 mg by mouth as needed      silver sulfADIAZINE (SILVADENE) 1 % external cream Apply topically daily as needed      spironolactone (ALDACTONE) 25 MG tablet Take 25 mg by mouth daily      tadalafil (CIALIS) 5 MG tablet Take 5 mg by mouth every morning      VITAMIN D, CHOLECALCIFEROL, PO Take 4,000 Units by mouth daily       !! - Potential duplicate medications found. Please discuss with provider.      STOP taking these medications       amLODIPine (NORVASC) 10 MG tablet Comments:   Reason for Stopping:         hydrALAZINE (APRESOLINE) 50 MG tablet Comments:   Reason for Stopping:             Allergies   No Known Allergies

## 2022-12-20 NOTE — ED NOTES
[facility]  ED Nurse Handoff Report    Federico Chamberlain is a 72 year old male   ED Chief complaint: Head Injury and Syncope  . ED Diagnosis:   Final diagnoses:   Syncope   Generalized muscle weakness - episodic resolved   Elevated troponin     Allergies: No Known Allergies    Code Status: Full Code  Activity level - Baseline/Home:  Independent. Activity Level - Current:   Stand by Assist. Lift room needed: No. Bariatric: No   Needed: No   Isolation: No. Infection: Not Applicable.     Vital Signs:   Vitals:    22 1524 22 1715 22 1730 22 1745   BP:  (!) 140/78 (!) 142/81 135/76   Pulse: 57 62 68 66   Resp: 16 12 17 14   Temp:       TempSrc:       SpO2: 96% 99% 97% 97%   Weight:       Height:           Cardiac Rhythm:  ,      Pain level:    Patient confused: No. Patient Falls Risk: Yes.   Elimination Status: Has voided   Patient Report - Initial Complaint: syncope/fall with head lac. Focused Assessment: Sensory Assessment Sensation - All Extremities: no impairment   [place] Coma Scale (greater than 18 mos) Eye Openin-->(E4) spontaneous  Best Motor Response: 6-->(M6) obeys commands  Best Verbal Response: 5-->(V5) oriented, appropriate  [place] Coma Scale Score: 15   Cognitive Follows Commands: yes   Cognitive Communication / Language: appropriate   Neuro Cognitive (Adult) Cognitive/Neuro/Behavioral WDL: WDL  Level of Consciousness: alert  Arousal Level: arouses to voice; opens eyes spontaneously  Speech: clear; spontaneous; logical  Mood/Behavior: calm; cooperative; behavior appropriate to situation  Additional Documentation: Dizziness Assessment (Group); Cranial Nerve Assessment (Group); [place] Coma Scale (Group); Hand /Ankle Strength (Group); Headache Assessment (Group); Last Known Well Date/Time (Rows)   [place] Coma Scale Best Eye Response: 4-->(E4) spontaneous  Best Motor Response: 6-->(M6) obeys commands  Best Verbal Response: 5-->(V5) oriented  Negrito Coma  Scale Score: 15   Hand /Ankle Strength Hand , Left: strong  Hand , Right: strong  Dorsiflexion, Left: strong  Dorsiflexion, Right: strong  Plantarflexion, Left: strong  Plantarflexion, Right: strong   Headache Assessment Headache Location: scalp; frontal  Description/Character: other (see comments)  (pt fell last night and hit his head on a door jam (per pt statement), pain is at this site)  Associated Signs/Symptoms: other (see comments)  (no nausea now but pt states that he threw up X1 last night after fall)    Tests Performed: labs, MRI Abnormal Results:   Labs Ordered and Resulted from Time of ED Arrival to Time of ED Departure   TROPONIN T, HIGH SENSITIVITY - Abnormal       Result Value    Troponin T, High Sensitivity 29 (*)    COMPREHENSIVE METABOLIC PANEL - Abnormal    Sodium 141      Potassium 3.6      Chloride 103      Carbon Dioxide (CO2) 25      Anion Gap 13      Urea Nitrogen 28.7 (*)     Creatinine 0.85      Calcium 9.1      Glucose 137 (*)     Alkaline Phosphatase 80      AST 24      ALT 29      Protein Total 6.3 (*)     Albumin 4.1      Bilirubin Total 0.7      GFR Estimate >90     TROPONIN T, HIGH SENSITIVITY - Abnormal    Troponin T, High Sensitivity 24 (*)    CK TOTAL - Normal         CBC WITH PLATELETS AND DIFFERENTIAL    WBC Count 7.7      RBC Count 5.09      Hemoglobin 14.6      Hematocrit 45.9      MCV 90      MCH 28.7      MCHC 31.8      RDW 14.3      Platelet Count 206      % Neutrophils 66      % Lymphocytes 22      % Monocytes 7      % Eosinophils 4      % Basophils 1      % Immature Granulocytes 0      NRBCs per 100 WBC 0      Absolute Neutrophils 5.1      Absolute Lymphocytes 1.7      Absolute Monocytes 0.5      Absolute Eosinophils 0.3      Absolute Basophils 0.0      Absolute Immature Granulocytes 0.0      Absolute NRBCs 0.0       MRA Neck (Carotids) wo & w Contrast   Final Result   IMPRESSION:   HEAD MRI:    1.  No acute intracranial finding.      HEAD MRA:    1.   Mild to moderate stenosis of the A3 segment of the right SHERIDAN.   2.  No large vessel occlusion or aneurysm.      NECK MRA:   1.  No carotid or vertebral artery hemodynamically significant stenosis, dissection, or pseudoaneurysm.      MRA Brain ([place]) wo Contrast   Final Result   IMPRESSION:   HEAD MRI:    1.  No acute intracranial finding.      HEAD MRA:    1.  Mild to moderate stenosis of the A3 segment of the right SHERIDAN.   2.  No large vessel occlusion or aneurysm.      NECK MRA:   1.  No carotid or vertebral artery hemodynamically significant stenosis, dissection, or pseudoaneurysm.      MR Brain w/o & w Contrast   Final Result   IMPRESSION:   HEAD MRI:    1.  No acute intracranial finding.      HEAD MRA:    1.  Mild to moderate stenosis of the A3 segment of the right SHERIDAN.   2.  No large vessel occlusion or aneurysm.      NECK MRA:   1.  No carotid or vertebral artery hemodynamically significant stenosis, dissection, or pseudoaneurysm.         Treatments provided: NS bolus  Family Comments: no family at the bedside  OBS brochure/video discussed/provided to patient:  Yes  ED Medications:   Medications   0.9% sodium chloride BOLUS (1,000 mLs Intravenous New Bag 12/19/22 1322)   gadobutrol (GADAVIST) injection 10 mL (10 mLs Intravenous Given 12/19/22 1705)     Drips infusing:  No  For the majority of the shift, the patient's behavior Green. Interventions performed were n/a.    Sepsis treatment initiated: No     Patient tested for COVID 19 prior to admission: NO    ED Nurse Name/Phone Number: Ketty Estevez RN,   6:10 PM   RECEIVING UNIT ED HANDOFF REVIEW    Above ED Nurse Handoff Report was reviewed: Yes  Reviewed by: Rafiq Pickett RN on December 19, 2022 at 8:51 PM

## 2022-12-20 NOTE — H&P
Admitted: 12/19/2022    CHIEF COMPLAINT:  Syncope and head injury.    HISTORY OF PRESENT ILLNESS:  Federico Chamberlain is a 72-year-old gentleman with a past medical history including hypertension, on multiple blood pressure medications; hypercholesterolemia, previous history of speech difficulty with vision disturbance, hypertensive urgency, diabetes mellitus type 2, on metformin; herpes zoster, among others, who came in today with a complaint of a head injury after a syncopal episode.  The patient stated yesterday, at around 2:00 p.m., he was sitting on a step when he had a weird feeling and his heart was pounding.  He was unable to move for approximately 10 minutes.  At that time he did not fall down.  He did not have any headache or vision change. The episode passed after 10 minutes and he was able to get up and perform his normal activities.  Early this morning at 3:00 a.m. he had similar feeling and he took his blood pressure, which was 87/50.  He then got up to go to the bathroom and had an episode of syncope and fell down.  He does not remember what happened or how long it lasted lasted, but he woke up on the floor and crawled to his bedroom. Patient lives alone and it was unwitnessed. He also ahd one episode of vomiting.  He then noted  blood on his forehead and a laceration of his forehead. Patient did not call for help or EMS, but went back to sleep. The morning he woke up without any significant weakness or vision change or speech difficulty.  He also denied any chest pain, no palpitation.    He then drove himself to the Emergency Room.  Of note, the patient did have a suspected TIA in 2020.  At that time he had echocardiogram, which showed EF of 55% with moderate dilatation of the aorta.  Today also he had work up for TIA, and Stroke Neurology was consulted, and had MRI and MRA of the brain.   . When I examined the patient, he was doing well.  Blood pressure was 150/80.  He denied any new symptoms.    PAST  MEDICAL HISTORY:  As in electronic medical record.  Past Medical History:   Diagnosis Date     Hypercholesterolemia      Hypertension      Acute kidney injury    Herpes zoster without complication    Hypertensive urgency, malignant    TIA (transient ischemic attack.    PAST SURGICAL HISTORY:    Past Surgical History:   Procedure Laterality Date     APPENDECTOMY       BACK SURGERY       ORTHOPEDIC SURGERY         FAMILY HISTORY:  Reviewed and noncontributory.    SOCIAL HISTORY:  The patient lives alone.  He never smoked.  He does not drink alcohol.  He does not use illicit drugs.    REVIEW OF SYSTEMS:  Ten points reviewed, all are negative except those mentioned in history of present illness.    HOME MEDICATIONS:  Reviewed, reconciled as in electronic medical record.  Prior to Admission Medications   Prescriptions Last Dose Informant Patient Reported? Taking?   ATORVASTATIN CALCIUM PO 12/18/2022  Yes Yes   Sig: Take 40 mg by mouth At Bedtime   LISINOPRIL PO 12/18/2022 Self Yes Yes   Sig: Take 40 mg by mouth At Bedtime   METFORMIN HCL PO 12/19/2022 at am  Yes Yes   Sig: Take 500 mg by mouth daily (with breakfast)   Pramipexole Dihydrochloride (MIRAPEX PO) 12/18/2022  Yes Yes   Sig: Take 3 mg by mouth At Bedtime   VITAMIN D, CHOLECALCIFEROL, PO 12/19/2022 at am  Yes Yes   Sig: Take 4,000 Units by mouth daily   amLODIPine (NORVASC) 10 MG tablet 12/18/2022  No Yes   Sig: Take 1 tablet (10 mg) by mouth daily   Patient taking differently: Take 5 mg by mouth At Bedtime   aspirin 81 MG EC tablet 12/19/2022 at am  Yes Yes   Sig: Take 81 mg by mouth daily   carvedilol (COREG) 6.25 MG tablet 12/19/2022 at x1  No Yes   Sig: Take 1 tablet (6.25 mg) by mouth 2 times daily (with meals)   Patient taking differently: Take 12.5 mg by mouth 2 times daily (with meals)   chlorthalidone (HYGROTON) 25 MG tablet 12/19/2022 at am  Yes Yes   Sig: Take 25 mg by mouth daily   cyclobenzaprine (FLEXERIL) 5 MG tablet 12/19/2022  Yes Yes   Sig:  Take 5 mg by mouth every morning   cyclobenzaprine (FLEXERIL) 5 MG tablet   Yes Yes   Sig: Take 5 mg by mouth 3 times daily as needed for muscle spasms   diclofenac (VOLTAREN) 1 % GEL topical gel   No Yes   Sig: Place 2 g onto the skin 3 times daily   Patient taking differently: Place 2 g onto the skin 3 times daily as needed   ferrous sulfate (FEROSUL) 325 (65 Fe) MG tablet 12/19/2022  Yes Yes   Sig: Take 325 mg by mouth daily (with breakfast)   gabapentin (NEURONTIN) 800 MG tablet 12/19/2022 at x1  Yes Yes   Sig: Take 800 mg by mouth 3 times daily   hydrALAZINE (APRESOLINE) 50 MG tablet 12/19/2022 at x1  Yes Yes   Sig: Take 25 mg by mouth 3 times daily   ibuprofen (ADVIL/MOTRIN) 800 MG tablet   Yes Yes   Sig: Take 800 mg by mouth every 8 hours as needed for moderate pain (4-6)   multivitamin w/minerals (MULTI-VITAMIN) tablet 12/18/2022  Yes Yes   Sig: Take 1 tablet by mouth At Bedtime   potassium chloride ER (K-TAB/KLOR-CON) 10 MEQ CR tablet 12/19/2022 at x1  Yes Yes   Sig: Take 10 mEq by mouth 2 times daily   semaglutide (OZEMPIC, 1 MG/DOSE,) 2 MG/1.5ML pen Past Week  Yes Yes   Sig: Inject 1 mg Subcutaneous every 7 days On saturday   sildenafil (REVATIO) 20 MG tablet   Yes Yes   Sig: Take 20 mg by mouth as needed   silver sulfADIAZINE (SILVADENE) 1 % external cream   Yes Yes   Sig: Apply topically daily as needed   spironolactone (ALDACTONE) 25 MG tablet 12/19/2022 at am  Yes Yes   Sig: Take 25 mg by mouth daily   tadalafil (CIALIS) 5 MG tablet 12/19/2022  Yes Yes   Sig: Take 5 mg by mouth every morning      Facility-Administered Medications: None       PHYSICAL EXAMINATION:    GENERAL:  The patient is awake, alert, oriented, comfortable, not in distress.  VITAL SIGNS:  Blood pressure 150/70, pulse rate 60, temperature 97, oxygen saturation 97% on room air.  HEENT:  [place], nonicteric.  Midline lacerated wound on his frontal area, midline scalp, without gap but with tissue loss.  Moist oral mucosa.  NECK:  Supple.   No JVD, no thyromegaly.  CHEST:  Good air entry bilaterally.  No wheezing, crackles or rales.  CARDIOVASCULAR SYSTEM:  S1 and S2 well heard.  No gallop or murmur.  ABDOMEN:  Soft, nontender, and nondistended.  EXTREMITIES:  No edema, cyanosis or clubbing.  NEUROLOGIC:  No focal neurologic deficit.  Cranial nerves grossly intact.    DIAGNOSTIC TESTS OF INTEREST:  EKG showed sinus rhythm at 60 beats per minute.  QTc is 516.     MR of the neck showed no acute intracranial finding, mild to moderate stenosis of S3 segment of the right ACL.  No large vessel occlusion.  No carotid or vertebral artery hemodynamically significant stenosis.      MRI of the brain:  No acute intracranial finding.    LABORATORIES:  Troponin 29.  Sodium 141, potassium 3.6.  Repeat troponin is 24.  Otherwise, electrolytes are normal.  BUN is slightly elevated at 28.  Glucose 137.  WBC 7.7, hemoglobin 14.6, platelets 206.    ASSESSMENT AND PLAN:  Federico Chamberlain is a 72-year-old gentleman with a significant past medical history including hypertension, migraine, dyslipidemia, diabetes mellitus type 2, on metformin; previous episode of speech difficulty and visual disturbance in 2020.  At that time workup was negative.  He presented to the Emergency Room after a syncopal episode last night with a laceration of his forehead.    1.  Syncopal episode with head trauma/laceration. This is unwitnessed. He stated his blood pressure was low last night prior to the episode. Noted he is on multiple blood pressure medications and I suspect it is contributing to his transient low blood pressure with orthostatic drop causing syncope. He is on amlodipine, chlorthalidone, hydralazine, spironolactone, and Coreg.  The patient is also on other medications including gabapentin, Flexeril and Mirapex.  MRI and MRA of the head and neck did not show any acute abnormality.  Neuro Stroke evaluated the patient. They recommend to do more work related to syncope as this is not  TIA.  We will continue to monitor on telemetry, check orthostatic blood pressure. It is elevated at this time.  Resume some of his medications including Coreg and lisinopril. Consider discharging him on event monitoring for a month to check for any arrhythmia.     2.  Hypertension:  The patient is on multiple blood pressure medications including amlodipine, Coreg, lisinopril, chlorthalidone and spironolactone.  It is not clear why patient is on all these different medications at the same time, and most of them are not even at the maximum dose. The combination of the medications could result in orthostatic drop when take together. If BP is not controlled , may consider to look at the underlying etiology. He will be on a 2-gram sodium diet.    3.  Diabetes mellitus type 2:  At baseline the patient is on metformin. Continue to monitor. Glucose is ok, consider restarting Metformin in AM.    4.  Slightly elevated troponin:  On presentation his troponin was 29; repeat is 24.  We will just check the third set. Echo will be done as work up for syncope.     I discussed with the patient the plan of care.  All his questions and concerns addressed    DISPOSITION:  The patient is being admitted under observation.  If echo is normal, orthostatic blood pressure is okay, and his blood pressure controled, he can be discharged in less than 24 hours.      Nilson Barrera MD        D: 2022   T: 2022   MT: [id number]    Name:     AMADEO HAYNES  MRN:      [id number]        Account:     [id number]   :      1950           Admitted:    2022       Document: [id number]    cc:  Luis Alberto Aponte MD

## 2022-12-20 NOTE — PLAN OF CARE
PRIMARY DIAGNOSIS: SYNCOPE/ ELEVATED TROPONIN/ SCALP LACERATION  OUTPATIENT/OBSERVATION GOALS TO BE MET BEFORE DISCHARGE:  1. Ruled out acute coronary syndrome (negative or stable Troponin 29,24,26  2. Pain Status: Pain free.  3. Appropriate provocative testing performed: Yes  - Stress Test Procedure:  - Interpretation of cardiac rhythm per telemetry tech: SR 70    4. Cleared by Consultants (if applicable):Yes  5. Return to near baseline physical activity: Yes  Discharge Planner Nurse   Safe discharge environment identified: Yes  Barriers to discharge: No       Entered by: Rafiq Pickett RN 12/20/2022 1:49 AM   A & O x 4, SBA, SR 70s on telemetry, Troponin 29.24,26, left leg brace d/t foot drop, denies dizziness  & chest pain, Pt on multiple BP meds, Echo to be done for syncope episode.  Please review provider order for any additional goals.   Nurse to notify provider when observation goals have been met and patient is ready for discharge.  Goal Outcome Evaluation:

## 2022-12-20 NOTE — PLAN OF CARE
ROOM # 204-2    Living Situation (if not independent, order SW consult):Home with dog  Facility name:  : son    Activity level at baseline: IND  Activity level on admit: SB A    Who will be transporting you at discharge: drove self    Patient registered to observation; given Patient Bill of Rights; given the opportunity to ask questions about observation status and their plan of care.  Patient has been oriented to the observation room, bathroom and call light is in place.    Discussed discharge goals and expectations with patient/family.         Goal Outcome Evaluation:

## 2022-12-20 NOTE — PLAN OF CARE
Patient's After Visit Summary was reviewed with patient.   Patient verbalized understanding of After Visit Summary, recommended follow up and was given an opportunity to ask questions.   Discharge medications sent home with patient/family: YES, Not applicable   Discharged alone.     Pt verbalized understanding the discharge plan. W/C ride given to ER door. Pt ride home alone.

## 2022-12-31 ENCOUNTER — APPOINTMENT (OUTPATIENT)
Dept: CT IMAGING | Facility: CLINIC | Age: 72
DRG: 871 | End: 2022-12-31
Attending: EMERGENCY MEDICINE
Payer: COMMERCIAL

## 2022-12-31 ENCOUNTER — HOSPITAL ENCOUNTER (INPATIENT)
Facility: CLINIC | Age: 72
LOS: 5 days | Discharge: HOME | DRG: 871 | End: 2023-01-06
Attending: EMERGENCY MEDICINE | Admitting: INTERNAL MEDICINE
Payer: COMMERCIAL

## 2022-12-31 ENCOUNTER — APPOINTMENT (OUTPATIENT)
Dept: GENERAL RADIOLOGY | Facility: CLINIC | Age: 72
DRG: 871 | End: 2022-12-31
Attending: EMERGENCY MEDICINE
Payer: COMMERCIAL

## 2022-12-31 DIAGNOSIS — R93.89 ABNORMAL CT SCAN: ICD-10-CM

## 2022-12-31 DIAGNOSIS — I24.89 DEMAND ISCHEMIA (H): ICD-10-CM

## 2022-12-31 DIAGNOSIS — R65.20 SEVERE SEPSIS (H): ICD-10-CM

## 2022-12-31 DIAGNOSIS — T79.6XXA TRAUMATIC RHABDOMYOLYSIS, INITIAL ENCOUNTER (H): ICD-10-CM

## 2022-12-31 DIAGNOSIS — L08.9 TOE INFECTION: ICD-10-CM

## 2022-12-31 DIAGNOSIS — R79.89 ELEVATED TROPONIN: ICD-10-CM

## 2022-12-31 DIAGNOSIS — R78.81 GRAM-POSITIVE BACTEREMIA: ICD-10-CM

## 2022-12-31 DIAGNOSIS — I10 ESSENTIAL HYPERTENSION: ICD-10-CM

## 2022-12-31 DIAGNOSIS — R55 SYNCOPE, UNSPECIFIED SYNCOPE TYPE: Primary | ICD-10-CM

## 2022-12-31 DIAGNOSIS — A41.9 SEVERE SEPSIS (H): ICD-10-CM

## 2022-12-31 LAB
ABO/RH(D): NORMAL
ALBUMIN SERPL BCG-MCNC: 4 G/DL (ref 3.5–5.2)
ALP SERPL-CCNC: 81 U/L (ref 40–129)
ALT SERPL W P-5'-P-CCNC: 35 U/L (ref 10–50)
ANION GAP SERPL CALCULATED.3IONS-SCNC: 14 MMOL/L (ref 7–15)
ANTIBODY SCREEN: NEGATIVE
APTT PPP: 29 SECONDS (ref 22–38)
AST SERPL W P-5'-P-CCNC: 44 U/L (ref 10–50)
BASOPHILS # BLD AUTO: 0.1 10E3/UL (ref 0–0.2)
BASOPHILS NFR BLD AUTO: 0 %
BILIRUB SERPL-MCNC: 1.1 MG/DL
BUN SERPL-MCNC: 25.7 MG/DL (ref 8–23)
CALCIUM SERPL-MCNC: 9.2 MG/DL (ref 8.8–10.2)
CHLORIDE SERPL-SCNC: 103 MMOL/L (ref 98–107)
CK SERPL-CCNC: 1377 U/L (ref 39–308)
CREAT BLD-MCNC: 1.2 MG/DL (ref 0.7–1.3)
CREAT SERPL-MCNC: 1.14 MG/DL (ref 0.67–1.17)
DEPRECATED HCO3 PLAS-SCNC: 24 MMOL/L (ref 22–29)
EOSINOPHIL # BLD AUTO: 0 10E3/UL (ref 0–0.7)
EOSINOPHIL NFR BLD AUTO: 0 %
ERYTHROCYTE [DISTWIDTH] IN BLOOD BY AUTOMATED COUNT: 13.9 % (ref 10–15)
ETHANOL SERPL-MCNC: <0.01 G/DL
FLUAV RNA SPEC QL NAA+PROBE: NEGATIVE
FLUBV RNA RESP QL NAA+PROBE: NEGATIVE
GFR SERPL CREATININE-BSD FRML MDRD: 69 ML/MIN/1.73M2
GFR SERPL CREATININE-BSD FRML MDRD: >60 ML/MIN/1.73M2
GLUCOSE SERPL-MCNC: 148 MG/DL (ref 70–99)
HCO3 BLDV-SCNC: 24 MMOL/L (ref 21–28)
HCT VFR BLD AUTO: 45.9 % (ref 40–53)
HGB BLD-MCNC: 15.1 G/DL (ref 13.3–17.7)
IMM GRANULOCYTES # BLD: 0.3 10E3/UL
IMM GRANULOCYTES NFR BLD: 1 %
INR PPP: 1.13 (ref 0.85–1.15)
LACTATE BLD-SCNC: 3.4 MMOL/L
LYMPHOCYTES # BLD AUTO: 0.5 10E3/UL (ref 0.8–5.3)
LYMPHOCYTES NFR BLD AUTO: 2 %
MAGNESIUM SERPL-MCNC: 1.7 MG/DL (ref 1.7–2.3)
MCH RBC QN AUTO: 28.6 PG (ref 26.5–33)
MCHC RBC AUTO-ENTMCNC: 32.9 G/DL (ref 31.5–36.5)
MCV RBC AUTO: 87 FL (ref 78–100)
MONOCYTES # BLD AUTO: 1.3 10E3/UL (ref 0–1.3)
MONOCYTES NFR BLD AUTO: 5 %
NEUTROPHILS # BLD AUTO: 23.1 10E3/UL (ref 1.6–8.3)
NEUTROPHILS NFR BLD AUTO: 92 %
NRBC # BLD AUTO: 0 10E3/UL
NRBC BLD AUTO-RTO: 0 /100
PCO2 BLDV: 38 MM HG (ref 40–50)
PH BLDV: 7.41 [PH] (ref 7.32–7.43)
PLATELET # BLD AUTO: 213 10E3/UL (ref 150–450)
PO2 BLDV: 28 MM HG (ref 25–47)
POTASSIUM SERPL-SCNC: 3.4 MMOL/L (ref 3.4–5.3)
PROT SERPL-MCNC: 6.9 G/DL (ref 6.4–8.3)
RBC # BLD AUTO: 5.28 10E6/UL (ref 4.4–5.9)
RSV RNA SPEC NAA+PROBE: NEGATIVE
SAO2 % BLDV: 54 % (ref 94–100)
SARS-COV-2 RNA RESP QL NAA+PROBE: NEGATIVE
SODIUM SERPL-SCNC: 141 MMOL/L (ref 136–145)
SPECIMEN EXPIRATION DATE: NORMAL
TROPONIN T SERPL HS-MCNC: 152 NG/L
WBC # BLD AUTO: 25.2 10E3/UL (ref 4–11)

## 2022-12-31 PROCEDURE — 80053 COMPREHEN METABOLIC PANEL: CPT | Performed by: EMERGENCY MEDICINE

## 2022-12-31 PROCEDURE — 96361 HYDRATE IV INFUSION ADD-ON: CPT

## 2022-12-31 PROCEDURE — 84484 ASSAY OF TROPONIN QUANT: CPT | Performed by: EMERGENCY MEDICINE

## 2022-12-31 PROCEDURE — 250N000011 HC RX IP 250 OP 636: Performed by: EMERGENCY MEDICINE

## 2022-12-31 PROCEDURE — 82077 ASSAY SPEC XCP UR&BREATH IA: CPT | Performed by: EMERGENCY MEDICINE

## 2022-12-31 PROCEDURE — 93005 ELECTROCARDIOGRAM TRACING: CPT

## 2022-12-31 PROCEDURE — 83735 ASSAY OF MAGNESIUM: CPT | Performed by: EMERGENCY MEDICINE

## 2022-12-31 PROCEDURE — 72125 CT NECK SPINE W/O DYE: CPT

## 2022-12-31 PROCEDURE — 99285 EMERGENCY DEPT VISIT HI MDM: CPT | Mod: 25

## 2022-12-31 PROCEDURE — 96374 THER/PROPH/DIAG INJ IV PUSH: CPT | Mod: 59

## 2022-12-31 PROCEDURE — 87149 DNA/RNA DIRECT PROBE: CPT | Performed by: EMERGENCY MEDICINE

## 2022-12-31 PROCEDURE — 70450 CT HEAD/BRAIN W/O DYE: CPT

## 2022-12-31 PROCEDURE — 36415 COLL VENOUS BLD VENIPUNCTURE: CPT | Performed by: EMERGENCY MEDICINE

## 2022-12-31 PROCEDURE — 73630 X-RAY EXAM OF FOOT: CPT | Mod: RT

## 2022-12-31 PROCEDURE — 258N000003 HC RX IP 258 OP 636: Performed by: EMERGENCY MEDICINE

## 2022-12-31 PROCEDURE — 87077 CULTURE AEROBIC IDENTIFY: CPT | Performed by: EMERGENCY MEDICINE

## 2022-12-31 PROCEDURE — 82803 BLOOD GASES ANY COMBINATION: CPT

## 2022-12-31 PROCEDURE — 85730 THROMBOPLASTIN TIME PARTIAL: CPT | Performed by: EMERGENCY MEDICINE

## 2022-12-31 PROCEDURE — C9803 HOPD COVID-19 SPEC COLLECT: HCPCS

## 2022-12-31 PROCEDURE — 96365 THER/PROPH/DIAG IV INF INIT: CPT

## 2022-12-31 PROCEDURE — 73562 X-RAY EXAM OF KNEE 3: CPT | Mod: LT

## 2022-12-31 PROCEDURE — 86901 BLOOD TYPING SEROLOGIC RH(D): CPT | Performed by: EMERGENCY MEDICINE

## 2022-12-31 PROCEDURE — 82550 ASSAY OF CK (CPK): CPT | Performed by: EMERGENCY MEDICINE

## 2022-12-31 PROCEDURE — 96376 TX/PRO/DX INJ SAME DRUG ADON: CPT

## 2022-12-31 PROCEDURE — 87637 SARSCOV2&INF A&B&RSV AMP PRB: CPT | Performed by: EMERGENCY MEDICINE

## 2022-12-31 PROCEDURE — 85025 COMPLETE CBC W/AUTO DIFF WBC: CPT | Performed by: EMERGENCY MEDICINE

## 2022-12-31 PROCEDURE — 83036 HEMOGLOBIN GLYCOSYLATED A1C: CPT | Performed by: INTERNAL MEDICINE

## 2022-12-31 PROCEDURE — 96366 THER/PROPH/DIAG IV INF ADDON: CPT

## 2022-12-31 PROCEDURE — 86850 RBC ANTIBODY SCREEN: CPT | Performed by: EMERGENCY MEDICINE

## 2022-12-31 PROCEDURE — 82565 ASSAY OF CREATININE: CPT

## 2022-12-31 PROCEDURE — 83605 ASSAY OF LACTIC ACID: CPT

## 2022-12-31 PROCEDURE — 85610 PROTHROMBIN TIME: CPT | Performed by: EMERGENCY MEDICINE

## 2022-12-31 RX ORDER — HYDROMORPHONE HYDROCHLORIDE 1 MG/ML
0.3 INJECTION, SOLUTION INTRAMUSCULAR; INTRAVENOUS; SUBCUTANEOUS ONCE
Status: COMPLETED | OUTPATIENT
Start: 2022-12-31 | End: 2022-12-31

## 2022-12-31 RX ORDER — HYDROMORPHONE HYDROCHLORIDE 1 MG/ML
0.5 INJECTION, SOLUTION INTRAMUSCULAR; INTRAVENOUS; SUBCUTANEOUS ONCE
Status: COMPLETED | OUTPATIENT
Start: 2022-12-31 | End: 2022-12-31

## 2022-12-31 RX ADMIN — HYDROMORPHONE HYDROCHLORIDE 0.3 MG: 1 INJECTION, SOLUTION INTRAMUSCULAR; INTRAVENOUS; SUBCUTANEOUS at 23:31

## 2022-12-31 RX ADMIN — SODIUM CHLORIDE 1000 ML: 9 INJECTION, SOLUTION INTRAVENOUS at 23:15

## 2022-12-31 RX ADMIN — SODIUM CHLORIDE 1000 ML: 9 INJECTION, SOLUTION INTRAVENOUS at 23:18

## 2022-12-31 RX ADMIN — HYDROMORPHONE HYDROCHLORIDE 0.5 MG: 1 INJECTION, SOLUTION INTRAMUSCULAR; INTRAVENOUS; SUBCUTANEOUS at 22:04

## 2022-12-31 RX ADMIN — TAZOBACTAM SODIUM AND PIPERACILLIN SODIUM 4.5 G: 500; 4 INJECTION, SOLUTION INTRAVENOUS at 23:15

## 2022-12-31 RX ADMIN — SODIUM CHLORIDE, POTASSIUM CHLORIDE, SODIUM LACTATE AND CALCIUM CHLORIDE 1000 ML: 600; 310; 30; 20 INJECTION, SOLUTION INTRAVENOUS at 22:04

## 2022-12-31 ASSESSMENT — ENCOUNTER SYMPTOMS
MYALGIAS: 1
NAUSEA: 1
ARTHRALGIAS: 1

## 2022-12-31 ASSESSMENT — ACTIVITIES OF DAILY LIVING (ADL): ADLS_ACUITY_SCORE: 37

## 2023-01-01 ENCOUNTER — APPOINTMENT (OUTPATIENT)
Dept: CT IMAGING | Facility: CLINIC | Age: 73
DRG: 871 | End: 2023-01-01
Attending: EMERGENCY MEDICINE
Payer: COMMERCIAL

## 2023-01-01 ENCOUNTER — APPOINTMENT (OUTPATIENT)
Dept: CARDIOLOGY | Facility: CLINIC | Age: 73
DRG: 871 | End: 2023-01-01
Attending: INTERNAL MEDICINE
Payer: COMMERCIAL

## 2023-01-01 PROBLEM — T79.6XXA TRAUMATIC RHABDOMYOLYSIS, INITIAL ENCOUNTER (H): Status: ACTIVE | Noted: 2023-01-01

## 2023-01-01 PROBLEM — R55 SYNCOPE, UNSPECIFIED SYNCOPE TYPE: Status: ACTIVE | Noted: 2023-01-01

## 2023-01-01 PROBLEM — R79.89 ELEVATED TROPONIN: Status: ACTIVE | Noted: 2023-01-01

## 2023-01-01 PROBLEM — L08.9 TOE INFECTION: Status: ACTIVE | Noted: 2023-01-01

## 2023-01-01 LAB
ALBUMIN UR-MCNC: 50 MG/DL
AMPHETAMINES UR QL SCN: NORMAL
ANION GAP SERPL CALCULATED.3IONS-SCNC: 16 MMOL/L (ref 7–15)
APPEARANCE UR: CLEAR
BARBITURATES UR QL SCN: NORMAL
BENZODIAZ UR QL SCN: NORMAL
BILIRUB UR QL STRIP: NEGATIVE
BUN SERPL-MCNC: 26.6 MG/DL (ref 8–23)
BZE UR QL SCN: NORMAL
CALCIUM SERPL-MCNC: 8.3 MG/DL (ref 8.8–10.2)
CANNABINOIDS UR QL SCN: NORMAL
CHLORIDE SERPL-SCNC: 103 MMOL/L (ref 98–107)
CK SERPL-CCNC: 2293 U/L (ref 39–308)
COLOR UR AUTO: YELLOW
CREAT SERPL-MCNC: 1.04 MG/DL (ref 0.67–1.17)
DEPRECATED HCO3 PLAS-SCNC: 22 MMOL/L (ref 22–29)
ENTEROCOCCUS FAECALIS: NOT DETECTED
ENTEROCOCCUS FAECIUM: NOT DETECTED
ERYTHROCYTE [DISTWIDTH] IN BLOOD BY AUTOMATED COUNT: 14.1 % (ref 10–15)
GFR SERPL CREATININE-BSD FRML MDRD: 77 ML/MIN/1.73M2
GLUCOSE BLDC GLUCOMTR-MCNC: 100 MG/DL (ref 70–99)
GLUCOSE BLDC GLUCOMTR-MCNC: 115 MG/DL (ref 70–99)
GLUCOSE BLDC GLUCOMTR-MCNC: 119 MG/DL (ref 70–99)
GLUCOSE BLDC GLUCOMTR-MCNC: 141 MG/DL (ref 70–99)
GLUCOSE BLDC GLUCOMTR-MCNC: 157 MG/DL (ref 70–99)
GLUCOSE SERPL-MCNC: 151 MG/DL (ref 70–99)
GLUCOSE UR STRIP-MCNC: NEGATIVE MG/DL
HBA1C MFR BLD: 6 %
HCO3 BLDV-SCNC: 24 MMOL/L (ref 21–28)
HCT VFR BLD AUTO: 43.3 % (ref 40–53)
HGB BLD-MCNC: 14.1 G/DL (ref 13.3–17.7)
HGB UR QL STRIP: ABNORMAL
KETONES UR STRIP-MCNC: NEGATIVE MG/DL
LACTATE BLD-SCNC: 3.1 MMOL/L
LACTATE SERPL-SCNC: 1.5 MMOL/L (ref 0.7–2)
LACTATE SERPL-SCNC: 2.5 MMOL/L (ref 0.7–2)
LACTATE SERPL-SCNC: 3 MMOL/L (ref 0.7–2)
LACTATE SERPL-SCNC: 3.2 MMOL/L (ref 0.7–2)
LEUKOCYTE ESTERASE UR QL STRIP: NEGATIVE
LISTERIA SPECIES (DETECTED/NOT DETECTED): NOT DETECTED
LVEF ECHO: NORMAL
MCH RBC QN AUTO: 28.6 PG (ref 26.5–33)
MCHC RBC AUTO-ENTMCNC: 32.6 G/DL (ref 31.5–36.5)
MCV RBC AUTO: 88 FL (ref 78–100)
MUCOUS THREADS #/AREA URNS LPF: PRESENT /LPF
NITRATE UR QL: NEGATIVE
OPIATES UR QL SCN: NORMAL
PCO2 BLDV: 37 MM HG (ref 40–50)
PH BLDV: 7.43 [PH] (ref 7.32–7.43)
PH UR STRIP: 5.5 [PH] (ref 5–7)
PLATELET # BLD AUTO: 167 10E3/UL (ref 150–450)
PO2 BLDV: 23 MM HG (ref 25–47)
POTASSIUM SERPL-SCNC: 2.9 MMOL/L (ref 3.4–5.3)
POTASSIUM SERPL-SCNC: 3.1 MMOL/L (ref 3.4–5.3)
PROCALCITONIN SERPL IA-MCNC: 3.81 NG/ML
RBC # BLD AUTO: 4.93 10E6/UL (ref 4.4–5.9)
RBC URINE: 2 /HPF
SAO2 % BLDV: 42 % (ref 94–100)
SODIUM SERPL-SCNC: 141 MMOL/L (ref 136–145)
SP GR UR STRIP: 1.03 (ref 1–1.03)
SQUAMOUS EPITHELIAL: 1 /HPF
STAPHYLOCOCCUS AUREUS: NOT DETECTED
STAPHYLOCOCCUS EPIDERMIDIS: NOT DETECTED
STAPHYLOCOCCUS LUGDUNENSIS: NOT DETECTED
STAPHYLOCOCCUS SPECIES: NOT DETECTED
STREPTOCOCCUS AGALACTIAE: NOT DETECTED
STREPTOCOCCUS ANGINOSUS GROUP: NOT DETECTED
STREPTOCOCCUS PNEUMONIAE: NOT DETECTED
STREPTOCOCCUS PYOGENES: NOT DETECTED
STREPTOCOCCUS SPECIES: DETECTED
TROPONIN T SERPL HS-MCNC: 156 NG/L
TROPONIN T SERPL HS-MCNC: 168 NG/L
UROBILINOGEN UR STRIP-MCNC: NORMAL MG/DL
WBC # BLD AUTO: 20.8 10E3/UL (ref 4–11)
WBC URINE: 3 /HPF

## 2023-01-01 PROCEDURE — 80307 DRUG TEST PRSMV CHEM ANLYZR: CPT | Performed by: EMERGENCY MEDICINE

## 2023-01-01 PROCEDURE — 84484 ASSAY OF TROPONIN QUANT: CPT | Performed by: INTERNAL MEDICINE

## 2023-01-01 PROCEDURE — 250N000011 HC RX IP 250 OP 636: Performed by: INTERNAL MEDICINE

## 2023-01-01 PROCEDURE — 258N000003 HC RX IP 258 OP 636: Performed by: INTERNAL MEDICINE

## 2023-01-01 PROCEDURE — 72131 CT LUMBAR SPINE W/O DYE: CPT

## 2023-01-01 PROCEDURE — 82803 BLOOD GASES ANY COMBINATION: CPT

## 2023-01-01 PROCEDURE — 93325 DOPPLER ECHO COLOR FLOW MAPG: CPT | Mod: 26 | Performed by: INTERNAL MEDICINE

## 2023-01-01 PROCEDURE — 93321 DOPPLER ECHO F-UP/LMTD STD: CPT | Mod: 26 | Performed by: INTERNAL MEDICINE

## 2023-01-01 PROCEDURE — 36415 COLL VENOUS BLD VENIPUNCTURE: CPT | Performed by: INTERNAL MEDICINE

## 2023-01-01 PROCEDURE — 82550 ASSAY OF CK (CPK): CPT | Performed by: EMERGENCY MEDICINE

## 2023-01-01 PROCEDURE — 250N000011 HC RX IP 250 OP 636: Performed by: EMERGENCY MEDICINE

## 2023-01-01 PROCEDURE — 81001 URINALYSIS AUTO W/SCOPE: CPT | Performed by: EMERGENCY MEDICINE

## 2023-01-01 PROCEDURE — 250N000009 HC RX 250: Performed by: EMERGENCY MEDICINE

## 2023-01-01 PROCEDURE — 93325 DOPPLER ECHO COLOR FLOW MAPG: CPT

## 2023-01-01 PROCEDURE — 93308 TTE F-UP OR LMTD: CPT | Mod: 26 | Performed by: INTERNAL MEDICINE

## 2023-01-01 PROCEDURE — 99223 1ST HOSP IP/OBS HIGH 75: CPT | Performed by: INTERNAL MEDICINE

## 2023-01-01 PROCEDURE — 99222 1ST HOSP IP/OBS MODERATE 55: CPT | Performed by: PODIATRIST

## 2023-01-01 PROCEDURE — 83605 ASSAY OF LACTIC ACID: CPT

## 2023-01-01 PROCEDURE — 85027 COMPLETE CBC AUTOMATED: CPT | Performed by: INTERNAL MEDICINE

## 2023-01-01 PROCEDURE — 83605 ASSAY OF LACTIC ACID: CPT | Performed by: INTERNAL MEDICINE

## 2023-01-01 PROCEDURE — 96367 TX/PROPH/DG ADDL SEQ IV INF: CPT

## 2023-01-01 PROCEDURE — 74177 CT ABD & PELVIS W/CONTRAST: CPT

## 2023-01-01 PROCEDURE — 250N000013 HC RX MED GY IP 250 OP 250 PS 637: Performed by: INTERNAL MEDICINE

## 2023-01-01 PROCEDURE — 36415 COLL VENOUS BLD VENIPUNCTURE: CPT | Performed by: EMERGENCY MEDICINE

## 2023-01-01 PROCEDURE — 80048 BASIC METABOLIC PNL TOTAL CA: CPT | Performed by: INTERNAL MEDICINE

## 2023-01-01 PROCEDURE — 84484 ASSAY OF TROPONIN QUANT: CPT | Performed by: EMERGENCY MEDICINE

## 2023-01-01 PROCEDURE — 72128 CT CHEST SPINE W/O DYE: CPT

## 2023-01-01 PROCEDURE — 120N000001 HC R&B MED SURG/OB

## 2023-01-01 PROCEDURE — 255N000002 HC RX 255 OP 636: Performed by: EMERGENCY MEDICINE

## 2023-01-01 PROCEDURE — 258N000003 HC RX IP 258 OP 636: Performed by: EMERGENCY MEDICINE

## 2023-01-01 PROCEDURE — 99207 PR NO BILLABLE SERVICE THIS VISIT: CPT | Performed by: INTERNAL MEDICINE

## 2023-01-01 PROCEDURE — 99223 1ST HOSP IP/OBS HIGH 75: CPT | Mod: AI | Performed by: INTERNAL MEDICINE

## 2023-01-01 PROCEDURE — 84132 ASSAY OF SERUM POTASSIUM: CPT | Performed by: INTERNAL MEDICINE

## 2023-01-01 PROCEDURE — 93321 DOPPLER ECHO F-UP/LMTD STD: CPT

## 2023-01-01 PROCEDURE — 84145 PROCALCITONIN (PCT): CPT | Performed by: INTERNAL MEDICINE

## 2023-01-01 RX ORDER — AMOXICILLIN 250 MG
1 CAPSULE ORAL 2 TIMES DAILY PRN
Status: DISCONTINUED | OUTPATIENT
Start: 2023-01-01 | End: 2023-01-06 | Stop reason: HOSPADM

## 2023-01-01 RX ORDER — POTASSIUM CHLORIDE 1500 MG/1
20 TABLET, EXTENDED RELEASE ORAL ONCE
Status: COMPLETED | OUTPATIENT
Start: 2023-01-01 | End: 2023-01-01

## 2023-01-01 RX ORDER — POTASSIUM CHLORIDE 750 MG/1
20 TABLET, EXTENDED RELEASE ORAL DAILY
COMMUNITY

## 2023-01-01 RX ORDER — CARVEDILOL 12.5 MG/1
12.5 TABLET ORAL 2 TIMES DAILY WITH MEALS
Status: DISCONTINUED | OUTPATIENT
Start: 2023-01-01 | End: 2023-01-02

## 2023-01-01 RX ORDER — LORAZEPAM 2 MG/ML
0.25 INJECTION INTRAMUSCULAR EVERY 4 HOURS PRN
Status: DISCONTINUED | OUTPATIENT
Start: 2023-01-01 | End: 2023-01-06 | Stop reason: HOSPADM

## 2023-01-01 RX ORDER — HYDROMORPHONE HCL IN WATER/PF 6 MG/30 ML
0.4 PATIENT CONTROLLED ANALGESIA SYRINGE INTRAVENOUS
Status: DISCONTINUED | OUTPATIENT
Start: 2023-01-01 | End: 2023-01-02

## 2023-01-01 RX ORDER — LIDOCAINE 4 G/G
1-2 PATCH TOPICAL
Status: DISCONTINUED | OUTPATIENT
Start: 2023-01-01 | End: 2023-01-06 | Stop reason: HOSPADM

## 2023-01-01 RX ORDER — LISINOPRIL 40 MG/1
40 TABLET ORAL DAILY
Status: ON HOLD | COMMUNITY
End: 2023-01-06

## 2023-01-01 RX ORDER — ATORVASTATIN CALCIUM 40 MG/1
40 TABLET, FILM COATED ORAL DAILY
COMMUNITY

## 2023-01-01 RX ORDER — POLYETHYLENE GLYCOL 3350 17 G/17G
17 POWDER, FOR SOLUTION ORAL DAILY PRN
Status: DISCONTINUED | OUTPATIENT
Start: 2023-01-01 | End: 2023-01-06 | Stop reason: HOSPADM

## 2023-01-01 RX ORDER — ACETAMINOPHEN 325 MG/1
650 TABLET ORAL EVERY 6 HOURS PRN
Status: DISCONTINUED | OUTPATIENT
Start: 2023-01-01 | End: 2023-01-01

## 2023-01-01 RX ORDER — POTASSIUM CHLORIDE 1500 MG/1
40 TABLET, EXTENDED RELEASE ORAL ONCE
Status: COMPLETED | OUTPATIENT
Start: 2023-01-01 | End: 2023-01-01

## 2023-01-01 RX ORDER — NALOXONE HYDROCHLORIDE 0.4 MG/ML
0.2 INJECTION, SOLUTION INTRAMUSCULAR; INTRAVENOUS; SUBCUTANEOUS
Status: DISCONTINUED | OUTPATIENT
Start: 2023-01-01 | End: 2023-01-06 | Stop reason: HOSPADM

## 2023-01-01 RX ORDER — NALOXONE HYDROCHLORIDE 0.4 MG/ML
0.4 INJECTION, SOLUTION INTRAMUSCULAR; INTRAVENOUS; SUBCUTANEOUS
Status: DISCONTINUED | OUTPATIENT
Start: 2023-01-01 | End: 2023-01-06 | Stop reason: HOSPADM

## 2023-01-01 RX ORDER — CARVEDILOL 25 MG/1
25 TABLET ORAL 2 TIMES DAILY WITH MEALS
Status: DISCONTINUED | OUTPATIENT
Start: 2023-01-01 | End: 2023-01-01

## 2023-01-01 RX ORDER — CYCLOBENZAPRINE HCL 5 MG
5 TABLET ORAL 3 TIMES DAILY PRN
Status: DISCONTINUED | OUTPATIENT
Start: 2023-01-01 | End: 2023-01-06 | Stop reason: HOSPADM

## 2023-01-01 RX ORDER — OXYCODONE HYDROCHLORIDE 5 MG/1
10 TABLET ORAL EVERY 4 HOURS PRN
Status: DISCONTINUED | OUTPATIENT
Start: 2023-01-01 | End: 2023-01-02

## 2023-01-01 RX ORDER — PRAMIPEXOLE DIHYDROCHLORIDE 1 MG/1
3 TABLET ORAL AT BEDTIME
Status: DISCONTINUED | OUTPATIENT
Start: 2023-01-01 | End: 2023-01-06 | Stop reason: HOSPADM

## 2023-01-01 RX ORDER — GABAPENTIN 400 MG/1
800 CAPSULE ORAL 3 TIMES DAILY
Status: DISCONTINUED | OUTPATIENT
Start: 2023-01-01 | End: 2023-01-06 | Stop reason: HOSPADM

## 2023-01-01 RX ORDER — PROCHLORPERAZINE 25 MG
12.5 SUPPOSITORY, RECTAL RECTAL EVERY 12 HOURS PRN
Status: DISCONTINUED | OUTPATIENT
Start: 2023-01-01 | End: 2023-01-06 | Stop reason: HOSPADM

## 2023-01-01 RX ORDER — SODIUM CHLORIDE, SODIUM LACTATE, POTASSIUM CHLORIDE, CALCIUM CHLORIDE 600; 310; 30; 20 MG/100ML; MG/100ML; MG/100ML; MG/100ML
INJECTION, SOLUTION INTRAVENOUS CONTINUOUS
Status: DISCONTINUED | OUTPATIENT
Start: 2023-01-01 | End: 2023-01-03

## 2023-01-01 RX ORDER — CEPHALEXIN 500 MG/1
500 CAPSULE ORAL 2 TIMES DAILY
Status: ON HOLD | COMMUNITY
Start: 2022-12-30 | End: 2023-01-06

## 2023-01-01 RX ORDER — IOPAMIDOL 755 MG/ML
500 INJECTION, SOLUTION INTRAVASCULAR ONCE
Status: COMPLETED | OUTPATIENT
Start: 2023-01-01 | End: 2023-01-01

## 2023-01-01 RX ORDER — DEXTROSE MONOHYDRATE 25 G/50ML
25-50 INJECTION, SOLUTION INTRAVENOUS
Status: DISCONTINUED | OUTPATIENT
Start: 2023-01-01 | End: 2023-01-06 | Stop reason: HOSPADM

## 2023-01-01 RX ORDER — ASPIRIN 325 MG
325 TABLET ORAL ONCE
Status: COMPLETED | OUTPATIENT
Start: 2023-01-01 | End: 2023-01-01

## 2023-01-01 RX ORDER — ACETAMINOPHEN 325 MG/1
975 TABLET ORAL 3 TIMES DAILY
Status: DISCONTINUED | OUTPATIENT
Start: 2023-01-01 | End: 2023-01-06 | Stop reason: HOSPADM

## 2023-01-01 RX ORDER — AMOXICILLIN 250 MG
2 CAPSULE ORAL 2 TIMES DAILY PRN
Status: DISCONTINUED | OUTPATIENT
Start: 2023-01-01 | End: 2023-01-06 | Stop reason: HOSPADM

## 2023-01-01 RX ORDER — LISINOPRIL 40 MG/1
40 TABLET ORAL AT BEDTIME
Status: DISCONTINUED | OUTPATIENT
Start: 2023-01-01 | End: 2023-01-06 | Stop reason: HOSPADM

## 2023-01-01 RX ORDER — ATORVASTATIN CALCIUM 40 MG/1
40 TABLET, FILM COATED ORAL DAILY
Status: DISCONTINUED | OUTPATIENT
Start: 2023-01-01 | End: 2023-01-06 | Stop reason: HOSPADM

## 2023-01-01 RX ORDER — PROCHLORPERAZINE MALEATE 5 MG
5 TABLET ORAL EVERY 6 HOURS PRN
Status: DISCONTINUED | OUTPATIENT
Start: 2023-01-01 | End: 2023-01-06 | Stop reason: HOSPADM

## 2023-01-01 RX ORDER — ACETAMINOPHEN 650 MG/1
650 SUPPOSITORY RECTAL EVERY 6 HOURS PRN
Status: DISCONTINUED | OUTPATIENT
Start: 2023-01-01 | End: 2023-01-01

## 2023-01-01 RX ORDER — HYDRALAZINE HYDROCHLORIDE 20 MG/ML
10 INJECTION INTRAMUSCULAR; INTRAVENOUS EVERY 4 HOURS PRN
Status: DISCONTINUED | OUTPATIENT
Start: 2023-01-01 | End: 2023-01-06 | Stop reason: HOSPADM

## 2023-01-01 RX ORDER — OXYCODONE HYDROCHLORIDE 5 MG/1
5 TABLET ORAL EVERY 4 HOURS PRN
Status: DISCONTINUED | OUTPATIENT
Start: 2023-01-01 | End: 2023-01-02

## 2023-01-01 RX ORDER — ASPIRIN 81 MG/1
81 TABLET ORAL DAILY
Status: DISCONTINUED | OUTPATIENT
Start: 2023-01-02 | End: 2023-01-06 | Stop reason: HOSPADM

## 2023-01-01 RX ORDER — NICOTINE POLACRILEX 4 MG
15-30 LOZENGE BUCCAL
Status: DISCONTINUED | OUTPATIENT
Start: 2023-01-01 | End: 2023-01-06 | Stop reason: HOSPADM

## 2023-01-01 RX ORDER — LIDOCAINE 40 MG/G
CREAM TOPICAL
Status: DISCONTINUED | OUTPATIENT
Start: 2023-01-01 | End: 2023-01-06 | Stop reason: HOSPADM

## 2023-01-01 RX ORDER — CEFAZOLIN SODIUM 1 G/50ML
25 SOLUTION INTRAVENOUS ONCE
Status: COMPLETED | OUTPATIENT
Start: 2023-01-01 | End: 2023-01-01

## 2023-01-01 RX ADMIN — POTASSIUM CHLORIDE 20 MEQ: 1500 TABLET, EXTENDED RELEASE ORAL at 10:28

## 2023-01-01 RX ADMIN — HYDROMORPHONE HYDROCHLORIDE 0.4 MG: 0.2 INJECTION, SOLUTION INTRAMUSCULAR; INTRAVENOUS; SUBCUTANEOUS at 04:05

## 2023-01-01 RX ADMIN — ACETAMINOPHEN 650 MG: 325 TABLET, FILM COATED ORAL at 04:05

## 2023-01-01 RX ADMIN — OXYCODONE HYDROCHLORIDE 10 MG: 5 TABLET ORAL at 16:28

## 2023-01-01 RX ADMIN — ACETAMINOPHEN 975 MG: 325 TABLET, FILM COATED ORAL at 21:13

## 2023-01-01 RX ADMIN — TAZOBACTAM SODIUM AND PIPERACILLIN SODIUM 3.38 G: 375; 3 INJECTION, SOLUTION INTRAVENOUS at 06:12

## 2023-01-01 RX ADMIN — SODIUM CHLORIDE, POTASSIUM CHLORIDE, SODIUM LACTATE AND CALCIUM CHLORIDE: 600; 310; 30; 20 INJECTION, SOLUTION INTRAVENOUS at 11:51

## 2023-01-01 RX ADMIN — TAZOBACTAM SODIUM AND PIPERACILLIN SODIUM 3.38 G: 375; 3 INJECTION, SOLUTION INTRAVENOUS at 11:49

## 2023-01-01 RX ADMIN — HYDROMORPHONE HYDROCHLORIDE 0.4 MG: 0.2 INJECTION, SOLUTION INTRAMUSCULAR; INTRAVENOUS; SUBCUTANEOUS at 11:45

## 2023-01-01 RX ADMIN — ACETAMINOPHEN 975 MG: 325 TABLET, FILM COATED ORAL at 13:51

## 2023-01-01 RX ADMIN — PRAMIPEXOLE DIHYDROCHLORIDE 3 MG: 1 TABLET ORAL at 21:19

## 2023-01-01 RX ADMIN — OXYCODONE HYDROCHLORIDE 10 MG: 5 TABLET ORAL at 22:18

## 2023-01-01 RX ADMIN — VANCOMYCIN HYDROCHLORIDE 2500 MG: 10 INJECTION, POWDER, LYOPHILIZED, FOR SOLUTION INTRAVENOUS at 01:18

## 2023-01-01 RX ADMIN — GABAPENTIN 800 MG: 400 CAPSULE ORAL at 08:12

## 2023-01-01 RX ADMIN — GABAPENTIN 800 MG: 400 CAPSULE ORAL at 21:14

## 2023-01-01 RX ADMIN — TAZOBACTAM SODIUM AND PIPERACILLIN SODIUM 3.38 G: 375; 3 INJECTION, SOLUTION INTRAVENOUS at 18:41

## 2023-01-01 RX ADMIN — POTASSIUM CHLORIDE 40 MEQ: 1500 TABLET, EXTENDED RELEASE ORAL at 09:09

## 2023-01-01 RX ADMIN — ASPIRIN 325 MG ORAL TABLET 325 MG: 325 PILL ORAL at 03:23

## 2023-01-01 RX ADMIN — LIDOCAINE 2 PATCH: 560 PATCH PERCUTANEOUS; TOPICAL; TRANSDERMAL at 10:29

## 2023-01-01 RX ADMIN — HUMAN ALBUMIN MICROSPHERES AND PERFLUTREN 2 ML: 10; .22 INJECTION, SOLUTION INTRAVENOUS at 07:23

## 2023-01-01 RX ADMIN — SODIUM CHLORIDE 90 ML: 9 INJECTION, SOLUTION INTRAVENOUS at 00:51

## 2023-01-01 RX ADMIN — CARVEDILOL 12.5 MG: 12.5 TABLET, FILM COATED ORAL at 18:51

## 2023-01-01 RX ADMIN — OXYCODONE HYDROCHLORIDE 5 MG: 5 TABLET ORAL at 06:38

## 2023-01-01 RX ADMIN — ATORVASTATIN CALCIUM 40 MG: 40 TABLET, FILM COATED ORAL at 16:14

## 2023-01-01 RX ADMIN — POTASSIUM CHLORIDE 40 MEQ: 1500 TABLET, EXTENDED RELEASE ORAL at 21:14

## 2023-01-01 RX ADMIN — SODIUM CHLORIDE, POTASSIUM CHLORIDE, SODIUM LACTATE AND CALCIUM CHLORIDE: 600; 310; 30; 20 INJECTION, SOLUTION INTRAVENOUS at 04:06

## 2023-01-01 RX ADMIN — Medication 1 MG: at 22:19

## 2023-01-01 RX ADMIN — OXYCODONE HYDROCHLORIDE 10 MG: 5 TABLET ORAL at 10:28

## 2023-01-01 RX ADMIN — IOPAMIDOL 77 ML: 755 INJECTION, SOLUTION INTRAVENOUS at 00:51

## 2023-01-01 ASSESSMENT — ACTIVITIES OF DAILY LIVING (ADL)
ADLS_ACUITY_SCORE: 37
ADLS_ACUITY_SCORE: 37
ADLS_ACUITY_SCORE: 41
ADLS_ACUITY_SCORE: 37
ADLS_ACUITY_SCORE: 41
ADLS_ACUITY_SCORE: 37
ADLS_ACUITY_SCORE: 37
ADLS_ACUITY_SCORE: 41

## 2023-01-01 NOTE — H&P
[facility]  Hospitalist Admission Note  Name: Federico Chamberlain    MRN: [id number]  YOB: 1950    Age: 72 year old  Date of admission: 12/31/2022  Primary care provider: Luis Alberto Aponte    Chief Complaint:  Fall, loss of consciousness, pain    Assessment and Plan:   Severe sepsis  Right toe infection  Persistent elevated lactic acid: Presenting after syncopal episode found on the ground.  He cannot really provide me with any history due to acute encephalopathy.  Told to the providers he had left rib pain, left knee pain, and nausea.  Leukocytosis of 25.2, persistent lactic acid elevation in the low 3 range, procalcitonin of 3.8 and now fever to 102.8  F consistent with severe sepsis.  Source is unclear as CT of the chest/ab/pelvis was fairly unremarkable, urinalysis did not have pyuria, COVID-19, influenza A/B, RSV PCR negative.  He did see podiatry 3 days ago for a first right toe wound that has loss of the superficial skin which was thought to be frostbite injury and he was prescribed cephalexin that he did not start yet.  There is no surrounding erythema of the foot, but there is some faint erythema of the right lower leg.  Persistent lactic acid elevation despite 3 L of IV crystalloid may be in part due to his metformin.  Blood pressure is now rising and heart rate is improved.  Currently afebrile.  -Continue IV Zosyn  -Received IV vancomycin in the ER, hold for now given no history of MRSA and he also has rhabdomyolysis, mild RILEY, and received IV contrast  -Consult podiatry  -2 blood cultures obtained  -Repeat CBC and lactic acid with morning lab draw    Syncope  NSTEMI  HTN  Dilated aorta  RBBB  First-degree AVB  LAFB  QTC prolongation: Here less than 2 weeks ago for a syncopal episode in the setting of low blood pressure thought to be likely due to polypharmacy as he was on numerous blood pressure medications at that time including amlodipine 5 mg at bedtime, chlorthalidone 25 mg  daily, carvedilol 12.5 mg twice daily, hydralazine 25 mg 3 times daily, lisinopril 40 mg at bedtime, Aldactone 25 mg daily, Cialis scheduled 5 mg daily, and sildenafil as needed for ED.  Brain MRI and head neck MRA negative at admission.  TTE showed EF 50-55%, 4.7 cm moderately dilated aortic root, likely grade 1 diastolic dysfunction with 1+ MR.  He was discharged with an event monitor that is still in place.  His blood pressure medications were attempted to be simplified in discharge by stopping amlodipine and hydralazine while increasing carvedilol to 25 mg twice daily and chlorthalidone to 50 mg daily.  Again presenting with likely syncopal episode as he woke up on the floor of the bathroom does not member falling.  EKG here shows NSR with RBBB, LAFB, and first-degree AV block.  QTC is prolonged at 541 and he does have some ST depression in leads V3-V6.  His troponin is initially elevated 152 now rising to 168 so he possibly has an NSTEMI although is not able to tell me about any chest pain currently.  CK is also elevated which I think is more likely from rhabdomyolysis given the trauma on exam and unknown period of time of being down.  Lexiscan from August 2020 in Care Everywhere was negative.  -Consult cardiology  -Give 324 mg aspirin now then continue with 81 mg p.o. daily  -Repeat additional troponin with morning lab draw  -Holding off on any heparin drip given his multiple trauma and large bruising of the left chest wall  -Repeat limited TTE  -Cardiac monitoring  -Resumed his carvedilol  -For now hold lisinopril, spironolactone, chlorthalidone, Cialis  -Watch intake and output and daily weights    Rhabdomyolysis  RILEY: Creatinine 1.14 and BUN is 25 up from baseline creatinine of 0.7-0.9.  CK initially elevated at 1377 then rising to 2293 consistent with rhabdomyolysis.  RILEY multifactorial secondary to prerenal hypovolemia, rhabdomyolysis, and currently on lisinopril, renal lactone, and chlorthalidone.   Received aggressive fluid resuscitation in the ER.  -Hold PTA atorvastatin  -Recheck BMP in the morning  - mL/h  -Recheck CK tomorrow morning    Fall: Second syncopal episode in less than 2 weeks as above.  Previous mild head laceration from fall 2 weeks ago.  Now with bruising to his left lateral chest wall and multiple abrasions on his lower legs.  Noncontrast head CT, C/T/L spine CTs, CT chest/ab/pelvis, right foot, and left knee x-ray negative for trauma.  -Fall precautions  -Consult PT and OT    Acute septic encephalopathy: Reportedly vague historian for the ER provider.  Received 2 doses of IV Dilaudid.  For me he is confused and not answering any symptom-based questions with appropriate answers and most words he says are either unintelligible or not real word.  He is groaning when touched, recently from pain.  He does move all of his extremities in the room.  Noncontrast head CT negative for acute pathology.  He does have a history of a TIA.  Had a brain MRI and head and neck MRA less than 2 weeks ago that showed mild to moderate stenosis of the A3 segment of the right SHERIDAN, but no other findings.  CVA seems unlikely and much more likely has acute septic encephalopathy with some contribution from Dilaudid.    Type II DM: PTA on semaglutide weekly and metformin 500 mg daily.  Blood glucose 148.  Most recent A1c 5.7 last month.  -Hold semaglutide while inpatient  -Hold metformin for elevated lactic acid  -Medium dose sliding scale insulin    History lumbar fusion  Chronic pain: Resume gabapentin 800 mg 3 times daily and Flexeril 5 mg 3 times daily as needed with instructions to hold if still confused and sedated when Dilaudid wears off.      RLS: Resume prior Paxil 3 mg at bedtime.    History of prostate cancer  Erectile dysfunction: Previous prostatectomy resulting in erectile dysfunction.  Hold PTA scheduled Cialis and as needed sildenafil    Obesity: BMI 37.    DVT Prophylaxis: Pneumatic Compression  Devices  Code Status: Full Code -resume previous CODE STATUS  FEN: Regular diet when alert,  mL/h  Discharge Dispo: TBD.  Consult PT/OT/SW  Estimated Disch Date / # of Days until Disch: Admit inpatient.  Anticipate minimal 2-3 night hospitalization.      History of Present Illness:  Federico Chamberlain is a 72 year old male with PMH including type II DM, HTN, HLD, RLS, RBBB, first-degree AVB, MDD, lumbar fusion, gout, TIA, prostate cancer s/p prostatectomy, erectile dysfunction, and recent overnight hospitalization on 12/19 for syncopal episode who presents via EMS after being found down to the bathroom with loss of consciousness.  On 12/19 he had negative brain MRI, MRA head and neck.  Trivial troponin elevation with negative TTE.  Discharged on cardiac monitor.  Blood pressure medications were adjusted as it was felt to be syncope from hypotension from polypharmacy.  Now coming in he cannot provide me any history as he is acutely confused.  He told other providers that he does not remember falling in the bathroom, but that he woke up on the floor.  He is reporting left chest wall pain and left knee pain.  He called his ex-wife because he could not get up and she called EMS.  He did report some nausea although no reported vomiting or diarrhea.  He did see podiatry in clinic 3 days ago supposed to start cephalexin for right first toe wound, but he did not yet.  Denies any alcohol use.    History obtained from patient, medical record, and from Dr. Jewell in the emergency department.  Blood pressure 118/83 then up to 167/88 after IV fluids.  Heart rate 91.  Temperature 98  F initially, now febrile after mission and 102.8  F, oxygen 91-97% on room air.  Leukocytosis at 25.2, hemoglobin 15.1 platelet count 213.  Creatinine 1.14 and BUN is 25.  CK elevated at 1377, blood glucose 148.  Troponin elevated 152 with repeat 168.  Lactic acid elevated 3.4 with repeat 3.1 after 2 L IV fluids.  Urinalysis shows 3 to BCs and  "negative LE.  COVID-19, influenza A/B, RSV PCR negative.  Procalcitonin is elevated to 3.8.  LFTs within normal limits.  Alcohol level undetectable.  EKG shows NSR with bifascicular block, QTC prolongation of 541, and V3-V6 ST depression.   Noncontrast head CT, C/T/L spine CTs, CT chest/ab/pelvis, right foot, and left knee x-ray negative for trauma.  He received IV vancomycin and Zosyn for severe sepsis.  He also received 2 L NS, 1 L LR, Dilaudid 0.3 mg followed by 0.5 mg.  Admit inpatient.       Clinically Significant Risk Factors Present on Admission                  # Hypertension: home medication list includes antihypertensive(s)      # Obesity: Estimated body mass index is 37.47 kg/m  as calculated from the following:    Height as of 22: 1.956 m (6' 5\").    Weight as of 22: 143.3 kg (316 lb).                     Past Medical History reviewed:  Past Medical History:   Diagnosis Date     Hypercholesterolemia      Hypertension    Type II DM  RLS  RBBB  First-degree AVB  Prostate cancer  MDD  Erectile dysfunction  Gout  TIA  Chronic low back pain  Syncope  Left foot drop  Left foot ulcer    Past Surgical History reviewed:  Past Surgical History:   Procedure Laterality Date     APPENDECTOMY       BACK SURGERY       ORTHOPEDIC SURGERY     Prostatectomy  Lumbar laminectomy and fusion    Social History reviewed:  Non-smoker  No alcohol    Family History reviewed:  Mother with history of CHF  Brother history of MI  at age 42    Allergies:  No Known Allergies  Medications:  Prior to Admission medications    Medication Sig Last Dose Taking? Auth Provider Long Term End Date   aspirin 81 MG EC tablet Take 81 mg by mouth daily   Unknown, Entered By History     ATORVASTATIN CALCIUM PO Take 40 mg by mouth At Bedtime   Reported, Patient Yes    carvedilol (COREG) 25 MG tablet Take 1 tablet (25 mg) by mouth 2 times daily (with meals) for 30 days   Vannesa Copeland PA-C Yes 23   chlorthalidone " (HYGROTON) 25 MG tablet Take 2 tablets (50 mg) by mouth daily   Vannesa Copeland PA-C Yes    cyclobenzaprine (FLEXERIL) 5 MG tablet Take 5 mg by mouth every morning   Unknown, Entered By History     cyclobenzaprine (FLEXERIL) 5 MG tablet Take 5 mg by mouth 3 times daily as needed for muscle spasms   Unknown, Entered By History     diclofenac (VOLTAREN) 1 % GEL topical gel Place 2 g onto the skin 3 times daily  Patient taking differently: Place 2 g onto the skin 3 times daily as needed   Cristobal Stevenson,      ferrous sulfate (FEROSUL) 325 (65 Fe) MG tablet Take 325 mg by mouth daily (with breakfast)   Unknown, Entered By History     gabapentin (NEURONTIN) 800 MG tablet Take 800 mg by mouth 3 times daily   Unknown, Entered By History Yes    ibuprofen (ADVIL/MOTRIN) 800 MG tablet Take 800 mg by mouth every 8 hours as needed for moderate pain (4-6)   Unknown, Entered By History No    LISINOPRIL PO Take 40 mg by mouth At Bedtime   Reported, Patient No    METFORMIN HCL PO Take 500 mg by mouth daily (with breakfast)   Reported, Patient Yes    multivitamin w/minerals (MULTI-VITAMIN) tablet Take 1 tablet by mouth At Bedtime   Unknown, Entered By History     potassium chloride ER (K-TAB/KLOR-CON) 10 MEQ CR tablet Take 10 mEq by mouth 2 times daily   Unknown, Entered By History     Pramipexole Dihydrochloride (MIRAPEX PO) Take 3 mg by mouth At Bedtime   Unknown, Entered By History     semaglutide (OZEMPIC, 1 MG/DOSE,) 2 MG/1.5ML pen Inject 1 mg Subcutaneous every 7 days On saturday   Unknown, Entered By History     sildenafil (REVATIO) 20 MG tablet Take 20 mg by mouth as needed   Unknown, Entered By History Yes    silver sulfADIAZINE (SILVADENE) 1 % external cream Apply topically daily as needed   Unknown, Entered By History     spironolactone (ALDACTONE) 25 MG tablet Take 25 mg by mouth daily   Unknown, Entered By History Yes    tadalafil (CIALIS) 5 MG tablet Take 5 mg by mouth every morning   Unknown, Entered By  History Yes    VITAMIN D, CHOLECALCIFEROL, PO Take 4,000 Units by mouth daily   Unknown, Entered By History       Review of Systems:  A Comprehensive greater than 10 system review of systems was carried out.  Pertinent positives and negatives are noted above.  Otherwise negative.     Physical Exam:  Blood pressure (!) 149/91, pulse 93, temperature 98  F (36.7  C), temperature source Oral, resp. rate 18, SpO2 96 %.  Wt Readings from Last 1 Encounters:   12/19/22 143.3 kg (316 lb)     Exam:  Constitutional: Awake, appears in mild distress, rolling back and forth in bed  Eyes: sclera white   HEENT:  dry mucous membrane  Respiratory: no respiratory distress, lungs cta bilaterally, no crackles or wheeze  Cardiovascular: RRR.  No murmur appreciated  GI: Mild to moderate diffuse tenderness to palpation, bowel sounds present, not distended  Skin: no rash or lesions, acyanotic  Musculoskeletal/extremities: Trace-1+ bilateral lower extremity pitting edema right greater than sign left.  Right first toe lesion with loss of first layer of skin without surrounding erythema.  Some blanchable erythema of the right lower leg.  Abrasions to bilateral knees and shins.  Large left lateral upper chest wall ecchymoses.  Neurologic: Alert, but not opening eyes.  Not answering symptom-based questions appropriately and most of the words he did say were not actual words.  Moving all extremities and rolling back-and-forth in bed  Psychiatric: Agitated, not following command    Lab and imaging data personally reviewed:  Labs:  Recent Labs   Lab 01/01/23  0120 12/31/22  2225   PH 7.43 7.41   HCO3V 24 24     Recent Labs   Lab 12/31/22  2154   WBC 25.2*   HGB 15.1   HCT 45.9   MCV 87        Recent Labs   Lab 01/01/23  0321 12/31/22  2200 12/31/22  2154   NA  --   --  141   POTASSIUM  --   --  3.4   CHLORIDE  --   --  103   CO2  --   --  24   ANIONGAP  --   --  14   *  --  148*   BUN  --   --  25.7*   CR  --  1.2 1.14   GFRESTIMATED   --  >60 69   LAURA  --   --  9.2   MAG  --   --  1.7   PROTTOTAL  --   --  6.9   ALBUMIN  --   --  4.0   BILITOTAL  --   --  1.1   ALKPHOS  --   --  81   AST  --   --  44   ALT  --   --  35     Recent Labs   Lab 01/01/23  0120 12/31/22  2154   CKT 2,293* 1,377*     Recent Labs   Lab 12/31/22  2221   INR 1.13     Recent Labs   Lab 01/01/23  0120 01/01/23  0119 12/31/22  2225   LACT 3.1* 3.2* 3.4*     Recent Labs   Lab 01/01/23  0132   COLOR Yellow   APPEARANCE Clear   URINEGLC Negative   URINEBILI Negative   URINEKETONE Negative   SG 1.030   UBLD Moderate*   URINEPH 5.5   PROTEIN 50*   NITRITE Negative   LEUKEST Negative   RBCU 2   WBCU 3     Procalcitonin 3.8  Troponin T 152 with repeat 168  A1c 6.0    EKG: NSR, first-degree AV block, RBBB, LAFB, , V3-V6 ST depression    Imaging:  Recent Results (from the past 24 hour(s))   XR Foot Port Right 3 Views    Narrative    EXAM: XR FOOT PORT RIGHT 3 VIEWS  LOCATION: [facility]  DATE/TIME: 12/31/2022 11:10 PM    INDICATION: r great toe infection  COMPARISON: None.      Impression    IMPRESSION: No visible fracture, dislocation or definitive destructive change. MRI would be more sensitive for exclusion of osteomyelitis if indicated. Soft tissue edema great toe distally.   XR Knee Port Left 3 Views    Narrative    EXAM: XR KNEE PORT LEFT 3 VIEWS  LOCATION: [facility]  DATE/TIME: 12/31/2022 11:11 PM    INDICATION: fall, pain  COMPARISON: None.      Impression    IMPRESSION: Normal left knee joint spaces and alignment. No fracture or joint effusion. Benign-appearing cortical thickening posterior aspects of the upper tibia and fibula. Vascular calcifications.   Head CT w/o contrast    Narrative    EXAM: CT HEAD W/O CONTRAST, CT CERVICAL SPINE W/O CONTRAST  LOCATION: [facility]  DATE/TIME: 1/1/2023 12:57 AM    INDICATION: Syncope, head trauma  COMPARISON: Brain MRI from 12/19/2022  TECHNIQUE:   1) Routine  CT Head without IV contrast. Multiplanar reformats. Dose reduction techniques were used.  2) Routine CT Cervical Spine without IV contrast. Multiplanar reformats. Dose reduction techniques were used.    FINDINGS:   HEAD CT:   INTRACRANIAL CONTENTS: No intracranial hemorrhage, extraaxial collection, or mass effect.  No CT evidence of acute infarct. Normal parenchymal attenuation. Normal ventricles and sulci.     VISUALIZED ORBITS/SINUSES/MASTOIDS: No intraorbital abnormality. Mild to moderate scattered paranasal sinus mucosal disease. No middle ear or mastoid effusion.    BONES/SOFT TISSUES: No acute abnormality.    CERVICAL SPINE CT:   VERTEBRA: Slight retrolisthesis C2 on C3. Alignment is otherwise normal. No acute cervical spine fracture or posttraumatic subluxation. Ankylosis of C4-C5. Moderate to marked loss of disc space height C3-C4 and mild changes at a few additional levels.   Mild to moderate multilevel facet arthropathy.    CANAL/FORAMINA: Moderate canal narrowing C2-C3 through C4-C5 and moderate to marked changes at C5-C6. Moderate to marked multilevel degenerative foraminal narrowing.    PARASPINAL: No extraspinal abnormality. Visualized lung fields are clear.      Impression    IMPRESSION:  HEAD CT:  1.  No acute intracranial abnormality or significant change compared to the prior study.    CERVICAL SPINE CT:  1.  No CT evidence for acute fracture or post traumatic subluxation.    2.  Moderate multilevel degenerative canal narrowing and moderate to marked multilevel degenerative foraminal narrowing.   Cervical spine CT w/o contrast    Narrative    EXAM: CT HEAD W/O CONTRAST, CT CERVICAL SPINE W/O CONTRAST  LOCATION: [facility]  DATE/TIME: 1/1/2023 12:57 AM    INDICATION: Syncope, head trauma  COMPARISON: Brain MRI from 12/19/2022  TECHNIQUE:   1) Routine CT Head without IV contrast. Multiplanar reformats. Dose reduction techniques were used.  2) Routine CT Cervical Spine without  IV contrast. Multiplanar reformats. Dose reduction techniques were used.    FINDINGS:   HEAD CT:   INTRACRANIAL CONTENTS: No intracranial hemorrhage, extraaxial collection, or mass effect.  No CT evidence of acute infarct. Normal parenchymal attenuation. Normal ventricles and sulci.     VISUALIZED ORBITS/SINUSES/MASTOIDS: No intraorbital abnormality. Mild to moderate scattered paranasal sinus mucosal disease. No middle ear or mastoid effusion.    BONES/SOFT TISSUES: No acute abnormality.    CERVICAL SPINE CT:   VERTEBRA: Slight retrolisthesis C2 on C3. Alignment is otherwise normal. No acute cervical spine fracture or posttraumatic subluxation. Ankylosis of C4-C5. Moderate to marked loss of disc space height C3-C4 and mild changes at a few additional levels.   Mild to moderate multilevel facet arthropathy.    CANAL/FORAMINA: Moderate canal narrowing C2-C3 through C4-C5 and moderate to marked changes at C5-C6. Moderate to marked multilevel degenerative foraminal narrowing.    PARASPINAL: No extraspinal abnormality. Visualized lung fields are clear.      Impression    IMPRESSION:  HEAD CT:  1.  No acute intracranial abnormality or significant change compared to the prior study.    CERVICAL SPINE CT:  1.  No CT evidence for acute fracture or post traumatic subluxation.    2.  Moderate multilevel degenerative canal narrowing and moderate to marked multilevel degenerative foraminal narrowing.   CT Thoracic Spine w/o Contrast    Narrative    EXAM: CT THORACIC SPINE W/O CONTRAST  LOCATION: [facility]  DATE/TIME: 1/1/2023 12:59 AM    INDICATION: Back pain  COMPARISON: None.  TECHNIQUE: Routine CT Thoracic Spine without IV contrast. Multiplanar reformats. Dose reduction techniques were used.     FINDINGS:  VERTEBRA: Alignment is normal. Multilevel bridging osteophytes anteriorly. No acute thoracic spine fracture. Mild to moderate multilevel degenerative disc disease and facet  arthropathy.    CANAL/FORAMINA: No high-grade canal narrowing. Moderate left T8-T9 and T9-T10 degenerative foraminal narrowing. Moderate right T9-T10 and marked right T10-T11 degenerative foraminal narrowing.    PARASPINAL: Please refer to CT chest same day for extraspinal findings.      Impression    IMPRESSION:  1.  No acute thoracic spine fracture.     CT Chest (PE) Abdomen Pelvis w Contrast    Narrative    EXAM: CT CHEST PE ABDOMEN PELVIS W CONTRAST  LOCATION: [facility]  DATE/TIME: 1/1/2023 1:03 AM    INDICATION: Syncope. Traumatic injury. Left chest wall injury.  COMPARISON: CT abdomen pelvis with IV contrast 4/09/2022.  TECHNIQUE: CT chest pulmonary angiogram and routine CT abdomen pelvis with IV contrast. Arterial phase through the chest and venous phase through the abdomen and pelvis. Multiplanar reformats and MIP reconstructions were performed. Dose reduction   techniques were used.   CONTRAST: 90 mL [id number]-[id number] IV.    FINDINGS:  ANGIOGRAM CHEST: Motion artifact degrades several images. Distal pulmonary vessels are not well opacified. No obvious central, lobar or segmental pulmonary emboli on either side. Slightly atherosclerotic thoracic aorta, mildly aneurysmal ascending   segment measuring 4.6 x 4.5 cm (image 131, series 7). No dissection. No CT evidence of right heart strain.     LUNGS AND PLEURA: Mild diffuse thickening of the bronchi. Dependent atelectasis in the posterior lungs. No pleural effusion on either side.    MEDIASTINUM/AXILLAE: Mild cardiac enlargement. Mild coronary artery calcifications. No pericardial effusion. No suspicious adenopathy. Trachea is midline.    CORONARY ARTERY CALCIFICATION: Mild.    HEPATOBILIARY: Motion artifact degrades numerous images. The gallbladder is distended without calcified gallstones or biliary dilatation. Diffuse fatty infiltration of the liver. Hypodense cysts in the liver, suboptimally visualized due to motion   artifact, unchanged.  Patent portal veins.    PANCREAS: Normal.    SPLEEN: Normal.    ADRENAL GLANDS: Normal.    KIDNEYS/BLADDER: Cortical simple cysts in the kidneys, no specific follow-up required. Nonobstructive 0.5 cm stone in the right kidney posteriorly (image 139, series 12), unchanged. Both kidneys are well perfused without hydronephrosis or hydroureter.   Normal urinary bladder. Previously seen Alvarez catheter has been removed. Prostatectomy.    BOWEL: Prominent amount of formed stool material throughout normal caliber redundant colon. No mechanical obstruction or free gas.    LYMPH NODES: No suspicious abdominopelvic adenopathy.    VASCULATURE: Mildly atherosclerotic normal caliber distal abdominal aorta measuring 2.0 x 2.0 cm (image 150, series 12). Normal caliber IVC.    PELVIC ORGANS: Vascular calcifications. Stool-filled redundant rectosigmoid. Prostatectomy. No adenopathy or free fluid.    MUSCULOSKELETAL: Degenerative changes both shoulders, spine and joints of the pelvis. Postoperative changes of L3-L5 posterior instrumented fusion. Slight left convex lumbar curve. Please see separate report for CT spine.      Impression    IMPRESSION:  1.  Motion artifact degrades numerous images. Suboptimal opacification of the pulmonary vasculature. No obvious pulmonary emboli on either side. Mild diffuse thickening of the bronchi. Dependent atelectasis    2.  Atherosclerotic thoracic aorta, mildly aneurysmal ascending segment. No dissection. Mild cardiac enlargement and coronary artery calcifications. No pericardial effusion. Aortoiliac atherosclerotic changes without aneurysm.    3.  Fatty liver. Hypodense hepatic cysts, no specific follow-up required. Cortical simple cysts in the kidneys, no specific follow-up required. Nonobstructive stone in the right kidney posteriorly, unchanged. Alvarez catheter has been removed.   Prostatectomy.    4.  Degenerative changes both shoulders, spine and joints of the pelvis. Postoperative changes of  L3-L5 posterior instrumented fusion. Slight left convex lumbar curve. Please see separate report for CT spine.   Lumbar spine CT w/o contrast    Narrative    EXAM: CT LUMBAR SPINE W/O CONTRAST  LOCATION: [facility]  DATE/TIME: 1/1/2023 1:04 AM    INDICATION: Back pain, fall  COMPARISON: None.  TECHNIQUE: Routine CT Lumbar Spine without IV contrast. Multiplanar reformats. Dose reduction techniques were used.     FINDINGS:  VERTEBRA: 5 lumbar-type vertebral bodies. Alignment is normal. Posterior instrumented fusion L3-L5 and L3-L5 laminectomies. Intact hardware. No acute lumbar spine fracture. Small incidental bone island in the right aspect of the T12 vertebral body. Prior   anterior fusion of L3-L4 and L4-L5. Moderate to marked multilevel facet arthropathy. Mild L1-L2 and L2-L3 degenerative disc disease.     CANAL/FORAMINA: Marked canal narrowing at L2-L3 related to dominant posterior osteophyte. No other significant canal narrowing. Mild multilevel degenerative foraminal narrowing apart from on the right at L5-S1 where it is marked.    PARASPINAL: Refer to CT abdomen pelvis same day for extraspinal findings.      Impression    IMPRESSION:  1.  No acute lumbar spine fracture.    2.  Anterior and posterior instrumented fusion and laminectomies L3 through L5 with intact hardware.    3.  Moderate to marked degenerative canal narrowing at L2-L3 and marked right L5-S1 degenerative foraminal narrowing.       Eugene Diaz MD  Hospitalist  [facility]

## 2023-01-01 NOTE — CONSULTS
[facility]    Cardiology Consultation     Date of Admission:  12/31/2022    Assessment & Plan     72 year old male with PMH including type II DM, HTN, HLD, RLS, RBBB, first-degree AVB, MDD, lumbar fusion, gout, TIA, prostate cancer s/p prostatectomy, erectile dysfunction, with recent hospitalization for syncope on 12/19. He is very confused and sleepy thus history difficult to obtain. He denies current chest pain to me but doesn't remember his event.     Per admission records: brought in by EMS after being found down to the bathroom with loss of consciousness.  On 12/19 he had negative brain MRI, MRA head and neck.  Trivial troponin elevation with negative TTE.  Discharged on cardiac monitor.  Blood pressure medications were adjusted as it was felt to be syncope from hypotension from polypharmacy.     In hospital this admission BP stable as was heart rate. No arrhythmias on telemetry. WBC elevated to 25K. Lactate elevated to 3.4. COVID negative, flu negative. Negative CT trauma scan. Febrile to 102. He was started on IV ABX and admitted to medicine.     1. Syncope: will check with [employer] Monday any triggered events. I have suspicion of heart block given bifasicular block with 1st degree block however unclear if cause of this event  -QTC prolongation with consideration of bundle branch block only mildly increased, withold any QTC prolonging drugs  -monitor on telemetry    2. NSTEMI: TN elevation likely in setting of prolonged fall, as evidenced by concomitant CK elevation and other lab abnormalities  -echocardiogram this am demonstrated normal LV function and no wall motion abnormalities  -no heparin indication at this time given no chest pain  -will likely defer ischemic evaluation for now for later in hospitalization after primary sepsis clears, or as outpatient depending on course and events on [employer].   -aspirin loaded, continue aspirin 81 mg daily     3. HTN: holding PTA medications in  setting of fall and possible hypotension     High complexity     Angie Monsalve MD    Primary Care Physician   Luis Alberto Aponte    Reason for Consult     Syncope     History of Present Illness     72 year old male with PMH including type II DM, HTN, HLD, RLS, RBBB, first-degree AVB, MDD, lumbar fusion, gout, TIA, prostate cancer s/p prostatectomy, erectile dysfunction, with recent hospitalization for syncope on 12/19. He is very confused and sleepy thus history difficult to obtain. He denies current chest pain to me but doesn't remember his event.     Per admission records: brought in by EMS after being found down to the bathroom with loss of consciousness.  On 12/19 he had negative brain MRI, MRA head and neck.  Trivial troponin elevation with negative TTE.  Discharged on cardiac monitor.  Blood pressure medications were adjusted as it was felt to be syncope from hypotension from polypharmacy.     In hospital this admission BP stable as was heart rate. No arrhythmias on telemetry. WBC elevated to 25K. Lactate elevated to 3.4. COVID negative, flu negative. Negative CT trauma scan. Febrile to 102. He was started on IV ABX and admitted to medicine.     Past Medical History   Past Medical History:   Diagnosis Date     Hypercholesterolemia      Hypertension      Past Surgical History   Past Surgical History:   Procedure Laterality Date     APPENDECTOMY       BACK SURGERY       ORTHOPEDIC SURGERY         Prior to Admission Medications   Prior to Admission Medications   Prescriptions Last Dose Informant Patient Reported? Taking?   Pramipexole Dihydrochloride (MIRAPEX PO) Unknown  Yes Yes   Sig: Take 3 mg by mouth At Bedtime   VITAMIN D, CHOLECALCIFEROL, PO Unknown  Yes Yes   Sig: Take 4,000 Units by mouth daily   aspirin 81 MG EC tablet Unknown  Yes Yes   Sig: Take 81 mg by mouth daily   atorvastatin (LIPITOR) 40 MG tablet Unknown  Yes Yes   Sig: Take 40 mg by mouth daily   carvedilol (COREG) 25 MG tablet Unknown  No  Yes   Sig: Take 1 tablet (25 mg) by mouth 2 times daily (with meals) for 30 days   cephALEXin (KEFLEX) 500 MG capsule Unknown  Yes Yes   Sig: Take 500 mg by mouth 2 times daily   chlorthalidone (HYGROTON) 25 MG tablet Unknown  No Yes   Sig: Take 2 tablets (50 mg) by mouth daily   cyclobenzaprine (FLEXERIL) 5 MG tablet Unknown  Yes Yes   Sig: Take 5 mg by mouth 3 times daily as needed for muscle spasms   ferrous sulfate (FEROSUL) 325 (65 Fe) MG tablet Unknown  Yes Yes   Sig: Take 325 mg by mouth daily (with breakfast)   gabapentin (NEURONTIN) 800 MG tablet Unknown  Yes Yes   Sig: Take 800 mg by mouth 3 times daily   lisinopril (ZESTRIL) 40 MG tablet Unknown  Yes Yes   Sig: Take 40 mg by mouth daily   metFORMIN (GLUCOPHAGE) 500 MG tablet Unknown  Yes Yes   Sig: Take 500 mg by mouth daily   multivitamin w/minerals (THERA-VIT-M) tablet Unknown  Yes Yes   Sig: Take 1 tablet by mouth At Bedtime   potassium chloride ER (KLOR-CON M) 10 MEQ CR tablet Unknown  Yes Yes   Sig: Take 20 mEq by mouth daily   semaglutide (OZEMPIC, 1 MG/DOSE,) 2 MG/1.5ML pen 12/30/2022  Yes Yes   Sig: Inject 0.5 mg Subcutaneous every 7 days On Fridays   silver sulfADIAZINE (SILVADENE) 1 % external cream   Yes No   Sig: Apply topically daily as needed   spironolactone (ALDACTONE) 25 MG tablet Unknown  Yes Yes   Sig: Take 25 mg by mouth daily   tadalafil (CIALIS) 5 MG tablet Unknown  Yes Yes   Sig: Take 5 mg by mouth every morning      Facility-Administered Medications: None     Current Facility-Administered Medications   Medication Dose Route Frequency     acetaminophen  975 mg Oral TID     [START ON 1/2/2023] aspirin  81 mg Oral Daily     carvedilol  12.5 mg Oral BID w/meals     gabapentin  800 mg Oral TID     insulin aspart  1-7 Units Subcutaneous TID AC     insulin aspart  1-5 Units Subcutaneous At Bedtime     lidocaine  1-2 patch Transdermal Q24H     lidocaine   Transdermal Q8H DEJAH     [Held by provider] lisinopril  40 mg Oral At Bedtime      "piperacillin-tazobactam  3.375 g Intravenous Q6H     pramipexole  3 mg Oral At Bedtime     sodium chloride (PF)  3 mL Intracatheter Q8H     Current Facility-Administered Medications   Medication Last Rate     lactated ringers 125 mL/hr at 01/01/23 1151     Allergies   No Known Allergies    Social History    reports that he has never smoked. He has never used smokeless tobacco. He reports that he does not drink alcohol and does not use drugs.      Family History     Unable to obtain due to AMS    Review of Systems   A comprehensive review of system was performed and is negative other than that noted in the HPI or here.     Physical Exam   Vital Signs with Ranges  Temp:  [98  F (36.7  C)-102.8  F (39.3  C)] 98.1  F (36.7  C)  Pulse:  [69-96] 80  Resp:  [13-18] 13  BP: (106-180)/() 128/69  SpO2:  [91 %-100 %] 95 %  Wt Readings from Last 4 Encounters:   01/01/23 145 kg (319 lb 9.6 oz)   12/19/22 143.3 kg (316 lb)   12/03/21 131.5 kg (290 lb)   08/04/20 145.2 kg (320 lb)     I/O last 3 completed shifts:  In: -   Out: 1225 [Urine:1225]      Vitals: /69 (BP Location: Right arm)   Pulse 80   Temp 98.1  F (36.7  C) (Oral)   Resp 13   Ht 1.981 m (6' 6\")   Wt 145 kg (319 lb 9.6 oz)   SpO2 95%   BMI 36.93 kg/m      Physical Exam:   General - Difficult to awake but opens eyes to commands and groans minimal words  Eyes - No scleral icterus  HEENT - Neck supple, moist mucous membranes  Cardiovascular - RRR no mrg  Extremities - There is 1+ edema  Respiratory - Diminished bs at the bases  Skin - No pallor or cyanosis  Gastrointestinal - Non tender and non distended without rebound or guarding  Psych - Appropriate affect   Neurological - No gross motor neurological focal deficits    No lab results found in last 7 days.    Invalid input(s): TROPONINIES    Recent Labs   Lab 01/01/23  0800 01/01/23  0740 01/01/23  0321 12/31/22  2221 12/31/22  2200 12/31/22  2154   WBC  --  20.8*  --   --   --  25.2*   HGB  --  14.1  " --   --   --  15.1   MCV  --  88  --   --   --  87   PLT  --  167  --   --   --  213   INR  --   --   --  1.13  --   --    NA  --  141  --   --   --  141   POTASSIUM  --  2.9*  --   --   --  3.4   CHLORIDE  --  103  --   --   --  103   CO2  --  22  --   --   --  24   BUN  --  26.6*  --   --   --  25.7*   CR  --  1.04  --   --  1.2 1.14   GFRESTIMATED  --  77  --   --  >60 69   ANIONGAP  --  16*  --   --   --  14   LAURA  --  8.3*  --   --   --  9.2   * 151* 141*  --   --  148*   ALBUMIN  --   --   --   --   --  4.0   PROTTOTAL  --   --   --   --   --  6.9   BILITOTAL  --   --   --   --   --  1.1   ALKPHOS  --   --   --   --   --  81   ALT  --   --   --   --   --  35   AST  --   --   --   --   --  44     Recent Labs   Lab Test 07/02/18  0636   CHOL 92   HDL 32*   LDL <1   TRIG 297*     Recent Labs   Lab 01/01/23  0740 12/31/22  2154   WBC 20.8* 25.2*   HGB 14.1 15.1   HCT 43.3 45.9   MCV 88 87    213     Recent Labs   Lab 01/01/23  0120 12/31/22  2225   PH 7.43 7.41   HCO3V 24 24     No results for input(s): NTBNPI, NTBNP in the last 168 hours.  No results for input(s): DD in the last 168 hours.  No results for input(s): SED, CRP in the last 168 hours.  Recent Labs   Lab 01/01/23  0740 12/31/22  2154    213     No results for input(s): TSH in the last 168 hours.  Recent Labs   Lab 01/01/23  0132   COLOR Yellow   APPEARANCE Clear   URINEGLC Negative   URINEBILI Negative   URINEKETONE Negative   SG 1.030   UBLD Moderate*   URINEPH 5.5   PROTEIN 50*   NITRITE Negative   LEUKEST Negative   RBCU 2   WBCU 3       Imaging:  Recent Results (from the past 48 hour(s))   XR Foot Port Right 3 Views    Narrative    EXAM: XR FOOT PORT RIGHT 3 VIEWS  LOCATION: [facility]  DATE/TIME: 12/31/2022 11:10 PM    INDICATION: r great toe infection  COMPARISON: None.      Impression    IMPRESSION: No visible fracture, dislocation or definitive destructive change. MRI would be more sensitive for  exclusion of osteomyelitis if indicated. Soft tissue edema great toe distally.   XR Knee Port Left 3 Views    Narrative    EXAM: XR KNEE PORT LEFT 3 VIEWS  LOCATION: [facility]  DATE/TIME: 12/31/2022 11:11 PM    INDICATION: fall, pain  COMPARISON: None.      Impression    IMPRESSION: Normal left knee joint spaces and alignment. No fracture or joint effusion. Benign-appearing cortical thickening posterior aspects of the upper tibia and fibula. Vascular calcifications.   Head CT w/o contrast    Narrative    EXAM: CT HEAD W/O CONTRAST, CT CERVICAL SPINE W/O CONTRAST  LOCATION: [facility]  DATE/TIME: 1/1/2023 12:57 AM    INDICATION: Syncope, head trauma  COMPARISON: Brain MRI from 12/19/2022  TECHNIQUE:   1) Routine CT Head without IV contrast. Multiplanar reformats. Dose reduction techniques were used.  2) Routine CT Cervical Spine without IV contrast. Multiplanar reformats. Dose reduction techniques were used.    FINDINGS:   HEAD CT:   INTRACRANIAL CONTENTS: No intracranial hemorrhage, extraaxial collection, or mass effect.  No CT evidence of acute infarct. Normal parenchymal attenuation. Normal ventricles and sulci.     VISUALIZED ORBITS/SINUSES/MASTOIDS: No intraorbital abnormality. Mild to moderate scattered paranasal sinus mucosal disease. No middle ear or mastoid effusion.    BONES/SOFT TISSUES: No acute abnormality.    CERVICAL SPINE CT:   VERTEBRA: Slight retrolisthesis C2 on C3. Alignment is otherwise normal. No acute cervical spine fracture or posttraumatic subluxation. Ankylosis of C4-C5. Moderate to marked loss of disc space height C3-C4 and mild changes at a few additional levels.   Mild to moderate multilevel facet arthropathy.    CANAL/FORAMINA: Moderate canal narrowing C2-C3 through C4-C5 and moderate to marked changes at C5-C6. Moderate to marked multilevel degenerative foraminal narrowing.    PARASPINAL: No extraspinal abnormality. Visualized lung fields  are clear.      Impression    IMPRESSION:  HEAD CT:  1.  No acute intracranial abnormality or significant change compared to the prior study.    CERVICAL SPINE CT:  1.  No CT evidence for acute fracture or post traumatic subluxation.    2.  Moderate multilevel degenerative canal narrowing and moderate to marked multilevel degenerative foraminal narrowing.   Cervical spine CT w/o contrast    Narrative    EXAM: CT HEAD W/O CONTRAST, CT CERVICAL SPINE W/O CONTRAST  LOCATION: [facility]  DATE/TIME: 1/1/2023 12:57 AM    INDICATION: Syncope, head trauma  COMPARISON: Brain MRI from 12/19/2022  TECHNIQUE:   1) Routine CT Head without IV contrast. Multiplanar reformats. Dose reduction techniques were used.  2) Routine CT Cervical Spine without IV contrast. Multiplanar reformats. Dose reduction techniques were used.    FINDINGS:   HEAD CT:   INTRACRANIAL CONTENTS: No intracranial hemorrhage, extraaxial collection, or mass effect.  No CT evidence of acute infarct. Normal parenchymal attenuation. Normal ventricles and sulci.     VISUALIZED ORBITS/SINUSES/MASTOIDS: No intraorbital abnormality. Mild to moderate scattered paranasal sinus mucosal disease. No middle ear or mastoid effusion.    BONES/SOFT TISSUES: No acute abnormality.    CERVICAL SPINE CT:   VERTEBRA: Slight retrolisthesis C2 on C3. Alignment is otherwise normal. No acute cervical spine fracture or posttraumatic subluxation. Ankylosis of C4-C5. Moderate to marked loss of disc space height C3-C4 and mild changes at a few additional levels.   Mild to moderate multilevel facet arthropathy.    CANAL/FORAMINA: Moderate canal narrowing C2-C3 through C4-C5 and moderate to marked changes at C5-C6. Moderate to marked multilevel degenerative foraminal narrowing.    PARASPINAL: No extraspinal abnormality. Visualized lung fields are clear.      Impression    IMPRESSION:  HEAD CT:  1.  No acute intracranial abnormality or significant change compared to the  prior study.    CERVICAL SPINE CT:  1.  No CT evidence for acute fracture or post traumatic subluxation.    2.  Moderate multilevel degenerative canal narrowing and moderate to marked multilevel degenerative foraminal narrowing.   CT Thoracic Spine w/o Contrast    Narrative    EXAM: CT THORACIC SPINE W/O CONTRAST  LOCATION: [facility]  DATE/TIME: 1/1/2023 12:59 AM    INDICATION: Back pain  COMPARISON: None.  TECHNIQUE: Routine CT Thoracic Spine without IV contrast. Multiplanar reformats. Dose reduction techniques were used.     FINDINGS:  VERTEBRA: Alignment is normal. Multilevel bridging osteophytes anteriorly. No acute thoracic spine fracture. Mild to moderate multilevel degenerative disc disease and facet arthropathy.    CANAL/FORAMINA: No high-grade canal narrowing. Moderate left T8-T9 and T9-T10 degenerative foraminal narrowing. Moderate right T9-T10 and marked right T10-T11 degenerative foraminal narrowing.    PARASPINAL: Please refer to CT chest same day for extraspinal findings.      Impression    IMPRESSION:  1.  No acute thoracic spine fracture.     CT Chest (PE) Abdomen Pelvis w Contrast    Narrative    EXAM: CT CHEST PE ABDOMEN PELVIS W CONTRAST  LOCATION: [facility]  DATE/TIME: 1/1/2023 1:03 AM    INDICATION: Syncope. Traumatic injury. Left chest wall injury.  COMPARISON: CT abdomen pelvis with IV contrast 4/09/2022.  TECHNIQUE: CT chest pulmonary angiogram and routine CT abdomen pelvis with IV contrast. Arterial phase through the chest and venous phase through the abdomen and pelvis. Multiplanar reformats and MIP reconstructions were performed. Dose reduction   techniques were used.   CONTRAST: 90 mL Isovue-370 IV.    FINDINGS:  ANGIOGRAM CHEST: Motion artifact degrades several images. Distal pulmonary vessels are not well opacified. No obvious central, lobar or segmental pulmonary emboli on either side. Slightly atherosclerotic thoracic aorta, mildly  aneurysmal ascending   segment measuring 4.6 x 4.5 cm (image 131, series 7). No dissection. No CT evidence of right heart strain.     LUNGS AND PLEURA: Mild diffuse thickening of the bronchi. Dependent atelectasis in the posterior lungs. No pleural effusion on either side.    MEDIASTINUM/AXILLAE: Mild cardiac enlargement. Mild coronary artery calcifications. No pericardial effusion. No suspicious adenopathy. Trachea is midline.    CORONARY ARTERY CALCIFICATION: Mild.    HEPATOBILIARY: Motion artifact degrades numerous images. The gallbladder is distended without calcified gallstones or biliary dilatation. Diffuse fatty infiltration of the liver. Hypodense cysts in the liver, suboptimally visualized due to motion   artifact, unchanged. Patent portal veins.    PANCREAS: Normal.    SPLEEN: Normal.    ADRENAL GLANDS: Normal.    KIDNEYS/BLADDER: Cortical simple cysts in the kidneys, no specific follow-up required. Nonobstructive 0.5 cm stone in the right kidney posteriorly (image 139, series 12), unchanged. Both kidneys are well perfused without hydronephrosis or hydroureter.   Normal urinary bladder. Previously seen Alvarez catheter has been removed. Prostatectomy.    BOWEL: Prominent amount of formed stool material throughout normal caliber redundant colon. No mechanical obstruction or free gas.    LYMPH NODES: No suspicious abdominopelvic adenopathy.    VASCULATURE: Mildly atherosclerotic normal caliber distal abdominal aorta measuring 2.0 x 2.0 cm (image 150, series 12). Normal caliber IVC.    PELVIC ORGANS: Vascular calcifications. Stool-filled redundant rectosigmoid. Prostatectomy. No adenopathy or free fluid.    MUSCULOSKELETAL: Degenerative changes both shoulders, spine and joints of the pelvis. Postoperative changes of L3-L5 posterior instrumented fusion. Slight left convex lumbar curve. Please see separate report for CT spine.      Impression    IMPRESSION:  1.  Motion artifact degrades numerous images.  Suboptimal opacification of the pulmonary vasculature. No obvious pulmonary emboli on either side. Mild diffuse thickening of the bronchi. Dependent atelectasis    2.  Atherosclerotic thoracic aorta, mildly aneurysmal ascending segment. No dissection. Mild cardiac enlargement and coronary artery calcifications. No pericardial effusion. Aortoiliac atherosclerotic changes without aneurysm.    3.  Fatty liver. Hypodense hepatic cysts, no specific follow-up required. Cortical simple cysts in the kidneys, no specific follow-up required. Nonobstructive stone in the right kidney posteriorly, unchanged. Alvarez catheter has been removed.   Prostatectomy.    4.  Degenerative changes both shoulders, spine and joints of the pelvis. Postoperative changes of L3-L5 posterior instrumented fusion. Slight left convex lumbar curve. Please see separate report for CT spine.   Lumbar spine CT w/o contrast    Narrative    EXAM: CT LUMBAR SPINE W/O CONTRAST  LOCATION: [facility]  DATE/TIME: 1/1/2023 1:04 AM    INDICATION: Back pain, fall  COMPARISON: None.  TECHNIQUE: Routine CT Lumbar Spine without IV contrast. Multiplanar reformats. Dose reduction techniques were used.     FINDINGS:  VERTEBRA: 5 lumbar-type vertebral bodies. Alignment is normal. Posterior instrumented fusion L3-L5 and L3-L5 laminectomies. Intact hardware. No acute lumbar spine fracture. Small incidental bone island in the right aspect of the T12 vertebral body. Prior   anterior fusion of L3-L4 and L4-L5. Moderate to marked multilevel facet arthropathy. Mild L1-L2 and L2-L3 degenerative disc disease.     CANAL/FORAMINA: Marked canal narrowing at L2-L3 related to dominant posterior osteophyte. No other significant canal narrowing. Mild multilevel degenerative foraminal narrowing apart from on the right at L5-S1 where it is marked.    PARASPINAL: Refer to CT abdomen pelvis same day for extraspinal findings.      Impression    IMPRESSION:  1.  No acute  lumbar spine fracture.    2.  Anterior and posterior instrumented fusion and laminectomies L3 through L5 with intact hardware.    3.  Moderate to marked degenerative canal narrowing at L2-L3 and marked right L5-S1 degenerative foraminal narrowing.   Echocardiogram Limited   Result Value    LVEF  50-55%    [facility]    [id number]  [id number]  [id number]  [id number]^SALLY^JAIDA^MERYL     [facility]  Echocardiography Laboratory  [address] [place] [address]     Name: AMADEO HAYNES  MRN: [id number]  : 1950  Study Date: 2023 07:10 AM  Age: 72 yrs  Gender: Male  Patient Location: [facility]  Reason For Study: Chest Pain  Ordering Physician: JAIDA ZAVALA  Referring Physician: Luis Alberto Aponte  Performed By: Brandy Russ RDCS     BSA: 2.7 m2  Height: 77 in  Weight: 316 lb  HR: 72  BP: 149/91 mmHg  ______________________________________________________________________________  ______________________________________________________________________________  Interpretation Summary     Technically difficult imaging  Left ventricular systolic function is low normal.  The visual ejection fraction is 50-55%.  Septal motion is consistent with conduction abnormality.  The left ventricle is normal in size.  The right ventricle is normal in structure, function and size.  The pericardium appears normal.  The rhythm was sinus with wide QRS.     Limited imgates were obtained reportedly dute to the patient's limited ablilty  to move from a supine positiion. Grossly there appears to be no change since  2022  ______________________________________________________________________________  Left Ventricle  The left ventricle is normal in size. There is normal left ventricular wall  thickness. Left ventricular systolic function is low normal. The visual  ejection fraction is 50-55%. Septal motion is consistent with conduction  abnormality. There is no thrombus seen in the left ventricle.    "  Right Ventricle  The right ventricle is normal in structure, function and size. There is no  mass or thrombus in the right ventricle.     Atria  Normal left atrial size. Right atrial size is normal. There is no atrial shunt  seen. The left atrial appendage is not well visualized.     Mitral Valve  The mitral valve leaflets appear normal. There is no evidence of stenosis,  fluttering, or prolapse. There is no evidence of mitral valve prolapse. There  is no mitral regurgitation noted.     Tricuspid Valve  The tricuspid valve is not well visualized.     Aortic Valve  There is moderate trileaflet aortic sclerosis. No aortic regurgitation is  present.     Pulmonic Valve  The pulmonic valve is not well visualized. There is trace pulmonic valvular  regurgitation.     Vessels  Mild aortic root dilatation. Inferior vena cava not well visualized for  estimation of right atrial pressure.     Pericardium  The pericardium appears normal. There is no pleural effusion.     Rhythm  The rhythm was sinus with wide QRS.  ______________________________________________________________________________  Report approved by: Dr. Mg Vyas 01/01/2023 11:16 AM     ______________________________________________________________________________          Echo:  No results found for this or any previous visit (from the past 4320 hour(s)).    Clinically Significant Risk Factors Present on Admission       # Hypokalemia: Lowest K = 2.9 mmol/L in last 2 days, will replace as needed   # Hypocalcemia: Lowest Ca = 8.3 mg/dL in last 2 days, will monitor and replace as appropriate          # Obesity: Estimated body mass index is 36.93 kg/m  as calculated from the following:    Height as of this encounter: 1.981 m (6' 6\").    Weight as of this encounter: 145 kg (319 lb 9.6 oz).        Fluid & Electrolyte Disorders: Volume depletion, unspecified      Neurology: Encephalopathy: Encephalopathy, unspecified      Limitations of activities/Fatigue " (optional): Chronic Fatigue and Other Debilities: Other reduced mobility

## 2023-01-01 NOTE — PLAN OF CARE
Blood pressure (!) 149/91, pulse 92, temperature (!) 102.8  F (39.3  C), temperature source Oral, resp. rate 18, SpO2 96 %.    Admit Date: 12/31/22  Admitting Diagnosis: syncope, elevated trop  Neuro: Alert and Oriented x4  Activity: hasnt been OOB   Telemetry Monitoring: yes  Pain: complaining of 8/10 pain in their left side abdomen.  Dilaudid and Oxycodone, tylenol given for pain.  Labs / Tests: lactic acid, trop, CK total  GI: ESPERANZA  : Urinal in bed, needs assistance  Fluids: has Lactated Ringer's running at 125 mL per hour.  Diet: orders need to be obtained  Consults: SW/OT/PT, cardiology, podiatry  Treatment:  Discharge Disposition:TBD

## 2023-01-01 NOTE — PROVIDER NOTIFICATION
Provider Notified : Critical lab result explaining the blood culture collected earlier: streptococcus species. They are not sure the exact species yet. This is from [facility] lab.

## 2023-01-01 NOTE — ED NOTES
"[facility]  ED Nurse Handoff Report    Federico Chamberlain is a 72 year old male   ED Chief complaint: Fall (l) and Loss of Consciousness  . ED Diagnosis:   Final diagnoses:   Syncope, unspecified syncope type   Traumatic rhabdomyolysis, initial encounter (H)   Toe infection   Elevated troponin     Allergies: No Known Allergies    Code Status: Full Code  Activity level - Baseline/Home:  Independent. Activity Level - Current:   Assist X 2. Lift room needed: No. Bariatric: No   Needed: No   Isolation: No. Infection: Not Applicable.     Vital Signs:   Vitals:    12/31/22 2330 12/31/22 2345 01/01/23 0115 01/01/23 0200   BP: (!) 145/133 106/47 (!) 167/88    Pulse: 94 94 94 94   Resp:       Temp:       TempSrc:       SpO2:  95% 97% 97%       Cardiac Rhythm:  ,   Cardiac  Cardiac Rhythm: Normal sinus rhythm  Pain level:    Patient confused: Yes. Patient Falls Risk: Yes.   Elimination Status: Has voided   Patient Report - Initial Complaint: Fall, LOC. Focused Assessment: Pt lives alone at home and BIBA for LOC and fall in bathroom. Pt was seen here x1 week ago for same thing and sent home on external heart monitor. Pt c/o L rib pain that rates 9/10 and L knee pain. Pt states does not know if he hit head stating \"I lost consciousness I just remember waking up on floor\". VSS, A&Ox4, ABCs intact.   Tests Performed:   Labs Ordered and Resulted from Time of ED Arrival to Time of ED Departure   COMPREHENSIVE METABOLIC PANEL - Abnormal       Result Value    Sodium 141      Potassium 3.4      Chloride 103      Carbon Dioxide (CO2) 24      Anion Gap 14      Urea Nitrogen 25.7 (*)     Creatinine 1.14      Calcium 9.2      Glucose 148 (*)     Alkaline Phosphatase 81      AST 44      ALT 35      Protein Total 6.9      Albumin 4.0      Bilirubin Total 1.1      GFR Estimate 69     TROPONIN T, HIGH SENSITIVITY - Abnormal    Troponin T, High Sensitivity 152 (*)    CBC WITH PLATELETS AND DIFFERENTIAL - Abnormal    WBC " Count 25.2 (*)     RBC Count 5.28      Hemoglobin 15.1      Hematocrit 45.9      MCV 87      MCH 28.6      MCHC 32.9      RDW 13.9      Platelet Count 213      % Neutrophils 92      % Lymphocytes 2      % Monocytes 5      % Eosinophils 0      % Basophils 0      % Immature Granulocytes 1      NRBCs per 100 WBC 0      Absolute Neutrophils 23.1 (*)     Absolute Lymphocytes 0.5 (*)     Absolute Monocytes 1.3      Absolute Eosinophils 0.0      Absolute Basophils 0.1      Absolute Immature Granulocytes 0.3      Absolute NRBCs 0.0     CK TOTAL - Abnormal    CK 1,377 (*)    ISTAT GASES LACTATE VENOUS POCT - Abnormal    Lactic Acid POCT 3.4 (*)     Bicarbonate Venous POCT 24      O2 Sat, Venous POCT 54 (*)     pCO2V Venous POCT 38 (*)     pH Venous POCT 7.41      pO2 Venous POCT 28     UA MACROSCOPIC WITH REFLEX TO MICRO AND CULTURE - Abnormal    Color Urine Yellow      Appearance Urine Clear      Glucose Urine Negative      Bilirubin Urine Negative      Ketones Urine Negative      Specific Gravity Urine 1.030      Blood Urine Moderate (*)     pH Urine 5.5      Protein Albumin Urine 50 (*)     Urobilinogen Urine Normal      Nitrite Urine Negative      Leukocyte Esterase Urine Negative      Mucus Urine Present (*)     RBC Urine 2      WBC Urine 3      Squamous Epithelials Urine 1     LACTIC ACID WHOLE BLOOD - Abnormal    Lactic Acid 3.2 (*)    TROPONIN T, HIGH SENSITIVITY - Abnormal    Troponin T, High Sensitivity 168 (*)    ISTAT GASES LACTATE VENOUS POCT - Abnormal    Lactic Acid POCT 3.1 (*)     Bicarbonate Venous POCT 24      O2 Sat, Venous POCT 42 (*)     pCO2V Venous POCT 37 (*)     pH Venous POCT 7.43      pO2 Venous POCT 23 (*)    MAGNESIUM - Normal    Magnesium 1.7     ETHYL ALCOHOL LEVEL - Normal    Alcohol ethyl <0.01     ISTAT CREATININE POCT - Normal    Creatinine POCT 1.2      GFR, ESTIMATED POCT >60     INFLUENZA A/B & SARS-COV2 PCR MULTIPLEX - Normal    Influenza A PCR Negative      Influenza B PCR Negative       RSV PCR Negative      SARS CoV2 PCR Negative     PARTIAL THROMBOPLASTIN TIME - Normal    aPTT 29     INR - Normal    INR 1.13     DRUG ABUSE SCREEN 1 URINE (ED)   CK TOTAL   ISTAT CREATININE POCT   TYPE AND SCREEN, ADULT    ABO/RH(D) O POS      Antibody Screen Negative      SPECIMEN EXPIRATION DATE [id number]     BLOOD CULTURE   BLOOD CULTURE   ABO/RH TYPE AND SCREEN     Lumbar spine CT w/o contrast   Final Result   IMPRESSION:   1.  No acute lumbar spine fracture.      2.  Anterior and posterior instrumented fusion and laminectomies L3 through L5 with intact hardware.      3.  Moderate to marked degenerative canal narrowing at L2-L3 and marked right L5-S1 degenerative foraminal narrowing.      CT Chest (PE) Abdomen Pelvis w Contrast   Final Result   IMPRESSION:   1.  Motion artifact degrades numerous images. Suboptimal opacification of the pulmonary vasculature. No obvious pulmonary emboli on either side. Mild diffuse thickening of the bronchi. Dependent atelectasis      2.  Atherosclerotic thoracic aorta, mildly aneurysmal ascending segment. No dissection. Mild cardiac enlargement and coronary artery calcifications. No pericardial effusion. Aortoiliac atherosclerotic changes without aneurysm.      3.  Fatty liver. Hypodense hepatic cysts, no specific follow-up required. Cortical simple cysts in the kidneys, no specific follow-up required. Nonobstructive stone in the right kidney posteriorly, unchanged. Alvarez catheter has been removed.    Prostatectomy.      4.  Degenerative changes both shoulders, spine and joints of the pelvis. Postoperative changes of L3-L5 posterior instrumented fusion. Slight left convex lumbar curve. Please see separate report for CT spine.      CT Thoracic Spine w/o Contrast   Final Result   IMPRESSION:   1.  No acute thoracic spine fracture.         Cervical spine CT w/o contrast   Final Result   IMPRESSION:   HEAD CT:   1.  No acute intracranial abnormality or significant change  compared to the prior study.      CERVICAL SPINE CT:   1.  No CT evidence for acute fracture or post traumatic subluxation.      2.  Moderate multilevel degenerative canal narrowing and moderate to marked multilevel degenerative foraminal narrowing.      Head CT w/o contrast   Final Result   IMPRESSION:   HEAD CT:   1.  No acute intracranial abnormality or significant change compared to the prior study.      CERVICAL SPINE CT:   1.  No CT evidence for acute fracture or post traumatic subluxation.      2.  Moderate multilevel degenerative canal narrowing and moderate to marked multilevel degenerative foraminal narrowing.      XR Knee Port Left 3 Views   Final Result   IMPRESSION: Normal left knee joint spaces and alignment. No fracture or joint effusion. Benign-appearing cortical thickening posterior aspects of the upper tibia and fibula. Vascular calcifications.      XR Foot Port Right 3 Views   Final Result   IMPRESSION: No visible fracture, dislocation or definitive destructive change. MRI would be more sensitive for exclusion of osteomyelitis if indicated. Soft tissue edema great toe distally.        . Abnormal Results: Lactic.   Treatments provided: See MAR  Family Comments: Wife  OBS brochure/video discussed/provided to patient:  No  ED Medications:   Medications   piperacillin-tazobactam (ZOSYN) intermittent infusion 4.5 g (0 g Intravenous Stopped 1/1/23 0118)   vancomycin (VANCOCIN) 2,500 mg in sodium chloride 0.9 % 500 mL intermittent infusion (2,500 mg Intravenous New Bag 1/1/23 0118)   lactated ringers BOLUS 1,000 mL (0 mLs Intravenous Stopped 12/31/22 2318)   HYDROmorphone (PF) (DILAUDID) injection 0.5 mg (0.5 mg Intravenous Given 12/31/22 2204)   0.9% sodium chloride BOLUS (0 mLs Intravenous Stopped 1/1/23 0118)   0.9% sodium chloride BOLUS (0 mLs Intravenous Stopped 1/1/23 0118)   HYDROmorphone (PF) (DILAUDID) injection 0.3 mg (0.3 mg Intravenous Given 12/31/22 2331)   iopamidol (ISOVUE-370) solution  "500 mL (77 mLs Intravenous Given 1/1/23 0051)   CT Scan Flush (90 mLs Intravenous Given 1/1/23 0051)     Drips infusing:  No  For the majority of the shift, the patient's behavior Yellow. Interventions performed were reorientation.    Sepsis treatment initiated: Yes    Per the ED Provider, Time Zero for severe sepsis or septic shock is:  2246    3 Hour Severe Sepsis Bundle Completion:  1. Initial Lactic Acid Result:   Recent Labs   Lab Test 01/01/23  0120 01/01/23  0119 12/31/22  2225   LACT 3.1* 3.2* 3.4*     2. Blood Cultures before Antibiotics: Yes  3. Broad Spectrum Antibiotics Administered:     Anti-infectives (From now, onward)      Start     Dose/Rate Route Frequency Ordered Stop    01/01/23 0025  vancomycin (VANCOCIN) 2,500 mg in sodium chloride 0.9 % 500 mL intermittent infusion         25 mg/kg × 143.3 kg  over 2 Hours Intravenous ONCE 01/01/23 0024      12/31/22 2220  piperacillin-tazobactam (ZOSYN) intermittent infusion 4.5 g        Note to Pharmacy: For [facility], [facility] and [facility]: For Zosyn-naive patients, use the \"Zosyn initial dose + extended infusion\" order panel.    4.5 g  200 mL/hr over 30 Minutes Intravenous EVERY 6 HOURS 12/31/22 2215            4. 3000 ml of IV fluids have been given so far      6 Hour Severe Sepsis Bundle Completion:    1. Repeat Lactic Acid Level: Last result   Lab Results   Component Value Date    LACT 3.1 (H) 01/01/2023     2. Patient currently on Vasopressors =  No     Patient tested for COVID 19 prior to admission: YES    ED Nurse Name/Phone Number: Krysta Becerra RN,   2:03 AM      "

## 2023-01-01 NOTE — PHARMACY-ADMISSION MEDICATION HISTORY
Admission medication history interview status for this patient is complete. See [facility] admission navigator for allergy information, prior to admission medications and immunization status.     Medication history interview done, indicate source(s): Patient  Medication history resources (including written lists, pill bottles, clinic record):SureScripts and Care Everywhere  Pharmacy: [id number] - [place], [place] - [address] [place] [address]    Changes made to PTA medication list:  Added: cephalexin  Changed: potassium 10 mEq BID --? 20 mEq every day; semaglutide 1 mg weekly --> 0.5 mg weekly  Reported as Not Taking: None  Removed: sildenafil, ibuprofen, diclofenac, flexeril 5 mg qam    Actions taken by pharmacist (provider contacted, etc): Spoke with patient to verify med list.    Additional medication history information: Patient recently discharged from our hospital on 12/20/22. The patient his memory has not been great after his recent fall and was unable to tell me when he last took his medications.     He was able to go over his medication list with me. He stated that he gave the police his medication list, but I was unable to find it in his chart after my interview with him.    Patient has not increased his ozempic dose from 0.5 mg to 1 mg yet due to the the medication shortage. He stated his most recent injection day has been Fridays.    Patient stated he no longer wants to take the tadalafil anymore and is planning to speak to his provider about this.    Patient believes his fall might be due to the increase in his carvedilol dose. It used to be 12.5 mg BID and it was increased to 25 mg BID.    Patient believes he was only taking 25 mg of chlorthalidone daily, but his recent discharge and prescription fill on 12/20/22 states he should be taking 50 mg every day.  Amlodipine and hydralazine was stopped when he was discharged from the hospital on 12/20/22.     Medication reconciliation/reorder completed by provider  prior to medication history?  Y   (Y/N)     For patients on insulin therapy: N    Prior to Admission medications    Medication Sig Last Dose Taking? Auth Provider Long Term End Date   aspirin 81 MG EC tablet Take 81 mg by mouth daily Unknown Yes Unknown, Entered By History     atorvastatin (LIPITOR) 40 MG tablet Take 40 mg by mouth daily Unknown Yes Unknown, Entered By History No    carvedilol (COREG) 25 MG tablet Take 1 tablet (25 mg) by mouth 2 times daily (with meals) for 30 days Unknown Yes Vannesa Copeland PA-C Yes 1/19/23   cephALEXin (KEFLEX) 500 MG capsule Take 500 mg by mouth 2 times daily for 7 days Unknown Yes Unknown, Entered By History     chlorthalidone (HYGROTON) 25 MG tablet Take 2 tablets (50 mg) by mouth daily Unknown Yes Vannesa Copeland PA-C Yes    cyclobenzaprine (FLEXERIL) 5 MG tablet Take 5 mg by mouth 3 times daily as needed for muscle spasms Unknown Yes Unknown, Entered By History     ferrous sulfate (FEROSUL) 325 (65 Fe) MG tablet Take 325 mg by mouth daily (with breakfast) Unknown Yes Unknown, Entered By History     gabapentin (NEURONTIN) 800 MG tablet Take 800 mg by mouth 3 times daily Unknown Yes Unknown, Entered By History Yes    lisinopril (ZESTRIL) 40 MG tablet Take 40 mg by mouth daily Unknown Yes Unknown, Entered By History No    metFORMIN (GLUCOPHAGE) 500 MG tablet Take 500 mg by mouth daily Unknown Yes Unknown, Entered By History No    multivitamin w/minerals (THERA-VIT-M) tablet Take 1 tablet by mouth At Bedtime Unknown Yes Unknown, Entered By History     potassium chloride ER (KLOR-CON M) 10 MEQ CR tablet Take 20 mEq by mouth daily Unknown Yes Unknown, Entered By History     Pramipexole Dihydrochloride (MIRAPEX PO) Take 3 mg by mouth At Bedtime Unknown Yes Unknown, Entered By History     semaglutide (OZEMPIC, 1 MG/DOSE,) 2 MG/1.5ML pen Inject 0.5 mg Subcutaneous every 7 days On Fridays 12/30/2022 Yes Unknown, Entered By History     spironolactone (ALDACTONE) 25 MG tablet  Take 25 mg by mouth daily Unknown Yes Unknown, Entered By History Yes    tadalafil (CIALIS) 5 MG tablet Take 5 mg by mouth every morning Unknown Yes Unknown, Entered By History Yes    VITAMIN D, CHOLECALCIFEROL, PO Take 4,000 Units by mouth daily Unknown Yes Unknown, Entered By History     silver sulfADIAZINE (SILVADENE) 1 % external cream Apply topically daily as needed   Unknown, Entered By History

## 2023-01-01 NOTE — PROGRESS NOTES
VSS ex requiring 2L O2 via oxymask while asleep after pain meds. Patient moaning in pain (left abdominal), oxycodone given with no relief, patient then relaxed and sleeping after IV dilaudid. Lidocaine patches placed as well. Lactic 3 then 2.5. On IV zosyn and IVF. Potassium 2.9, replaced, recheck at 1600 and will recheck lactic at that time as well per order. Trop 156, echo completed, cardiology consulted. Right great toe wound bleeding, adaptic and gauze placed, podiatry consulted. Poor appetite, ordered yogurt and apple juice but has not eaten yet.  and 115. Voiding via urinal plus dribbling/incontinence. Up with A2 walker gait belt. Tele - SR BBB. PT/OT/SW consults.

## 2023-01-01 NOTE — PROVIDER NOTIFICATION
DATE:  1/1/2023   TIME OF RECEIPT FROM LAB:  0240  LAB TEST:  CK  LAB VALUE:  2,293  RESULTS GIVEN WITH READ-BACK TO (PROVIDER): Admitting pager  TIME LAB VALUE REPORTED TO PROVIDER:   0242

## 2023-01-01 NOTE — PROGRESS NOTES
Patent was seen and examined. He was admitted by my colleague this morning.. He is a 72 year old male with historyof type II DM, HTN, HLD, RLS, RBBB, first-degree AVB, MDD, lumbar fusion, gout, TIA, prostate cancer s/p prostatectomy, erectile dysfunction, and recent overnight hospitalization on 12/19 for syncopal episode. He presented to the [facility] ED on 12/31/22 after being found down in the bathroom with loss of consciousness.  On 12/19 he had negative brain MRI, MRA head and neck.  He had trivial troponin elevation with negative TTE. Blood pressure medications were adjusted as it was felt to be syncope from polypharmacy and hypotension. He discharged home on cardiac monitor. He presented to the ED this time with confusion.  He did not remember falling in the bathroom, but woke up on the floor.  He reported left chest wall pain and left knee pain.  He had been seen in podiatry clinic 3 days earlier and prescribed cephalexin for right first toe wound that was thought to be due to frostbite. ED evaluation showed heart rate in the 90s. He had temperature as high as 102.8  F. Exam showed swollen and erythematous right large toe. Laboratory evaluation showed lactic acid 3.4 and 3.1, procalcitonin 3.8, WBC 25.2, hemoglobin 15.1, platelet count 213, creatinine 1.14, BUN 25, CK 1377, normal LFTs, blood glucose 148, and troponin 152. Urinalysis showed 3 WBCs and negative LE.  COVID-19, influenza A/B, and RSV testing was negative.  Alcohol level undetectable.  EKG showed NSR with bifascicular block, QTC prolongation of 541, and V3-V6 ST depression.   CTs of head, C/T/L spine, and chest/ab/pelvis showed no traumatic injury or obvious infection. XRs of right foot and left knee showed no traumatic injuries. He received IV vancomycin and Zosyn for severe sepsis.  He also received 2 L NS, 1 L LR, Dilaudid 0.3 mg followed by 0.5 mg.  He was admitted as inpatient with sepsis possibly due to right great toe infection, lactic acidosis,   NSTEMI, and rhabdomyolysis. He was treated with Zosyn and Vancomycin. Podiatry was consulted for right great toe cellulitis and recommended no immediate intervention. Echocardiogram was obtained for elevated troponin and possible NSTEMI and was unremarkable with EF of 50-55% and no wall motion abnormality. Cardiology was consulted with troponin elevation and issues with recurrent syncope. There was some concern for heart block given bifascicular block with 1st degree block. Continued telemetry monitoring and avoiding QTc prolonging drugs was recommended. Immediate ischemic evaluation was not recommended given sepsis. IV fluid was given for rhabdomyolysis.     Continue Zosyn and Vancomycin. Home meds addressed. Continue PTA coreg, ASA, and Lipitor. No heparin drip given flat troponins, unremarkable echo, and recent trauma. Full note tomorrow. Decrease LR to 75 ml/hr for rhabdomyolysis. AM BMP and CK.

## 2023-01-01 NOTE — CONSULTS
"Foot & Ankle Surgery  January 1, 2023    CC: right great toe wound    I was asked to see Federico Chamberlain regarding the chief complaint by:  Dr. FLEX Diaz    HPI:  Pt is a 72 year old male who presents with above complaint.  Admission for severe sepsis with concerns for origin of infection associated with right great toe wound.  He was seen by an outside specialist within the past few days for a right great toe wound.  Patient states there was an injury to the toe.  He was given a Rx for Keflex,but this was not started.  xrays of the right foot showed no acute MSK pathology or acute SOI.  The patient was seen in the ER.  Was given IV Vanc and Zosyn in ER, now on just Zosyn.    ROS:   Pos for CC.  The patient denies current nausea, vomiting, chills, fevers, belly pain, calf pain, chest pain or SOB.  Complete remainder of ROS is otherwise neg.    VITALS:    Vitals:    01/01/23 0440 01/01/23 0746 01/01/23 0842 01/01/23 1046   BP:  133/80  128/69   BP Location:  Right arm  Right arm   Pulse:  69  80   Resp:  16  13   Temp:  98.5  F (36.9  C)  98.1  F (36.7  C)   TempSrc:  Oral  Oral   SpO2: 96% 95%  100%   Weight:   145 kg (319 lb 9.6 oz)    Height:   1.981 m (6' 6\")        PMH:    Past Medical History:   Diagnosis Date     Hypercholesterolemia      Hypertension        SXHX:    Past Surgical History:   Procedure Laterality Date     APPENDECTOMY       BACK SURGERY       ORTHOPEDIC SURGERY          MEDS:    Current Facility-Administered Medications   Medication     acetaminophen (TYLENOL) tablet 975 mg     [START ON 1/2/2023] aspirin EC tablet 81 mg     carvedilol (COREG) tablet 12.5 mg     cyclobenzaprine (FLEXERIL) tablet 5 mg     glucose gel 15-30 g    Or     dextrose 50 % injection 25-50 mL    Or     glucagon injection 1 mg     gabapentin (NEURONTIN) capsule 800 mg     hydrALAZINE (APRESOLINE) injection 10 mg     HYDROmorphone (DILAUDID) injection 0.4 mg     insulin aspart (NovoLOG) injection (RAPID ACTING)     insulin " aspart (NovoLOG) injection (RAPID ACTING)     lactated ringers infusion     Lidocaine (LIDOCARE) 4 % Patch 1-2 patch     lidocaine (LMX4) cream     lidocaine 1 % 0.1-1 mL     lidocaine patch in PLACE     [Held by provider] lisinopril (ZESTRIL) tablet 40 mg     melatonin tablet 1 mg     oxyCODONE (ROXICODONE) tablet 10 mg     oxyCODONE (ROXICODONE) tablet 5 mg     piperacillin-tazobactam (ZOSYN) infusion 3.375 g     polyethylene glycol (MIRALAX) Packet 17 g     pramipexole (MIRAPEX) tablet 3 mg     prochlorperazine (COMPAZINE) injection 5 mg    Or     prochlorperazine (COMPAZINE) tablet 5 mg    Or     prochlorperazine (COMPAZINE) suppository 12.5 mg     senna-docusate (SENOKOT-S/PERICOLACE) 8.6-50 MG per tablet 1 tablet    Or     senna-docusate (SENOKOT-S/PERICOLACE) 8.6-50 MG per tablet 2 tablet     sodium chloride (PF) 0.9% PF flush 3 mL     sodium chloride (PF) 0.9% PF flush 3 mL     Current Outpatient Medications   Medication     aspirin 81 MG EC tablet     atorvastatin (LIPITOR) 40 MG tablet     carvedilol (COREG) 25 MG tablet     cephALEXin (KEFLEX) 500 MG capsule     chlorthalidone (HYGROTON) 25 MG tablet     cyclobenzaprine (FLEXERIL) 5 MG tablet     ferrous sulfate (FEROSUL) 325 (65 Fe) MG tablet     gabapentin (NEURONTIN) 800 MG tablet     lisinopril (ZESTRIL) 40 MG tablet     metFORMIN (GLUCOPHAGE) 500 MG tablet     multivitamin w/minerals (THERA-VIT-M) tablet     potassium chloride ER (KLOR-CON M) 10 MEQ CR tablet     Pramipexole Dihydrochloride (MIRAPEX PO)     semaglutide (OZEMPIC, 1 MG/DOSE,) 2 MG/1.5ML pen     spironolactone (ALDACTONE) 25 MG tablet     tadalafil (CIALIS) 5 MG tablet     VITAMIN D, CHOLECALCIFEROL, PO     silver sulfADIAZINE (SILVADENE) 1 % external cream       ALL:   No Known Allergies    FMH:  No family history on file.    SocHx:    Social History     Socioeconomic History     Marital status:      Spouse name: Not on file     Number of children: Not on file     Years of  education: Not on file     Highest education level: Not on file   Occupational History     Not on file   Tobacco Use     Smoking status: Never     Smokeless tobacco: Never   Substance and Sexual Activity     Alcohol use: No     Drug use: No     Sexual activity: Not on file   Other Topics Concern     Not on file   Social History Narrative     Not on file     Social Determinants of Health     Financial Resource Strain: Not on file   Food Insecurity: Not on file   Transportation Needs: Not on file   Physical Activity: Not on file   Stress: Not on file   Social Connections: Not on file   Intimate Partner Violence: Not on file   Housing Stability: Not on file           EXAMINATION:  Gen:   No apparent distress  Neuro:   A&Ox3, no deficits  Psych:    Answering questions appropriately for age and situation with normal affect  Head:    NCAT  Eye:    Visual scanning without deficit  Ear:    Response to auditory stimuli wnl  Lung:    Non-labored breathing on RA noted  Abd:    NTND per patient report  Lymph:    Neg for pitting/non-pitting edema BLE  Vasc:    Pulses palpable, CFT minimally delayed  Neuro:    Light touch sensation intact to all sensory nerve distributions without paresthesias  Derm:    Superficial wound distal-plantar right great toe, bleeding healthy base without adjacent erythema.  He does have onychocryptosis with superficial wound at the medial R hallux nail fold, and mild proximal nail fold paronychia.  The nail is not loose.  Right lower extremity erythema/cellulitis noted.    MSK:    ROM, strength wnl without limitation, no pain on palpation noted.  Calf:    Neg for redness, swelling or tenderness      Imaging:  IMPRESSION: No visible fracture, dislocation or definitive destructive change. MRI would be more sensitive for exclusion of osteomyelitis if indicated. Soft tissue edema great toe distally.    Labs:    Component      Latest Ref Rng & Units 12/31/2022 1/1/2023   Sodium      136 - 145 mmol/L  141    Potassium      3.4 - 5.3 mmol/L  2.9 (L)   Chloride      98 - 107 mmol/L  103   Carbon Dioxide (CO2)      22 - 29 mmol/L  22   Anion Gap      7 - 15 mmol/L  16 (H)   Urea Nitrogen      8.0 - 23.0 mg/dL  26.6 (H)   Creatinine      0.67 - 1.17 mg/dL  1.04   Calcium      8.8 - 10.2 mg/dL  8.3 (L)   Glucose      70 - 99 mg/dL  151 (H)   GFR Estimate      >60 mL/min/1.73m2  77   WBC      4.0 - 11.0 10e3/uL  20.8 (H)   RBC Count      4.40 - 5.90 10e6/uL  4.93   Hemoglobin      13.3 - 17.7 g/dL  14.1   Hematocrit      40.0 - 53.0 %  43.3   MCV      78 - 100 fL  88   MCH      26.5 - 33.0 pg  28.6   MCHC      31.5 - 36.5 g/dL  32.6   RDW      10.0 - 15.0 %  14.1   Platelet Count      150 - 450 10e3/uL  167   Lactic Acid      0.7 - 2.0 mmol/L  3.2 (H)   Hemoglobin A1C      <5.7 % 6.0 (H)        Assessment:  72 year old male with superficial wound R great toe, and onychocryptosis medial R hallux with low-grade paronychia, with right lower extremity cellulitis in setting of DMII      Plan:  Discussed etiologies, anatomy and options  1.  Superficial right great toe wound,and onychocryptosis medial right hallux with low-grade paronycyhia, with right lower extremity cellulitis in setting of DMII  -I personally reviewed and interpreted the patient's lower extremity history pertinent to today's visit, including imaging/labs, in preparation for initiating a treatment program.  -I personally reviewed/interpreted right lower extremity foot films.  -wound right great toe is stable without acute concerning findings.  However, onychocryptosis with low-grade paronychia medial R hallux may be contributing factor, although cellulitis extending up to the knee in setting of ingrown nail is not commonly seen.  Will reassess tomorrow.  Consider bedside nail removal if erythema persists.    -agree with Placido Rudolph DPM FACFAS FACFAOM  Podiatric Foot & Ankle Surgeon  [facility]  [phone]    Disclaimer:  This note consists of symbols derived from keyboarding, dictation and/or voice recognition software. As a result, there may be errors in the script that have gone undetected. Please consider this when interpreting information found in this chart.

## 2023-01-01 NOTE — ED PROVIDER NOTES
History     Chief Complaint:  Fall (l) and Loss of Consciousness       The history is provided by the patient and the EMS personnel. The history is limited by the condition of the patient (vague historian).      Federico Chamberlain is a 72 year old male with a history of hyperlipidemia, hypertension, and type II diabetes mellitus who presents with fall and loss of consciousness. The patient reports that he does not remember falling while walking, but woke up today on the floor in his bathroom. He noticed some blood, and believes it was coming from a wound on his head that was from a fall he had last week. He was unable to get up, called his ex-wife, and she called EMS. He is having rib pain, primarily on the left side, and left knee pain, as well as some nausea. He does not use a cane or walker at baseline. No alcohol use today. He was supposed to start a course of cephalexin today for an infection of his right great toe.     Independent Historian: patient      Review of External Notes: The patient was recently admitted from 12/19 to 12/20 for an episode of syncope preceded by some palpitations and hypotension. Resulting laceration to his forehead. Discharged with a Zio heart monitor. MRA, MRI, and ECHO obtained, results below.     Echocardiogram - 12/20  Interpretation Summary     The visual ejection fraction is 50-55%.  There are regional wall motion abnormalities as specified.  Mild aortic root dilatation.  The ascending aorta is Moderately dilated.  The study was technically difficult.    MR Brain w and w/o Contrast - 12/19  IMPRESSION:  1.  No acute intracranial finding.    MRA Brain w/o Contrast - 12/19  IMPRESSION:  1.  Mild to moderate stenosis of the A3 segment of the right SHERIDAN.  2.  No large vessel occlusion or aneurysm.    Neck MRA w/o and w Contrast - 12/19  IMPRESSION:  1.  No carotid or vertebral artery hemodynamically significant stenosis, dissection, or pseudoaneurysm.    ROS:  Review of Systems    Gastrointestinal: Positive for nausea.   Musculoskeletal: Positive for arthralgias and myalgias.   Neurological: Positive for syncope.   All other systems reviewed and are negative.      Allergies:  No known drug allergies      Medications:    Aspirin 81 mg  Atorvastatin  Amlodipine   Cyclobenzaprine  Ferrous sulfate  Gabapentin  Potassium chloride  Lisinopril  Metformin  Carvedilol  Chlorthalidone   Pramipexole   Spironolactone  Tadalafil  Cephalexin     Past Medical History:    Hypercholesteremia   Hypertension   Carpal tunnel  RLS  Aortic dilatation  Prostate cancer  Depression   Insomnia   Lumbar spinal fusion  Lumbosacral radiculitis   Hyperlipidemia   Osteoarthritis   Type 2 diabetes mellitus   Gout     Past Surgical History:    Tonsillectomy   Adenoidectomy   Lumbar laminectomy     Appendectomy   Lumbar fusion    Family History:    Father: cataract  Mother: CHF  Brother: glaucoma, MI    Social History:  Patient presents via EMS  Lives alone at home  PCP: Luis Alberto Aponte     Physical Exam     Patient Vitals for the past 24 hrs:   BP Temp Temp src Pulse Resp SpO2   01/01/23 0200 -- -- -- 94 -- 97 %   01/01/23 0115 (!) 167/88 -- -- 94 -- 97 %   12/31/22 2345 106/47 -- -- 94 -- 95 %   12/31/22 2330 (!) 145/133 -- -- 94 -- --   12/31/22 2315 (!) 159/87 -- -- 93 -- 100 %   12/31/22 2300 134/80 -- -- 93 -- 98 %   12/31/22 2247 118/74 -- -- 95 -- --   12/31/22 2232 111/56 -- -- 93 -- 98 %   12/31/22 2217 122/68 -- -- 92 -- 97 %   12/31/22 2147 118/83 -- -- 91 18 91 %   12/31/22 2139 118/83 98  F (36.7  C) Oral -- -- --        Physical Exam  General: Alert. Ziopatch in place  Head:  The scalp is with abrasion of various aging  Eyes:  Sclera white; Pupils are equal and round  ENT:    External ears normal.  No hemotympanum.      External nares normal.  No septal hematoma.      Dry oral mucosa  Neck:  No midline tenderness or pain with full ROM.  CV:  Rate as above with regular rhythm   2/2 radial and dorsal pedal  pulses; L. Chest wall with ecchymosis, TTP, no crepitance  Resp:  Breath sounds clear and equal bilaterally    Non-labored, no retractions or accessory muscle use  GI:  Abdomen soft, non-tender, non-distended    No rebound tenderness or guarding  MSK:  No midline tenderness or bony step-off    No deformity    Moves all extremities equally and symmetrically; R. Great toe with erythema/warmth and granulation tissue; full active ROM flexion/extension; L. Knee with tenderness to palpation, no overlying warmth/erythema  Skin:  No rash or lesions noted.  Neuro:   No apparent deficit.    GCS: 15  Psych:  Flat affect       Emergency Department Course   ECG  ECG taken at 2149, ECG read at 2150  Normal sinus rhythm; RBBB; left anterior fascicular block; bifascicular block   No 1st degree AV block as compared to prior, dated 12/19/22.  Rate 91 bpm. MA interval 208 ms. QRS duration 190 ms. QT/QTc 440/541 ms. P-R-T axes 90 -63 -12.      Imaging:  Lumbar spine CT w/o contrast   Final Result   IMPRESSION:   1.  No acute lumbar spine fracture.      2.  Anterior and posterior instrumented fusion and laminectomies L3 through L5 with intact hardware.      3.  Moderate to marked degenerative canal narrowing at L2-L3 and marked right L5-S1 degenerative foraminal narrowing.      CT Chest (PE) Abdomen Pelvis w Contrast   Final Result   IMPRESSION:   1.  Motion artifact degrades numerous images. Suboptimal opacification of the pulmonary vasculature. No obvious pulmonary emboli on either side. Mild diffuse thickening of the bronchi. Dependent atelectasis      2.  Atherosclerotic thoracic aorta, mildly aneurysmal ascending segment. No dissection. Mild cardiac enlargement and coronary artery calcifications. No pericardial effusion. Aortoiliac atherosclerotic changes without aneurysm.      3.  Fatty liver. Hypodense hepatic cysts, no specific follow-up required. Cortical simple cysts in the kidneys, no specific follow-up required.  Nonobstructive stone in the right kidney posteriorly, unchanged. Alvarez catheter has been removed.    Prostatectomy.      4.  Degenerative changes both shoulders, spine and joints of the pelvis. Postoperative changes of L3-L5 posterior instrumented fusion. Slight left convex lumbar curve. Please see separate report for CT spine.      CT Thoracic Spine w/o Contrast   Final Result   IMPRESSION:   1.  No acute thoracic spine fracture.         Cervical spine CT w/o contrast   Final Result   IMPRESSION:   HEAD CT:   1.  No acute intracranial abnormality or significant change compared to the prior study.      CERVICAL SPINE CT:   1.  No CT evidence for acute fracture or post traumatic subluxation.      2.  Moderate multilevel degenerative canal narrowing and moderate to marked multilevel degenerative foraminal narrowing.      Head CT w/o contrast   Final Result   IMPRESSION:   HEAD CT:   1.  No acute intracranial abnormality or significant change compared to the prior study.      CERVICAL SPINE CT:   1.  No CT evidence for acute fracture or post traumatic subluxation.      2.  Moderate multilevel degenerative canal narrowing and moderate to marked multilevel degenerative foraminal narrowing.      XR Knee Port Left 3 Views   Final Result   IMPRESSION: Normal left knee joint spaces and alignment. No fracture or joint effusion. Benign-appearing cortical thickening posterior aspects of the upper tibia and fibula. Vascular calcifications.      XR Foot Port Right 3 Views   Final Result   IMPRESSION: No visible fracture, dislocation or definitive destructive change. MRI would be more sensitive for exclusion of osteomyelitis if indicated. Soft tissue edema great toe distally.         Report per radiology    Laboratory:  Labs Ordered and Resulted from Time of ED Arrival to Time of ED Departure   COMPREHENSIVE METABOLIC PANEL - Abnormal       Result Value    Sodium 141      Potassium 3.4      Chloride 103      Carbon Dioxide (CO2)  24      Anion Gap 14      Urea Nitrogen 25.7 (*)     Creatinine 1.14      Calcium 9.2      Glucose 148 (*)     Alkaline Phosphatase 81      AST 44      ALT 35      Protein Total 6.9      Albumin 4.0      Bilirubin Total 1.1      GFR Estimate 69     TROPONIN T, HIGH SENSITIVITY - Abnormal    Troponin T, High Sensitivity 152 (*)    CBC WITH PLATELETS AND DIFFERENTIAL - Abnormal    WBC Count 25.2 (*)     RBC Count 5.28      Hemoglobin 15.1      Hematocrit 45.9      MCV 87      MCH 28.6      MCHC 32.9      RDW 13.9      Platelet Count 213      % Neutrophils 92      % Lymphocytes 2      % Monocytes 5      % Eosinophils 0      % Basophils 0      % Immature Granulocytes 1      NRBCs per 100 WBC 0      Absolute Neutrophils 23.1 (*)     Absolute Lymphocytes 0.5 (*)     Absolute Monocytes 1.3      Absolute Eosinophils 0.0      Absolute Basophils 0.1      Absolute Immature Granulocytes 0.3      Absolute NRBCs 0.0     CK TOTAL - Abnormal    CK 1,377 (*)    ISTAT GASES LACTATE VENOUS POCT - Abnormal    Lactic Acid POCT 3.4 (*)     Bicarbonate Venous POCT 24      O2 Sat, Venous POCT 54 (*)     pCO2V Venous POCT 38 (*)     pH Venous POCT 7.41      pO2 Venous POCT 28     UA MACROSCOPIC WITH REFLEX TO MICRO AND CULTURE - Abnormal    Color Urine Yellow      Appearance Urine Clear      Glucose Urine Negative      Bilirubin Urine Negative      Ketones Urine Negative      Specific Gravity Urine 1.030      Blood Urine Moderate (*)     pH Urine 5.5      Protein Albumin Urine 50 (*)     Urobilinogen Urine Normal      Nitrite Urine Negative      Leukocyte Esterase Urine Negative      Mucus Urine Present (*)     RBC Urine 2      WBC Urine 3      Squamous Epithelials Urine 1     LACTIC ACID WHOLE BLOOD - Abnormal    Lactic Acid 3.2 (*)    TROPONIN T, HIGH SENSITIVITY - Abnormal    Troponin T, High Sensitivity 168 (*)    ISTAT GASES LACTATE VENOUS POCT - Abnormal    Lactic Acid POCT 3.1 (*)     Bicarbonate Venous POCT 24      O2 Sat, Venous  POCT 42 (*)     pCO2V Venous POCT 37 (*)     pH Venous POCT 7.43      pO2 Venous POCT 23 (*)    CK TOTAL - Abnormal    CK 2,293 (*)    GLUCOSE BY METER - Abnormal    GLUCOSE BY METER POCT 141 (*)    MAGNESIUM - Normal    Magnesium 1.7     ETHYL ALCOHOL LEVEL - Normal    Alcohol ethyl <0.01     ISTAT CREATININE POCT - Normal    Creatinine POCT 1.2      GFR, ESTIMATED POCT >60     INFLUENZA A/B & SARS-COV2 PCR MULTIPLEX - Normal    Influenza A PCR Negative      Influenza B PCR Negative      RSV PCR Negative      SARS CoV2 PCR Negative     PARTIAL THROMBOPLASTIN TIME - Normal    aPTT 29     INR - Normal    INR 1.13     DRUG ABUSE SCREEN 1 URINE (ED)   PROCALCITONIN   GLUCOSE MONITOR NURSING POCT   HEMOGLOBIN A1C   GLUCOSE MONITOR NURSING POCT   GLUCOSE MONITOR NURSING POCT   GLUCOSE MONITOR NURSING POCT   GLUCOSE MONITOR NURSING POCT   TYPE AND SCREEN, ADULT    ABO/RH(D) O POS      Antibody Screen Negative      SPECIMEN EXPIRATION DATE [id number]     BLOOD CULTURE   BLOOD CULTURE   ABO/RH TYPE AND SCREEN        Emergency Department Course & Assessments:     Interventions:  2204: Lactated ringers 1000 mL IV Bolus   2204: Dilaudid 0.5 mg IV  2315: NS 1L IV Bolus   2315: Zosyn 4.5 g IV Infusion  2315: NS 1L IV Bolus   2331: Dilaudid 0.3 mg IV  0118: Vancocin 2500 mg in NaCl 0.9% 500 mL IV Infusion    Independent Interpretation (X-rays, CTs, rhythm strip):  EKG without ischemic changes    Consultations/Discussion of Management or Tests:  9428 I spoke with the patient's ex wife over the phone.    0151 I spoke with Dr. Diaz from hospitalist service regarding the patient's presentation and workup.     Social Determinants of Health affecting care:  Frequent alcohol use.      Disposition:  The patient was admitted to the hospital under the care of Dr. Diaz    Impression & Plan    CMS Diagnoses:   The patient has signs of Severe Sepsis        If one the following conditions is present, a 30 mL/kg bolus is recommended as  "part of the 6 hour bundle (IBW can be used for BMI >30, or document refusal/contraindication):      1.   Initial hypotension  defined as 2 bps < 90 or map < 65 in the 6hrs before or 3hrs after time zero.     2.  Lactate >4.      The patient has signs of Severe Sepsis as evidenced by:    1. 2 SIRS criteria, AND  2. Suspected infection, AND   3. Organ dysfunction: Lactic Acidosis with value >2.0    Time severe sepsis diagnosis confirmed: 0128  01/01/23 as this was the time when Lactate resulted, and the level was > 2.0    3 Hour Severe Sepsis Bundle Completion:    1. Initial Lactic Acid Result:   Recent Labs   Lab Test 01/01/23  0120 01/01/23  0119 12/31/22  2225   LACT 3.1* 3.2* 3.4*     2. Blood Cultures before Antibiotics: Yes  3. Broad Spectrum Antibiotics Administered:  yes       Anti-infectives (From admission through now)    Start     Dose/Rate Route Frequency Ordered Stop    01/01/23 0025  vancomycin (VANCOCIN) 2,500 mg in sodium chloride 0.9 % 500 mL intermittent infusion         25 mg/kg × 143.3 kg  over 2 Hours Intravenous ONCE 01/01/23 0024      12/31/22 2220  piperacillin-tazobactam (ZOSYN) intermittent infusion 4.5 g        Note to Pharmacy: For [facility], [facility] and [facility]: For Zosyn-naive patients, use the \"Zosyn initial dose + extended infusion\" order panel.    4.5 g  200 mL/hr over 30 Minutes Intravenous EVERY 6 HOURS 12/31/22 2215            4. Is initial hypotension present?     No     Severe Sepsis reassessment:  1. Repeat Lactic Acid Level within 6 hours of time zero: 3.1  2. MAP>65 after initial IVF bolus, will continue to monitor fluid status and vital signs    Medical Decision Making:  Patient is a 72-year-old male presenting status post reported fall versus syncope episode.  He is a limited historian on arrival.  He is quite ill-appearing however with obvious signs of trauma on exam.  He underwent extensive work-up during his time in the ED.  His work-up was initiated by my partner.  Concern for severe " sepsis with likely source of infection being right great toe.  No soft tissue gas on x-ray.  IV Zosyn and vancomycin given, formal blood culture sent.  From a trauma standpoint, no serious traumatic injuries identified on CT head/c-spine/chest/abdomen and pelvis. He is noted to be in rhabdomyolysis.  EKG without STEMI or underlying arrhythmia.  High-sensitivity screening troponin elevated though I suspect this is more type II.  He denies any chest pain.  No obvious large PE identified on CT imaging. Incidental  miildly aneurysmal ascending segment to thoracic aorta though no dissection. Patient's lactate mildly down trended despite aggressive IV fluids.  He however has MAPS greater than 65, mentating and no indication for emergent pressor support at this point in time.  He is accepted by hospitalist for admission at this time.    Critical Care time was 40 minutes for this patient excluding procedures.      Diagnosis:    ICD-10-CM    1. Syncope, unspecified syncope type  R55       2. Traumatic rhabdomyolysis, initial encounter (H)  T79.6XXA       3. Toe infection  L08.9       4. Elevated troponin  R77.8       5. Severe sepsis (H)  A41.9     R65.20       6. Abnormal CT scan  R93.89      mildly aneurysmal ascending segment              Scribe Disclosure:  I, Yuko Alonso, am serving as a scribe at 9:44 PM on 12/31/2022 to document services personally performed by Danyelle Jewell DO based on my observations and the provider's statements to me.     12/31/2022   Danyelle Jewell DO McDonald, Lindsey E, DO  01/01/23 0329

## 2023-01-01 NOTE — PROGRESS NOTES
Patient Transfer Information    Patient tolerated transfer: Yes    Patient connected to monitoring equipment on arrival (if yes, what equipment): Yes:  Pulse ox    Safety equipment at bedside (if applicable): Yes     Patient connected to wall oxygen on arrival (if applicable): Yes    Belongings: Transferred with patient    Report received from transporter physically handed patient off to receiving RN: Yes        *See flowsheets for vital signs and focused assessment of admission/transfer*

## 2023-01-01 NOTE — ED TRIAGE NOTES
"Pt lives alone at home and BIBA for LOC and fall in bathroom. Pt was seen here x1 week ago for same thing and sent home on external heart monitor. Pt c/o L rib pain that rates 9/10 and L knee pain. Pt states does not know if he hit head stating \"I lost consciousness I just remember waking up on floor\". VSS, A&Ox4, ABCs intact.     Triage Assessment     Row Name 12/31/22 4512       Triage Assessment (Adult)    Airway WDL WDL       Respiratory WDL    Respiratory WDL WDL       Skin Circulation/Temperature WDL    Skin Circulation/Temperature WDL X  Bruising under L breast area from fall       Cardiac WDL    Cardiac WDL X  Pt on external heart monitor d/t LOC and fall last week       Peripheral/Neurovascular WDL    Peripheral Neurovascular WDL WDL       Cognitive/Neuro/Behavioral WDL    Cognitive/Neuro/Behavioral WDL WDL              "

## 2023-01-02 ENCOUNTER — APPOINTMENT (OUTPATIENT)
Dept: OCCUPATIONAL THERAPY | Facility: CLINIC | Age: 73
DRG: 871 | End: 2023-01-02
Attending: INTERNAL MEDICINE
Payer: COMMERCIAL

## 2023-01-02 ENCOUNTER — TELEPHONE (OUTPATIENT)
Dept: CARDIOLOGY | Facility: CLINIC | Age: 73
End: 2023-01-02

## 2023-01-02 ENCOUNTER — APPOINTMENT (OUTPATIENT)
Dept: PHYSICAL THERAPY | Facility: CLINIC | Age: 73
DRG: 871 | End: 2023-01-02
Attending: INTERNAL MEDICINE
Payer: COMMERCIAL

## 2023-01-02 LAB
ANION GAP SERPL CALCULATED.3IONS-SCNC: 8 MMOL/L (ref 7–15)
ATRIAL RATE - MUSE: 91 BPM
BUN SERPL-MCNC: 24.8 MG/DL (ref 8–23)
CALCIUM SERPL-MCNC: 8.2 MG/DL (ref 8.8–10.2)
CHLORIDE SERPL-SCNC: 100 MMOL/L (ref 98–107)
CK SERPL-CCNC: 2410 U/L (ref 39–308)
CREAT SERPL-MCNC: 1.15 MG/DL (ref 0.67–1.17)
DEPRECATED HCO3 PLAS-SCNC: 28 MMOL/L (ref 22–29)
DIASTOLIC BLOOD PRESSURE - MUSE: NORMAL MMHG
ERYTHROCYTE [DISTWIDTH] IN BLOOD BY AUTOMATED COUNT: 14.6 % (ref 10–15)
GFR SERPL CREATININE-BSD FRML MDRD: 68 ML/MIN/1.73M2
GLUCOSE BLDC GLUCOMTR-MCNC: 103 MG/DL (ref 70–99)
GLUCOSE BLDC GLUCOMTR-MCNC: 128 MG/DL (ref 70–99)
GLUCOSE BLDC GLUCOMTR-MCNC: 162 MG/DL (ref 70–99)
GLUCOSE BLDC GLUCOMTR-MCNC: 84 MG/DL (ref 70–99)
GLUCOSE BLDC GLUCOMTR-MCNC: 98 MG/DL (ref 70–99)
GLUCOSE SERPL-MCNC: 103 MG/DL (ref 70–99)
HCT VFR BLD AUTO: 41.5 % (ref 40–53)
HGB BLD-MCNC: 13.2 G/DL (ref 13.3–17.7)
INTERPRETATION ECG - MUSE: NORMAL
MCH RBC QN AUTO: 28.7 PG (ref 26.5–33)
MCHC RBC AUTO-ENTMCNC: 31.8 G/DL (ref 31.5–36.5)
MCV RBC AUTO: 90 FL (ref 78–100)
P AXIS - MUSE: 90 DEGREES
PLATELET # BLD AUTO: 144 10E3/UL (ref 150–450)
POTASSIUM SERPL-SCNC: 3.2 MMOL/L (ref 3.4–5.3)
POTASSIUM SERPL-SCNC: 3.7 MMOL/L (ref 3.4–5.3)
POTASSIUM SERPL-SCNC: 3.7 MMOL/L (ref 3.4–5.3)
PR INTERVAL - MUSE: 208 MS
QRS DURATION - MUSE: 190 MS
QT - MUSE: 440 MS
QTC - MUSE: 541 MS
R AXIS - MUSE: -63 DEGREES
RBC # BLD AUTO: 4.6 10E6/UL (ref 4.4–5.9)
SODIUM SERPL-SCNC: 136 MMOL/L (ref 136–145)
SYSTOLIC BLOOD PRESSURE - MUSE: NORMAL MMHG
T AXIS - MUSE: -12 DEGREES
VENTRICULAR RATE- MUSE: 91 BPM
WBC # BLD AUTO: 11.6 10E3/UL (ref 4–11)

## 2023-01-02 PROCEDURE — 97165 OT EVAL LOW COMPLEX 30 MIN: CPT | Mod: GO

## 2023-01-02 PROCEDURE — 36415 COLL VENOUS BLD VENIPUNCTURE: CPT | Performed by: INTERNAL MEDICINE

## 2023-01-02 PROCEDURE — 87040 BLOOD CULTURE FOR BACTERIA: CPT | Performed by: INTERNAL MEDICINE

## 2023-01-02 PROCEDURE — 97535 SELF CARE MNGMENT TRAINING: CPT | Mod: GO

## 2023-01-02 PROCEDURE — 84132 ASSAY OF SERUM POTASSIUM: CPT | Performed by: INTERNAL MEDICINE

## 2023-01-02 PROCEDURE — 250N000011 HC RX IP 250 OP 636: Performed by: EMERGENCY MEDICINE

## 2023-01-02 PROCEDURE — 250N000011 HC RX IP 250 OP 636: Performed by: INTERNAL MEDICINE

## 2023-01-02 PROCEDURE — 258N000003 HC RX IP 258 OP 636: Performed by: INTERNAL MEDICINE

## 2023-01-02 PROCEDURE — 80048 BASIC METABOLIC PNL TOTAL CA: CPT | Performed by: INTERNAL MEDICINE

## 2023-01-02 PROCEDURE — 99233 SBSQ HOSP IP/OBS HIGH 50: CPT | Mod: 25 | Performed by: PODIATRIST

## 2023-01-02 PROCEDURE — 97161 PT EVAL LOW COMPLEX 20 MIN: CPT | Mod: GP | Performed by: PHYSICAL THERAPIST

## 2023-01-02 PROCEDURE — 120N000001 HC R&B MED SURG/OB

## 2023-01-02 PROCEDURE — 85027 COMPLETE CBC AUTOMATED: CPT | Performed by: INTERNAL MEDICINE

## 2023-01-02 PROCEDURE — 250N000013 HC RX MED GY IP 250 OP 250 PS 637: Performed by: INTERNAL MEDICINE

## 2023-01-02 PROCEDURE — 11730 AVULSION NAIL PLATE SIMPLE 1: CPT | Mod: T5 | Performed by: PODIATRIST

## 2023-01-02 PROCEDURE — 82550 ASSAY OF CK (CPK): CPT | Performed by: INTERNAL MEDICINE

## 2023-01-02 PROCEDURE — 97530 THERAPEUTIC ACTIVITIES: CPT | Mod: GP | Performed by: PHYSICAL THERAPIST

## 2023-01-02 PROCEDURE — 99233 SBSQ HOSP IP/OBS HIGH 50: CPT | Performed by: INTERNAL MEDICINE

## 2023-01-02 PROCEDURE — 99232 SBSQ HOSP IP/OBS MODERATE 35: CPT | Mod: FS | Performed by: NURSE PRACTITIONER

## 2023-01-02 RX ORDER — VANCOMYCIN HYDROCHLORIDE 1 G/200ML
1000 INJECTION, SOLUTION INTRAVENOUS EVERY 12 HOURS
Status: DISCONTINUED | OUTPATIENT
Start: 2023-01-02 | End: 2023-01-03

## 2023-01-02 RX ORDER — OXYCODONE HYDROCHLORIDE 5 MG/1
5 TABLET ORAL EVERY 4 HOURS PRN
Status: DISCONTINUED | OUTPATIENT
Start: 2023-01-02 | End: 2023-01-06 | Stop reason: HOSPADM

## 2023-01-02 RX ORDER — FUROSEMIDE 10 MG/ML
20 INJECTION INTRAMUSCULAR; INTRAVENOUS
Status: DISCONTINUED | OUTPATIENT
Start: 2023-01-02 | End: 2023-01-03

## 2023-01-02 RX ORDER — CARVEDILOL 25 MG/1
25 TABLET ORAL 2 TIMES DAILY WITH MEALS
Status: DISCONTINUED | OUTPATIENT
Start: 2023-01-02 | End: 2023-01-02

## 2023-01-02 RX ORDER — CARVEDILOL 12.5 MG/1
12.5 TABLET ORAL 2 TIMES DAILY WITH MEALS
Status: DISCONTINUED | OUTPATIENT
Start: 2023-01-02 | End: 2023-01-03

## 2023-01-02 RX ORDER — ENOXAPARIN SODIUM 100 MG/ML
40 INJECTION SUBCUTANEOUS EVERY 24 HOURS
Status: DISCONTINUED | OUTPATIENT
Start: 2023-01-02 | End: 2023-01-06 | Stop reason: HOSPADM

## 2023-01-02 RX ORDER — POTASSIUM CHLORIDE 1500 MG/1
40 TABLET, EXTENDED RELEASE ORAL ONCE
Status: COMPLETED | OUTPATIENT
Start: 2023-01-02 | End: 2023-01-02

## 2023-01-02 RX ORDER — HYDROMORPHONE HCL IN WATER/PF 6 MG/30 ML
0.2 PATIENT CONTROLLED ANALGESIA SYRINGE INTRAVENOUS EVERY 4 HOURS PRN
Status: DISCONTINUED | OUTPATIENT
Start: 2023-01-02 | End: 2023-01-06 | Stop reason: HOSPADM

## 2023-01-02 RX ADMIN — VANCOMYCIN HYDROCHLORIDE 1000 MG: 1 INJECTION, SOLUTION INTRAVENOUS at 20:11

## 2023-01-02 RX ADMIN — TAZOBACTAM SODIUM AND PIPERACILLIN SODIUM 3.38 G: 375; 3 INJECTION, SOLUTION INTRAVENOUS at 11:43

## 2023-01-02 RX ADMIN — LORAZEPAM 0.25 MG: 2 INJECTION INTRAMUSCULAR; INTRAVENOUS at 00:15

## 2023-01-02 RX ADMIN — GABAPENTIN 800 MG: 400 CAPSULE ORAL at 20:18

## 2023-01-02 RX ADMIN — GABAPENTIN 800 MG: 400 CAPSULE ORAL at 09:01

## 2023-01-02 RX ADMIN — ASPIRIN 81 MG: 81 TABLET, COATED ORAL at 09:01

## 2023-01-02 RX ADMIN — TAZOBACTAM SODIUM AND PIPERACILLIN SODIUM 3.38 G: 375; 3 INJECTION, SOLUTION INTRAVENOUS at 05:45

## 2023-01-02 RX ADMIN — HYDROMORPHONE HYDROCHLORIDE 0.4 MG: 0.2 INJECTION, SOLUTION INTRAMUSCULAR; INTRAVENOUS; SUBCUTANEOUS at 08:59

## 2023-01-02 RX ADMIN — CYCLOBENZAPRINE HYDROCHLORIDE 5 MG: 5 TABLET, FILM COATED ORAL at 00:15

## 2023-01-02 RX ADMIN — SODIUM CHLORIDE, POTASSIUM CHLORIDE, SODIUM LACTATE AND CALCIUM CHLORIDE: 600; 310; 30; 20 INJECTION, SOLUTION INTRAVENOUS at 17:45

## 2023-01-02 RX ADMIN — OXYCODONE HYDROCHLORIDE 5 MG: 5 TABLET ORAL at 13:04

## 2023-01-02 RX ADMIN — SENNOSIDES AND DOCUSATE SODIUM 1 TABLET: 50; 8.6 TABLET ORAL at 09:01

## 2023-01-02 RX ADMIN — CARVEDILOL 12.5 MG: 12.5 TABLET, FILM COATED ORAL at 09:01

## 2023-01-02 RX ADMIN — PRAMIPEXOLE DIHYDROCHLORIDE 3 MG: 1 TABLET ORAL at 21:34

## 2023-01-02 RX ADMIN — OXYCODONE HYDROCHLORIDE 5 MG: 5 TABLET ORAL at 21:34

## 2023-01-02 RX ADMIN — LIDOCAINE 2 PATCH: 560 PATCH PERCUTANEOUS; TOPICAL; TRANSDERMAL at 09:01

## 2023-01-02 RX ADMIN — POLYETHYLENE GLYCOL 3350 17 G: 17 POWDER, FOR SOLUTION ORAL at 09:01

## 2023-01-02 RX ADMIN — ACETAMINOPHEN 975 MG: 325 TABLET, FILM COATED ORAL at 09:01

## 2023-01-02 RX ADMIN — TAZOBACTAM SODIUM AND PIPERACILLIN SODIUM 3.38 G: 375; 3 INJECTION, SOLUTION INTRAVENOUS at 00:10

## 2023-01-02 RX ADMIN — ATORVASTATIN CALCIUM 40 MG: 40 TABLET, FILM COATED ORAL at 09:01

## 2023-01-02 RX ADMIN — ENOXAPARIN SODIUM 40 MG: 40 INJECTION SUBCUTANEOUS at 12:33

## 2023-01-02 RX ADMIN — POTASSIUM CHLORIDE 40 MEQ: 1500 TABLET, EXTENDED RELEASE ORAL at 04:13

## 2023-01-02 RX ADMIN — ACETAMINOPHEN 975 MG: 325 TABLET, FILM COATED ORAL at 20:18

## 2023-01-02 RX ADMIN — ACETAMINOPHEN 975 MG: 325 TABLET, FILM COATED ORAL at 13:04

## 2023-01-02 RX ADMIN — TAZOBACTAM SODIUM AND PIPERACILLIN SODIUM 3.38 G: 375; 3 INJECTION, SOLUTION INTRAVENOUS at 18:27

## 2023-01-02 RX ADMIN — SODIUM CHLORIDE, POTASSIUM CHLORIDE, SODIUM LACTATE AND CALCIUM CHLORIDE: 600; 310; 30; 20 INJECTION, SOLUTION INTRAVENOUS at 01:34

## 2023-01-02 ASSESSMENT — ACTIVITIES OF DAILY LIVING (ADL)
ADLS_ACUITY_SCORE: 39
ADLS_ACUITY_SCORE: 42
ADLS_ACUITY_SCORE: 39
DEPENDENT_IADLS:: INDEPENDENT
ADLS_ACUITY_SCORE: 42
ADLS_ACUITY_SCORE: 39
ADLS_ACUITY_SCORE: 42
ADLS_ACUITY_SCORE: 42

## 2023-01-02 NOTE — PHARMACY-VANCOMYCIN DOSING SERVICE
"Pharmacy Vancomycin Initial Note  Date of Service 2023  Patient's  1950  72 year old, male    Indication: Skin and Soft Tissue Infection    Current estimated CrCl = Estimated Creatinine Clearance: 95.4 mL/min (based on SCr of 1.15 mg/dL).    Creatinine for last 3 days  2022:  9:54 PM Creatinine 1.14 mg/dL; 10:00 PM Creatinine POCT 1.2 mg/dL  2023:  7:40 AM Creatinine 1.04 mg/dL  2023:  7:56 AM Creatinine 1.15 mg/dL    Recent Vancomycin Level(s) for last 3 days  No results found for requested labs within last 72 hours.      Vancomycin IV Administrations (past 72 hours)                   vancomycin (VANCOCIN) 2,500 mg in sodium chloride 0.9 % 500 mL intermittent infusion (mg) 2,500 mg New Bag 23 0118                Nephrotoxins and other renal medications (From now, onward)    Start     Dose/Rate Route Frequency Ordered Stop    23 1800  vancomycin (VANCOCIN) 1000 mg in dextrose 5% 200 mL PREMIX         1,000 mg  200 mL/hr over 1 Hours Intravenous EVERY 12 HOURS 23 1725      23 1130  [Held by provider]  furosemide (LASIX) injection 20 mg        (Held by provider since 2023 at 1253 by Choco Riggins MD.Hold Reason: Change in Vitals)    20 mg  over 1-3 Minutes Intravenous 2 TIMES DAILY (Diuretics and Nitrates) 23 1127      23 2200  [Held by provider]  lisinopril (ZESTRIL) tablet 40 mg        (Held by provider since Sun 2023 at 0254 by Eugene Diaz MD.Hold Reason: Acute Kidney Injury)    40 mg Oral AT BEDTIME 23 0254      23 0600  piperacillin-tazobactam (ZOSYN) infusion 3.375 g        Note to Pharmacy: For [facility], [facility] and [facility]: For Zosyn-naive patients, use the \"Zosyn initial dose + extended infusion\" order panel.    3.375 g  100 mL/hr over 30 Minutes Intravenous EVERY 6 HOURS 23 0254            Contrast Orders - past 72 hours (72h ago, onward)    Start     Dose/Rate Route Frequency Stop    23 0725  " perflutren diluted 1mL to 2mL with saline (OPTISON) diluted injection 2 mL         2 mL Intravenous ONCE 01/01/23 0723    01/01/23 0055  iopamidol (ISOVUE-370) solution 500 mL         500 mL Intravenous ONCE 01/01/23 0051          [employer] Prediction of Planned Initial Vancomycin Regimen  Loading dose: N/A  Regimen: 1000 mg IV every 12 hours.  Start time: 18:23 on 01/02/2023  Exposure target: AUC24 (range)400-600 mg/L.hr   AUC24,ss: 418 mg/L.hr  Probability of AUC24 > 400: 55 %  Ctrough,ss: 14.3 mg/L  Probability of Ctrough,ss > 20: 19 %  Probability of nephrotoxicity (Lodise LUZ ELENA 2009): 9 %          Plan:  1. Start vancomycin 1000 mg IV q12h.   2. Vancomycin monitoring method: AUC  3. Vancomycin therapeutic monitoring goal: 400-600 mg*h/L   4. Pharmacy will check vancomycin levels as appropriate.    Xiao Tran, BenitoD

## 2023-01-02 NOTE — PROGRESS NOTES
"Foot & Ankle Surgery Progress Note  January 2, 2023    S:  Federico was seen at bedside today for continued monitoring of right lower extremity cellulitis in setting of distal R hallux wound and R hallux nail paronychia.  Redness improved, but right lower extremity  with mottled staining of skin.  R hallux wound is stable with granulation tissue, but hallux nail paronychia present with malodor.    BP 93/53 (BP Location: Right arm, Patient Position: Chair, Cuff Size: Adult Large)   Pulse 75   Temp 97.7  F (36.5  C) (Oral)   Resp 19   Ht 1.981 m (6' 6\")   Wt 145 kg (319 lb 9.6 oz)   SpO2 97%   BMI 36.93 kg/m      ROS - Pos for CC.  Denies nausea, vomiting, chills, fever, belly pain, calf pain, chest pain or shortness of breath. Complete remainder of ROS is otherwise neg.      PE - R hallux proximal nail fold paronychia improved, but not resolved.  Medial nail plate of the R hallux is growing into the skin creating a superficial wound.  Again, malodor is noted from wound.  Skin shows no trophic, color or temperature changes otherwise.  No calf redness, swelling or pain noted otherwise.    Assessment:  72 year old male right lower extremity cellulitis with suspected source from R hallux paronychia/onychocryptosis    Plan:  Total temp avulsion today, see procedure note.  This should help with resolution of infection and with wound healing along the medial nail fold  -daily dressing order signed  -discharge wound care instructions placed  -Zosyn while in-house, recommend 7 day course of Augmentin at discharge  -will sign off.  Discharge instructions signed. Please call with questions or acute changes to patient/wound status           Jagdeep Rudolph DPM FACFAS FACFAOM  Podiatric Foot & Ankle Surgeon  [facility]  [phone]    "

## 2023-01-02 NOTE — TELEPHONE ENCOUNTER
Received call from Irena Russ wondering if anything has been received from [employer] regarding this patient. Nothing in patient's chart to indicate we have received any calls. No faxes have been received. Called [employer] and unfortunately was directed to . Left  requesting a call back so that we can confirm no episodes have been logged. Irena Russ is aware.  YUNG INFANTE

## 2023-01-02 NOTE — PLAN OF CARE
Goal Outcome Evaluation:         A&OX4. Forgetful/ anxious and agitated. Ls diminished.  VSS oxygen 94% on 2L NC. Up with assist of 2 with gait belt and walker. Positive Blood culture for streptococcus species. Md notified. Agitated and restless. Paged for an order for anti-anxiety medication. Got an order for ativan. Ns infusing at 75 ml/hr. K 3.1. is being replaced. Scabs scattered. Left rib bruised and painful. Oxycodone administered. Mod carb diet. Continue POC and monitoring.

## 2023-01-02 NOTE — PROGRESS NOTES
[facility]    Medicine Progress Note - Hospitalist Service    Date of Admission:  12/31/2022    Assessment & Plan     Federico Chamberlain is a 72 year old male with historyof type II DM, HTN, HLD, RLS, RBBB, first-degree AVB, MDD, lumbar fusion, gout, TIA, prostate cancer s/p prostatectomy, erectile dysfunction, and recent overnight hospitalization on 12/19 for syncopal episode. He presented to the [facility] ED on 12/31/22 after being found down in the bathroom with loss of consciousness.  On 12/19 he had negative brain MRI, MRA head and neck.  He had trivial troponin elevation with negative TTE. Blood pressure medications were adjusted as it was felt to be syncope from polypharmacy and hypotension. He discharged home on cardiac monitor. He presented to the ED this time with confusion.  He did not remember falling in the bathroom, but woke up on the floor.  He reported left chest wall pain and left knee pain.  He had been seen in podiatry clinic 3 days earlier and prescribed cephalexin for right first toe wound that was thought to be due to frostbite. ED evaluation showed heart rate in the 90s. He had temperature as high as 102.8  F. Exam showed swollen and erythematous right large toe. Laboratory evaluation showed lactic acid 3.4 and 3.1, procalcitonin 3.8, WBC 25.2, hemoglobin 15.1, platelet count 213, creatinine 1.14, BUN 25, CK 1377, normal LFTs, blood glucose 148, and troponin 152. Urinalysis showed 3 WBCs and negative LE.  COVID-19, influenza A/B, and RSV testing was negative.  Alcohol level undetectable.  EKG showed NSR with bifascicular block, QTC prolongation of 541, and V3-V6 ST depression.   CTs of head, C/T/L spine, and chest/ab/pelvis showed no traumatic injury or obvious infection. XRs of right foot and left knee showed no traumatic injuries. He received IV vancomycin and Zosyn for severe sepsis.  He also received 2 L NS, 1 L LR, Dilaudid 0.3 mg followed by 0.5 mg.  He was admitted as  inpatient with sepsis possibly due to right great toe infection, lactic acidosis,  NSTEMI, and rhabdomyolysis. He was treated with Zosyn and Vancomycin. Podiatry was consulted for right great toe cellulitis and recommended no immediate intervention. Echocardiogram was obtained for elevated troponin and possible NSTEMI and was unremarkable with EF of 50-55% and no wall motion abnormality. Cardiology was consulted with troponin elevation and issues with recurrent syncope. There was some concern for heart block given bifascicular block with 1st degree block. Continued telemetry monitoring and avoiding QTc prolonging drugs was recommended. Immediate ischemic evaluation was not recommended given sepsis. IV fluid was given for rhabdomyolysis.     Problem list:    Severe sepsis, likely due to right great toe and lower leg celllulitis  Frostbite of right great toe  Lactic acidosis, resolved  Leukocytosis, improved  High fever (102.8), resolved  Septic encephalopathy, improved  -Developed frost bite of right great toe 3 days prior to admission snow blowing with a hole in his right boot. Was seen in clinic and prescribed Keflex which he did not start.  -Presented with fever, HR in 90s, lactic acidosis, leukocytosis, and elevated procalcitonin (3.81)  -1 of 2 admission blood cultures grew gram positive cocci that (per Verigene analysis) is likely contaminant streptococcal organism but await formal identification  -Continue Zosyn  -Podiatry following- no immediate surgical intervention needed.  -AM CBC    ADDEDUM: 1/2 blood cultures is now growing group D strep which is likely a pathogen. Resume vancomycin, repeat blood culture in am, and request ID consult    Syncope  Recent syncope with overnight admit 12/19  NSTEMI with elevated troponin (150s)  Hypertension  Dilated aorta  RBBB  First-degree AVB  LAFB  QTC prolongation  -Cardiology consult appreciated. Possible ischemia work up at some point once infection and rhabdo are  better.  -Continue Coreg at 12.5 mg BID  -Continue LIpitor  -PTA lisinopril, chlorthalidone, and aldactone are on hold with rhabdo  -Intake/output and daily weights ordered     Fall  Rhabdomyolysis  Acute kidney injury  Left chest wall pain, likely due to contusion  -Creatinine was 1.14 on presentation up from baseline creatinine of 0.7-0.9.  CK was elevated at 1377 then rising to 2293 and 2410 today consistent with rhabdomyolysis.    -RILEY is likey multifactorial secondary to prerenal hypovolemia, rhabdomyolysis, and likely exacerbated by PTA meds (lisinopril, aldactone, and chlorthalidone).    -Received aggressive fluid resuscitation in the ED.  -Seems a bit edematous in legs today and feeling SOB  -Creatinine is stable today but not back to baseline  -Continue IVF with rhabdo  -With edema will start Lasix 20 mg IV bid  -Daily BMP and CK X 3 days  -Continue to hold PTA lisinopril, aldactone, and chlorthalidone)  -Likely resume aldactone and cholorthalidone once Lasix and IV fluid stopped  -PT consulted for falls  -Trauma imaging showed no traumatic injuries  -Continue pain control for likely left chest wall contusion (scheduled tylenol and lidoderm, and as needed oxycodone and IV dilaudid)    Shortness of breath today  -Suspect multifactorial due to infection and increased metabolic demands, some possible volume overload, possibly pain medications, and possibly some component due to situational anxiety.  -O2 sats are ok  -Adding Lasix as discussed above  -Reduce IV dilaudid and oral oxycodone doses     Type II DM: PTA on semaglutide weekly and metformin 500 mg daily.  Blood glucose 148.  Most recent A1c 5.7 last month.  -Hold semaglutide while inpatient  -Hold metformin with RILEY and sepsis with elevated lactic acid on presentation  -BG have been fine on medium resistance Novolog insulin sliding scale      History lumbar fusion  Chronic pain  -Continue PTA gabapentin 800 mg 3 times daily and Flexeril 5 mg 3 times  "daily   -Other pain meds available for acute pain issues      RLS  -Continue PTA Mirapex 3 mg at bedtime.     History of prostate cancer  Erectile dysfunction  -Previous prostatectomy resulting in erectile dysfunction.  Hold PTA scheduled Cialis and as needed sildenafil     Obesity  -BMI 37.       Diet: Moderate Consistent Carb (60 g CHO per Meal) Diet    DVT Prophylaxis: Pneumatic Compression Devices; add subcutaneous lovenox  Alvarez Catheter: Not present  Lines: None     Cardiac Monitoring: ACTIVE order. Indication: AMI (NSTEMI/ STEMI) (48 hours)  Code Status: Full Code      Clinically Significant Risk Factors        # Hypokalemia: Lowest K = 2.9 mmol/L in last 2 days, will replace as needed   # Hypocalcemia: Lowest Ca = 8.2 mg/dL in last 2 days, will monitor and replace as appropriate               # Obesity: Estimated body mass index is 36.93 kg/m  as calculated from the following:    Height as of this encounter: 1.981 m (6' 6\").    Weight as of this encounter: 145 kg (319 lb 9.6 oz)., PRESENT ON ADMISSION         Disposition Plan   Unclear. Likely here for 3-4 more days, at least    Choco Riggins MD  Hospitalist Service  [facility]  Securely message with [employer] (more info)  Text page via [facility] Paging/Directory   ______________________________________________________________________    Interval History   Some shortness of breath today. Right great toe an lower leg are red and sore. Feels anxious.     Physical Exam   Vital Signs: Temp: 97.7  F (36.5  C) Temp src: Oral BP: 137/62 Pulse: 75   Resp: 19 SpO2: 97 % O2 Device: Nasal cannula Oxygen Delivery: 2 LPM  Weight: 319 lbs 9.6 oz    GENERAL:  Uncomfortable. Cooperative. Anxious  PSYCH: pleasant, oriented, Mild acute distress with anxiety and SOB.  EYES: PERRLA, Normal conjunctiva.  HEART:  Regular rate and rhythm. No JVD. Pulses normal. No edema.  LUNGS:  Clear to auscultation, normal Respiratory effort.  ABDOMEN:  Soft, no " hepatosplenomegaly, normal bowel sounds.  EXTREMETIES: No clubbing, cyanosis or ischemia  SKIN:  Dry to touch, No rash.      Medical Decision Making       50 MINUTES SPENT BY ME on the date of service doing chart review, history, exam, documentation & further activities per the note.      Data     I have personally reviewed the following data over the past 24 hrs:    11.6 (H)  \   13.2 (L)   / 144 (L)     136 100 24.8 (H) /  84   3.7; 3.7 28 1.15 \       Procal: N/A CRP: N/A Lactic Acid: 1.5         Imaging results reviewed over the past 24 hrs:   No results found for this or any previous visit (from the past 24 hour(s)).  Recent Labs   Lab 01/02/23  0917 01/02/23  0756 01/02/23  0421 01/02/23  0113 01/01/23  1627 01/01/23  1555 01/01/23  0800 01/01/23  0740 01/01/23  0321 12/31/22  2221 12/31/22  2200 12/31/22  2154   WBC  --  11.6*  --   --   --   --   --  20.8*  --   --   --  25.2*   HGB  --  13.2*  --   --   --   --   --  14.1  --   --   --  15.1   MCV  --  90  --   --   --   --   --  88  --   --   --  87   PLT  --  144*  --   --   --   --   --  167  --   --   --  213   INR  --   --   --   --   --   --   --   --   --  1.13  --   --    NA  --  136  --   --   --   --   --  141  --   --   --  141   POTASSIUM  --  3.7  3.7  --  3.2*  --  3.1*  --  2.9*  --   --   --  3.4   CHLORIDE  --  100  --   --   --   --   --  103  --   --   --  103   CO2  --  28  --   --   --   --   --  22  --   --   --  24   BUN  --  24.8*  --   --   --   --   --  26.6*  --   --   --  25.7*   CR  --  1.15  --   --   --   --   --  1.04  --   --  1.2 1.14   ANIONGAP  --  8  --   --   --   --   --  16*  --   --   --  14   LAURA  --  8.2*  --   --   --   --   --  8.3*  --   --   --  9.2   GLC 84 103* 103*  --    < >  --    < > 151*   < >  --   --  148*   ALBUMIN  --   --   --   --   --   --   --   --   --   --   --  4.0   PROTTOTAL  --   --   --   --   --   --   --   --   --   --   --  6.9   BILITOTAL  --   --   --   --   --   --   --   --   --    --   --  1.1   ALKPHOS  --   --   --   --   --   --   --   --   --   --   --  81   ALT  --   --   --   --   --   --   --   --   --   --   --  35   AST  --   --   --   --   --   --   --   --   --   --   --  44    < > = values in this interval not displayed.

## 2023-01-02 NOTE — PROGRESS NOTES
"   01/02/23 0800   Appointment Info   Signing Clinician's Name / Credentials (OT) Nathaly Loco, OTR/L   Rehab Comments (OT) Initial OT Eval   Living Environment   Living Environment Comments pt lives at home alone with his dog. single family home. stairs in the home. walk in shower and tub shower   Self-Care   Usual Activity Tolerance good   Current Activity Tolerance moderate   Equipment Currently Used at Home none   Fall history within last six months yes   Number of times patient has fallen within last six months 2  (syncope)   Activity/Exercise/Self-Care Comment indp with self care and ADL baseline no AD/DME   Instrumental Activities of Daily Living (IADL)   IADL Comments pt indp baseline with all IADL. drives. cares for his dog.   General Information   Onset of Illness/Injury or Date of Surgery 12/31/22   Referring Physician Eugene Diaz MD   Patient/Family Therapy Goal Statement (OT) \"to find out what happened\"   Additional Occupational Profile Info/Pertinent History of Current Problem Federico Chamberlain is a 72 year old male with PMH including type II DM, HTN, HLD, RLS, RBBB, first-degree AVB, MDD, lumbar fusion, gout, TIA, prostate cancer s/p prostatectomy, erectile dysfunction, and recent overnight hospitalization on 12/19 for syncopal episode who presents via EMS after being found down to the bathroom with loss of consciousness.   General Observations and Info agreeable to OT. very plesant   Cognitive Status Examination   Orientation Status orientation to person, place and time   Cognitive Status Comments able to name medications, name of doctors, very appropiate and follows 2 step commands. appropiate. may have had night delirium? will continue to monitor. good safety awareness and problem solving during eval   Visual Perception   Visual Impairment/Limitations corrective lenses full-time   Sensory   Sensory Comments reports baseline neuropathy   Pain Assessment   Patient Currently in Pain Yes, see " Vital Sign flowsheet   Posture   Posture forward head position;protracted shoulders   Range of Motion Comprehensive   Comment, General Range of Motion L UE SH flexion limited by pain at rib and back. R UE WNL   Strength Comprehensive (MMT)   Comment, General Manual Muscle Testing (MMT) Assessment MMT not formally assessed given back pain and rib pain. pt reports feeling overal weak. deconditioned functionally though minimally 3+/5 in arms. defer LE to PT   Muscle Tone Assessment   Muscle Tone Quick Adds No deficits were identified   Coordination   Functional Limitations Fine motor ADL performance impaired   Coordination Comments deficits identified during g/h with shift and small packages. may be fatigue and balance related too   Bed Mobility   Bed Mobility supine-sit   Supine-Sit [place] (Bed Mobility) moderate assist (50% patient effort)   Activities of Daily Living   BADL Assessment/Intervention upper body dressing;lower body dressing;grooming;feeding;toileting;bathing   Bathing Assessment/Intervention   [place] Level (Bathing) moderate assist (50% patient effort)   Upper Body Dressing Assessment/Training   [place] Level (Upper Body Dressing) moderate assist (50% patient effort)   Lower Body Dressing Assessment/Training   [place] Level (Lower Body Dressing) moderate assist (50% patient effort)   Grooming Assessment/Training   [place] Level (Grooming) minimum assist (75% patient effort)   Eating/Self Feeding   [place] Level (Feeding) set up   Toileting   [place] Level (Toileting) minimum assist (75% patient effort)   Clinical Impression   Criteria for Skilled Therapeutic Interventions Met (OT) Yes, treatment indicated   OT Diagnosis decreased function in ADL/mobility   OT Problem List-Impairments impacting ADL problems related to;activity tolerance impaired;balance;communication;mobility;strength;pain   Assessment of Occupational Performance 5 or more Performance Deficits    Identified Performance Deficits bathing, dressing, toileting, home mgmt, mobility, self care, IADL   Planned Therapy Interventions (OT) ADL retraining;IADL retraining;progressive activity/exercise;bed mobility training   Clinical Decision Making Complexity (OT) low complexity   Anticipated Equipment Needs Upon Discharge (OT) shower chair   Risk & Benefits of therapy have been explained evaluation/treatment results reviewed;care plan/treatment goals reviewed;risks/benefits reviewed;current/potential barriers reviewed;participants voiced agreement with care plan;participants included;patient   Clinical Impression Comments decreased function in ADL warrants skilled IP OT tx   OT Total Evaluation Time   OT Eval, Low Complexity Minutes (52264) 15   OT Goals   Therapy Frequency (OT) Daily   OT Predicted Duration/Target Date for Goal Attainment 01/07/23   OT Goals Hygiene/Grooming;Upper Body Dressing;Lower Body Dressing;Transfers;Toilet Transfer/Toileting;OT Goal 1   OT: Hygiene/Grooming modified independent   OT: Upper Body Dressing Modified independent   OT: Lower Body Dressing Modified independent;using adaptive equipment   OT: Transfer Supervision/stand-by assist  (tub shower)   OT: Toilet Transfer/Toileting Supervision/stand-by assist;toilet transfer;cleaning and garment management   OT: Goal 1 Pt will demo 10 minutes of standing ADL with mod I   Interventions   Interventions Quick Adds Self-Care/Home Management   Self-Care/Home Management   Self-Care/Home Mgmt/ADL, Compensatory, Meal Prep Minutes (10906) 30   Symptoms Noted During/After Treatment (Meal Preparation/Planning Training) fatigue;increased pain   Treatment Detail/Skilled Intervention pt seen for OT tx session this date. agreeable. reports he has had OT/PT in the past with back surgery.appropiate, follows commands, appears to be close to baseline cognition at this time. cues for bed mobility to seated EOB, urinal use seated EOB with min. educated on  safety for use of FWW and transfer technique with belt, pain in ribs noted. sit>stand with FWW, gait belt and min A from raised height bed and FWw, VC for hand placement. amb into bathroom. toilet tx with grab bar on low toilet and min A for eccentric controlled lowering. pt uses bidet at home for jordyn cares. sit>stand from toilet with grab bar and CGA, increased time and VC for body mecahnics. stands at sink for 6 minutes for g/h with min A progressing to CGA. reports feeling shakey. amb to bedside chair with CGA, FWW and VC for sequencing. requires CGA for controlle decent into chair. pillows placed for comfort. educated pt on recs for home and need for assist. all needs in reach end of session   OT Discharge Planning   OT Plan toileting, dressing, shower tx/tub tx   OT Discharge Recommendation (DC Rec) Transitional Care Facility;home with home care occupational therapy;home with assist   OT Rationale for DC Rec pt well below baseline function in self care and ADL. requries Ax1 at this time given falls risk, pain, weakness and general fatigue. notes from team indicate agitation and confusion but pt very appropiate, cooperative, and A&Ox4 this AM. does not recall syncopal events. pt lives alone and is very indp. at this time would recommend TCU. pt may be able to progress to discharge home with assist pending medical workup, progress and LOS. if home would need Ax1 with all self care, ADL and mobility. defer mobility device to PT. will need shower chair and HH OT/PT if home   OT Brief overview of current status Ax1   Total Session Time   Timed Code Treatment Minutes 30   Total Session Time (sum of timed and untimed services) 45

## 2023-01-02 NOTE — CONSULTS
Care Management Initial Consult    General Information  Assessment completed with: Patient, Patient  Type of CM/SW Visit: Initial Assessment    Primary Care Provider verified and updated as needed: No   Readmission within the last 30 days: other (see comments)   Return Category: Exacerbation of disease  Reason for Consult: discharge planning  Advance Care Planning:            Communication Assessment  Patient's communication style: spoken language (English or Bilingual)             Cognitive  Cognitive/Neuro/Behavioral: WDL  Level of Consciousness: alert  Arousal Level: opens eyes spontaneously  Orientation: oriented x 4  Mood/Behavior: calm, cooperative  Best Language: 0 - No aphasia  Speech: slow, clear, logical    Living Environment:   People in home: alone     Current living Arrangements: house      Able to return to prior arrangements: no       Family/Social Support:  Care provided by: self  Provides care for: pet(s)     Children          Description of Support System:           Current Resources:   Patient receiving home care services:       Community Resources:    Equipment currently used at home: walker, rolling, cane, straight, orthosis, shower chair  Supplies currently used at home:      Employment/Financial:  Employment Status:          Financial Concerns:             Lifestyle & Psychosocial Needs:  Social Determinants of Health     Tobacco Use: Not on file   Alcohol Use: Not on file   Financial Resource Strain: Not on file   Food Insecurity: Not on file   Transportation Needs: Not on file   Physical Activity: Not on file   Stress: Not on file   Social Connections: Not on file   Intimate Partner Violence: Not on file   Depression: Not on file   Housing Stability: Not on file       Functional Status:  Prior to admission patient needed assistance:      Dependent IADLs:: Independent       Mental Health Status:  Mental Health Status: No Current Concerns       Chemical Dependency Status:  Chemical Dependency  Status: No Current Concerns             Values/Beliefs:  Spiritual, Cultural Beliefs, Scientologist Practices, Values that affect care:                 Additional Information:  Patient was sitting in chair when SW arrived. PT recs TCU.     Patient lives alone at home. He has a dog that his neighbor is taking care of. Patient has a son in [place] and a son in [place]. Patient is in the process of moving to [place] to be closer to his son and grandson.     Patient is not familiar with TCU. SW discussed insurance coverage, services at TCU, Medicare ratings, and gave SNF list. Patient will be in the hospital 3-4 days.     SW will follow up for choices and send referrals closer to discharge date.     TEODORO Julio, [facility]  Emergency Room   [phone]-Please contact the SW on the floor in which the patient is staying for any questions or concerns

## 2023-01-02 NOTE — PROGRESS NOTES
Patient with increasing anxiety and agitation.  Continue Xarelto arm.  Very high fall risk and here with syncope.  Patient known to me from admission last night where he was extremely altered and not following commands.  QTC prolongation so avoiding antipsychotics.  -0.25 mg IV lorazepam every 4 hours as needed

## 2023-01-02 NOTE — CONSULTS
[facility]  Antimicrobial Stewardship Team (AST) Note             To:  Hospitalist  Unit:  301  Patient Name: Federico Chamberlain   Allergies: NKAs     Brief Summary:  Patient is a 72 y.o. with h/o type II DM admitted 12/31 with Severe sepsis, likely due to right great toe and lower leg celllulitis    Assessment:  Tmax = 100 (?from 102.8 on 12/31), WBC  11.6 (?from 25.2 on 12/31), procalcitonin 3.81. cultures 1 of 2 bottles growing Streptococcus dysgalactiae (Group G Streptococcus). Podiatry following- no immediate surgical intervention needed.    Current Antibiotic therapy: Zosyn 3.375 grams IV q6hrs (D3)    Clinical Features/Vital Signs (VS):    Culture Results:  Date Culture Site Organism   1/2  Blood culture No component results   12/31 Blood culture - LA Positive on 1st day of incubation!  Streptococcus dysgalactiae (Group G Streptococcus)!!   12/31 Blood culture - peripheral No growth after 1 day   12/31 Influenza A/B/RSV/Covid-19 Negative     Imaging:     Recommendation/Interventions:    Patient has group G Strep bacteremia, not a contaminant. Patient will need extended course of IV antibiotics. Consider an Infectious Disease consult.    Discussed w/ ID Staff-Dr Gisella Toth, PharmD, BCPS  Pharmacy Clinical Specialist  [phone]

## 2023-01-02 NOTE — PROGRESS NOTES
"Cardiology Progress Note  Irena Russ APRKAREEM, CNP          Assessment and Plan:     Admit (12/31/22) for syncope with resultant prolonged down time.  Evidence of acute encephalopathy, sepsis, rhabdomyolysis, prolonged QT interval   Recently suffered syncope presumed due to hypotension currently wearing an event monitor.    PMH:  Type II diabetes, hypertension, hyperlipidemia, rBBB, chronic pain, restless leg syndrome, prostate cancer s/p prostatectomy (4/2022), obesity, untreated sleep apnea    Severe Sepsis  -streptococcus species, source unknown  (Has some toe cellulitis)  -hemodynamically stable, improving mentation  -IV abx, IVFs    Syncope  -previous syncopal episode 2 wks ago, felt to be due to hypotension from polypharmacy.  -wearing event monitor.  No concerning strips thus far documented from [employer]  -underlying SR with RBBB and first degree AV block  -no significant pauses, arrhythmias, or giuseppe-arrhythmias on telemetry  -continue to monitor    Troponin Elevation, likely type II MI  -peak 168 in setting of sepsis  -LVEF 50-55% with new anterolateral and inferolateral hypokinesis ( ECHO 12/19/22)  -no CP or hx of CAD, but multiple CVRF  -consider future ischemic evaluation    Rhabdomyolysis  -ongoing elevated CKs  -improving renal insufficiency     Hypertension:  -controlled on Coreg only  (ACE-I and diuretics held)    Moderate Ascending Aorta Dilatation    Hyperlipidemia:    Type II Diabetes               Interval History:     Sitting up in chair  Some ongoing confusion  Complains of \"shakiness\", left rib pain, right shin pain  No SOB or palpitations                Medications:       acetaminophen  975 mg Oral TID     aspirin  81 mg Oral Daily     atorvastatin  40 mg Oral Daily     carvedilol  12.5 mg Oral BID w/meals     gabapentin  800 mg Oral TID     insulin aspart  1-7 Units Subcutaneous TID AC     insulin aspart  1-5 Units Subcutaneous At Bedtime     lidocaine  1-2 patch Transdermal Q24H     lidocaine   " "Transdermal Q8H DEJAH     [Held by provider] lisinopril  40 mg Oral At Bedtime     piperacillin-tazobactam  3.375 g Intravenous Q6H     pramipexole  3 mg Oral At Bedtime     sodium chloride (PF)  3 mL Intracatheter Q8H            Physical Exam:   Blood pressure 137/62, pulse 75, temperature 97.7  F (36.5  C), temperature source Oral, resp. rate 19, height 1.981 m (6' 6\"), weight 145 kg (319 lb 9.6 oz), SpO2 100 %.  Wt Readings from Last 3 Encounters:   01/01/23 145 kg (319 lb 9.6 oz)   12/19/22 143.3 kg (316 lb)   12/03/21 131.5 kg (290 lb)     I/O last 3 completed shifts:  In: 800 [P.O.:800]  Out: 675 [Urine:675]    CONST:  Some confusion  appropriate  LUNGS:  CTA bilaterally  CARDIO:  RRR, S1, S2  No murmurs  ABD:  Soft  obese  EXT:  Right 1+ shin edema  No left edema           Data:   TELE:  SR with first degree AV block and BBB    CBC  Recent Labs   Lab 01/02/23  0756 01/01/23  0740   WBC 11.6* 20.8*   HGB 13.2* 14.1   * 167       BMP  Recent Labs   Lab 01/02/23  0917 01/02/23  0756 01/02/23  0421 01/02/23  0113 01/01/23  2130 01/01/23  1627 01/01/23  1555 01/01/23  0800 01/01/23  0740 01/01/23  0321 12/31/22  2200 12/31/22  2154   NA  --  136  --   --   --   --   --   --  141  --   --  141   POTASSIUM  --  3.7  3.7  --  3.2*  --   --  3.1*  --  2.9*  --   --  3.4   CHLORIDE  --  100  --   --   --   --   --   --  103  --   --  103   LAURA  --  8.2*  --   --   --   --   --   --  8.3*  --   --  9.2   CO2  --  28  --   --   --   --   --   --  22  --   --  24   BUN  --  24.8*  --   --   --   --   --   --  26.6*  --   --  25.7*   CR  --  1.15  --   --   --   --   --   --  1.04  --  1.2 1.14   GLC 84 103* 103*  --  119*   < >  --    < > 151*   < >  --  148*    < > = values in this interval not displayed.     Recent Labs   Lab Test 07/02/18  0636   CHOL 92   HDL 32*   LDL <1   TRIG 297*       TROP  Lab Results   Component Value Date    TROPI <0.015 08/04/2020    TROPI <0.015 07/08/2018    TROPI 0.021 07/05/2018    " TROPI 0.023 07/03/2018    TROPONIN 0.03 07/01/2018    TROPONIN <0.04 11/09/2007       BNP  No results for input(s): NTBNPI, NTBNP in the last 168 hours.

## 2023-01-02 NOTE — PLAN OF CARE
End of shift summary (0700-1930)    Pt Ox4, forgetful at times. VSS on 2L O2 per NC except symptomatic soft BP's (MD aware). See MAR for analgesic regimen for reported left rib pain. Tele SR w/ BBB / 1* AVB / prolong Qtc, denied CP. RPIV intact, infusing w/ LR @ 100 ml/hr. Up Ax2 GB W. Wound cares and dsg changed to right great toe by podiatry this shift. Event monitor in place (was placed PTA per pt report). On IV zosyn and started on IV vanco. Cardiology, Podiatry, ID, PT/OT following. Will continue POC.     Goal Outcome Evaluation:      Plan of Care Reviewed With: patient    Overall Patient Progress: no changeOverall Patient Progress: no change

## 2023-01-02 NOTE — PROGRESS NOTES
01/02/23 1100   Appointment Info   Signing Clinician's Name / Credentials (PT) Phuong Lamp PT   Living Environment   People in Home alone   Current Living Arrangements house   Home Accessibility stairs to enter home;stairs within home   Number of Stairs, Main Entrance 2   Stair Railings, Main Entrance none   Number of Stairs, Within Home, Primary eight   Stair Railings, Within Home, Primary railings on both sides of stairs   Transportation Anticipated car, drives self   Living Environment Comments Pt lives alone in a split level home alone with his dog.  Pt has 7-8 steps to go up to his bedroom with B rails.   Self-Care   Usual Activity Tolerance good   Current Activity Tolerance moderate   Equipment Currently Used at Home walker, rolling;cane, straight;orthosis;shower chair  (L AFO at home)   Fall history within last six months yes   Number of times patient has fallen within last six months 2   Activity/Exercise/Self-Care Comment Pt ind with ADLs.  Pt has a FWW and SEC at home and uses an AFO on the L.  Pt enjoys taking his dog out for daily walks   General Information   Onset of Illness/Injury or Date of Surgery 12/31/22   Referring Physician Eugene Diaz   Patient/Family Therapy Goals Statement (PT) Pt agreeable to PT goals and TCU placement if needed   Pertinent History of Current Problem (include personal factors and/or comorbidities that impact the POC) 72 year old male with historyof type II DM, HTN, HLD, RLS, RBBB, first-degree AVB, MDD, lumbar fusion, gout, TIA, prostate cancer s/p prostatectomy, erectile dysfunction, and recent overnight hospitalization on 12/19 for syncopal episode. [facility] after being found down in the bathroom with loss of consciousness.  On 12/19 he had negative brain MRI, MRA head and neck.  He had trivial troponin elevation with negative TTE. Blood pressure medications were adjusted as it was felt to be syncope from polypharmacy and hypotension. He  "discharged home on cardiac monitor. He presented to the ED this time with confusion.  He did not remember falling in the bathroom, but woke up on the floor.  He reported left chest wall pain and left knee pain.  Chest, abd and pelvis CT negative.   Existing Precautions/Restrictions fall   Heart Disease Risk Factors Diabetes;High blood pressure;Dislipidemia;Medical history;Gender   General Observations Pt sitting up in chair. Vital: SpO2 95% on 2L, HR 65, BP 94/53   Cognition   Affect/Mental Status (Cognition) WNL   Orientation Status (Cognition) oriented x 4   Cognitive Status Comments Pt states he feels, \" a little bit in a fog\". Pt states it might be due to taking Dilaudid. Pt also asking if he has a concussion from his fall.  Cardiac MD present and states he doesn't know.   Pain Assessment   Patient Currently in Pain Yes, see Vital Sign flowsheet  (L rib cage and lower back)   Integumentary/Edema   Integumentary/Edema Comments R LE edema and redness   Posture    Posture Forward head position;Protracted shoulders   Range of Motion (ROM)   ROM Comment tight hamstrings and gastrocs with R SLR to 60 deg, L to 30 deg, R ankle DF to 5 deg, L to -5 deg   Strength (Manual Muscle Testing)   Strength Comments B LEs 5/5 hip flex and knee ext, R ankle DF 5/5, L DF 0/5, L PF 4/5   Bed Mobility   Bed Mobility sit-supine   Sit-Supine Refugio (Bed Mobility) supervision;verbal cues   Comment, (Bed Mobility) Sit > sup SBA  onto R sidely with increased time to lift R LE into bed due to L rib pain.   Transfers   Transfers sit-stand transfer   Sit-Stand Transfer   Sit-Stand Refugio (Transfers) minimum assist (75% patient effort);verbal cues   Assistive Device (Sit-Stand Transfers) walker, front-wheeled   Comment, (Sit-Stand Transfer) sit <> stand to FWW from recliner with CGA/Min A   Gait/Stairs (Locomotion)   Refugio Level (Gait) contact guard   Assistive Device (Gait) walker, front-wheeled   Distance in Feet 2 " "  Comment, (Gait/Stairs) Pt amb 2' from chair <> bed with FWW and CGA, L foot drop.  Pt reports feeling light headed and a little \"out of it\" during session.   Balance   Balance Comments Impaired standing balance   Coordination   Coordination Comments Pt slow to initiate B hip flex in sitting   Muscle Tone   Muscle Tone no deficits were identified   Clinical Impression   Criteria for Skilled Therapeutic Intervention Yes, treatment indicated   PT Diagnosis (PT) Impaired mobility   Influenced by the following impairments Generalized mms weakness, L foot drop, decreased activity tolerance, hypotensive, impaired balance, R LE cellulitis, L rib pain   Functional limitations due to impairments High falls risk, Pt unable to live alone and car for himself and his dog, assisted mobility, pt unable to amb houshold or community distances or stairs to access his home   Clinical Presentation (PT Evaluation Complexity) Evolving/Changing   Clinical Presentation Rationale Low BP, evolving medical status, no support at home   Clinical Decision Making (Complexity) low complexity   Planned Therapy Interventions (PT) balance training;bed mobility training;gait training;patient/family education;ROM (range of motion);stair training;strengthening;stretching;transfer training;progressive activity/exercise;risk factor education;home program guidelines   Risk & Benefits of therapy have been explained evaluation/treatment results reviewed;care plan/treatment goals reviewed;risks/benefits reviewed;current/potential barriers reviewed;participants voiced agreement with care plan;participants included;patient   PT Total Evaluation Time   PT Talia Low Complexity Minutes (67230) 10   Physical Therapy Goals   PT Frequency 5x/week   PT Predicted Duration/Target Date for Goal Attainment 01/05/23   PT Goals Bed Mobility;Transfers;Gait;Stairs   PT: Bed Mobility Independent;Supine to/from sit   PT: Transfers Modified independent;Sit to/from " stand;Assistive device   PT: Gait Modified independent;Rolling walker;150 feet   PT: Stairs Supervision/stand-by assist;8 stairs  (B rails)   PT Discharge Planning   PT Plan Progress gait with WC follow (hx syncope and hypotension, pt wears L AFO at baseline but no shoes/AFO here), monitor BP, progress all mobility (L rib pain)   PT Discharge Recommendation (DC Rec) Transitional Care Facility   PT Rationale for DC Rec Pt below baseline with all mobility. Pt currently requiring CGA/Min A with transers and only tolerated amb 2' with FWW from bed <> chair due to hypotension and light headedness.  Pt lives alone in a split level home with 8 steps to access his living environment. Recommend continued skilled PT/OT in the TCU setting to increase strength, activity tolerance, balance and progress safety and independence with all mobility.

## 2023-01-03 ENCOUNTER — APPOINTMENT (OUTPATIENT)
Dept: GENERAL RADIOLOGY | Facility: CLINIC | Age: 73
DRG: 871 | End: 2023-01-03
Attending: INTERNAL MEDICINE
Payer: COMMERCIAL

## 2023-01-03 ENCOUNTER — APPOINTMENT (OUTPATIENT)
Dept: ULTRASOUND IMAGING | Facility: CLINIC | Age: 73
DRG: 871 | End: 2023-01-03
Attending: HOSPITALIST
Payer: COMMERCIAL

## 2023-01-03 ENCOUNTER — APPOINTMENT (OUTPATIENT)
Dept: OCCUPATIONAL THERAPY | Facility: CLINIC | Age: 73
DRG: 871 | End: 2023-01-03
Payer: COMMERCIAL

## 2023-01-03 LAB
ANION GAP SERPL CALCULATED.3IONS-SCNC: 8 MMOL/L (ref 7–15)
ATRIAL RATE - MUSE: 68 BPM
BACTERIA BLD CULT: ABNORMAL
BACTERIA BLD CULT: ABNORMAL
BUN SERPL-MCNC: 18 MG/DL (ref 8–23)
CALCIUM SERPL-MCNC: 7.9 MG/DL (ref 8.8–10.2)
CHLORIDE SERPL-SCNC: 101 MMOL/L (ref 98–107)
CK SERPL-CCNC: 1235 U/L (ref 39–308)
CREAT SERPL-MCNC: 0.92 MG/DL (ref 0.67–1.17)
CRP SERPL-MCNC: 133.25 MG/L
DEPRECATED HCO3 PLAS-SCNC: 26 MMOL/L (ref 22–29)
DIASTOLIC BLOOD PRESSURE - MUSE: NORMAL MMHG
ERYTHROCYTE [DISTWIDTH] IN BLOOD BY AUTOMATED COUNT: 14.5 % (ref 10–15)
GFR SERPL CREATININE-BSD FRML MDRD: 89 ML/MIN/1.73M2
GLUCOSE BLDC GLUCOMTR-MCNC: 106 MG/DL (ref 70–99)
GLUCOSE BLDC GLUCOMTR-MCNC: 106 MG/DL (ref 70–99)
GLUCOSE BLDC GLUCOMTR-MCNC: 113 MG/DL (ref 70–99)
GLUCOSE BLDC GLUCOMTR-MCNC: 116 MG/DL (ref 70–99)
GLUCOSE BLDC GLUCOMTR-MCNC: 142 MG/DL (ref 70–99)
GLUCOSE BLDC GLUCOMTR-MCNC: 83 MG/DL (ref 70–99)
GLUCOSE SERPL-MCNC: 109 MG/DL (ref 70–99)
HCT VFR BLD AUTO: 38.6 % (ref 40–53)
HGB BLD-MCNC: 12.3 G/DL (ref 13.3–17.7)
INTERPRETATION ECG - MUSE: NORMAL
MCH RBC QN AUTO: 28.6 PG (ref 26.5–33)
MCHC RBC AUTO-ENTMCNC: 31.9 G/DL (ref 31.5–36.5)
MCV RBC AUTO: 90 FL (ref 78–100)
P AXIS - MUSE: 33 DEGREES
PLATELET # BLD AUTO: 132 10E3/UL (ref 150–450)
POTASSIUM SERPL-SCNC: 3.7 MMOL/L (ref 3.4–5.3)
PR INTERVAL - MUSE: 208 MS
QRS DURATION - MUSE: 162 MS
QT - MUSE: 500 MS
QTC - MUSE: 531 MS
R AXIS - MUSE: -51 DEGREES
RBC # BLD AUTO: 4.3 10E6/UL (ref 4.4–5.9)
SODIUM SERPL-SCNC: 135 MMOL/L (ref 136–145)
SYSTOLIC BLOOD PRESSURE - MUSE: NORMAL MMHG
T AXIS - MUSE: 12 DEGREES
TROPONIN T SERPL HS-MCNC: 77 NG/L
VENTRICULAR RATE- MUSE: 68 BPM
WBC # BLD AUTO: 9 10E3/UL (ref 4–11)

## 2023-01-03 PROCEDURE — 85027 COMPLETE CBC AUTOMATED: CPT | Performed by: INTERNAL MEDICINE

## 2023-01-03 PROCEDURE — 250N000013 HC RX MED GY IP 250 OP 250 PS 637: Performed by: INTERNAL MEDICINE

## 2023-01-03 PROCEDURE — 120N000001 HC R&B MED SURG/OB

## 2023-01-03 PROCEDURE — 258N000003 HC RX IP 258 OP 636: Performed by: INTERNAL MEDICINE

## 2023-01-03 PROCEDURE — 36569 INSJ PICC 5 YR+ W/O IMAGING: CPT

## 2023-01-03 PROCEDURE — 99222 1ST HOSP IP/OBS MODERATE 55: CPT | Performed by: INTERNAL MEDICINE

## 2023-01-03 PROCEDURE — 82550 ASSAY OF CK (CPK): CPT | Performed by: INTERNAL MEDICINE

## 2023-01-03 PROCEDURE — 87040 BLOOD CULTURE FOR BACTERIA: CPT | Performed by: INTERNAL MEDICINE

## 2023-01-03 PROCEDURE — 93971 EXTREMITY STUDY: CPT | Mod: RT

## 2023-01-03 PROCEDURE — 36415 COLL VENOUS BLD VENIPUNCTURE: CPT | Performed by: INTERNAL MEDICINE

## 2023-01-03 PROCEDURE — 99232 SBSQ HOSP IP/OBS MODERATE 35: CPT | Mod: FS | Performed by: NURSE PRACTITIONER

## 2023-01-03 PROCEDURE — 99233 SBSQ HOSP IP/OBS HIGH 50: CPT | Performed by: HOSPITALIST

## 2023-01-03 PROCEDURE — 97535 SELF CARE MNGMENT TRAINING: CPT | Mod: GO

## 2023-01-03 PROCEDURE — 86140 C-REACTIVE PROTEIN: CPT | Performed by: HOSPITALIST

## 2023-01-03 PROCEDURE — 272N000748 HC KIT, CATH 3FR OR 4FR SINGLE LUMEN POWERMIDLINE

## 2023-01-03 PROCEDURE — 80048 BASIC METABOLIC PNL TOTAL CA: CPT | Performed by: INTERNAL MEDICINE

## 2023-01-03 PROCEDURE — 250N000011 HC RX IP 250 OP 636: Performed by: EMERGENCY MEDICINE

## 2023-01-03 PROCEDURE — 97110 THERAPEUTIC EXERCISES: CPT | Mod: GO

## 2023-01-03 PROCEDURE — 84484 ASSAY OF TROPONIN QUANT: CPT | Performed by: INTERNAL MEDICINE

## 2023-01-03 PROCEDURE — 250N000011 HC RX IP 250 OP 636: Performed by: INTERNAL MEDICINE

## 2023-01-03 PROCEDURE — 71045 X-RAY EXAM CHEST 1 VIEW: CPT

## 2023-01-03 RX ORDER — AMLODIPINE BESYLATE 5 MG/1
5 TABLET ORAL DAILY
Status: DISCONTINUED | OUTPATIENT
Start: 2023-01-03 | End: 2023-01-03

## 2023-01-03 RX ORDER — ISOSORBIDE DINITRATE 10 MG/1
10 TABLET ORAL
Status: DISCONTINUED | OUTPATIENT
Start: 2023-01-03 | End: 2023-01-05

## 2023-01-03 RX ORDER — NITROGLYCERIN 0.4 MG/1
0.4 TABLET SUBLINGUAL EVERY 5 MIN PRN
Status: DISCONTINUED | OUTPATIENT
Start: 2023-01-03 | End: 2023-01-06 | Stop reason: HOSPADM

## 2023-01-03 RX ORDER — FUROSEMIDE 10 MG/ML
20 INJECTION INTRAMUSCULAR; INTRAVENOUS ONCE
Status: DISCONTINUED | OUTPATIENT
Start: 2023-01-03 | End: 2023-01-04

## 2023-01-03 RX ORDER — CEFTRIAXONE 2 G/1
2 INJECTION, POWDER, FOR SOLUTION INTRAMUSCULAR; INTRAVENOUS EVERY 24 HOURS
Status: DISCONTINUED | OUTPATIENT
Start: 2023-01-03 | End: 2023-01-06 | Stop reason: HOSPADM

## 2023-01-03 RX ADMIN — LIDOCAINE 2 PATCH: 560 PATCH PERCUTANEOUS; TOPICAL; TRANSDERMAL at 07:59

## 2023-01-03 RX ADMIN — LORAZEPAM 0.25 MG: 2 INJECTION INTRAMUSCULAR; INTRAVENOUS at 02:05

## 2023-01-03 RX ADMIN — SODIUM CHLORIDE, POTASSIUM CHLORIDE, SODIUM LACTATE AND CALCIUM CHLORIDE: 600; 310; 30; 20 INJECTION, SOLUTION INTRAVENOUS at 07:59

## 2023-01-03 RX ADMIN — ACETAMINOPHEN 975 MG: 325 TABLET, FILM COATED ORAL at 20:35

## 2023-01-03 RX ADMIN — CEFTRIAXONE 2 G: 2 INJECTION, POWDER, FOR SOLUTION INTRAMUSCULAR; INTRAVENOUS at 16:22

## 2023-01-03 RX ADMIN — VANCOMYCIN HYDROCHLORIDE 1000 MG: 1 INJECTION, SOLUTION INTRAVENOUS at 10:08

## 2023-01-03 RX ADMIN — ASPIRIN 81 MG: 81 TABLET, COATED ORAL at 07:59

## 2023-01-03 RX ADMIN — ENOXAPARIN SODIUM 40 MG: 40 INJECTION SUBCUTANEOUS at 11:46

## 2023-01-03 RX ADMIN — ACETAMINOPHEN 975 MG: 325 TABLET, FILM COATED ORAL at 13:55

## 2023-01-03 RX ADMIN — METOPROLOL TARTRATE 12.5 MG: 25 TABLET, FILM COATED ORAL at 20:34

## 2023-01-03 RX ADMIN — PRAMIPEXOLE DIHYDROCHLORIDE 3 MG: 1 TABLET ORAL at 22:37

## 2023-01-03 RX ADMIN — CYCLOBENZAPRINE HYDROCHLORIDE 5 MG: 5 TABLET, FILM COATED ORAL at 02:05

## 2023-01-03 RX ADMIN — ISOSORBIDE DINITRATE 10 MG: 10 TABLET ORAL at 18:08

## 2023-01-03 RX ADMIN — METOPROLOL TARTRATE 12.5 MG: 25 TABLET, FILM COATED ORAL at 08:00

## 2023-01-03 RX ADMIN — TAZOBACTAM SODIUM AND PIPERACILLIN SODIUM 3.38 G: 375; 3 INJECTION, SOLUTION INTRAVENOUS at 06:23

## 2023-01-03 RX ADMIN — TAZOBACTAM SODIUM AND PIPERACILLIN SODIUM 3.38 G: 375; 3 INJECTION, SOLUTION INTRAVENOUS at 11:44

## 2023-01-03 RX ADMIN — OXYCODONE HYDROCHLORIDE 5 MG: 5 TABLET ORAL at 01:33

## 2023-01-03 RX ADMIN — ISOSORBIDE DINITRATE 10 MG: 10 TABLET ORAL at 07:59

## 2023-01-03 RX ADMIN — ISOSORBIDE DINITRATE 10 MG: 10 TABLET ORAL at 11:47

## 2023-01-03 RX ADMIN — ACETAMINOPHEN 975 MG: 325 TABLET, FILM COATED ORAL at 07:58

## 2023-01-03 RX ADMIN — ATORVASTATIN CALCIUM 40 MG: 40 TABLET, FILM COATED ORAL at 08:00

## 2023-01-03 RX ADMIN — TAZOBACTAM SODIUM AND PIPERACILLIN SODIUM 3.38 G: 375; 3 INJECTION, SOLUTION INTRAVENOUS at 00:26

## 2023-01-03 ASSESSMENT — ACTIVITIES OF DAILY LIVING (ADL)
ADLS_ACUITY_SCORE: 39

## 2023-01-03 NOTE — PROGRESS NOTES
Patient Transfer Information    Patient tolerated transfer: Yes    Patient connected to monitoring equipment on arrival (if yes, what equipment): Yes: tele    Safety equipment at bedside (if applicable): Yes     Patient connected to wall oxygen on arrival (if applicable): Yes    Belongings: No belongings present    Report received from ARELIS Freire.  Mouna (transporter) physically handed patient off to receiving RN: Yes    I have reviewed the Medications, Allergies, Past Medical and Surgical History, and Social History in the Epic system. I have reviewed pending test results and orders. No further questions at this time.      *See flowsheets for vital signs and focused assessment of admission/transfer*

## 2023-01-03 NOTE — PROGRESS NOTES
Care Management Follow Up    Length of Stay (days): 2    Expected Discharge Date: 01/05/2023     Concerns to be Addressed: discharge planning     Patient plan of care discussed at interdisciplinary rounds: Yes    Anticipated Discharge Disposition: Transitional Care     Anticipated Discharge Services: None  Anticipated Discharge DME: None    Patient/family educated on Medicare website which has current facility and service quality ratings: yes  Education Provided on the Discharge Plan:    Patient/Family in Agreement with the Plan: yes    Referrals Placed by CM/SW:    Private pay costs discussed: insurance  Additional Information:  SW met with patient at bedside for TCU choices. Per chart review, patient will be here 2-3 more days for IV antibiotics. Patient states his son is coming from [place], [place] tomorrow. He is unsure of the flight time. Patient's ex-wifes daughter will also be present tomorrow. Patient wants SW to stop back tomorrow when they are here to discuss discharge planning. Patient is leaning towards TCU in [place] or [place]. SW noted that we can send referrals to see if they have openings. SW to follow up tomorrow with patient and family.     TEODORO Julio, [facility]  Emergency Room   [phone]-Please contact the SW on the floor in which the patient is staying for any questions or concerns

## 2023-01-03 NOTE — PROGRESS NOTES
[facility]    Hospitalist Progress Note      Assessment & Plan   Federico Chamberlain is a 72 year old male with historyof type II DM, HTN, HLD, RLS, RBBB, first-degree AVB, MDD, lumbar fusion, gout, TIA, prostate cancer s/p prostatectomy, erectile dysfunction, and recent overnight hospitalization on 12/19 for syncopal episode who presents after being found down at home.    He was recently hospitalized under observation in mid December for syncope.  On 12/19 he had negative brain MRI, MRA head and neck.  He had trivial troponin elevation with negative TTE. Blood pressure medications were adjusted as it was felt to be syncope from polypharmacy and hypotension. He discharged home on cardiac monitor.     #Severe Sepsis secondary to strep discal active bacteremia from RLE cellulitis, right great toe infection. Frostbite of right great toe: Patient came to the ER because he woke up on the bathroom floor.  He does not remember falling or the circumstances surrounding this.  He reported left chest wall pain and left knee pain upon getting up.  He was seen in podiatry clinic 3 days prior to his fall and prescribed Keflex for right first toe wound that was thought to be due to frostbite.  -In the ER, patient with heart rate in the 90s, febrile to 102.8, leukocytosis to 25.  Lactic acid elevated at 3.4.  Exam showed swollen and erythematous right large toe.  -Blood cultures no growing strep dysgalactiae from admission.  He is currently on vancomycin and Zosyn.  -His hemodynamics, leukocytosis and lactic acid have all improved with treatment.  He was seen by podiatry and did have right great toenail removed.    -ID consulted   -We will obtain right lower extremity ultrasound as patient does seem to have some erythema, swelling and warmth of the right calf and shin.  -Stop IV fluids today.  -Trend inflammatory markers    #Syncope. Recent syncope with overnight admit 12/19. NSTEMI with elevated troponin (150s).  Hypertension. Dilated aorta. RBBB. First-degree AVB. LAFB. QTC prolongation: Cardiology consult appreciated. Possible ischemia work up at some point once infection and rhabdo are better.  -BP medications adjusted.  Carvedilol transition to low-dose metoprolol.  Isordil started.  -Continue LIpitor  -PTA lisinopril, chlorthalidone, and aldactone are on hold with rhabdo  -Intake/output and daily weights ordered     #Fall. Rhabdomyolysis. Acute kidney injury. Left chest wall pain, likely due to contusion: Creatinine was 1.14 on presentation up from baseline creatinine of 0.7-0.9.  CK was elevated at 1377 then rising to over 2k.  This has since improved with IV fluids.  -RILEY is likey multifactorial secondary to prerenal hypovolemia, rhabdomyolysis, and likely exacerbated by PTA meds (lisinopril, aldactone, and chlorthalidone).    -Received aggressive fluid resuscitation in the ED and has been on maintenance IV fluids.  -CK is better and suspect patient may be getting more on the volume overloaded side.  Chest x-ray done early this a.m. also showed some mild prominence of early fluid overload.  We will stop IV fluid.  -We will give 20 mg IV Lasix once for fluid overload.  -Continue to hold PTA lisinopril, aldactone, and chlorthalidone)  -PT consulted for falls  -Trauma imaging showed no traumatic injuries  -Continue pain control for likely left chest wall contusion (scheduled tylenol and lidoderm, and as needed oxycodone and IV dilaudid)    #Type II DM: PTA on semaglutide weekly and metformin 500 mg daily.  Blood glucose 148.  Most recent A1c 5.7 last month.  -Hold semaglutide while inpatient  -Hold metformin with RILEY and sepsis with elevated lactic acid on presentation  -BG have been fine on medium resistance Novolog insulin sliding scale      #History lumbar fusion/ Chronic pain: Continue PTA gabapentin 800 mg 3 times daily and Flexeril 5 mg 3 times daily   -Other pain meds available for acute pain issues      #RLS:  Continue PTA Mirapex 3 mg at bedtime.     #History of prostate cancer. Erectile dysfunction: Previous prostatectomy resulting in erectile dysfunction.  Hold PTA scheduled Cialis and as needed sildenafil     #Obesity: BMI 37.    DVT Prophylaxis: Enoxaparin (Lovenox) SQ  Code Status: Full Code  Dispo: Likely 2-3 more days for IV antibiotics.  Therapies consulted.    I did update his son by phone.    Gilbert Gandara MD    Interval History   Assumed care.  Events of overnight noted.  Still with chest wall pain more on the left that is worse with palpation.  Requiring 1 L oxygen via nasal cannula.  Notes that he had erythema of his right shin and calf yesterday but seems a little worse today.  No fevers or chills.  No nausea vomiting.    -Data reviewed today: I reviewed all new labs and imaging results over the last 24 hours.    Physical Exam   Temp: 98.4  F (36.9  C) Temp src: Oral BP: 138/67 Pulse: 72   Resp: 20 SpO2: 97 % O2 Device: Nasal cannula Oxygen Delivery: 1 LPM  Vitals:    01/01/23 0842 01/03/23 0607   Weight: 145 kg (319 lb 9.6 oz) 146.9 kg (323 lb 14.4 oz)     Vital Signs with Ranges  Temp:  [97.7  F (36.5  C)-99.2  F (37.3  C)] 98.4  F (36.9  C)  Pulse:  [61-72] 72  Resp:  [18-25] 20  BP: ()/(46-70) 138/67  SpO2:  [94 %-100 %] 97 %  I/O last 3 completed shifts:  In: -   Out: 500 [Urine:500]    Constitutional: Nontoxic, NAD.  Answers appropriately.  Sitting up in the chair.  HEENT: Normocephalic. MMM, No elevation of JVD noted.   Respiratory: Nl WOB, some inspiratory crackles at the bases.  No wheezes.  Cardiovascular: Regular, no murmur  GI: Obese, BS+, NT, ND  Skin/Integumen: WWP, right great toe with dressings in place.  No drainage.  He has erythema, swelling and warmth over the right shin and calf.  Neuro: CNII-XII intact. Moves all extremities. No tremor. A&Ox3.    Medications       acetaminophen  975 mg Oral TID     aspirin  81 mg Oral Daily     atorvastatin  40 mg Oral Daily     enoxaparin  ANTICOAGULANT  40 mg Subcutaneous Q24H     [Held by provider] furosemide  20 mg Intravenous BID     gabapentin  800 mg Oral TID     insulin aspart  1-7 Units Subcutaneous TID AC     insulin aspart  1-5 Units Subcutaneous At Bedtime     isosorbide dinitrate  10 mg Oral TID     lidocaine  1-2 patch Transdermal Q24H     lidocaine   Transdermal Q8H DEJAH     [Held by provider] lisinopril  40 mg Oral At Bedtime     metoprolol tartrate  12.5 mg Oral BID     piperacillin-tazobactam  3.375 g Intravenous Q6H     pramipexole  3 mg Oral At Bedtime     sodium chloride (PF)  3 mL Intracatheter Q8H     vancomycin  1,000 mg Intravenous Q12H       Data   Recent Labs   Lab 01/03/23  0630 01/03/23  0223 01/03/23  0134 01/02/23  0917 01/02/23  0756 01/02/23  0421 01/02/23  0113 01/01/23  0800 01/01/23  0740 01/01/23  0321 12/31/22  2221 12/31/22  2200 12/31/22  2154   WBC 9.0  --   --   --  11.6*  --   --   --  20.8*  --   --   --  25.2*   HGB 12.3*  --   --   --  13.2*  --   --   --  14.1  --   --   --  15.1   MCV 90  --   --   --  90  --   --   --  88  --   --   --  87   *  --   --   --  144*  --   --   --  167  --   --   --  213   INR  --   --   --   --   --   --   --   --   --   --  1.13  --   --    *  --   --   --  136  --   --   --  141  --   --   --  141   POTASSIUM 3.7  --   --   --  3.7  3.7  --  3.2*   < > 2.9*  --   --   --  3.4   CHLORIDE 101  --   --   --  100  --   --   --  103  --   --   --  103   CO2 26  --   --   --  28  --   --   --  22  --   --   --  24   BUN 18.0  --   --   --  24.8*  --   --   --  26.6*  --   --   --  25.7*   CR 0.92  --   --   --  1.15  --   --   --  1.04  --   --    < > 1.14   ANIONGAP 8  --   --   --  8  --   --   --  16*  --   --   --  14   LAURA 7.9*  --   --   --  8.2*  --   --   --  8.3*  --   --   --  9.2   * 116* 83   < > 103*   < >  --    < > 151*   < >  --   --  148*   ALBUMIN  --   --   --   --   --   --   --   --   --   --   --   --  4.0   PROTTOTAL  --   --   --   --    --   --   --   --   --   --   --   --  6.9   BILITOTAL  --   --   --   --   --   --   --   --   --   --   --   --  1.1   ALKPHOS  --   --   --   --   --   --   --   --   --   --   --   --  81   ALT  --   --   --   --   --   --   --   --   --   --   --   --  35   AST  --   --   --   --   --   --   --   --   --   --   --   --  44    < > = values in this interval not displayed.       Recent Results (from the past 24 hour(s))   XR Chest Port 1 View    Narrative    EXAM: XR CHEST PORT 1 VIEW  LOCATION: [facility]  DATE/TIME: 1/3/2023 2:50 AM    INDICATION: chest pain and shortness of breath.  On IV fluids for rhabdomyolysis, eval for any pulmonary edema or infiltrate  COMPARISON: CT from 01/01/2023.      Impression    IMPRESSION: Cardiac silhouette borderline enlarged for AP portable technique. Mild prominence of the central pulmonary interstitium could reflect early fluid overload, though no overt peripheral edema. No visible pneumothorax or pleural effusion.

## 2023-01-03 NOTE — PROGRESS NOTES
Cross cover notified of patient with chest pain radiating to his left neck and shortness of breath.  Patient known to me from admission 2 nights ago.  He is here for bacteremia and right leg cellulitis with toe infection.  He did have an elevated troponin and has been having syncopal episodes.  Has been evaluated by cardiology.  TTE showed some septal lateral hypokinesis.  Troponin elevation felt to be secondary to sepsis.    I evaluated the patient at the bedside.  He is slightly lethargic after receiving Flexeril, Ativan, and oxycodone for his left chest wall pain where he has an ecchymoses from his falls.  He says the pain is sharp and in this area and has been present ever since his fall.  He does have some pain radiating up into his neck which is been present for a couple of days.  He is feeling more short of breath throughout the day.  Any slight movement of the patient or touching anywhere near his chest results in him wincing and yelling out.  Highly suspect musculoskeletal etiology for his chest pain based on history and exam.  Also I believe his shortness of breath is mostly due to not taking deep breaths as this causes increased pain which was witnessed at the bedside when I asked him to take a deep breath.  He does have 1-2+ right lower extremity and 1+ left lower extremity she may be developing pulmonary edema as he is on continuous IV fluids for rhabdomyolysis.  I obtained an EKG that showed his known RBBB, but there is no ST elevation or depression.  -Give 1 sublingual nitroglycerin to see if it helps with his pain and if not would not give any more doses  -Obtain stat troponin and repeat again with morning lab draw  -Obtain a portable chest x-ray  -Decrease IV fluids to 50 mL/h  -Continuous pulse ox as he appears mildly sedated following his multiple medications for pain

## 2023-01-03 NOTE — CONSULTS
ID consult dictated IMP 1 72-year-old male, acute sepsis, right great toe infection with obvious for classic streptococcal right leg cellulitis and in fact beta-hemolytic strep bacteremia    REC 1 no obvious abscess or deep infection in the toe and no surgical intervention, I think we can focus on the strep and the cellulitis, simplify to ceftriaxone, at this point okay to place midline and start planning IV course will need a 2-week course

## 2023-01-03 NOTE — PLAN OF CARE
"BP (!) 152/64 (BP Location: Left arm, Patient Position: Semi-Paige's, Cuff Size: Adult Large)   Pulse 72   Temp 99.2  F (37.3  C) (Axillary)   Resp 25   Ht 1.981 m (6' 6\")   Wt 146.9 kg (323 lb 14.4 oz)   SpO2 95%   BMI 37.43 kg/m        AO4 but forgetful. Pt c/o of pain Oxy adm. Oxygen 2L. SBA w/Gait belt and walker. Pt was anxious, C/O of SOB , Chest pain. MD was paged. Chest Xray Stat done, EKG done and lab ordered. (see MAR) meds adm to pt having hallucinations and hearing voices. Pt on Zosyn and LR. PIV on R arm intact & no infiltration. Discharge TBD. Nurse will continue POC.  "

## 2023-01-03 NOTE — PROGRESS NOTES
Cardiology Progress Note  Irena NGO, CNP          Assessment and Plan:     Admit (12/31/22) for syncope with resultant prolonged down time.  Evidence of acute encephalopathy, sepsis, rhabdomyolysis, prolonged QT interval   Recently suffered syncope presumed due to hypotension currently wearing an event monitor.    PMH:  Type II diabetes, hypertension, hyperlipidemia, rBBB, chronic pain, restless leg syndrome, prostate cancer s/p prostatectomy (4/2022), obesity, untreated sleep apnea    Severe Sepsis,  -streptococcus species, likely from right leg cellulitis  -hemodynamically stable, improving mentation  -IV abx, IVFs  -right leg ultrasound today  -ID evaluation today    Syncope  -previous syncopal episode 2 wks ago, felt to be due to hypotension from polypharmacy.  -wearing event monitor.  No concerning strips thus far documented from [employer]  -underlying SR with RBBB and first degree AV block  -no significant pauses, arrhythmias, or giuseppe-arrhythmias on telemetry  -continue to monitor    Troponin Elevation, likely type II MI  -peak 168 in setting of sepsis  -LVEF 50-55% with new septal lateral hypokinesis ( ECHO 12/19/22)  -no hx of CAD, but multiple CVRF  -chest wall pain due to injury  No significant ST abnormalities on EKG, troponin down  - future ischemic evaluation when more stable    Rhabdomyolysis  -ongoing elevated CKs   -improving renal insufficiency     Bifascicular Block  Prolonged QTc  -would recommend stopping Coreg, but will add Metoprolol 12.5 mg BID  -avoid meds which prolong QT intverval  -recheck EKG tomorrow    PVCs, nonsustained VT  -LVEF 50%  -will change Coreg to metoprolol 12.5 BID    Hypertension:   -will add isordil TID and low dose Metoprolol  (ACE-I and diuretics held due to rhabdo)    Moderate Ascending Aorta Dilatation    Treated Hyperlipidemia:    Type II Diabetes  -hgb A1c 6               Interval History:     Left chest wall pain,   Some nausea with quick movement  Forgetful,  "anxious and some hallucinations  On O2 support at 1 L                Medications:       acetaminophen  975 mg Oral TID     aspirin  81 mg Oral Daily     atorvastatin  40 mg Oral Daily     carvedilol  12.5 mg Oral BID w/meals     enoxaparin ANTICOAGULANT  40 mg Subcutaneous Q24H     [Held by provider] furosemide  20 mg Intravenous BID     gabapentin  800 mg Oral TID     insulin aspart  1-7 Units Subcutaneous TID AC     insulin aspart  1-5 Units Subcutaneous At Bedtime     lidocaine  1-2 patch Transdermal Q24H     lidocaine   Transdermal Q8H [facility]     [Held by provider] lisinopril  40 mg Oral At Bedtime     piperacillin-tazobactam  3.375 g Intravenous Q6H     pramipexole  3 mg Oral At Bedtime     sodium chloride (PF)  3 mL Intracatheter Q8H     vancomycin  1,000 mg Intravenous Q12H            Physical Exam:   Blood pressure (!) 152/64, pulse 72, temperature 99.2  F (37.3  C), temperature source Axillary, resp. rate 25, height 1.981 m (6' 6\"), weight 146.9 kg (323 lb 14.4 oz), SpO2 95 %.  Wt Readings from Last 3 Encounters:   01/03/23 146.9 kg (323 lb 14.4 oz)   12/19/22 143.3 kg (316 lb)   12/03/21 131.5 kg (290 lb)     I/O last 3 completed shifts:  In: -   Out: 500 [Urine:500]    CONST:  Some confusion  appropriate  LUNGS:  CTA bilaterally  CARDIO:  RRR, S1, S2  No murmurs  ABD:  Soft  obese  EXT:  Right 1+ shin edema  No left edema           Data:   TELE:  SR with first degree AV block and BBB    CBC  Recent Labs   Lab 01/03/23  0630 01/02/23  0756   WBC 9.0 11.6*   HGB 12.3* 13.2*   * 144*       BMP  Recent Labs   Lab 01/03/23  0630 01/03/23  0223 01/03/23  0134 01/02/23  2130 01/02/23  0917 01/02/23  0756 01/02/23  0421 01/02/23  0113 01/01/23  1627 01/01/23  1555 01/01/23  0800 01/01/23  0740 01/01/23  0321 12/31/22  2200 12/31/22  2154   *  --   --   --   --  136  --   --   --   --   --  141  --   --  141   POTASSIUM 3.7  --   --   --   --  3.7  3.7  --  3.2*  --  3.1*  --  2.9*  --   --  3.4 "   CHLORIDE 101  --   --   --   --  100  --   --   --   --   --  103  --   --  103   LAURA 7.9*  --   --   --   --  8.2*  --   --   --   --   --  8.3*  --   --  9.2   CO2 26  --   --   --   --  28  --   --   --   --   --  22  --   --  24   BUN 18.0  --   --   --   --  24.8*  --   --   --   --   --  26.6*  --   --  25.7*   CR 0.92  --   --   --   --  1.15  --   --   --   --   --  1.04  --  1.2 1.14   * 116* 83 162*   < > 103*   < >  --    < >  --    < > 151*   < >  --  148*    < > = values in this interval not displayed.     Recent Labs   Lab Test 07/02/18  0636   CHOL 92   HDL 32*   LDL <1   TRIG 297*       TROP  Lab Results   Component Value Date    TROPI <0.015 08/04/2020    TROPI <0.015 07/08/2018    TROPI 0.021 07/05/2018    TROPI 0.023 07/03/2018    TROPONIN 0.03 07/01/2018    TROPONIN <0.04 11/09/2007       BNP  No results for input(s): NTBNPI, NTBNP in the last 168 hours.

## 2023-01-04 ENCOUNTER — APPOINTMENT (OUTPATIENT)
Dept: OCCUPATIONAL THERAPY | Facility: CLINIC | Age: 73
DRG: 871 | End: 2023-01-04
Payer: COMMERCIAL

## 2023-01-04 ENCOUNTER — APPOINTMENT (OUTPATIENT)
Dept: PHYSICAL THERAPY | Facility: CLINIC | Age: 73
DRG: 871 | End: 2023-01-04
Payer: COMMERCIAL

## 2023-01-04 LAB
ANION GAP SERPL CALCULATED.3IONS-SCNC: 12 MMOL/L (ref 7–15)
BUN SERPL-MCNC: 11.2 MG/DL (ref 8–23)
CALCIUM SERPL-MCNC: 8.1 MG/DL (ref 8.8–10.2)
CHLORIDE SERPL-SCNC: 98 MMOL/L (ref 98–107)
CK SERPL-CCNC: 785 U/L (ref 39–308)
CREAT SERPL-MCNC: 0.81 MG/DL (ref 0.67–1.17)
DEPRECATED HCO3 PLAS-SCNC: 25 MMOL/L (ref 22–29)
ERYTHROCYTE [DISTWIDTH] IN BLOOD BY AUTOMATED COUNT: 14.1 % (ref 10–15)
GFR SERPL CREATININE-BSD FRML MDRD: >90 ML/MIN/1.73M2
GLUCOSE BLDC GLUCOMTR-MCNC: 107 MG/DL (ref 70–99)
GLUCOSE BLDC GLUCOMTR-MCNC: 88 MG/DL (ref 70–99)
GLUCOSE BLDC GLUCOMTR-MCNC: 94 MG/DL (ref 70–99)
GLUCOSE BLDC GLUCOMTR-MCNC: 98 MG/DL (ref 70–99)
GLUCOSE BLDC GLUCOMTR-MCNC: 99 MG/DL (ref 70–99)
GLUCOSE SERPL-MCNC: 93 MG/DL (ref 70–99)
HCT VFR BLD AUTO: 37.6 % (ref 40–53)
HGB BLD-MCNC: 11.8 G/DL (ref 13.3–17.7)
MCH RBC QN AUTO: 28.4 PG (ref 26.5–33)
MCHC RBC AUTO-ENTMCNC: 31.4 G/DL (ref 31.5–36.5)
MCV RBC AUTO: 91 FL (ref 78–100)
PLATELET # BLD AUTO: 146 10E3/UL (ref 150–450)
POTASSIUM SERPL-SCNC: 3.5 MMOL/L (ref 3.4–5.3)
RBC # BLD AUTO: 4.15 10E6/UL (ref 4.4–5.9)
SODIUM SERPL-SCNC: 135 MMOL/L (ref 136–145)
WBC # BLD AUTO: 8.9 10E3/UL (ref 4–11)

## 2023-01-04 PROCEDURE — 120N000001 HC R&B MED SURG/OB

## 2023-01-04 PROCEDURE — 250N000013 HC RX MED GY IP 250 OP 250 PS 637: Performed by: INTERNAL MEDICINE

## 2023-01-04 PROCEDURE — 82550 ASSAY OF CK (CPK): CPT | Performed by: INTERNAL MEDICINE

## 2023-01-04 PROCEDURE — 99232 SBSQ HOSP IP/OBS MODERATE 35: CPT | Performed by: INTERNAL MEDICINE

## 2023-01-04 PROCEDURE — 93010 ELECTROCARDIOGRAM REPORT: CPT | Performed by: INTERNAL MEDICINE

## 2023-01-04 PROCEDURE — 97110 THERAPEUTIC EXERCISES: CPT | Mod: GO | Performed by: REHABILITATION PRACTITIONER

## 2023-01-04 PROCEDURE — 250N000011 HC RX IP 250 OP 636: Performed by: INTERNAL MEDICINE

## 2023-01-04 PROCEDURE — 99233 SBSQ HOSP IP/OBS HIGH 50: CPT | Performed by: HOSPITALIST

## 2023-01-04 PROCEDURE — 80048 BASIC METABOLIC PNL TOTAL CA: CPT | Performed by: INTERNAL MEDICINE

## 2023-01-04 PROCEDURE — 85027 COMPLETE CBC AUTOMATED: CPT | Performed by: HOSPITALIST

## 2023-01-04 PROCEDURE — 97530 THERAPEUTIC ACTIVITIES: CPT | Mod: GP

## 2023-01-04 PROCEDURE — 97116 GAIT TRAINING THERAPY: CPT | Mod: GP

## 2023-01-04 PROCEDURE — 97535 SELF CARE MNGMENT TRAINING: CPT | Mod: GO | Performed by: REHABILITATION PRACTITIONER

## 2023-01-04 RX ORDER — METOPROLOL SUCCINATE 25 MG/1
25 TABLET, EXTENDED RELEASE ORAL 2 TIMES DAILY
Status: DISCONTINUED | OUTPATIENT
Start: 2023-01-04 | End: 2023-01-05

## 2023-01-04 RX ORDER — FUROSEMIDE 10 MG/ML
40 INJECTION INTRAMUSCULAR; INTRAVENOUS ONCE
Status: DISCONTINUED | OUTPATIENT
Start: 2023-01-04 | End: 2023-01-04

## 2023-01-04 RX ORDER — POTASSIUM CHLORIDE 1500 MG/1
40 TABLET, EXTENDED RELEASE ORAL ONCE
Status: COMPLETED | OUTPATIENT
Start: 2023-01-04 | End: 2023-01-04

## 2023-01-04 RX ORDER — METOPROLOL TARTRATE 25 MG/1
25 TABLET, FILM COATED ORAL 2 TIMES DAILY
Status: DISCONTINUED | OUTPATIENT
Start: 2023-01-04 | End: 2023-01-04

## 2023-01-04 RX ORDER — POTASSIUM CHLORIDE 1500 MG/1
20 TABLET, EXTENDED RELEASE ORAL DAILY
Status: DISCONTINUED | OUTPATIENT
Start: 2023-01-05 | End: 2023-01-05

## 2023-01-04 RX ORDER — FUROSEMIDE 20 MG
20 TABLET ORAL DAILY
Status: DISCONTINUED | OUTPATIENT
Start: 2023-01-05 | End: 2023-01-06 | Stop reason: HOSPADM

## 2023-01-04 RX ORDER — FUROSEMIDE 10 MG/ML
40 INJECTION INTRAMUSCULAR; INTRAVENOUS ONCE
Status: COMPLETED | OUTPATIENT
Start: 2023-01-04 | End: 2023-01-04

## 2023-01-04 RX ADMIN — ACETAMINOPHEN 975 MG: 325 TABLET, FILM COATED ORAL at 21:13

## 2023-01-04 RX ADMIN — ASPIRIN 81 MG: 81 TABLET, COATED ORAL at 08:39

## 2023-01-04 RX ADMIN — OXYCODONE HYDROCHLORIDE 5 MG: 5 TABLET ORAL at 02:42

## 2023-01-04 RX ADMIN — OXYCODONE HYDROCHLORIDE 5 MG: 5 TABLET ORAL at 08:39

## 2023-01-04 RX ADMIN — ACETAMINOPHEN 975 MG: 325 TABLET, FILM COATED ORAL at 08:38

## 2023-01-04 RX ADMIN — PRAMIPEXOLE DIHYDROCHLORIDE 3 MG: 1 TABLET ORAL at 21:13

## 2023-01-04 RX ADMIN — FUROSEMIDE 40 MG: 10 INJECTION, SOLUTION INTRAMUSCULAR; INTRAVENOUS at 10:24

## 2023-01-04 RX ADMIN — POTASSIUM CHLORIDE 40 MEQ: 1500 TABLET, EXTENDED RELEASE ORAL at 10:25

## 2023-01-04 RX ADMIN — CEFTRIAXONE 2 G: 2 INJECTION, POWDER, FOR SOLUTION INTRAMUSCULAR; INTRAVENOUS at 16:20

## 2023-01-04 RX ADMIN — ENOXAPARIN SODIUM 40 MG: 40 INJECTION SUBCUTANEOUS at 11:04

## 2023-01-04 RX ADMIN — METOPROLOL SUCCINATE 25 MG: 25 TABLET, EXTENDED RELEASE ORAL at 21:13

## 2023-01-04 RX ADMIN — ISOSORBIDE DINITRATE 10 MG: 10 TABLET ORAL at 08:39

## 2023-01-04 RX ADMIN — ATORVASTATIN CALCIUM 40 MG: 40 TABLET, FILM COATED ORAL at 08:39

## 2023-01-04 RX ADMIN — ISOSORBIDE DINITRATE 10 MG: 10 TABLET ORAL at 12:27

## 2023-01-04 RX ADMIN — GABAPENTIN 800 MG: 400 CAPSULE ORAL at 21:13

## 2023-01-04 RX ADMIN — METOPROLOL TARTRATE 12.5 MG: 25 TABLET, FILM COATED ORAL at 08:38

## 2023-01-04 RX ADMIN — ISOSORBIDE DINITRATE 10 MG: 10 TABLET ORAL at 17:38

## 2023-01-04 RX ADMIN — METOPROLOL TARTRATE 12.5 MG: 25 TABLET, FILM COATED ORAL at 10:25

## 2023-01-04 RX ADMIN — LIDOCAINE 2 PATCH: 560 PATCH PERCUTANEOUS; TOPICAL; TRANSDERMAL at 08:38

## 2023-01-04 RX ADMIN — GABAPENTIN 800 MG: 400 CAPSULE ORAL at 08:40

## 2023-01-04 ASSESSMENT — ACTIVITIES OF DAILY LIVING (ADL)
ADLS_ACUITY_SCORE: 40
ADLS_ACUITY_SCORE: 38
ADLS_ACUITY_SCORE: 40
ADLS_ACUITY_SCORE: 39
ADLS_ACUITY_SCORE: 40
ADLS_ACUITY_SCORE: 38
ADLS_ACUITY_SCORE: 39
ADLS_ACUITY_SCORE: 40
ADLS_ACUITY_SCORE: 39
ADLS_ACUITY_SCORE: 39

## 2023-01-04 NOTE — PROGRESS NOTES
[facility]    Hospitalist Progress Note      Assessment & Plan   Federico Chamberlain is a 72 year old male with historyof type II DM, HTN, HLD, RLS, RBBB, first-degree AVB, MDD, lumbar fusion, gout, TIA, prostate cancer s/p prostatectomy, erectile dysfunction, and recent overnight hospitalization on 12/19 for syncopal episode who presents after being found down at home.     He was recently hospitalized under observation in mid December for syncope.  On 12/19 he had negative brain MRI, MRA head and neck.  He had trivial troponin elevation with negative TTE. Blood pressure medications were adjusted as it was felt to be syncope from polypharmacy and hypotension. He discharged home on cardiac monitor.      #Severe Sepsis secondary to strep discal active bacteremia from RLE cellulitis, right great toe infection. Frostbite of right great toe: Patient came to the ER because he woke up on the bathroom floor.  He does not remember falling or the circumstances surrounding this.  He reported left chest wall pain and left knee pain upon getting up.  He was seen in podiatry clinic 3 days prior to his fall and prescribed Keflex for right first toe wound that was thought to be due to frostbite.  -In the ER, patient with heart rate in the 90s, febrile to 102.8, leukocytosis to 25.  Lactic acid elevated at 3.4.  Exam showed swollen and erythematous right large toe.  US of RLE showed evidence of cellulitis but no discrete abscess noted. Area of cellulitis outlined on 1/4.   -Blood cultures no growing strep dysgalactiae from admission.  He has been on IV antibiotics and will complete 2 weeks of IV ceftriaxone therapy.  Midline placed on 1/3.     -His hemodynamics, leukocytosis and lactic acid have all improved with treatment.  He was seen by podiatry and did have right great toenail removed.    -ID consulted  -We will obtain right lower extremity ultrasound as patient does seem to have some erythema, swelling and  warmth of the right calf and shin.  -Stop IV fluids today.  -Trend inflammatory markers     #Syncope. Recent syncope with overnight admit 12/19. NSTEMI with elevated troponin (150s). Hypertension. Dilated aorta. RBBB. First-degree AVB. LAFB. QTC prolongation: Cardiology consult appreciated. Possible ischemia work up at some point once infection and rhabdo are better.  -BP medications adjusted.  Carvedilol transition to low-dose metoprolol.  Isordil started.  -Continue LIpitor  -PTA lisinopril, chlorthalidone, and aldactone are on hold with rhabdo.  Will give dose of IV lasix on 1/4 for mild fluid overload.   -Intake/output and daily weights ordered     #Fall. Rhabdomyolysis. Acute kidney injury. Left chest wall pain, likely due to contusion: Creatinine was 1.14 on presentation up from baseline creatinine of 0.7-0.9.  CK was elevated at 1377 then rising to over 2k.  This has since improved with IV fluids.  -RILEY is likey multifactorial secondary to prerenal hypovolemia, rhabdomyolysis, and likely exacerbated by PTA meds (lisinopril, aldactone, and chlorthalidone).    -Received aggressive fluid resuscitation in the ED and has been on maintenance IV fluids.  -CK is better and suspect patient may be getting more on the volume overloaded side.  Chest x-ray done on 1/3 also showed some mild prominence of early fluid overload.  Stopped IV Fluid and giving some IV lasix on 1/4.   -Continue to hold PTA lisinopril, aldactone, and chlorthalidone)  -PT consulted for falls  -Trauma imaging showed no traumatic injuries  -Continue pain control for likely left chest wall contusion (scheduled tylenol and lidoderm, and as needed oxycodone and IV dilaudid)     #Type II DM: PTA on semaglutide weekly and metformin 500 mg daily.  Blood glucose 148.  Most recent A1c 5.7 last month.  -Hold semaglutide while inpatient  -Hold metformin with RILEY and sepsis with elevated lactic acid on presentation  -BG have been fine on medium resistance  Novolog insulin sliding scale      #History lumbar fusion/ Chronic pain: Continue PTA gabapentin 800 mg 3 times daily and Flexeril 5 mg 3 times daily   -Other pain meds available for acute pain issues      #RLS: Continue PTA Mirapex 3 mg at bedtime.     #History of prostate cancer. Erectile dysfunction: Previous prostatectomy resulting in erectile dysfunction.  Hold PTA scheduled Cialis and as needed sildenafil     #Obesity: BMI 37.     DVT Prophylaxis: Enoxaparin (Lovenox) SQ  Code Status: Full Code  Dispo: Possibly ready tomorrow. Will diurese today to help with fluid overload.     Gilbert Gandara MD    Interval History   Walked halls today with therapy. Noted some SOB with exertion. Also had some orthopnea last night. Continued redness of RLE today. Area outlined. No fevers/chlils.     -Data reviewed today: I reviewed all new labs and imaging results over the last 24 hours.     Physical Exam   Temp: 98.2  F (36.8  C) Temp src: Oral BP: 119/69 Pulse: 62   Resp: 18 SpO2: 96 % O2 Device: None (Room air) Oxygen Delivery: 1 LPM  Vitals:    01/03/23 0607 01/04/23 0300 01/04/23 0422   Weight: 146.9 kg (323 lb 14.4 oz) 144 kg (317 lb 6.4 oz) 144 kg (317 lb 6.4 oz)     Vital Signs with Ranges  Temp:  [97.4  F (36.3  C)-98.9  F (37.2  C)] 98.2  F (36.8  C)  Pulse:  [62-72] 62  Resp:  [16-20] 18  BP: (114-151)/(62-78) 119/69  SpO2:  [92 %-97 %] 96 %  I/O last 3 completed shifts:  In: 3630 [I.V.:3630]  Out: 200 [Urine:200]    Constitutional: Nontoxic, NAD.  Answers appropriately.  Sitting up in the chair.  HEENT: Normocephalic. MMM, No elevation of JVD noted.   Respiratory: Nl WOB, some inspiratory crackles at the bases.  No wheezes.  Cardiovascular: Regular, no murmur  GI: Obese, BS+, NT, ND  Skin/Integumen: WWP, right great toe with dressings in place.  No drainage.  He has erythema, swelling and warmth over the right shin and calf.  Area outlined.   Neuro: CNII-XII intact. Moves all extremities. No tremor.  A&Ox3.    Medications       acetaminophen  975 mg Oral TID     aspirin  81 mg Oral Daily     atorvastatin  40 mg Oral Daily     cefTRIAXone  2 g Intravenous Q24H     enoxaparin ANTICOAGULANT  40 mg Subcutaneous Q24H     furosemide  20 mg Intravenous Once     gabapentin  800 mg Oral TID     insulin aspart  1-7 Units Subcutaneous TID AC     insulin aspart  1-5 Units Subcutaneous At Bedtime     isosorbide dinitrate  10 mg Oral TID     lidocaine  1-2 patch Transdermal Q24H     lidocaine   Transdermal Q8H DEJAH     [Held by provider] lisinopril  40 mg Oral At Bedtime     metoprolol succinate ER  25 mg Oral BID     pramipexole  3 mg Oral At Bedtime     sodium chloride (PF)  10 mL Intracatheter Q8H     sodium chloride (PF)  3 mL Intracatheter Q8H       Data   Recent Labs   Lab 01/04/23  0832 01/04/23  0606 01/04/23  0159 01/03/23  0938 01/03/23  0630 01/02/23  0917 01/02/23  0756 01/01/23  0321 12/31/22  2221 12/31/22  2200 12/31/22  2154   WBC  --  8.9  --   --  9.0  --  11.6*   < >  --   --  25.2*   HGB  --  11.8*  --   --  12.3*  --  13.2*   < >  --   --  15.1   MCV  --  91  --   --  90  --  90   < >  --   --  87   PLT  --  146*  --   --  132*  --  144*   < >  --   --  213   INR  --   --   --   --   --   --   --   --  1.13  --   --    NA  --  135*  --   --  135*  --  136   < >  --   --  141   POTASSIUM  --  3.5  --   --  3.7  --  3.7  3.7   < >  --   --  3.4   CHLORIDE  --  98  --   --  101  --  100   < >  --   --  103   CO2  --  25  --   --  26  --  28   < >  --   --  24   BUN  --  11.2  --   --  18.0  --  24.8*   < >  --   --  25.7*   CR  --  0.81  --   --  0.92  --  1.15   < >  --    < > 1.14   ANIONGAP  --  12  --   --  8  --  8   < >  --   --  14   LAURA  --  8.1*  --   --  7.9*  --  8.2*   < >  --   --  9.2   GLC 88 93 107*   < > 109*   < > 103*   < >  --   --  148*   ALBUMIN  --   --   --   --   --   --   --   --   --   --  4.0   PROTTOTAL  --   --   --   --   --   --   --   --   --   --  6.9   BILITOTAL  --   --    --   --   --   --   --   --   --   --  1.1   ALKPHOS  --   --   --   --   --   --   --   --   --   --  81   ALT  --   --   --   --   --   --   --   --   --   --  35   AST  --   --   --   --   --   --   --   --   --   --  44    < > = values in this interval not displayed.       No results found for this or any previous visit (from the past 24 hour(s)).

## 2023-01-04 NOTE — CONSULTS
Consult Date: 01/04/2023    INFECTIOUS DISEASE CONSULTATION    LOCATION:  ROOM 301 at [facility].    REFERRING PHYSICIAN:  Gilbert Gandara M.D.    IMPRESSION:     1.  A 72-year-old male with frostbite to his right great toe and resulting right leg cellulitis, including mild sepsis and bacteremia.  2.  Group C strep bacteremia.  No obvious secondary sites, clinically improved on antibiotics already.  3.  Toe with the frostbite.  No significant abscess.  No obvious deep involvement, probably appears streptococcal infection.  4.  Syncope and fall.  5.  Rhabdomyolysis.    RECOMMENDATIONS:     1.  Simplify to ceftriaxone, okay to place midline, and needs a 2-week course of IV antibiotics.  2.  Follow clinically, but probably okay for disposition, if other factors allow including the rhabdomyolysis, etc.    HISTORY OF PRESENT ILLNESS:  This 72-year-old male is seen in consultation due to a strep bacteremia and cellulitis.  The patient initially presented with some degree of confusion, has had a fall and loss of consciousness, had among other things, frostbite develop in his right great toe, then had sepsis.  He has a group C strep bacteremia and the right leg with an obvious cellulitis.  He is clinically improved on antibiotics, most of his mentation changes have resolved.  No obvious secondary involved sites.  The toe is clinically improved and no intervention surgically felt required.    PAST MEDICAL HISTORY:  The current event with a syncopal event.  Full workup underway.  No major infection problems historically.  Possible cardiac event with the current issue.  Rhabdomyolysis with current episode.    MEDICATIONS:  As listed.    SOCIAL AND FAMILY HISTORY:  No recent travel exposures.  No major alcohol involvement in this episode.    ALLERGIES:  NONE.    REVIEW OF SYSTEMS:  A 10-point review of systems currently negative.  Some pain in the toe, but it is improved, still does not feel particularly well  significant nausea, malaise, fatigue, but not having major shaking, chills, or fever.    PHYSICAL EXAMINATION:    GENERAL:  The patient appears his stated age, looks somewhat uncomfortable, having some discomfort and nausea.  VITAL SIGNS:  Have improved.  He is afebrile, not tachycardic, not septic looking.  HEENT:  No thrush or intraoral lesions.  HEART:  Regular rhythm, no major murmur.  LUNGS:  Clear bilaterally.  ABDOMEN:  Soft, nontender.  EXTREMITIES:  The toe without major fluctuance or deep wound.  Right leg with classic streptococcal cellulitis and some lymphangitis.    LABORATORY DATA:  Blood cultures growing a group C strep.  CPK has been elevated with some rhabdomyolysis from the episodes,some slight renal dysfunction initially has now resolved.  White blood cell count was initially 25,000, now down to normal.    Thank you very much for the consultation.  I will follow the patient with you.    Anderson Obando MD        D: 2023   T: 2023   MT: DRAGAN    Name:     AMADEO HAYNESJaime  MRN:      [id number]        Account:      [id number]   :      1950           Consult Date: 2023     Document: [id number]

## 2023-01-04 NOTE — PLAN OF CARE
Goal Outcome Evaluation:      Plan of Care Reviewed With: patient      Redness in right LE and swelling seems to be increasing from the previous night.  Oxycodone, Ice and Elevation to the RLE.  Left Rib/Chest still bruised and painful with movement.  Lidocaine patches help the pain during the day when on.  Patient moving better, up with assist of 1, walker and gait belt.  Up in the recliner some tonight.  Plan is to continue Rocephin.  ID is following.  New midline to right are flushes well.  Plan is for discharge to TCU.  Patient stated that his son is coming into town on Thursday.  Continue with POC.

## 2023-01-04 NOTE — CONSULTS
Care Management Follow Up    Length of Stay (days): 3    Expected Discharge Date: 01/05/2023     Concerns to be Addressed: discharge planning     Patient plan of care discussed at interdisciplinary rounds: Yes    Anticipated Discharge Disposition: Transitional Care     Anticipated Discharge Services: None  Anticipated Discharge DME: None    Patient/family educated on Medicare website which has current facility and service quality ratings: yes  Education Provided on the Discharge Plan:  Not at this time  Patient/Family in Agreement with the Plan: yes    Referrals Placed by CM/SW:  Post Acute Referrals  Private pay costs discussed: Not applicable    Additional Information:  Care management following for discharge planning. TCU recommended with referrals sent.   Noted that ID plan for 2 weeks IV ABX, Ceftriaxone.   Left VM with admissions at [facility].  [facility] has declined.     CM will follow up with patient and family later today for further discharge planning.      Update 1110: Met with patient at bedside. Patient states that son will arrive on Thursday.  Patient gave permission to contact son to discuss discharge planning and obtain additional TCU choices. SNF/ TCU packet emailed to son at [email or web address]    Update 1430: [facility] has declined. Updated patient at bedside that additional referrals will need to be sent. Patient will discuss additional referrals and give choices.       Geovanna Morales, RN      Geovanna Morales RN Case Manager  Inpatient Care Coordination  [facility]   [phone]

## 2023-01-04 NOTE — PROGRESS NOTES
Inpatient Cardiology Consultation Progress Note:    Federico Chamberlain MRN#: [id number]   YOB: 1950 Age: 72 year old     Date of Admission: 12/31/2022  Consult indication: elevated troponin         Assessment and Plan:     Patient admitted with syncope in the setting of severe sepsis and bacteremia, found to have rhabdomyolysis, mildly elevated troponin, prolonged QT interval.  Mildly elevated troponin most consistent with type II MI, however TTE 12/20/2022 demonstrates LVEF 50% with septal lateral hypokinesis.  Patient denies any chest pain/chest pressure.  Suspect syncope likely due to severe sepsis.  He does have baseline abnormal conduction with bifascicular block with first-degree AV block, currently with an event monitor in place.  QTc prolonged, agree with avoiding QT prolonging medications.  No significant bradycardia or pauses on telemetry. Frequent PVCs, short runs of paroxysmal SVT on telemetry, BP mildly elevated, will increase metoprolol to 25mg BID.  Had been on carvedilol 25mg BID at home.  Previously had been on lisinopril, chlorthalidone, and spironolactone at home, which have been held due to rhabdomyolysis.  Added isordil, considered adding hydralazine however BPs have been generally in normal range.    Agree patient does seem mildly hypervolemic, after resuscitation for rhabdomyolysis and acute kidney injury, will give furosemide IV today.   Pt would benefit from an ischemic evaluation however in the setting of bacteremia and rhabdomyolysis, an ischemic evaluation would not be appropriate at this time. Reassuringly, he continues to be asymptomatic of angina or symptoms concerning for angina. Will plan to address at follow up in clinic.  Advised patient to seek medical care if develops any severe chest pain, abnormal shortness of breath, or other symptoms that are concerning for him in the interim, he voiced understanding and was in agreement.  Cardiology will follow, hopefully will be  able to transition to a p.o. diuretic regimen tomorrow in anticipation of discharge.    Thank you for allowing our team to participate in the care of Federico Chamberlain.  Please do not hesitate to page me with any questions or concerns.     Kevin Garcia MD, [facility]  Cardiology  January 4, 2023    Voice recognition software utilized.          Interval Events:     - no acute events overnight  - Pt denies any chest pain, chest discomfort, dyspnea  - no dizziness/lightheadedness  - no bleeding events  - telemetry reviewed, no significant abnormalities  - interval labs and studies reviewed   - interval progress notes reviewed  - Pt updated on clinical course, plan for the day         Medications reviewed:     Current medications:  Current Facility-Administered Medications Ordered in Epic   Medication Dose Route Frequency Last Rate Last Admin     acetaminophen (TYLENOL) tablet 975 mg  975 mg Oral TID   975 mg at 01/04/23 0838     aspirin EC tablet 81 mg  81 mg Oral Daily   81 mg at 01/04/23 0839     atorvastatin (LIPITOR) tablet 40 mg  40 mg Oral Daily   40 mg at 01/04/23 0839     cefTRIAXone (ROCEPHIN) 2 g vial to attach to  ml bag for ADULTS or NS 50 ml bag for PEDS  2 g Intravenous Q24H   2 g at 01/03/23 1622     cyclobenzaprine (FLEXERIL) tablet 5 mg  5 mg Oral TID PRN   5 mg at 01/03/23 0205     glucose gel 15-30 g  15-30 g Oral Q15 Min PRN        Or     dextrose 50 % injection 25-50 mL  25-50 mL Intravenous Q15 Min PRN        Or     glucagon injection 1 mg  1 mg Subcutaneous Q15 Min PRN         enoxaparin ANTICOAGULANT (LOVENOX) injection 40 mg  40 mg Subcutaneous Q24H   40 mg at 01/03/23 1146     furosemide (LASIX) injection 20 mg  20 mg Intravenous Once         gabapentin (NEURONTIN) capsule 800 mg  800 mg Oral TID   800 mg at 01/04/23 0840     hydrALAZINE (APRESOLINE) injection 10 mg  10 mg Intravenous Q4H PRN         HYDROmorphone (DILAUDID) injection 0.2 mg  0.2 mg Intravenous Q4H PRN          insulin aspart (NovoLOG) injection (RAPID ACTING)  1-7 Units Subcutaneous TID AC   1 Units at 01/01/23 0825     insulin aspart (NovoLOG) injection (RAPID ACTING)  1-5 Units Subcutaneous At Bedtime         isosorbide dinitrate (ISORDIL) tablet 10 mg  10 mg Oral TID   10 mg at 01/04/23 0839     Lidocaine (LIDOCARE) 4 % Patch 1-2 patch  1-2 patch Transdermal Q24H   2 patch at 01/04/23 0838     lidocaine (LMX4) cream   Topical Q1H PRN         lidocaine 1 % 0.1-1 mL  0.1-1 mL Other Q1H PRN         lidocaine 1 % 0.1-5 mL  0.1-5 mL Other Q1H PRN         lidocaine patch in PLACE   Transdermal Q8H DEJAH         [Held by provider] lisinopril (ZESTRIL) tablet 40 mg  40 mg Oral At Bedtime         LORazepam (ATIVAN) injection 0.25 mg  0.25 mg Intravenous Q4H PRN   0.25 mg at 01/03/23 0205     melatonin tablet 1 mg  1 mg Oral At Bedtime PRN   1 mg at 01/01/23 2219     metoprolol tartrate (LOPRESSOR) half-tab 12.5 mg  12.5 mg Oral Once         metoprolol tartrate (LOPRESSOR) tablet 25 mg  25 mg Oral BID         naloxone (NARCAN) injection 0.2 mg  0.2 mg Intravenous Q2 Min PRN        Or     naloxone (NARCAN) injection 0.4 mg  0.4 mg Intravenous Q2 Min PRN        Or     naloxone (NARCAN) injection 0.2 mg  0.2 mg Intramuscular Q2 Min PRN        Or     naloxone (NARCAN) injection 0.4 mg  0.4 mg Intramuscular Q2 Min PRN         nitroGLYcerin (NITROSTAT) sublingual tablet 0.4 mg  0.4 mg Sublingual Q5 Min PRN         oxyCODONE (ROXICODONE) tablet 5 mg  5 mg Oral Q4H PRN   5 mg at 01/04/23 0839     polyethylene glycol (MIRALAX) Packet 17 g  17 g Oral Daily PRN   17 g at 01/02/23 0901     pramipexole (MIRAPEX) tablet 3 mg  3 mg Oral At Bedtime   3 mg at 01/03/23 2237     prochlorperazine (COMPAZINE) injection 5 mg  5 mg Intravenous Q6H PRN        Or     prochlorperazine (COMPAZINE) tablet 5 mg  5 mg Oral Q6H PRN        Or     prochlorperazine (COMPAZINE) suppository 12.5 mg  12.5 mg Rectal Q12H PRN         senna-docusate  (SENOKOT-S/PERICOLACE) 8.6-50 MG per tablet 1 tablet  1 tablet Oral BID PRN   1 tablet at 23 0901    Or     senna-docusate (SENOKOT-S/PERICOLACE) 8.6-50 MG per tablet 2 tablet  2 tablet Oral BID PRN         sodium chloride (PF) 0.9% PF flush 10 mL  10 mL Intracatheter Q8H   10 mL at 23 0841     sodium chloride (PF) 0.9% PF flush 10-20 mL  10-20 mL Intracatheter q1 min prn         sodium chloride (PF) 0.9% PF flush 10-40 mL  10-40 mL Intracatheter Once PRN         sodium chloride (PF) 0.9% PF flush 3 mL  3 mL Intracatheter Q8H   3 mL at 23 0239     sodium chloride (PF) 0.9% PF flush 3 mL  3 mL Intracatheter q1 min prn   3 mL at 23 0238     No current [facility]-ordered outpatient medications on file.             Physical Exam:   Vital signs were reviewed:  Temperatures:  Current - Temp: 98.2  F (36.8  C); Max - Temp  Av.3  F (36.8  C)  Min: 97.4  F (36.3  C)  Max: 98.9  F (37.2  C)  Respiration range: Resp  Av.7  Min: 16  Max: 20  Pulse range: Pulse  Av.3  Min: 63  Max: 72  Blood pressure range: Systolic (24hrs), Av , Min:109 , Max:151   ; Diastolic (24hrs), Av, Min:58, Max:78    Pulse oximetry range: SpO2  Av.9 %  Min: 92 %  Max: 97 %    Intake/Output Summary (Last 24 hours) at 2023 0924  Last data filed at 1/3/2023 1200  Gross per 24 hour   Intake --   Output 200 ml   Net -200 ml     317 lbs 6.41 oz  Body mass index is 36.68 kg/m .   Body surface area is 2.82 meters squared.    Constitutional: appears stated age, in no apparent distress, appears to be well nourished  Head: normocephalic, atraumatic  Neck: supple, trachea midline  Pulmonary: clear to auscultation bilaterally, no wheezes, no rales, no increased work of breathing  Cardiovascular: JVP normal, regular rate, regular rhythm, normal S1 and S2, no S3, S4, no murmur appreciated  Gastrointestinal: no guarding, non-rigid   Neurologic: awake, alert, moves all extremities  Skin: no jaundice, warm on limited  exam, RLE erythematous and tender  Psychiatric: affect is normal, answers questions appropriately, oriented to self and place         Selected laboratory tests:   Laboratory test results personally reviewed:   CMP  Recent Labs   Lab 01/04/23  0832 01/04/23  0606 01/04/23  0159 01/03/23  2206 01/03/23  0938 01/03/23  0630 01/02/23  0917 01/02/23  0756 01/02/23  0421 01/02/23  0113 01/01/23  0800 01/01/23  0740 12/31/22  2200 12/31/22  2154   NA  --  135*  --   --   --  135*  --  136  --   --   --  141  --  141   POTASSIUM  --  3.5  --   --   --  3.7  --  3.7  3.7  --  3.2*   < > 2.9*  --  3.4   CHLORIDE  --  98  --   --   --  101  --  100  --   --   --  103  --  103   CO2  --  25  --   --   --  26  --  28  --   --   --  22  --  24   ANIONGAP  --  12  --   --   --  8  --  8  --   --   --  16*  --  14   GLC 88 93 107* 106*   < > 109*   < > 103*   < >  --    < > 151*   < > 148*   BUN  --  11.2  --   --   --  18.0  --  24.8*  --   --   --  26.6*  --  25.7*   CR  --  0.81  --   --   --  0.92  --  1.15  --   --   --  1.04   < > 1.14   GFRESTIMATED  --  >90  --   --   --  89  --  68  --   --   --  77   < > 69   LAURA  --  8.1*  --   --   --  7.9*  --  8.2*  --   --   --  8.3*  --  9.2   MAG  --   --   --   --   --   --   --   --   --   --   --   --   --  1.7   PROTTOTAL  --   --   --   --   --   --   --   --   --   --   --   --   --  6.9   ALBUMIN  --   --   --   --   --   --   --   --   --   --   --   --   --  4.0   BILITOTAL  --   --   --   --   --   --   --   --   --   --   --   --   --  1.1   ALKPHOS  --   --   --   --   --   --   --   --   --   --   --   --   --  81   AST  --   --   --   --   --   --   --   --   --   --   --   --   --  44   ALT  --   --   --   --   --   --   --   --   --   --   --   --   --  35    < > = values in this interval not displayed.     CBC  Recent Labs   Lab 01/04/23  0606 01/03/23  0630 01/02/23  0756 01/01/23  0740   WBC 8.9 9.0 11.6* 20.8*   RBC 4.15* 4.30* 4.60 4.93   HGB 11.8* 12.3* 13.2*  14.1   HCT 37.6* 38.6* 41.5 43.3   MCV 91 90 90 88   MCH 28.4 28.6 28.7 28.6   MCHC 31.4* 31.9 31.8 32.6   RDW 14.1 14.5 14.6 14.1   * 132* 144* 167     INR  Recent Labs   Lab 12/31/22  2221   INR 1.13     Lab Results   Component Value Date    TROPI <0.015 08/04/2020    TROPI <0.015 07/08/2018    TROPI 0.021 07/05/2018    TROPONIN 0.03 07/01/2018    TROPONIN <0.04 11/09/2007     Recent Labs   Lab Test 07/02/18  0636   CHOL 92   HDL 32*   LDL <1   TRIG 297*     Lab Results   Component Value Date    A1C 6.0 12/31/2022    A1C 6.0 07/08/2018    A1C 6.1 07/01/2018     TSH   Date Value Ref Range Status   12/20/2022 1.54 0.30 - 4.20 uIU/mL Final   07/09/2018 5.40 (H) 0.40 - 4.00 mU/L Final

## 2023-01-04 NOTE — PLAN OF CARE
End of shift summary ([id number])    Pt Ox4, forgetful at times. VSS on RA w/ varied BP's. Reports left rib and RLE pain w/ activity but otherwise denies pain. Tele SR w/ 1* AVB / BBB /prolong QT, denied CP. LPIV intact, SL. RUE midline intact, SL. RLE red and warm. Right great toe wound cares and dsg change provided. Up Ax2 GB W to bathroom and chair. Ambulated halls this shift. See ID note for blood culture status, abx regimen, and updated POC. Recommended discharge to TCU, SW following. Will continue POC.     Goal Outcome Evaluation:      Plan of Care Reviewed With: patient    Overall Patient Progress: improvingOverall Patient Progress: improving

## 2023-01-04 NOTE — PLAN OF CARE
"Pt. A&O, SBA with walker, gait belt for mobility, voiding adequately, has some pain, but refused 2pm pain mes, dressing on right great toe was changed, pt. Is on tele monitor SR 1st degree AVB with BBB, refused food, got education about nutrition, CMS intact.    /70 (BP Location: Left arm)   Pulse 63   Temp 98.2  F (36.8  C) (Oral)   Resp 18   Ht 1.981 m (6' 6\")   Wt 144 kg (317 lb 6.4 oz)   SpO2 95%   BMI 36.68 kg/m     "

## 2023-01-04 NOTE — PROGRESS NOTES
"[facility]  Infectious Disease Progress Note          Assessment and Plan:   IMPRESSION:     1.  A 72-year-old male with frostbite to his right great toe and resulting right leg cellulitis, including mild sepsis and bacteremia.  2.  Group C strep bacteremia.  No obvious secondary sites, clinically improved on antibiotics already.  3.  Toe with the frostbite.  No significant abscess.  No obvious deep involvement, probably appears streptococcal infection.  4.  Syncope and fall.  5.  Rhabdomyolysis.     RECOMMENDATIONS:     1.  Continue ceftriaxone, okay to place midline, and needs a 2-week course of IV antibiotics.  2.  Follow clinically, but probably okay for disposition on IV when  other factors allow including the rhabdomyolysis, etc.        Interval History:   no new complaints and doing well; no cp, sob, n/v/d, or abd pain. Sleepy T down but still feels ill chronic back pain about same              Medications:       acetaminophen  975 mg Oral TID     aspirin  81 mg Oral Daily     atorvastatin  40 mg Oral Daily     cefTRIAXone  2 g Intravenous Q24H     enoxaparin ANTICOAGULANT  40 mg Subcutaneous Q24H     gabapentin  800 mg Oral TID     insulin aspart  1-7 Units Subcutaneous TID AC     insulin aspart  1-5 Units Subcutaneous At Bedtime     isosorbide dinitrate  10 mg Oral TID     lidocaine  1-2 patch Transdermal Q24H     lidocaine   Transdermal Q8H DEJAH     [Held by provider] lisinopril  40 mg Oral At Bedtime     metoprolol succinate ER  25 mg Oral BID     pramipexole  3 mg Oral At Bedtime     sodium chloride (PF)  10 mL Intracatheter Q8H     sodium chloride (PF)  3 mL Intracatheter Q8H                  Physical Exam:   Blood pressure 130/70, pulse 63, temperature 98.2  F (36.8  C), temperature source Oral, resp. rate 18, height 1.981 m (6' 6\"), weight 144 kg (317 lb 6.4 oz), SpO2 95 %.  Wt Readings from Last 2 Encounters:   01/04/23 144 kg (317 lb 6.4 oz)   12/19/22 143.3 kg (316 lb) "     Vital Signs with Ranges  Temp:  [98.2  F (36.8  C)-98.9  F (37.2  C)] 98.2  F (36.8  C)  Pulse:  [60-71] 63  Resp:  [16-20] 18  BP: (119-151)/(62-78) 130/70  SpO2:  [94 %-97 %] 95 %    Constitutional: Awake, alert, cooperative, no apparent distress   Lungs: Clear to auscultation bilaterally, no crackles or wheezing   Cardiovascular: Regular rate and rhythm, normal S1 and S2, and no murmur noted   Abdomen: Normal bowel sounds, soft, non-distended, non-tender   Skin: , no cyanosis, no edema toe and leg same   Other:           Data:   All microbiology laboratory data reviewed.  Recent Labs   Lab Test 01/04/23  0606 01/03/23 0630 01/02/23  0756   WBC 8.9 9.0 11.6*   HGB 11.8* 12.3* 13.2*   HCT 37.6* 38.6* 41.5   MCV 91 90 90   * 132* 144*     Recent Labs   Lab Test 01/04/23  0606 01/03/23  0630 01/02/23  0756   CR 0.81 0.92 1.15     No lab results found.  No lab results found.    Invalid input(s): LEYLA

## 2023-01-04 NOTE — PROCEDURES
[facility]    Single Lumen Midline Placement    Date/Time: 1/3/2023 5:15 PM  Performed by: Bing Villaseñor RN  Authorized by: Anderson Obando MD   Indications: vascular access      UNIVERSAL PROTOCOL   Site Marked: Yes  Prior Images Obtained and Reviewed:  NA  Required items: Required blood products, implants, devices and special equipment available    Patient identity confirmed:  Verbally with patient, arm band, provided demographic data and hospital-assigned identification number  NA - No sedation, light sedation, or local anesthesia  Confirmation Checklist:  Patient's identity using two indicators, relevant allergies, procedure was appropriate and matched the consent or emergent situation and correct equipment/implants were available  Time out: Immediately prior to the procedure a time out was called    Universal Protocol: the Joint Commission Universal Protocol was followed    Preparation: Patient was prepped and draped in usual sterile fashion    ESBL (mL):  2     ANESTHESIA    Anesthesia: Local infiltration  Local Anesthetic:  Lidocaine 1% without epinephrine  Anesthetic Total (mL):  1      SEDATION    Patient Sedated: No        Preparation: skin prepped with ChloraPrep  Skin prep agent: skin prep agent completely dried prior to procedure  Sterile barriers: maximum sterile barriers were used: cap, mask, sterile gown, sterile gloves, and large sterile sheet  Hand hygiene: hand hygiene performed prior to central venous catheter insertion  Type of line used: Midline  Catheter type: single lumen  Lumen type: non-valved  Catheter size: 4 Fr  Brand: Bard  Lot number: [id number]  Placement method: venipuncture, MST and ultrasound  Number of attempts: 1  Difficulty threading catheter: no  Successful placement: yes  Orientation: right  Catheter to Vein (%): 7  Location: cephalic vein  Tip Location: distal to axillary vein  Arm circumference: adults 10 cm  Extremity circumference: 33  Visible  catheter length: 2  Internal length: 14 cm  Total catheter length: 16  Dressing and securement: adhesive securement device, additional securement method (see comment), alcohol impregnated caps, blood cleaned with CHG, chlorhexidine disc applied, dressing applied, line secured, securement device, site cleansed, sterile dressing applied, subcutaneous anchor securement system and transparent dressing  Post procedure assessment: blood return through all ports and free fluid flow  PROCEDURE   Patient Tolerance:  Patient tolerated the procedure well with no immediate complicationsDescribe Procedure: Right cephalic vein midline placed without difficulty. Lumen flushes easily and has good blood return. MIDLINE OK TO USE. Bedside RN Melissa updated. See flowhsheet for additional details.

## 2023-01-05 ENCOUNTER — APPOINTMENT (OUTPATIENT)
Dept: OCCUPATIONAL THERAPY | Facility: CLINIC | Age: 73
DRG: 871 | End: 2023-01-05
Payer: COMMERCIAL

## 2023-01-05 LAB
ANION GAP SERPL CALCULATED.3IONS-SCNC: 12 MMOL/L (ref 7–15)
ATRIAL RATE - MUSE: 68 BPM
BUN SERPL-MCNC: 11.8 MG/DL (ref 8–23)
CALCIUM SERPL-MCNC: 8.3 MG/DL (ref 8.8–10.2)
CHLORIDE SERPL-SCNC: 99 MMOL/L (ref 98–107)
CK SERPL-CCNC: 461 U/L (ref 39–308)
CREAT SERPL-MCNC: 0.82 MG/DL (ref 0.67–1.17)
DEPRECATED HCO3 PLAS-SCNC: 26 MMOL/L (ref 22–29)
DIASTOLIC BLOOD PRESSURE - MUSE: NORMAL MMHG
ERYTHROCYTE [DISTWIDTH] IN BLOOD BY AUTOMATED COUNT: 13.9 % (ref 10–15)
GFR SERPL CREATININE-BSD FRML MDRD: >90 ML/MIN/1.73M2
GLUCOSE BLDC GLUCOMTR-MCNC: 111 MG/DL (ref 70–99)
GLUCOSE BLDC GLUCOMTR-MCNC: 85 MG/DL (ref 70–99)
GLUCOSE BLDC GLUCOMTR-MCNC: 86 MG/DL (ref 70–99)
GLUCOSE BLDC GLUCOMTR-MCNC: 95 MG/DL (ref 70–99)
GLUCOSE BLDC GLUCOMTR-MCNC: 99 MG/DL (ref 70–99)
GLUCOSE SERPL-MCNC: 89 MG/DL (ref 70–99)
HCT VFR BLD AUTO: 37.6 % (ref 40–53)
HGB BLD-MCNC: 12.1 G/DL (ref 13.3–17.7)
INTERPRETATION ECG - MUSE: NORMAL
MAGNESIUM SERPL-MCNC: 1.9 MG/DL (ref 1.7–2.3)
MCH RBC QN AUTO: 28.6 PG (ref 26.5–33)
MCHC RBC AUTO-ENTMCNC: 32.2 G/DL (ref 31.5–36.5)
MCV RBC AUTO: 89 FL (ref 78–100)
P AXIS - MUSE: NORMAL DEGREES
PLATELET # BLD AUTO: 161 10E3/UL (ref 150–450)
POTASSIUM SERPL-SCNC: 3.2 MMOL/L (ref 3.4–5.3)
POTASSIUM SERPL-SCNC: 3.5 MMOL/L (ref 3.4–5.3)
PR INTERVAL - MUSE: NORMAL MS
QRS DURATION - MUSE: 162 MS
QT - MUSE: 532 MS
QTC - MUSE: 578 MS
R AXIS - MUSE: -49 DEGREES
RBC # BLD AUTO: 4.23 10E6/UL (ref 4.4–5.9)
SODIUM SERPL-SCNC: 137 MMOL/L (ref 136–145)
SYSTOLIC BLOOD PRESSURE - MUSE: NORMAL MMHG
T AXIS - MUSE: 31 DEGREES
VENTRICULAR RATE- MUSE: 71 BPM
WBC # BLD AUTO: 7.8 10E3/UL (ref 4–11)

## 2023-01-05 PROCEDURE — 250N000013 HC RX MED GY IP 250 OP 250 PS 637: Performed by: INTERNAL MEDICINE

## 2023-01-05 PROCEDURE — 250N000011 HC RX IP 250 OP 636: Performed by: INTERNAL MEDICINE

## 2023-01-05 PROCEDURE — 83735 ASSAY OF MAGNESIUM: CPT | Performed by: INTERNAL MEDICINE

## 2023-01-05 PROCEDURE — 84132 ASSAY OF SERUM POTASSIUM: CPT | Performed by: HOSPITALIST

## 2023-01-05 PROCEDURE — 99232 SBSQ HOSP IP/OBS MODERATE 35: CPT | Performed by: INTERNAL MEDICINE

## 2023-01-05 PROCEDURE — 97530 THERAPEUTIC ACTIVITIES: CPT | Mod: GO | Performed by: REHABILITATION PRACTITIONER

## 2023-01-05 PROCEDURE — 85027 COMPLETE CBC AUTOMATED: CPT | Performed by: HOSPITALIST

## 2023-01-05 PROCEDURE — 80048 BASIC METABOLIC PNL TOTAL CA: CPT | Performed by: INTERNAL MEDICINE

## 2023-01-05 PROCEDURE — 82550 ASSAY OF CK (CPK): CPT | Performed by: INTERNAL MEDICINE

## 2023-01-05 PROCEDURE — 99232 SBSQ HOSP IP/OBS MODERATE 35: CPT | Performed by: HOSPITALIST

## 2023-01-05 PROCEDURE — 120N000001 HC R&B MED SURG/OB

## 2023-01-05 RX ORDER — METOPROLOL SUCCINATE 50 MG/1
50 TABLET, EXTENDED RELEASE ORAL 2 TIMES DAILY
Status: DISCONTINUED | OUTPATIENT
Start: 2023-01-05 | End: 2023-01-06 | Stop reason: HOSPADM

## 2023-01-05 RX ORDER — POTASSIUM CHLORIDE 1500 MG/1
40 TABLET, EXTENDED RELEASE ORAL ONCE
Status: DISCONTINUED | OUTPATIENT
Start: 2023-01-05 | End: 2023-01-05

## 2023-01-05 RX ORDER — MAGNESIUM SULFATE HEPTAHYDRATE 40 MG/ML
2 INJECTION, SOLUTION INTRAVENOUS ONCE
Status: COMPLETED | OUTPATIENT
Start: 2023-01-05 | End: 2023-01-05

## 2023-01-05 RX ORDER — ISOSORBIDE MONONITRATE 30 MG/1
30 TABLET, EXTENDED RELEASE ORAL DAILY
Status: DISCONTINUED | OUTPATIENT
Start: 2023-01-05 | End: 2023-01-06 | Stop reason: HOSPADM

## 2023-01-05 RX ORDER — POTASSIUM CHLORIDE 1500 MG/1
40 TABLET, EXTENDED RELEASE ORAL ONCE
Status: COMPLETED | OUTPATIENT
Start: 2023-01-05 | End: 2023-01-05

## 2023-01-05 RX ORDER — POTASSIUM CHLORIDE 1500 MG/1
20 TABLET, EXTENDED RELEASE ORAL DAILY
Status: DISCONTINUED | OUTPATIENT
Start: 2023-01-06 | End: 2023-01-06 | Stop reason: HOSPADM

## 2023-01-05 RX ADMIN — SENNOSIDES AND DOCUSATE SODIUM 2 TABLET: 50; 8.6 TABLET ORAL at 21:09

## 2023-01-05 RX ADMIN — LIDOCAINE 2 PATCH: 560 PATCH PERCUTANEOUS; TOPICAL; TRANSDERMAL at 08:51

## 2023-01-05 RX ADMIN — ACETAMINOPHEN 975 MG: 325 TABLET, FILM COATED ORAL at 08:52

## 2023-01-05 RX ADMIN — ACETAMINOPHEN 975 MG: 325 TABLET, FILM COATED ORAL at 13:54

## 2023-01-05 RX ADMIN — MAGNESIUM SULFATE HEPTAHYDRATE 2 G: 40 INJECTION, SOLUTION INTRAVENOUS at 12:59

## 2023-01-05 RX ADMIN — ENOXAPARIN SODIUM 40 MG: 40 INJECTION SUBCUTANEOUS at 10:51

## 2023-01-05 RX ADMIN — GABAPENTIN 800 MG: 400 CAPSULE ORAL at 08:53

## 2023-01-05 RX ADMIN — GABAPENTIN 800 MG: 400 CAPSULE ORAL at 20:43

## 2023-01-05 RX ADMIN — METOPROLOL SUCCINATE 50 MG: 50 TABLET, EXTENDED RELEASE ORAL at 08:53

## 2023-01-05 RX ADMIN — METOPROLOL SUCCINATE 50 MG: 50 TABLET, EXTENDED RELEASE ORAL at 20:43

## 2023-01-05 RX ADMIN — ISOSORBIDE MONONITRATE 30 MG: 30 TABLET, EXTENDED RELEASE ORAL at 08:53

## 2023-01-05 RX ADMIN — PRAMIPEXOLE DIHYDROCHLORIDE 3 MG: 1 TABLET ORAL at 21:09

## 2023-01-05 RX ADMIN — OXYCODONE HYDROCHLORIDE 5 MG: 5 TABLET ORAL at 01:42

## 2023-01-05 RX ADMIN — POTASSIUM CHLORIDE 40 MEQ: 1500 TABLET, EXTENDED RELEASE ORAL at 12:58

## 2023-01-05 RX ADMIN — FUROSEMIDE 20 MG: 20 TABLET ORAL at 08:53

## 2023-01-05 RX ADMIN — ASPIRIN 81 MG: 81 TABLET, COATED ORAL at 08:53

## 2023-01-05 RX ADMIN — CEFTRIAXONE 2 G: 2 INJECTION, POWDER, FOR SOLUTION INTRAMUSCULAR; INTRAVENOUS at 14:30

## 2023-01-05 RX ADMIN — POTASSIUM CHLORIDE 40 MEQ: 1500 TABLET, EXTENDED RELEASE ORAL at 08:52

## 2023-01-05 RX ADMIN — ATORVASTATIN CALCIUM 40 MG: 40 TABLET, FILM COATED ORAL at 08:54

## 2023-01-05 RX ADMIN — ACETAMINOPHEN 975 MG: 325 TABLET, FILM COATED ORAL at 20:43

## 2023-01-05 RX ADMIN — GABAPENTIN 800 MG: 400 CAPSULE ORAL at 13:55

## 2023-01-05 ASSESSMENT — ACTIVITIES OF DAILY LIVING (ADL)
ADLS_ACUITY_SCORE: 40
ADLS_ACUITY_SCORE: 41
ADLS_ACUITY_SCORE: 40
ADLS_ACUITY_SCORE: 41
ADLS_ACUITY_SCORE: 40
ADLS_ACUITY_SCORE: 40
ADLS_ACUITY_SCORE: 41
ADLS_ACUITY_SCORE: 40
ADLS_ACUITY_SCORE: 40
ADLS_ACUITY_SCORE: 41
ADLS_ACUITY_SCORE: 40
ADLS_ACUITY_SCORE: 41

## 2023-01-05 NOTE — PROGRESS NOTES
Care Management Follow Up    Length of Stay (days): 4    Expected Discharge Date: 01/05/2023     Concerns to be Addressed: discharge planning     Patient plan of care discussed at interdisciplinary rounds: Yes    Anticipated Discharge Disposition: Transitional Care     Anticipated Discharge Services: None  Anticipated Discharge DME: None    Patient/family educated on Medicare website which has current facility and service quality ratings: yes  Education Provided on the Discharge Plan:  Yes  Patient/Family in Agreement with the Plan: yes    Referrals Placed by CM/SW:    Private pay costs discussed: private room/amenity fees    Additional Information:  Care management following for discharge planning. TCU recommended with referrals sent.  CM will follow up with patient and son later today for additional TCU choices.     Update  1515: Met with patient and son Seth at bedside. Discussed TCU, choice of facility, insurance coverage, and shared vs private room. Patient prefers a Private room and agreeable to additional private room costs.   Patient's first preference is [facility]. 2nd Choice [facility] Additional referrals sent to [facility] and [facility].  [facility], [place], and [place] have declined.    Anticipate priyanka Ann to provide transportation upon discharge.       Geovanna Morales, RN      Geovanna Morales RN Case Manager  Inpatient Care Coordination  [facility]   [phone]

## 2023-01-05 NOTE — PROGRESS NOTES
"Alert, mostly oriented but occasionally disoriented to time. Forgetful. Ax1. Reports pain to left side and arm; managed well with oxycodone. VSS on RA. Tele: SR with BBB and prolonged QT. Six beat run of pSVT at  0355. Lung sounds diminished. Generalized edema; worst to RLE. RLE erythematous and painful w/ palpation. Dressing to right great toe CDI. Plan to continue antibiotics, diuretics. Discharge TBD.    BP (!) 159/77 (BP Location: Left arm)   Pulse 68   Temp 97.9  F (36.6  C) (Oral)   Resp 20   Ht 1.981 m (6' 6\")   Wt 140.9 kg (310 lb 9.6 oz)   SpO2 97%   BMI 35.89 kg/m      "

## 2023-01-05 NOTE — PLAN OF CARE
Goal Outcome Evaluation:       A&OX4. LS diminished, but clear. VSS Oxygen 97% on room air. Right le redness and swelling. R great big toe wound dressing intact. No drainage noted.  On IV Rocephin for infection .  Up with assist of 1 with gait belt to bathroom. Midline to right arm returns blood. BG 94 and 99 at HS. Mod carb diet. Plan is to discharge to TCU when stable.  Continue POC and monitoring.

## 2023-01-05 NOTE — PROGRESS NOTES
[facility]  Infectious Disease Progress Note          Assessment and Plan:   IMPRESSION:     1.  A 72-year-old male with frostbite to his right great toe and resulting right leg cellulitis, including mild sepsis and bacteremia.  2.  Group C strep bacteremia.  No obvious secondary sites, clinically improved on antibiotics already.  3.  Toe with the frostbite.  No significant abscess.  No obvious deep involvement, probably appears streptococcal infection.  4.  Syncope and fall.  5.  Rhabdomyolysis.     RECOMMENDATIONS:     1.  Continue ceftriaxone, okay  midline, and needs a 2-week course of IV antibiotics.  2.  Follow clinically, but probably okay for disposition on IV when  other factors allow including the rhabdomyolysis, etc.  Orders in when disposition ready        Interval History:   no new complaints and doing well; no cp, sob, n/v/d, or abd pain. Sleepy T down but still feels ill chronic back pain about same              Medications:       acetaminophen  975 mg Oral TID     aspirin  81 mg Oral Daily     atorvastatin  40 mg Oral Daily     cefTRIAXone  2 g Intravenous Q24H     enoxaparin ANTICOAGULANT  40 mg Subcutaneous Q24H     furosemide  20 mg Oral Daily     gabapentin  800 mg Oral TID     insulin aspart  1-7 Units Subcutaneous TID AC     insulin aspart  1-5 Units Subcutaneous At Bedtime     isosorbide mononitrate  30 mg Oral Daily     lidocaine  1-2 patch Transdermal Q24H     lidocaine   Transdermal Q8H DEJAH     [Held by provider] lisinopril  40 mg Oral At Bedtime     magnesium sulfate  2 g Intravenous Once     metoprolol succinate ER  50 mg Oral BID     [START ON 1/6/2023] potassium chloride  20 mEq Oral Daily     pramipexole  3 mg Oral At Bedtime     sodium chloride (PF)  10 mL Intracatheter Q8H     sodium chloride (PF)  3 mL Intracatheter Q8H                  Physical Exam:   Blood pressure (!) 145/73, pulse 60, temperature 98.3  F (36.8  C), temperature source Oral, resp. rate 18,  "height 1.981 m (6' 6\"), weight 140.9 kg (310 lb 9.6 oz), SpO2 95 %.  Wt Readings from Last 2 Encounters:   01/05/23 140.9 kg (310 lb 9.6 oz)   12/19/22 143.3 kg (316 lb)     Vital Signs with Ranges  Temp:  [97.9  F (36.6  C)-98.3  F (36.8  C)] 98.3  F (36.8  C)  Pulse:  [60-68] 60  Resp:  [12-20] 18  BP: (121-163)/(52-83) 145/73  SpO2:  [92 %-97 %] 95 %    Constitutional: Awake, alert, cooperative, no apparent distress   Lungs: Clear to auscultation bilaterally, no crackles or wheezing   Cardiovascular: Regular rate and rhythm, normal S1 and S2, and no murmur noted   Abdomen: Normal bowel sounds, soft, non-distended, non-tender   Skin: , no cyanosis, no edema toe and leg same   Other:           Data:   All microbiology laboratory data reviewed.  Recent Labs   Lab Test 01/05/23  0648 01/04/23  0606 01/03/23  0630   WBC 7.8 8.9 9.0   HGB 12.1* 11.8* 12.3*   HCT 37.6* 37.6* 38.6*   MCV 89 91 90    146* 132*     Recent Labs   Lab Test 01/05/23  0648 01/04/23  0606 01/03/23  0630   CR 0.82 0.81 0.92     No lab results found.  No lab results found.    Invalid input(s): LEYLA    "

## 2023-01-05 NOTE — PROGRESS NOTES
Inpatient Cardiology Consultation Progress Note:    Federico Chamberlain MRN#: [id number]   YOB: 1950 Age: 72 year old     Date of Admission: 12/31/2022  Consult indication: elevated troponin         Assessment and Plan:     Patient admitted with syncope in the setting of severe sepsis and bacteremia, found to have rhabdomyolysis, mildly elevated troponin, prolonged QT interval.  Mildly elevated troponin most consistent with type II MI, however TTE 12/20/2022 demonstrates LVEF 50% with septal lateral hypokinesis.  Patient denies any chest pain/chest pressure.  Suspect syncope likely due to severe sepsis.  He does have baseline abnormal conduction with bifascicular block with first-degree AV block, currently with an event monitor in place.  QTc prolonged, agree with avoiding QT prolonging medications.  No significant bradycardia or pauses on telemetry. Frequent PVCs, short runs of paroxysmal SVT on telemetry, BP mildly elevated, will increase metoprolol to 50 mg BID.  Will monitor on telemetry today and overnight. Of note, had been on carvedilol 25mg BID at home, this was changed to metoprolol due to concern for orthostatic component to syncope in the setting of sepsis.  Previously had been on lisinopril, chlorthalidone, and spironolactone at home, which have been held due to rhabdomyolysis.  Added isordil, considered adding hydralazine however BPs have been generally in normal range.    Pt would benefit from an ischemic evaluation, however in the setting of bacteremia and rhabdomyolysis, this would not be appropriate at this time. Reassuringly, he continues to be asymptomatic of angina or symptoms concerning for angina. Will plan to address at follow up in clinic.  Advised patient to seek medical care if develops any severe chest pain, abnormal shortness of breath, or other symptoms that are concerning for him in the interim, he voiced understanding and was in agreement.  Cardiology will follow, anticipate  of discharge.    Thank you for allowing our team to participate in the care of Federico Chamberlain.  Please do not hesitate to page me with any questions or concerns.     Kevin Garcia MD, [facility]  Cardiology  January 4, 2023    Voice recognition software utilized.          Interval Events:     - no acute events overnight  - Pt denies any chest pain, chest discomfort, dyspnea  - no dizziness/lightheadedness  - no bleeding events  - telemetry reviewed, no significant abnormalities  - interval labs and studies reviewed   - interval progress notes reviewed  - Pt updated on clinical course, plan for the day         Medications reviewed:     Current medications:  Current Facility-Administered Medications Ordered in Epic   Medication Dose Route Frequency Last Rate Last Admin     acetaminophen (TYLENOL) tablet 975 mg  975 mg Oral TID   975 mg at 01/05/23 1354     aspirin EC tablet 81 mg  81 mg Oral Daily   81 mg at 01/05/23 0853     atorvastatin (LIPITOR) tablet 40 mg  40 mg Oral Daily   40 mg at 01/05/23 0854     cefTRIAXone (ROCEPHIN) 2 g vial to attach to  ml bag for ADULTS or NS 50 ml bag for PEDS  2 g Intravenous Q24H 200 mL/hr at 01/04/23 1620 2 g at 01/05/23 1430     cyclobenzaprine (FLEXERIL) tablet 5 mg  5 mg Oral TID PRN   5 mg at 01/03/23 0205     glucose gel 15-30 g  15-30 g Oral Q15 Min PRN        Or     dextrose 50 % injection 25-50 mL  25-50 mL Intravenous Q15 Min PRN        Or     glucagon injection 1 mg  1 mg Subcutaneous Q15 Min PRN         enoxaparin ANTICOAGULANT (LOVENOX) injection 40 mg  40 mg Subcutaneous Q24H   40 mg at 01/05/23 1051     furosemide (LASIX) tablet 20 mg  20 mg Oral Daily   20 mg at 01/05/23 0853     gabapentin (NEURONTIN) capsule 800 mg  800 mg Oral TID   800 mg at 01/05/23 1355     hydrALAZINE (APRESOLINE) injection 10 mg  10 mg Intravenous Q4H PRN         HYDROmorphone (DILAUDID) injection 0.2 mg  0.2 mg Intravenous Q4H PRN         insulin aspart (NovoLOG) injection  (RAPID ACTING)  1-7 Units Subcutaneous TID AC   1 Units at 01/01/23 0825     insulin aspart (NovoLOG) injection (RAPID ACTING)  1-5 Units Subcutaneous At Bedtime         isosorbide mononitrate (IMDUR) 24 hr tablet 30 mg  30 mg Oral Daily   30 mg at 01/05/23 0853     Lidocaine (LIDOCARE) 4 % Patch 1-2 patch  1-2 patch Transdermal Q24H   2 patch at 01/05/23 0851     lidocaine (LMX4) cream   Topical Q1H PRN         lidocaine 1 % 0.1-1 mL  0.1-1 mL Other Q1H PRN         lidocaine 1 % 0.1-5 mL  0.1-5 mL Other Q1H PRN         lidocaine patch in PLACE   Transdermal Q8H DEJAH         [Held by provider] lisinopril (ZESTRIL) tablet 40 mg  40 mg Oral At Bedtime         LORazepam (ATIVAN) injection 0.25 mg  0.25 mg Intravenous Q4H PRN   0.25 mg at 01/03/23 0205     melatonin tablet 1 mg  1 mg Oral At Bedtime PRN   1 mg at 01/01/23 2219     metoprolol succinate ER (TOPROL XL) 24 hr tablet 50 mg  50 mg Oral BID   50 mg at 01/05/23 0853     naloxone (NARCAN) injection 0.2 mg  0.2 mg Intravenous Q2 Min PRN        Or     naloxone (NARCAN) injection 0.4 mg  0.4 mg Intravenous Q2 Min PRN        Or     naloxone (NARCAN) injection 0.2 mg  0.2 mg Intramuscular Q2 Min PRN        Or     naloxone (NARCAN) injection 0.4 mg  0.4 mg Intramuscular Q2 Min PRN         nitroGLYcerin (NITROSTAT) sublingual tablet 0.4 mg  0.4 mg Sublingual Q5 Min PRN         oxyCODONE (ROXICODONE) tablet 5 mg  5 mg Oral Q4H PRN   5 mg at 01/05/23 0142     polyethylene glycol (MIRALAX) Packet 17 g  17 g Oral Daily PRN   17 g at 01/02/23 0901     [START ON 1/6/2023] potassium chloride ER (KLOR-CON M) CR tablet 20 mEq  20 mEq Oral Daily         pramipexole (MIRAPEX) tablet 3 mg  3 mg Oral At Bedtime   3 mg at 01/04/23 2113     prochlorperazine (COMPAZINE) injection 5 mg  5 mg Intravenous Q6H PRN        Or     prochlorperazine (COMPAZINE) tablet 5 mg  5 mg Oral Q6H PRN        Or     prochlorperazine (COMPAZINE) suppository 12.5 mg  12.5 mg Rectal Q12H PRN          senna-docusate (SENOKOT-S/PERICOLACE) 8.6-50 MG per tablet 1 tablet  1 tablet Oral BID PRN   1 tablet at 23 0901    Or     senna-docusate (SENOKOT-S/PERICOLACE) 8.6-50 MG per tablet 2 tablet  2 tablet Oral BID PRN         sodium chloride (PF) 0.9% PF flush 10 mL  10 mL Intracatheter Q8H   10 mL at 23 1524     sodium chloride (PF) 0.9% PF flush 10-20 mL  10-20 mL Intracatheter q1 min prn   10 mL at 23 1026     sodium chloride (PF) 0.9% PF flush 10-40 mL  10-40 mL Intracatheter Once PRN         sodium chloride (PF) 0.9% PF flush 3 mL  3 mL Intracatheter Q8H   3 mL at 23 1300     sodium chloride (PF) 0.9% PF flush 3 mL  3 mL Intracatheter q1 min prn   3 mL at 23 0238     No current [facility]-ordered outpatient medications on file.             Physical Exam:   Vital signs were reviewed:  Temperatures:  Current - Temp: 98.2  F (36.8  C); Max - Temp  Av.3  F (36.8  C)  Min: 97.4  F (36.3  C)  Max: 98.9  F (37.2  C)  Respiration range: Resp  Av.7  Min: 16  Max: 20  Pulse range: Pulse  Av.3  Min: 63  Max: 72  Blood pressure range: Systolic (24hrs), Av , Min:109 , Max:151   ; Diastolic (24hrs), Av, Min:58, Max:78    Pulse oximetry range: SpO2  Av.9 %  Min: 92 %  Max: 97 %    Intake/Output Summary (Last 24 hours) at 2023 0924  Last data filed at 1/3/2023 1200  Gross per 24 hour   Intake --   Output 200 ml   Net -200 ml     310 lbs 9.6 oz  Body mass index is 35.89 kg/m .   Body surface area is 2.78 meters squared.    Constitutional: appears stated age, in no apparent distress, appears to be well nourished  Head: normocephalic, atraumatic  Neck: supple, trachea midline  Pulmonary: clear to auscultation bilaterally, no wheezes, no rales, no increased work of breathing  Cardiovascular: JVP normal, regular rate, regular rhythm, normal S1 and S2, no S3, S4, no murmur appreciated  Gastrointestinal: no guarding, non-rigid   Neurologic: awake, alert, moves all  extremities  Skin: no jaundice, warm on limited exam, RLE erythematous and tender  Psychiatric: affect is normal, answers questions appropriately, oriented to self and place         Selected laboratory tests:   Laboratory test results personally reviewed:   [facility]  Recent Labs   Lab 01/05/23  1343 01/05/23  1150 01/05/23  0827 01/05/23  0648 01/05/23  0151 01/04/23  0832 01/04/23  0606 01/03/23  0938 01/03/23  0630 01/02/23  0917 01/02/23  0756 12/31/22  2200 12/31/22  2154   NA  --   --   --  137  --   --  135*  --  135*  --  136   < > 141   POTASSIUM 3.5  --   --  3.2*  --   --  3.5  --  3.7  --  3.7  3.7   < > 3.4   CHLORIDE  --   --   --  99  --   --  98  --  101  --  100   < > 103   CO2  --   --   --  26  --   --  25  --  26  --  28   < > 24   ANIONGAP  --   --   --  12  --   --  12  --  8  --  8   < > 14   GLC  --  111* 86 89 95   < > 93   < > 109*   < > 103*   < > 148*   BUN  --   --   --  11.8  --   --  11.2  --  18.0  --  24.8*   < > 25.7*   CR  --   --   --  0.82  --   --  0.81  --  0.92  --  1.15   < > 1.14   GFRESTIMATED  --   --   --  >90  --   --  >90  --  89  --  68   < > 69   LAURA  --   --   --  8.3*  --   --  8.1*  --  7.9*  --  8.2*   < > 9.2   MAG  --   --   --  1.9  --   --   --   --   --   --   --   --  1.7   PROTTOTAL  --   --   --   --   --   --   --   --   --   --   --   --  6.9   ALBUMIN  --   --   --   --   --   --   --   --   --   --   --   --  4.0   BILITOTAL  --   --   --   --   --   --   --   --   --   --   --   --  1.1   ALKPHOS  --   --   --   --   --   --   --   --   --   --   --   --  81   AST  --   --   --   --   --   --   --   --   --   --   --   --  44   ALT  --   --   --   --   --   --   --   --   --   --   --   --  35    < > = values in this interval not displayed.     CBC  Recent Labs   Lab 01/05/23  0648 01/04/23  0606 01/03/23  0630 01/02/23  0756   WBC 7.8 8.9 9.0 11.6*   RBC 4.23* 4.15* 4.30* 4.60   HGB 12.1* 11.8* 12.3* 13.2*   HCT 37.6* 37.6* 38.6* 41.5   MCV 89 91 90 90    MCH 28.6 28.4 28.6 28.7   MCHC 32.2 31.4* 31.9 31.8   RDW 13.9 14.1 14.5 14.6    146* 132* 144*     INR  Recent Labs   Lab 12/31/22  2221   INR 1.13     Lab Results   Component Value Date    TROPI <0.015 08/04/2020    TROPI <0.015 07/08/2018    TROPI 0.021 07/05/2018    TROPONIN 0.03 07/01/2018    TROPONIN <0.04 11/09/2007     Recent Labs   Lab Test 07/02/18  0636   CHOL 92   HDL 32*   LDL <1   TRIG 297*     Lab Results   Component Value Date    A1C 6.0 12/31/2022    A1C 6.0 07/08/2018    A1C 6.1 07/01/2018     TSH   Date Value Ref Range Status   12/20/2022 1.54 0.30 - 4.20 uIU/mL Final   07/09/2018 5.40 (H) 0.40 - 4.00 mU/L Final

## 2023-01-05 NOTE — PROGRESS NOTES
[facility]    Hospitalist Progress Note      Assessment & Plan   Federico Chamberlain is a 72 year old male with historyof type II DM, HTN, HLD, RLS, RBBB, first-degree AVB, MDD, lumbar fusion, gout, TIA, prostate cancer s/p prostatectomy, erectile dysfunction, and recent overnight hospitalization on 12/19 for syncopal episode who presents after being found down at home.     He was recently hospitalized under observation in mid December for syncope.  On 12/19 he had negative brain MRI, MRA head and neck.  He had trivial troponin elevation with negative TTE. Blood pressure medications were adjusted as it was felt to be syncope from polypharmacy and hypotension. He discharged home on cardiac monitor.      #Severe Sepsis secondary to strep discal active bacteremia from RLE cellulitis, right great toe infection. Frostbite of right great toe: Patient came to the ER because he woke up on the bathroom floor.  He does not remember falling or the circumstances surrounding this.  He reported left chest wall pain and left knee pain upon getting up.  He was seen in podiatry clinic 3 days prior to his fall and prescribed Keflex for right first toe wound that was thought to be due to frostbite.  -In the ER, patient with heart rate in the 90s, febrile to 102.8, leukocytosis to 25.  Lactic acid elevated at 3.4.  Exam showed swollen and erythematous right large toe.  US of RLE showed evidence of cellulitis but no discrete abscess noted. Area of cellulitis outlined on 1/4.   -Blood cultures no growing strep dysgalactiae from admission.  He has been on IV antibiotics and will complete 2 weeks of IV ceftriaxone therapy.  Midline placed on 1/3.     -His hemodynamics, leukocytosis and lactic acid have all improved with treatment.  He was seen by podiatry and did have right great toenail removed.    -ID consulted     #Syncope. Recent syncope with overnight admit 12/19. NSTEMI with elevated troponin  (150s). Hypertension. Dilated aorta. RBBB. First-degree AVB. LAFB. QTC prolongation. Multiple PVCs: Cardiology consult appreciated. Possible ischemia work up at some point once infection and rhabdo are better in outpatient setting.   -BP medications adjusted.   -Increase metoprolol succinate to 50 mg BID on 1/5. Continue imdur 30 mg daily.  Transition to oral lasix on 1/5 with K+ supplementation.   -Continue LIpitor  -PTA lisinopril, chlorthalidone, and aldactone are on hold with rhabdo.     -Intake/output and daily weights ordered     #Fall. Rhabdomyolysis. Acute kidney injury. Left chest wall pain, likely due to contusion: Creatinine was 1.14 on presentation up from baseline creatinine of 0.7-0.9.  CK was elevated at 1377 then rising to over 2k.  This has since improved with IV fluids.  -RILEY is likey multifactorial secondary to prerenal hypovolemia, rhabdomyolysis, and likely exacerbated by PTA meds (lisinopril, aldactone, and chlorthalidone).    -Received aggressive fluid resuscitation in the ED and has been on maintenance IV fluids.  -CK is better  -Continue to hold PTA lisinopril, aldactone, and chlorthalidone  -PT consulted for falls  -Trauma imaging showed no traumatic injuries  -Continue pain control for likely left chest wall contusion      #Type II DM: PTA on semaglutide weekly and metformin 500 mg daily.  Blood glucose 148.  Most recent A1c 5.7 last month.  -Hold semaglutide while inpatient  -Hold metformin with RILEY and sepsis with elevated lactic acid on presentation  -BG have been fine on medium resistance Novolog insulin sliding scale      #History lumbar fusion/ Chronic pain: Continue PTA gabapentin 800 mg 3 times daily and Flexeril 5 mg 3 times daily   -Other pain meds available for acute pain issues      #RLS: Continue PTA Mirapex 3 mg at bedtime.     #History of prostate cancer. Erectile dysfunction: Previous prostatectomy resulting in erectile dysfunction.  Hold PTA scheduled Cialis and as needed  sildenafil     #Obesity: BMI 37.     DVT Prophylaxis: Enoxaparin (Lovenox) SQ  Code Status: Full Code  Dispo: Monitoring 1 more day with adjustement in cardiac meds on telemetry. Likely tomorrow.     I did call to update sonSeth.     Gilbert Gandara MD    Interval History   No events. Feeling a bit stronger. Right leg less inflammed. Hurts less with touch. No fevers/chills. NO CP.     -Data reviewed today: I reviewed all new labs and imaging results over the last 24 hours.     Physical Exam   Temp: 98.1  F (36.7  C) Temp src: Oral BP: (!) 163/83 Pulse: 68   Resp: 18 SpO2: 97 % O2 Device: None (Room air)    Vitals:    01/04/23 0300 01/04/23 0422 01/05/23 0423   Weight: 144 kg (317 lb 6.4 oz) 144 kg (317 lb 6.4 oz) 140.9 kg (310 lb 9.6 oz)     Vital Signs with Ranges  Temp:  [97.9  F (36.6  C)-98.3  F (36.8  C)] 98.1  F (36.7  C)  Pulse:  [60-68] 68  Resp:  [12-20] 18  BP: (119-163)/(52-83) 163/83  SpO2:  [92 %-97 %] 97 %  I/O last 3 completed shifts:  In: 520 [P.O.:500; I.V.:20]  Out: 1250 [Urine:1250]    Constitutional: Nontoxic, NAD.  Answers appropriately.  Sitting up in the chair.  HEENT: Normocephalic. MMM, No elevation of JVD noted.   Respiratory: Nl WOB, some inspiratory crackles at the bases.  No wheezes.  Cardiovascular: Regular, no murmur  GI: Obese, BS+, NT, ND  Skin/Integumen: WWP, right great toe with dressings in place.  No drainage.  He has erythema, swelling and warmth over the right shin and calf within borders outlined.  Less tender to palpation.    Neuro: CNII-XII intact. Moves all extremities. No tremor. A&Ox3.    Medications       acetaminophen  975 mg Oral TID     aspirin  81 mg Oral Daily     atorvastatin  40 mg Oral Daily     cefTRIAXone  2 g Intravenous Q24H     enoxaparin ANTICOAGULANT  40 mg Subcutaneous Q24H     furosemide  20 mg Oral Daily     gabapentin  800 mg Oral TID     insulin aspart  1-7 Units Subcutaneous TID AC     insulin aspart  1-5 Units Subcutaneous At Bedtime     isosorbide  mononitrate  30 mg Oral Daily     lidocaine  1-2 patch Transdermal Q24H     lidocaine   Transdermal Q8H [facility]     [Held by provider] lisinopril  40 mg Oral At Bedtime     metoprolol succinate ER  50 mg Oral BID     [START ON 1/6/2023] potassium chloride  20 mEq Oral Daily     potassium chloride  40 mEq Oral Once     pramipexole  3 mg Oral At Bedtime     sodium chloride (PF)  10 mL Intracatheter Q8H     sodium chloride (PF)  3 mL Intracatheter Q8H       Data   Recent Labs   Lab 01/05/23  0827 01/05/23  0648 01/05/23  0151 01/04/23  0832 01/04/23  0606 01/03/23  0938 01/03/23  0630 01/01/23  0321 12/31/22 2221 12/31/22  2200 12/31/22  2154   WBC  --  7.8  --   --  8.9  --  9.0   < >  --   --  25.2*   HGB  --  12.1*  --   --  11.8*  --  12.3*   < >  --   --  15.1   MCV  --  89  --   --  91  --  90   < >  --   --  87   PLT  --  161  --   --  146*  --  132*   < >  --   --  213   INR  --   --   --   --   --   --   --   --  1.13  --   --    NA  --  137  --   --  135*  --  135*   < >  --   --  141   POTASSIUM  --  3.2*  --   --  3.5  --  3.7   < >  --   --  3.4   CHLORIDE  --  99  --   --  98  --  101   < >  --   --  103   CO2  --  26  --   --  25  --  26   < >  --   --  24   BUN  --  11.8  --   --  11.2  --  18.0   < >  --   --  25.7*   CR  --  0.82  --   --  0.81  --  0.92   < >  --    < > 1.14   ANIONGAP  --  12  --   --  12  --  8   < >  --   --  14   LAURA  --  8.3*  --   --  8.1*  --  7.9*   < >  --   --  9.2   GLC 86 89 95   < > 93   < > 109*   < >  --   --  148*   ALBUMIN  --   --   --   --   --   --   --   --   --   --  4.0   PROTTOTAL  --   --   --   --   --   --   --   --   --   --  6.9   BILITOTAL  --   --   --   --   --   --   --   --   --   --  1.1   ALKPHOS  --   --   --   --   --   --   --   --   --   --  81   ALT  --   --   --   --   --   --   --   --   --   --  35   AST  --   --   --   --   --   --   --   --   --   --  44    < > = values in this interval not displayed.       No results found for this or any  previous visit (from the past 24 hour(s)).

## 2023-01-06 ENCOUNTER — APPOINTMENT (OUTPATIENT)
Dept: PHYSICAL THERAPY | Facility: CLINIC | Age: 73
DRG: 871 | End: 2023-01-06
Payer: COMMERCIAL

## 2023-01-06 VITALS
BODY MASS INDEX: 35.79 KG/M2 | TEMPERATURE: 98 F | HEIGHT: 78 IN | DIASTOLIC BLOOD PRESSURE: 86 MMHG | HEART RATE: 67 BPM | SYSTOLIC BLOOD PRESSURE: 171 MMHG | WEIGHT: 309.3 LBS | RESPIRATION RATE: 16 BRPM | OXYGEN SATURATION: 95 %

## 2023-01-06 LAB
ANION GAP SERPL CALCULATED.3IONS-SCNC: 13 MMOL/L (ref 7–15)
BACTERIA BLD CULT: NO GROWTH
BUN SERPL-MCNC: 11.5 MG/DL (ref 8–23)
CALCIUM SERPL-MCNC: 8.9 MG/DL (ref 8.8–10.2)
CHLORIDE SERPL-SCNC: 99 MMOL/L (ref 98–107)
CREAT SERPL-MCNC: 0.78 MG/DL (ref 0.67–1.17)
DEPRECATED HCO3 PLAS-SCNC: 26 MMOL/L (ref 22–29)
ELLIPTOCYTES BLD QL SMEAR: SLIGHT
ERYTHROCYTE [DISTWIDTH] IN BLOOD BY AUTOMATED COUNT: 13.8 % (ref 10–15)
GFR SERPL CREATININE-BSD FRML MDRD: >90 ML/MIN/1.73M2
GLUCOSE BLDC GLUCOMTR-MCNC: 107 MG/DL (ref 70–99)
GLUCOSE BLDC GLUCOMTR-MCNC: 109 MG/DL (ref 70–99)
GLUCOSE BLDC GLUCOMTR-MCNC: 110 MG/DL (ref 70–99)
GLUCOSE SERPL-MCNC: 107 MG/DL (ref 70–99)
HCT VFR BLD AUTO: 39.1 % (ref 40–53)
HGB BLD-MCNC: 13 G/DL (ref 13.3–17.7)
MCH RBC QN AUTO: 28.7 PG (ref 26.5–33)
MCHC RBC AUTO-ENTMCNC: 33.2 G/DL (ref 31.5–36.5)
MCV RBC AUTO: 86 FL (ref 78–100)
PLAT MORPH BLD: ABNORMAL
PLATELET # BLD AUTO: 197 10E3/UL (ref 150–450)
POTASSIUM SERPL-SCNC: 4.4 MMOL/L (ref 3.4–5.3)
RBC # BLD AUTO: 4.53 10E6/UL (ref 4.4–5.9)
RBC MORPH BLD: ABNORMAL
SODIUM SERPL-SCNC: 138 MMOL/L (ref 136–145)
WBC # BLD AUTO: 9 10E3/UL (ref 4–11)

## 2023-01-06 PROCEDURE — 99232 SBSQ HOSP IP/OBS MODERATE 35: CPT | Performed by: INTERNAL MEDICINE

## 2023-01-06 PROCEDURE — 85014 HEMATOCRIT: CPT | Performed by: HOSPITALIST

## 2023-01-06 PROCEDURE — 250N000011 HC RX IP 250 OP 636: Performed by: INTERNAL MEDICINE

## 2023-01-06 PROCEDURE — 250N000013 HC RX MED GY IP 250 OP 250 PS 637: Performed by: INTERNAL MEDICINE

## 2023-01-06 PROCEDURE — 97116 GAIT TRAINING THERAPY: CPT | Mod: GP

## 2023-01-06 PROCEDURE — 97530 THERAPEUTIC ACTIVITIES: CPT | Mod: GP

## 2023-01-06 PROCEDURE — 80048 BASIC METABOLIC PNL TOTAL CA: CPT | Performed by: INTERNAL MEDICINE

## 2023-01-06 PROCEDURE — 99239 HOSP IP/OBS DSCHRG MGMT >30: CPT | Performed by: HOSPITALIST

## 2023-01-06 RX ORDER — CEFTRIAXONE 1 G/1
2000 INJECTION, POWDER, FOR SOLUTION INTRAMUSCULAR; INTRAVENOUS DAILY
Qty: 600 ML | Refills: 0 | Status: SHIPPED | OUTPATIENT
Start: 2023-01-06 | End: 2023-01-15

## 2023-01-06 RX ORDER — HYDRALAZINE HYDROCHLORIDE 25 MG/1
25 TABLET, FILM COATED ORAL 3 TIMES DAILY
DISCHARGE
Start: 2023-01-06 | End: 2023-01-23

## 2023-01-06 RX ORDER — AMOXICILLIN 250 MG
1 CAPSULE ORAL 2 TIMES DAILY PRN
Status: ON HOLD | DISCHARGE
Start: 2023-01-06 | End: 2023-09-24

## 2023-01-06 RX ORDER — METOPROLOL SUCCINATE 50 MG/1
50 TABLET, EXTENDED RELEASE ORAL 2 TIMES DAILY
DISCHARGE
Start: 2023-01-06 | End: 2023-02-14

## 2023-01-06 RX ORDER — ACETAMINOPHEN 325 MG/1
975 TABLET ORAL 3 TIMES DAILY
Status: ON HOLD | DISCHARGE
Start: 2023-01-06 | End: 2023-09-24

## 2023-01-06 RX ORDER — HYDRALAZINE HYDROCHLORIDE 25 MG/1
25 TABLET, FILM COATED ORAL 3 TIMES DAILY
Status: DISCONTINUED | OUTPATIENT
Start: 2023-01-06 | End: 2023-01-06 | Stop reason: HOSPADM

## 2023-01-06 RX ORDER — FUROSEMIDE 20 MG
20 TABLET ORAL DAILY
DISCHARGE
Start: 2023-01-06 | End: 2023-01-23

## 2023-01-06 RX ORDER — OXYCODONE HYDROCHLORIDE 5 MG/1
5 TABLET ORAL EVERY 4 HOURS PRN
Qty: 6 TABLET | Refills: 0 | Status: SHIPPED | DISCHARGE
Start: 2023-01-06 | End: 2023-01-09

## 2023-01-06 RX ORDER — ISOSORBIDE MONONITRATE 30 MG/1
30 TABLET, EXTENDED RELEASE ORAL DAILY
DISCHARGE
Start: 2023-01-06 | End: 2023-02-14

## 2023-01-06 RX ORDER — LIDOCAINE 4 G/G
1-2 PATCH TOPICAL EVERY 24 HOURS
Status: ON HOLD | DISCHARGE
Start: 2023-01-06 | End: 2023-09-24

## 2023-01-06 RX ADMIN — LIDOCAINE 2 PATCH: 560 PATCH PERCUTANEOUS; TOPICAL; TRANSDERMAL at 10:06

## 2023-01-06 RX ADMIN — HYDRALAZINE HYDROCHLORIDE 25 MG: 25 TABLET ORAL at 10:06

## 2023-01-06 RX ADMIN — CEFTRIAXONE 2 G: 2 INJECTION, POWDER, FOR SOLUTION INTRAMUSCULAR; INTRAVENOUS at 15:45

## 2023-01-06 RX ADMIN — HYDRALAZINE HYDROCHLORIDE 25 MG: 25 TABLET ORAL at 15:37

## 2023-01-06 RX ADMIN — METOPROLOL SUCCINATE 50 MG: 50 TABLET, EXTENDED RELEASE ORAL at 10:06

## 2023-01-06 RX ADMIN — ENOXAPARIN SODIUM 40 MG: 40 INJECTION SUBCUTANEOUS at 14:06

## 2023-01-06 RX ADMIN — ATORVASTATIN CALCIUM 40 MG: 40 TABLET, FILM COATED ORAL at 10:06

## 2023-01-06 RX ADMIN — ISOSORBIDE MONONITRATE 30 MG: 30 TABLET, EXTENDED RELEASE ORAL at 10:06

## 2023-01-06 RX ADMIN — GABAPENTIN 800 MG: 400 CAPSULE ORAL at 10:06

## 2023-01-06 RX ADMIN — GABAPENTIN 800 MG: 400 CAPSULE ORAL at 14:04

## 2023-01-06 RX ADMIN — ASPIRIN 81 MG: 81 TABLET, COATED ORAL at 10:06

## 2023-01-06 RX ADMIN — FUROSEMIDE 20 MG: 20 TABLET ORAL at 10:06

## 2023-01-06 RX ADMIN — POTASSIUM CHLORIDE 20 MEQ: 1500 TABLET, EXTENDED RELEASE ORAL at 10:06

## 2023-01-06 RX ADMIN — ACETAMINOPHEN 975 MG: 325 TABLET, FILM COATED ORAL at 14:04

## 2023-01-06 RX ADMIN — ACETAMINOPHEN 975 MG: 325 TABLET, FILM COATED ORAL at 10:14

## 2023-01-06 ASSESSMENT — ACTIVITIES OF DAILY LIVING (ADL)
ADLS_ACUITY_SCORE: 40
ADLS_ACUITY_SCORE: 39
ADLS_ACUITY_SCORE: 40
ADLS_ACUITY_SCORE: 39
ADLS_ACUITY_SCORE: 40
ADLS_ACUITY_SCORE: 39
ADLS_ACUITY_SCORE: 38
ADLS_ACUITY_SCORE: 40
ADLS_ACUITY_SCORE: 40

## 2023-01-06 NOTE — DISCHARGE SUMMARY
[facility]    Discharge Summary  Hospitalist    Date of Admission:  12/31/2022  Date of Discharge:  1/6/2023  Discharging Provider: Gilbert Gandara MD  Date of Service (when I saw the patient): 01/06/23    Discharge Diagnoses   #Severe Sepsis secondary to strep dysgalactiae bacteremia from RLE cellulitis, right great toe infection. Frostbite of right great toe  #Syncope.   #NSTEMI, suspected type 2 in setting of severe sepsis  #Hypertension.   #Dilated aorta.   #RBBB. First-degree AVB. LAFB.   #QTC prolongation.   #Multiple PVCs  #Fall. Rhabdomyolysis. Acute kidney injury. Left chest wall pain, likely due to contusion  #Type II DM  #History lumbar fusion/ Chronic pain  #RLS  #History of prostate cancer. Erectile dysfunction  #Obesity    Hospital Course   Federico Chamberlain is a 72 year old male with historyof type II DM, HTN, HLD, RLS, RBBB, first-degree AVB, MDD, lumbar fusion, gout, TIA, prostate cancer s/p prostatectomy, erectile dysfunction, and recent overnight hospitalization on 12/19 for syncopal episode who presents after being found down at home.     He was recently hospitalized under observation in mid December for syncope.  On 12/19 he had negative brain MRI, MRA head and neck.  He had trivial troponin elevation with negative TTE. Blood pressure medications were adjusted as it was felt to be syncope from polypharmacy and hypotension. He discharged home on cardiac monitor.      #Severe Sepsis secondary to strep discal active bacteremia from RLE cellulitis, right great toe infection. Frostbite of right great toe: Patient came to the ER because he woke up on the bathroom floor.  He does not remember falling or the circumstances surrounding this.  He reported left chest wall pain and left knee pain upon getting up.  He was seen in podiatry clinic 3 days prior to his fall and prescribed Keflex for right first toe wound that was thought to be due to frostbite.  -In the ER, patient with heart  rate in the 90s, febrile to 102.8, leukocytosis to 25.  Lactic acid elevated at 3.4.  Exam showed swollen and erythematous right large toe.  US of RLE showed evidence of cellulitis but no discrete abscess noted. Area of cellulitis outlined on 1/4.   -Blood cultures growing strep dysgalactiae from admission.  He has been on IV antibiotics and will complete 2 weeks of IV ceftriaxone therapy.  Midline placed on 1/3.     -His hemodynamics, leukocytosis and lactic acid have all improved with treatment.  He was seen by podiatry and did have right great toenail removed.       #Syncope. Recent syncope with overnight admit 12/19. NSTEMI with elevated troponin (150s). Hypertension. Dilated aorta. RBBB. First-degree AVB. LAFB. QTC prolongation. Multiple PVCs: Cardiology consult appreciated. Possible ischemia work up at some point once infection and rhabdo are better in outpatient setting.   -BP medications adjusted.   -Increase metoprolol succinate to 50 mg BID on 1/5. Continue imdur 30 mg daily.  Hydralazine 25 mg 3 times daily also added to her regimen on 1/6. Transition to oral lasix on 1/5 with K+ supplementation.   -Continue LIpitor  -PTA lisinopril, chlorthalidone, and aldactone are on hold with rhabdo.     -Intake/output and daily weights ordered     #Fall. Rhabdomyolysis. Acute kidney injury. Left chest wall pain, likely due to contusion: Creatinine was 1.14 on presentation up from baseline creatinine of 0.7-0.9.  CK was elevated at 1377 then rising to over 2k.  This has since improved with IV fluids.  -RILEY is likey multifactorial secondary to prerenal hypovolemia, rhabdomyolysis, and likely exacerbated by PTA meds (lisinopril, aldactone, and chlorthalidone).    -Received aggressive fluid resuscitation in the ED and has been on maintenance IV fluids.  -CK is better  -Continue to hold PTA lisinopril, aldactone, and chlorthalidone  -PT consulted for falls  -Trauma imaging showed no traumatic injuries  -Continue pain  control for likely left chest wall contusion      #Type II DM: PTA on semaglutide weekly and metformin 500 mg daily.  Blood glucose 148.  Most recent A1c 5.7 last month.     #History lumbar fusion/ Chronic pain: Continue PTA gabapentin 800 mg 3 times daily and Flexeril 5 mg 3 times daily   -Other pain meds available for acute pain issues      #RLS: Continue PTA Mirapex 3 mg at bedtime.     #History of prostate cancer. Erectile dysfunction: Previous prostatectomy resulting in erectile dysfunction.  Hold PTA scheduled Cialis and as needed sildenafil     #Obesity: BMI 37.    Gilbert Gandara MD    Pending Results   These results will be followed up by [facility] medicine  Unresulted Labs Ordered in the Past 30 Days of this Admission     Date and Time Order Name Status Description    1/3/2023 12:02 AM Blood Culture Arm, Left Preliminary     1/2/2023 12:33 AM Blood Culture Arm, Left Preliminary           Code Status   Full Code       Primary Care Physician   Luis Alberto Aponte    Physical Exam   Temp: 98  F (36.7  C) Temp src: Oral BP: (!) 171/86 Pulse: 67   Resp: 16 SpO2: 95 % O2 Device: None (Room air)    Vitals:    01/04/23 0422 01/05/23 0423 01/06/23 0147   Weight: 144 kg (317 lb 6.4 oz) 140.9 kg (310 lb 9.6 oz) 140.3 kg (309 lb 4.8 oz)     Vital Signs with Ranges  Temp:  [98  F (36.7  C)-98.7  F (37.1  C)] 98  F (36.7  C)  Pulse:  [57-80] 67  Resp:  [16-18] 16  BP: (144-172)/(73-90) 171/86  SpO2:  [94 %-99 %] 95 %  I/O last 3 completed shifts:  In: 750 [P.O.:740; I.V.:10]  Out: -     Constitutional: Nontoxic, NAD.  Answers appropriately.  Sitting up in the chair.  HEENT: Normocephalic. MMM, No elevation of JVD noted.   Respiratory: Nl WOB, some inspiratory crackles at the bases.  No wheezes.  Cardiovascular: Regular, no murmur  GI: Obese, BS+, NT, ND  Skin/Integumen: WWP, right great toe with dressings in place.  No drainage.  He has erythema, swelling and warmth over the right shin and calf within borders outlined that is  improving.  Significantly less tender to palpation.  Neuro: CNII-XII intact. Moves all extremities. No tremor. A&Ox3.    Discharge Disposition   Discharged to short-term care facility  Condition at discharge: Stable    Consultations This Hospital Stay   PHARMACY TO DOSE VANCO  PODIATRY IP CONSULT  CARDIOLOGY IP CONSULT  CARE MANAGEMENT / SOCIAL WORK IP CONSULT  PHYSICAL THERAPY ADULT IP CONSULT  OCCUPATIONAL THERAPY ADULT IP CONSULT  PHARMACY TO DOSE VANCO  INFECTIOUS DISEASES IP CONSULT  VASCULAR ACCESS ADULT IP CONSULT  CARE MANAGEMENT / SOCIAL WORK IP CONSULT  PHYSICAL THERAPY ADULT IP CONSULT  OCCUPATIONAL THERAPY ADULT IP CONSULT    Time Spent on this Encounter   I, Gilbert Gandara MD, personally saw the patient today and spent greater than 30 minutes discharging this patient.    Discharge Orders      Basic metabolic panel     CBC with platelets     CK total     Follow-Up with Cardiology SONNY      Follow-Up with Cardiology      Follow-up and recommended labs and tests     Thank you for choosing [facility]!    Follow up with Dr Uribe at one of the clinic locations as needed.  If the toe is healing well, no follow up needed.  Don't hesitate to call with any questions/concerns    DR. URIBE'S CLINIC LOCATIONS:      [address]   [place] [address]  [phone]  Clinic hours:  Monday, Tuesday morning, Thursday    [facility]  [address] [facility] [place] [id number] [phone]  Clinic hours:  Friday     Wound care and dressings    Ingrown toenail Post-procedural instructions    - After the procedure, go home and elevate the involved foot for the remainder of the day/evening as able.  This is to minimize swelling, control pain, and limit post-procedural complications.  The pre-procedural injection may cause your toe to be numb anywhere from1-2 hours.    - You can take Tylenol, Ibuprofen, Advil, etc as needed for pain if tolerated.  Follow label  "instructions      - If you have been given a prescription for antibiotics, take them as instructed and complete the prescription.    - Keep dressing intact until the following morning.  At that point, you may remove the bandage (you may need to soak it in warm soapy water as the bandage will likely adhere to your skin).  Soaking in warm soapy water for 5-10 minutes will also facilitate healing.  Wash the toe thoroughly, dry the toe thoroughly.  Apply antibiotic wound ointment to base of wound and cover with 2x2 gauze pads and Coban dressing (not too tightly) until it stops draining(you'll need to purchase the 2x2 gauze pads and 1\" Coban rolls, especially if you had the permanent/chemical procedure performed).  This may take a few days to weeks, but at that point, you may continue with antibiotic ointment and a band-aid, or you may stop applying a dressing all together.  Dressing changes and soaks should be done twice daily(morning/evening) if you had the permanent/chemical procedure done.      - You may do activities to tolerance the following day.  Find a shoe that is comfortable and minimizes the amount of rubbing on your toe, as this may increase pain, swelling, etc.  Utilize resting, icing, elevating and anti-inflammatories/Tylenol as needed.    - Monitor for signs of infection.  With this procedure, it is common to have mild surrounding redness and drainage.  If the redness involves the entire great toe or if you notice red streaks on top of your foot, or if you experience any nausea, vomiting, chills, fevers > 101 degrees, call clinic for a quick appointment.     General info for SNF    Length of Stay Estimate: Short Term Care: Estimated # of Days <30  Condition at Discharge: Stable  Level of care:skilled   Rehabilitation Potential: Fair  Admission H&P remains valid and up-to-date: Yes  Recent Chemotherapy: N/A  Use Nursing Home Standing Orders: Yes     Mantoux instructions    Give two-step Mantoux (PPD) Per " Facility Policy Yes     Reason for your hospital stay    You were hospitalized for syncope and cellulitis of the right lower extremity.  You were treated IV antibiotics, IV fluid, IV fluids.  You were seen by cardiology, podiatry and infectious disease.  You will complete a course of antibiotics on discharge.  You will discharge to TCU.  You will follow-up with primary care, cardiology and podiatry as an outpatient.     Glucose monitor nursing POCT    Before meals and at bedtime     Activity - Up with nursing assistance     Follow Up and recommended labs and tests    Follow up with Nursing home physician in 1 week.  The following labs/tests are recommended: CBC, BMP, CK.    Follow-up with cardiology in the next 4 weeks or so for outpatient evaluation and consideration of ischemic evaluation.     Full Code     Physical Therapy Adult Consult    Evaluate and treat as clinically indicated.    Reason:  bacteremia, generalized weakness     Occupational Therapy Adult Consult    Evaluate and treat as clinically indicated.    Reason:  bacteremia, generalized weakness     Fall precautions     Diet    Follow this diet upon discharge: Orders Placed This Encounter      Moderate Consistent Carb (60 g CHO per Meal) Diet     Discharge Medications   Current Discharge Medication List      START taking these medications    Details   acetaminophen (TYLENOL) 325 MG tablet Take 3 tablets (975 mg) by mouth 3 times daily    Associated Diagnoses: Syncope, unspecified syncope type      cefTRIAXone (ROCEPHIN) 1 GM vial Inject 2 g (2,000 mg) into the vein daily for 9 days CBC with differential, creatinine, SGOT weekly while on this medication to be faxed to Dr. Obando office.  Qty: 600 mL, Refills: 0    Associated Diagnoses: Gram-positive bacteremia      furosemide (LASIX) 20 MG tablet Take 1 tablet (20 mg) by mouth daily    Associated Diagnoses: Essential hypertension      hydrALAZINE (APRESOLINE) 25 MG tablet Take 1 tablet (25 mg) by mouth 3  times daily    Associated Diagnoses: Essential hypertension      isosorbide mononitrate (IMDUR) 30 MG 24 hr tablet Take 1 tablet (30 mg) by mouth daily    Associated Diagnoses: Essential hypertension      Lidocaine (LIDOCARE) 4 % Patch Place 1-2 patches onto the skin every 24 hours To prevent lidocaine toxicity, patient should be patch free for 12 hrs daily.    Associated Diagnoses: Syncope, unspecified syncope type      metoprolol succinate ER (TOPROL XL) 50 MG 24 hr tablet Take 1 tablet (50 mg) by mouth 2 times daily    Associated Diagnoses: Essential hypertension      oxyCODONE (ROXICODONE) 5 MG tablet Take 1 tablet (5 mg) by mouth every 4 hours as needed for moderate pain (4-6) or severe pain (7-10) (IF pain not managed with non-pharmacological and non-opioid interventions)  Qty: 6 tablet, Refills: 0    Associated Diagnoses: Syncope, unspecified syncope type      senna-docusate (SENOKOT-S/PERICOLACE) 8.6-50 MG tablet Take 1 tablet by mouth 2 times daily as needed for constipation    Associated Diagnoses: Syncope, unspecified syncope type         CONTINUE these medications which have NOT CHANGED    Details   aspirin 81 MG EC tablet Take 81 mg by mouth daily      atorvastatin (LIPITOR) 40 MG tablet Take 40 mg by mouth daily      cyclobenzaprine (FLEXERIL) 5 MG tablet Take 5 mg by mouth 3 times daily as needed for muscle spasms      ferrous sulfate (FEROSUL) 325 (65 Fe) MG tablet Take 325 mg by mouth daily (with breakfast)      gabapentin (NEURONTIN) 800 MG tablet Take 800 mg by mouth 3 times daily      metFORMIN (GLUCOPHAGE) 500 MG tablet Take 500 mg by mouth daily      multivitamin w/minerals (THERA-VIT-M) tablet Take 1 tablet by mouth At Bedtime      potassium chloride ER (KLOR-CON M) 10 MEQ CR tablet Take 20 mEq by mouth daily      Pramipexole Dihydrochloride (MIRAPEX PO) Take 3 mg by mouth At Bedtime      semaglutide (OZEMPIC, 1 MG/DOSE,) 2 MG/1.5ML pen Inject 0.5 mg Subcutaneous every 7 days On Fridays       VITAMIN D, CHOLECALCIFEROL, PO Take 4,000 Units by mouth daily      silver sulfADIAZINE (SILVADENE) 1 % external cream Apply topically daily as needed         STOP taking these medications       carvedilol (COREG) 25 MG tablet Comments:   Reason for Stopping:         cephALEXin (KEFLEX) 500 MG capsule Comments:   Reason for Stopping:         chlorthalidone (HYGROTON) 25 MG tablet Comments:   Reason for Stopping:         lisinopril (ZESTRIL) 40 MG tablet Comments:   Reason for Stopping:         LISINOPRIL PO Comments:   Reason for Stopping:         spironolactone (ALDACTONE) 25 MG tablet Comments:   Reason for Stopping:         tadalafil (CIALIS) 5 MG tablet Comments:   Reason for Stopping:             Allergies   No Known Allergies  Data   Most Recent 3 CBC's:  Recent Labs   Lab Test 01/06/23  0557 01/05/23  0648 01/04/23  0606   WBC 9.0 7.8 8.9   HGB 13.0* 12.1* 11.8*   MCV 86 89 91    161 146*      Most Recent 3 BMP's:  Recent Labs   Lab Test 01/06/23  0909 01/06/23  0557 01/06/23  0219 01/05/23  1730 01/05/23  1343 01/05/23  0827 01/05/23  0648 01/04/23  0832 01/04/23  0606   NA  --  138  --   --   --   --  137  --  135*   POTASSIUM  --  4.4  --   --  3.5  --  3.2*  --  3.5   CHLORIDE  --  99  --   --   --   --  99  --  98   CO2  --  26  --   --   --   --  26  --  25   BUN  --  11.5  --   --   --   --  11.8  --  11.2   CR  --  0.78  --   --   --   --  0.82  --  0.81   ANIONGAP  --  13  --   --   --   --  12  --  12   LAURA  --  8.9  --   --   --   --  8.3*  --  8.1*   * 107* 107*   < >  --    < > 89   < > 93    < > = values in this interval not displayed.     Most Recent 2 LFT's:  Recent Labs   Lab Test 12/31/22  2154 12/19/22  1314   AST 44 24   ALT 35 29   ALKPHOS 81 80   BILITOTAL 1.1 0.7     Most Recent INR's and Anticoagulation Dosing History:  Anticoagulation Dose History     Recent Dosing and Labs Latest Ref Rng & Units 11/9/2007 7/8/2018 12/31/2022    INR 0.85 - 1.15 0.98 0.97 1.13         Most Recent 3 Troponin's:  Recent Labs   Lab Test 08/04/20  2030 07/08/18  1356 07/05/18  1351 07/03/18  0555 07/01/18  2127   TROPI <0.015 <0.015 0.021   < >  --    TROPONIN  --   --   --   --  0.03    < > = values in this interval not displayed.     Most Recent Cholesterol Panel:  Recent Labs   Lab Test 07/02/18  0636   CHOL 92   LDL <1   HDL 32*   TRIG 297*     Most Recent 6 Bacteria Isolates From Any Culture (See EPIC Reports for Culture Details):No lab results found.  Most Recent TSH, T4 and A1c Labs:  Recent Labs   Lab Test 12/31/22  2154 12/20/22  0627 07/09/18  0728   TSH  --  1.54 5.40*   T4  --   --  1.29   A1C 6.0*  --   --

## 2023-01-06 NOTE — PLAN OF CARE
PT to be discharged at 1700. Son is present at bed side. Shower completed prior to discharge. Blood sugar 107-110. Insulin held due to protocol. PT has pain in left lower back, lidocaine patches in place. Pt seems anxious about transition to TCU education has been done. Wound dressing on big toe has been changed. Midline dressing changed. PT has a midline line and tele monitors have been removed.       Goal Outcome Evaluation:          Plan of Care Reviewed With: patient, child           Co Signed: Michelle Baltazar RN on 1/6/2023 at 5:56 PM

## 2023-01-06 NOTE — PROGRESS NOTES
"Care Management Discharge Note    Discharge Date: 01/06/2023       Discharge Disposition: Transitional Care    Discharge Services: None    Discharge DME: None    Discharge Transportation: car, drives self    Private pay costs discussed: private room/amenity fees and transportation costs    PAS Confirmation Code:    Patient/family educated on Medicare website which has current facility and service quality ratings: yes    Education Provided on the Discharge Plan:  yes    Patient/Family in Agreement with the Plan: yes        Additional Information:  CM following for discharge planning to Transitional Care Unit. Referrals were sent yesterday. Patient has been accepted by [facility]. Updated MD of bed availability today. He will speak to ID regarding discharge antibiotic plan before determining discharge readiness. Patient may be ready for discharge today.     Met with patient to discuss acceptance at [facility]. He would like to accept the bed at [facility]. We discussed transportation to Transitional Care Unit. Patient states his son has reservations about taking him, worried he will \"pass out\" in transportation. We discussed [facility] wheelchair transportation and the cost associated. Patient would like CM to check with [employer]. He states his son will be here at 1230 to discuss transportation.     Spoke to Admissions at [facility], they are able to accept him this afternoon.     Will await final ID plan for discharge plan.     ADDENDUM 1150:  Spoke to ID and MD and patient is ready for discharge today. Patient verified that his son will be transporting him to Transitional Care Unit. Orders sent via [facility] to [facility]. Message left regarding discharge time for patient. Awaiting to hear back on time they can accept.     You have successfully submitted the preadmission screening (PAS) to the [employer] on:  Created On  1/6/2023 11:55 AM  Your " confirmation number is:  [id number]            Mehnaz Reddy RN BSN CM  Inpatient Care Coordination  [facility]  [phone]

## 2023-01-06 NOTE — PLAN OF CARE
"Pt. A&O,SBA with walker, gait belt for mobility has some pain in right led, dressing was changed today, ate 50% of his meals, has PICC line and PRIV line, potassium and magnesium were replace today, pt. Is on tele monitor SR with BBB,plane to go to TCU tomorrow.    BP (!) 144/80 (BP Location: Left arm)   Pulse 80   Temp 98.2  F (36.8  C) (Oral)   Resp 18   Ht 1.981 m (6' 6\")   Wt 140.9 kg (310 lb 9.6 oz)   SpO2 99%   BMI 35.89 kg/m         "

## 2023-01-06 NOTE — PROGRESS NOTES
Inpatient Cardiology Consultation Progress Note:    Federico Chamberlain MRN#: [id number]   YOB: 1950 Age: 72 year old     Date of Admission: 12/31/2022  Consult indication: elevated troponin         Assessment and Plan:     Patient admitted with syncope in the setting of severe sepsis and bacteremia, found to have rhabdomyolysis, mildly elevated troponin, prolonged QT interval.  Mildly elevated troponin most consistent with type II MI, however TTE 12/20/2022 demonstrates LVEF 50% with septal lateral hypokinesis.  Patient denies any chest pain/chest pressure.  Suspect syncope likely due to severe sepsis.  He does have baseline abnormal conduction with bifascicular block with first-degree AV block, currently with an event monitor in place.  QTc prolonged, agree with avoiding QT prolonging medications.  No significant bradycardia or pauses on telemetry. Frequent PVCs, short runs of paroxysmal SVT on telemetry, BP mildly elevated, increased metoprolol to 50 mg BID.  No significant pauses or bradycardia arrhythmia on telemetry.  Of note, had been on carvedilol 25mg BID at home, this was changed to metoprolol due to concern for orthostatic component to syncope in the setting of sepsis.  Previously had been on lisinopril, chlorthalidone, and spironolactone at home, which have been held due to rhabdomyolysis.  Added isordil, will add hydralazine as well.  Will start furosemide 20 mg daily with potassium supplementation.  Pt would benefit from an ischemic evaluation, however in the setting of bacteremia and rhabdomyolysis, this would not be appropriate at this time. Reassuringly, he continues to be asymptomatic of angina or symptoms concerning for angina. Will plan to address at follow up in clinic, tentatively will plan on Lexiscan stress test (to be done in [place]), unless he is having any concerning symptoms then would plan a cardiac catheterization.  Advised patient to seek medical care if develops any severe  chest pain, abnormal shortness of breath, or other symptoms that are concerning for him in the interim, he voiced understanding and was in agreement.  Already has a cardiac event monitor in place. Cardiology follow-up placed in discharge navigator, needs BMP in 1 week, cardiology SONNY follow-up in 2 weeks (can order stress test at that time if appropriate), follow-up with me in 1 month, or sooner as needed.      Thank you for allowing our team to participate in the care of Federico Chamberlain.  Please do not hesitate to page me with any questions or concerns.     Kevin Garcia MD, [facility]  Cardiology  January 6, 2023    Voice recognition software utilized.          Interval Events:     - no acute events overnight  - Pt denies any chest pain, chest discomfort, dyspnea  - no dizziness/lightheadedness  - no bleeding events  - telemetry reviewed  - interval labs and studies reviewed   - interval progress notes reviewed  - Pt updated on clinical course, plan for the day         Medications reviewed:     Current medications:  Current Facility-Administered Medications Ordered in Epic   Medication Dose Route Frequency Last Rate Last Admin     acetaminophen (TYLENOL) tablet 975 mg  975 mg Oral TID   975 mg at 01/06/23 1014     aspirin EC tablet 81 mg  81 mg Oral Daily   81 mg at 01/06/23 1006     atorvastatin (LIPITOR) tablet 40 mg  40 mg Oral Daily   40 mg at 01/06/23 1006     cefTRIAXone (ROCEPHIN) 2 g vial to attach to  ml bag for ADULTS or NS 50 ml bag for PEDS  2 g Intravenous Q24H 200 mL/hr at 01/04/23 1620 2 g at 01/05/23 1430     cyclobenzaprine (FLEXERIL) tablet 5 mg  5 mg Oral TID PRN   5 mg at 01/03/23 0205     glucose gel 15-30 g  15-30 g Oral Q15 Min PRN        Or     dextrose 50 % injection 25-50 mL  25-50 mL Intravenous Q15 Min PRN        Or     glucagon injection 1 mg  1 mg Subcutaneous Q15 Min PRN         enoxaparin ANTICOAGULANT (LOVENOX) injection 40 mg  40 mg Subcutaneous Q24H   40 mg at  01/05/23 1051     furosemide (LASIX) tablet 20 mg  20 mg Oral Daily   20 mg at 01/06/23 1006     gabapentin (NEURONTIN) capsule 800 mg  800 mg Oral TID   800 mg at 01/06/23 1006     hydrALAZINE (APRESOLINE) injection 10 mg  10 mg Intravenous Q4H PRN         hydrALAZINE (APRESOLINE) tablet 25 mg  25 mg Oral TID   25 mg at 01/06/23 1006     HYDROmorphone (DILAUDID) injection 0.2 mg  0.2 mg Intravenous Q4H PRN         insulin aspart (NovoLOG) injection (RAPID ACTING)  1-7 Units Subcutaneous TID AC   1 Units at 01/01/23 0825     insulin aspart (NovoLOG) injection (RAPID ACTING)  1-5 Units Subcutaneous At Bedtime         isosorbide mononitrate (IMDUR) 24 hr tablet 30 mg  30 mg Oral Daily   30 mg at 01/06/23 1006     Lidocaine (LIDOCARE) 4 % Patch 1-2 patch  1-2 patch Transdermal Q24H   2 patch at 01/06/23 1006     lidocaine (LMX4) cream   Topical Q1H PRN         lidocaine 1 % 0.1-1 mL  0.1-1 mL Other Q1H PRN         lidocaine 1 % 0.1-5 mL  0.1-5 mL Other Q1H PRN         lidocaine patch in PLACE   Transdermal Q8H DEJAH         [Held by provider] lisinopril (ZESTRIL) tablet 40 mg  40 mg Oral At Bedtime         LORazepam (ATIVAN) injection 0.25 mg  0.25 mg Intravenous Q4H PRN   0.25 mg at 01/03/23 0205     melatonin tablet 1 mg  1 mg Oral At Bedtime PRN   1 mg at 01/01/23 2219     metoprolol succinate ER (TOPROL XL) 24 hr tablet 50 mg  50 mg Oral BID   50 mg at 01/06/23 1006     naloxone (NARCAN) injection 0.2 mg  0.2 mg Intravenous Q2 Min PRN        Or     naloxone (NARCAN) injection 0.4 mg  0.4 mg Intravenous Q2 Min PRN        Or     naloxone (NARCAN) injection 0.2 mg  0.2 mg Intramuscular Q2 Min PRN        Or     naloxone (NARCAN) injection 0.4 mg  0.4 mg Intramuscular Q2 Min PRN         nitroGLYcerin (NITROSTAT) sublingual tablet 0.4 mg  0.4 mg Sublingual Q5 Min PRN         oxyCODONE (ROXICODONE) tablet 5 mg  5 mg Oral Q4H PRN   5 mg at 01/05/23 0142     polyethylene glycol (MIRALAX) Packet 17 g  17 g Oral Daily PRN   17  g at 23 0901     potassium chloride ER (KLOR-CON M) CR tablet 20 mEq  20 mEq Oral Daily   20 mEq at 23 100     pramipexole (MIRAPEX) tablet 3 mg  3 mg Oral At Bedtime   3 mg at 23     prochlorperazine (COMPAZINE) injection 5 mg  5 mg Intravenous Q6H PRN        Or     prochlorperazine (COMPAZINE) tablet 5 mg  5 mg Oral Q6H PRN        Or     prochlorperazine (COMPAZINE) suppository 12.5 mg  12.5 mg Rectal Q12H PRN         senna-docusate (SENOKOT-S/PERICOLACE) 8.6-50 MG per tablet 1 tablet  1 tablet Oral BID PRN   1 tablet at 23 09    Or     senna-docusate (SENOKOT-S/PERICOLACE) 8.6-50 MG per tablet 2 tablet  2 tablet Oral BID PRN   2 tablet at 23     sodium chloride (PF) 0.9% PF flush 10 mL  10 mL Intracatheter Q8H   10 mL at 23 100     sodium chloride (PF) 0.9% PF flush 10-20 mL  10-20 mL Intracatheter q1 min prn   10 mL at 23 1026     sodium chloride (PF) 0.9% PF flush 10-40 mL  10-40 mL Intracatheter Once PRN         sodium chloride (PF) 0.9% PF flush 3 mL  3 mL Intracatheter Q8H   3 mL at 23     sodium chloride (PF) 0.9% PF flush 3 mL  3 mL Intracatheter q1 min prn   3 mL at 23 0238     Current Outpatient Medications Ordered in Epic   Medication     acetaminophen (TYLENOL) 325 MG tablet     cefTRIAXone (ROCEPHIN) 1 GM vial     furosemide (LASIX) 20 MG tablet     hydrALAZINE (APRESOLINE) 25 MG tablet     isosorbide mononitrate (IMDUR) 30 MG 24 hr tablet     Lidocaine (LIDOCARE) 4 % Patch     metoprolol succinate ER (TOPROL XL) 50 MG 24 hr tablet     oxyCODONE (ROXICODONE) 5 MG tablet     senna-docusate (SENOKOT-S/PERICOLACE) 8.6-50 MG tablet             Physical Exam:   Vital signs were reviewed:  Temperatures:  Current - Temp: 98.2  F (36.8  C); Max - Temp  Av.3  F (36.8  C)  Min: 97.4  F (36.3  C)  Max: 98.9  F (37.2  C)  Respiration range: Resp  Av.7  Min: 16  Max: 20  Pulse range: Pulse  Av.3  Min: 63  Max: 72  Blood  pressure range: Systolic (24hrs), Av , Min:109 , Max:151   ; Diastolic (24hrs), Av, Min:58, Max:78    Pulse oximetry range: SpO2  Av.9 %  Min: 92 %  Max: 97 %    Intake/Output Summary (Last 24 hours) at 2023 09  Last data filed at 1/3/2023 1200  Gross per 24 hour   Intake --   Output 200 ml   Net -200 ml     309 lbs 4.8 oz  Body mass index is 35.74 kg/m .   Body surface area is 2.78 meters squared.    Constitutional: appears stated age, in no apparent distress, appears to be well nourished  Head: normocephalic, atraumatic  Neck: supple, trachea midline  Pulmonary: clear to auscultation bilaterally, no wheezes, no rales, no increased work of breathing  Cardiovascular: JVP normal, regular rate, regular rhythm, normal S1 and S2, no S3, S4, no murmur appreciated  Gastrointestinal: no guarding, non-rigid   Neurologic: awake, alert, moves all extremities  Skin: no jaundice, warm on limited exam, RLE erythematous and tender  Psychiatric: affect is normal, answers questions appropriately, oriented to self and place         Selected laboratory tests:   Laboratory test results personally reviewed:   CMP  Recent Labs   Lab 23  0909 23  0557 23  0219 23  2211 23  1730 23  1343 23  0827 23  0648 23  0832 23  0606 23  0938 23  0630 22  2200 22  2154   NA  --  138  --   --   --   --   --  137  --  135*  --  135*   < > 141   POTASSIUM  --  4.4  --   --   --  3.5  --  3.2*  --  3.5  --  3.7   < > 3.4   CHLORIDE  --  99  --   --   --   --   --  99  --  98  --  101   < > 103   CO2  --  26  --   --   --   --   --  26  --  25  --  26   < > 24   ANIONGAP  --  13  --   --   --   --   --  12  --  12  --  8   < > 14   * 107* 107* 99   < >  --    < > 89   < > 93   < > 109*   < > 148*   BUN  --  11.5  --   --   --   --   --  11.8  --  11.2  --  18.0   < > 25.7*   CR  --  0.78  --   --   --   --   --  0.82  --  0.81  --  0.92   < >  1.14   GFRESTIMATED  --  >90  --   --   --   --   --  >90  --  >90  --  89   < > 69   LAURA  --  8.9  --   --   --   --   --  8.3*  --  8.1*  --  7.9*   < > 9.2   MAG  --   --   --   --   --   --   --  1.9  --   --   --   --   --  1.7   PROTTOTAL  --   --   --   --   --   --   --   --   --   --   --   --   --  6.9   ALBUMIN  --   --   --   --   --   --   --   --   --   --   --   --   --  4.0   BILITOTAL  --   --   --   --   --   --   --   --   --   --   --   --   --  1.1   ALKPHOS  --   --   --   --   --   --   --   --   --   --   --   --   --  81   AST  --   --   --   --   --   --   --   --   --   --   --   --   --  44   ALT  --   --   --   --   --   --   --   --   --   --   --   --   --  35    < > = values in this interval not displayed.     CBC  Recent Labs   Lab 01/06/23  0557 01/05/23  0648 01/04/23  0606 01/03/23  0630   WBC 9.0 7.8 8.9 9.0   RBC 4.53 4.23* 4.15* 4.30*   HGB 13.0* 12.1* 11.8* 12.3*   HCT 39.1* 37.6* 37.6* 38.6*   MCV 86 89 91 90   MCH 28.7 28.6 28.4 28.6   MCHC 33.2 32.2 31.4* 31.9   RDW 13.8 13.9 14.1 14.5    161 146* 132*     INR  Recent Labs   Lab 12/31/22  2221   INR 1.13     Lab Results   Component Value Date    TROPI <0.015 08/04/2020    TROPI <0.015 07/08/2018    TROPI 0.021 07/05/2018    TROPONIN 0.03 07/01/2018    TROPONIN <0.04 11/09/2007     Recent Labs   Lab Test 07/02/18  0636   CHOL 92   HDL 32*   LDL <1   TRIG 297*     Lab Results   Component Value Date    A1C 6.0 12/31/2022    A1C 6.0 07/08/2018    A1C 6.1 07/01/2018     TSH   Date Value Ref Range Status   12/20/2022 1.54 0.30 - 4.20 uIU/mL Final   07/09/2018 5.40 (H) 0.40 - 4.00 mU/L Final

## 2023-01-06 NOTE — PLAN OF CARE
VSS ex elevated BP. A/Ox4. LS dim on RA. Changed dressing on R big toe, applied adaptic, dry gauze, and kerilex. Cellulitis present on R leg warm and red, still within boarders. BG 99, 107. Chair alarm on bed due to bed alarm not working with bed extender. Midline in place, blood return noted and saline locked. Plan to go to TCU when bed available.

## 2023-01-06 NOTE — PLAN OF CARE
Occupational Therapy Discharge Summary    Reason for therapy discharge:    Discharged to transitional care facility.    Progress towards therapy goal(s). See goals on Care Plan in [facility] electronic health record for goal details.  Goals partially met.  Barriers to achieving goals:   discharge from facility.    Therapy recommendation(s):    Continued therapy is recommended.  Rationale/Recommendations:  Pt. isa beenfit from continued OT for ADL/IADL retraining.

## 2023-01-06 NOTE — PROGRESS NOTES
"[facility]  Infectious Disease Progress Note          Assessment and Plan:   IMPRESSION:     1.  A 72-year-old male with frostbite to his right great toe and resulting right leg cellulitis, including mild sepsis and bacteremia.  2.  Group C strep bacteremia.  No obvious secondary sites, clinically improved on antibiotics already.  3.  Toe with the frostbite.  No significant abscess.  No obvious deep involvement, probably appears streptococcal infection.  4.  Syncope and fall.  5.  Rhabdomyolysis.     RECOMMENDATIONS:     1.  Continue ceftriaxone, okay  midline, and needs a 2-week course of IV antibiotics.  2.  Follow clinically, but probably okay for disposition on IV when  other factors allow including the rhabdomyolysis, etc.  Orders in when disposition ready midline in        Interval History:   no new complaints and doing well; no cp, sob, n/v/d, or abd pain. Sleepy T down less ill chronic back pain about same              Medications:       acetaminophen  975 mg Oral TID     aspirin  81 mg Oral Daily     atorvastatin  40 mg Oral Daily     cefTRIAXone  2 g Intravenous Q24H     enoxaparin ANTICOAGULANT  40 mg Subcutaneous Q24H     furosemide  20 mg Oral Daily     gabapentin  800 mg Oral TID     hydrALAZINE  25 mg Oral TID     insulin aspart  1-7 Units Subcutaneous TID AC     insulin aspart  1-5 Units Subcutaneous At Bedtime     isosorbide mononitrate  30 mg Oral Daily     lidocaine  1-2 patch Transdermal Q24H     lidocaine   Transdermal Q8H DEJAH     [Held by provider] lisinopril  40 mg Oral At Bedtime     metoprolol succinate ER  50 mg Oral BID     potassium chloride  20 mEq Oral Daily     pramipexole  3 mg Oral At Bedtime     sodium chloride (PF)  10 mL Intracatheter Q8H     sodium chloride (PF)  3 mL Intracatheter Q8H                  Physical Exam:   Blood pressure (!) 171/86, pulse 67, temperature 98  F (36.7  C), temperature source Oral, resp. rate 16, height 1.981 m (6' 6\"), weight " 140.3 kg (309 lb 4.8 oz), SpO2 95 %.  Wt Readings from Last 2 Encounters:   01/06/23 140.3 kg (309 lb 4.8 oz)   12/19/22 143.3 kg (316 lb)     Vital Signs with Ranges  Temp:  [98  F (36.7  C)-98.7  F (37.1  C)] 98  F (36.7  C)  Pulse:  [57-80] 67  Resp:  [16-18] 16  BP: (144-172)/(73-90) 171/86  SpO2:  [94 %-99 %] 95 %    Constitutional: Awake, alert, cooperative, no apparent distress   Lungs: Clear to auscultation bilaterally, no crackles or wheezing   Cardiovascular: Regular rate and rhythm, normal S1 and S2, and no murmur noted   Abdomen: Normal bowel sounds, soft, non-distended, non-tender   Skin: , no cyanosis, no edema toe and leg same   Other:           Data:   All microbiology laboratory data reviewed.  Recent Labs   Lab Test 01/06/23  0557 01/05/23  0648 01/04/23  0606   WBC 9.0 7.8 8.9   HGB 13.0* 12.1* 11.8*   HCT 39.1* 37.6* 37.6*   MCV 86 89 91    161 146*     Recent Labs   Lab Test 01/06/23 0557 01/05/23  0648 01/04/23  0606   CR 0.78 0.82 0.81     No lab results found.  No lab results found.    Invalid input(s):

## 2023-01-07 LAB — BACTERIA BLD CULT: NO GROWTH

## 2023-01-08 LAB — BACTERIA BLD CULT: NO GROWTH

## 2023-01-09 ENCOUNTER — LAB REQUISITION (OUTPATIENT)
Dept: LAB | Facility: CLINIC | Age: 73
End: 2023-01-09
Payer: COMMERCIAL

## 2023-01-09 ENCOUNTER — PATIENT OUTREACH (OUTPATIENT)
Dept: CARE COORDINATION | Facility: CLINIC | Age: 73
End: 2023-01-09

## 2023-01-09 DIAGNOSIS — Z11.1 ENCOUNTER FOR SCREENING FOR RESPIRATORY TUBERCULOSIS: ICD-10-CM

## 2023-01-09 DIAGNOSIS — A41.9 SEPSIS, UNSPECIFIED ORGANISM (H): ICD-10-CM

## 2023-01-09 NOTE — PROGRESS NOTES
[facility]    Background: Transitional Care Management program identified per system criteria and reviewed by [facility] team for possible outreach.    Assessment: Upon chart review, CCR Team member will not proceed with patient outreach related to this episode of Transitional Care Management program due to reason below:    Non-[facility] TCU: Patient is not established within [facility] Primary [facility] and CCRC team member noted patient discharged to TCU/ARU/LTACH.    Plan: Transitional Care Management episode addressed appropriately per reason noted above.      Michelle Gilbert RN  [facility], [facility]    *Connected Care Resource Team does NOT follow patient ongoing. Referrals are identified based on internal discharge reports and the outreach is to ensure patient has an understanding of their discharge instructions.

## 2023-01-10 LAB
AST SERPL W P-5'-P-CCNC: 27 U/L (ref 0–45)
BASOPHILS # BLD AUTO: 0 10E3/UL (ref 0–0.2)
BASOPHILS NFR BLD AUTO: 1 %
EOSINOPHIL # BLD AUTO: 0.4 10E3/UL (ref 0–0.7)
EOSINOPHIL NFR BLD AUTO: 4 %
ERYTHROCYTE [DISTWIDTH] IN BLOOD BY AUTOMATED COUNT: 13.8 % (ref 10–15)
HCT VFR BLD AUTO: 41.5 % (ref 40–53)
HGB BLD-MCNC: 13 G/DL (ref 13.3–17.7)
IMM GRANULOCYTES # BLD: 0.2 10E3/UL
IMM GRANULOCYTES NFR BLD: 2 %
LYMPHOCYTES # BLD AUTO: 1.7 10E3/UL (ref 0.8–5.3)
LYMPHOCYTES NFR BLD AUTO: 20 %
MCH RBC QN AUTO: 28 PG (ref 26.5–33)
MCHC RBC AUTO-ENTMCNC: 31.3 G/DL (ref 31.5–36.5)
MCV RBC AUTO: 89 FL (ref 78–100)
MONOCYTES # BLD AUTO: 0.6 10E3/UL (ref 0–1.3)
MONOCYTES NFR BLD AUTO: 7 %
NEUTROPHILS # BLD AUTO: 5.8 10E3/UL (ref 1.6–8.3)
NEUTROPHILS NFR BLD AUTO: 66 %
NRBC # BLD AUTO: 0 10E3/UL
NRBC BLD AUTO-RTO: 0 /100
PLATELET # BLD AUTO: 313 10E3/UL (ref 150–450)
RBC # BLD AUTO: 4.64 10E6/UL (ref 4.4–5.9)
WBC # BLD AUTO: 8.7 10E3/UL (ref 4–11)

## 2023-01-10 PROCEDURE — 36415 COLL VENOUS BLD VENIPUNCTURE: CPT | Performed by: NURSE PRACTITIONER

## 2023-01-10 PROCEDURE — 86481 TB AG RESPONSE T-CELL SUSP: CPT | Performed by: NURSE PRACTITIONER

## 2023-01-10 PROCEDURE — P9604 ONE-WAY ALLOW PRORATED TRIP: HCPCS | Performed by: NURSE PRACTITIONER

## 2023-01-10 PROCEDURE — 85004 AUTOMATED DIFF WBC COUNT: CPT | Performed by: NURSE PRACTITIONER

## 2023-01-10 PROCEDURE — 84450 TRANSFERASE (AST) (SGOT): CPT | Performed by: NURSE PRACTITIONER

## 2023-01-11 LAB
QUANTIFERON MITOGEN: 2.34 IU/ML
QUANTIFERON NIL TUBE: 0.06 IU/ML
QUANTIFERON TB1 TUBE: 0.07 IU/ML
QUANTIFERON TB2 TUBE: 0.09

## 2023-01-12 LAB
GAMMA INTERFERON BACKGROUND BLD IA-ACNC: 0.06 IU/ML
M TB IFN-G BLD-IMP: NEGATIVE
M TB IFN-G CD4+ BCKGRND COR BLD-ACNC: 2.28 IU/ML
MITOGEN IGNF BCKGRD COR BLD-ACNC: 0.01 IU/ML
MITOGEN IGNF BCKGRD COR BLD-ACNC: 0.03 IU/ML

## 2023-01-15 ENCOUNTER — LAB REQUISITION (OUTPATIENT)
Dept: LAB | Facility: CLINIC | Age: 73
End: 2023-01-15
Payer: COMMERCIAL

## 2023-01-15 DIAGNOSIS — Z51.81 ENCOUNTER FOR THERAPEUTIC DRUG LEVEL MONITORING: ICD-10-CM

## 2023-01-15 DIAGNOSIS — I10 ESSENTIAL (PRIMARY) HYPERTENSION: ICD-10-CM

## 2023-01-16 ENCOUNTER — TELEPHONE (OUTPATIENT)
Dept: CARDIOLOGY | Facility: CLINIC | Age: 73
End: 2023-01-16

## 2023-01-16 LAB
ANION GAP SERPL CALCULATED.3IONS-SCNC: 6 MMOL/L (ref 3–14)
BUN SERPL-MCNC: 13 MG/DL (ref 7–30)
CALCIUM SERPL-MCNC: 9.2 MG/DL (ref 8.5–10.1)
CHLORIDE BLD-SCNC: 106 MMOL/L (ref 94–109)
CK SERPL-CCNC: 71 U/L (ref 30–300)
CO2 SERPL-SCNC: 30 MMOL/L (ref 20–32)
CREAT SERPL-MCNC: 0.71 MG/DL (ref 0.66–1.25)
ERYTHROCYTE [DISTWIDTH] IN BLOOD BY AUTOMATED COUNT: 14.2 % (ref 10–15)
GFR SERPL CREATININE-BSD FRML MDRD: >90 ML/MIN/1.73M2
GLUCOSE BLD-MCNC: 89 MG/DL (ref 70–99)
HCT VFR BLD AUTO: 43.7 % (ref 40–53)
HGB BLD-MCNC: 14 G/DL (ref 13.3–17.7)
MCH RBC QN AUTO: 28.1 PG (ref 26.5–33)
MCHC RBC AUTO-ENTMCNC: 32 G/DL (ref 31.5–36.5)
MCV RBC AUTO: 88 FL (ref 78–100)
PLATELET # BLD AUTO: 359 10E3/UL (ref 150–450)
POTASSIUM BLD-SCNC: 4.3 MMOL/L (ref 3.4–5.3)
RBC # BLD AUTO: 4.99 10E6/UL (ref 4.4–5.9)
SODIUM SERPL-SCNC: 142 MMOL/L (ref 133–144)
WBC # BLD AUTO: 7.4 10E3/UL (ref 4–11)

## 2023-01-16 PROCEDURE — 80048 BASIC METABOLIC PNL TOTAL CA: CPT | Performed by: NURSE PRACTITIONER

## 2023-01-16 PROCEDURE — 82550 ASSAY OF CK (CPK): CPT | Performed by: NURSE PRACTITIONER

## 2023-01-16 PROCEDURE — 36415 COLL VENOUS BLD VENIPUNCTURE: CPT | Performed by: NURSE PRACTITIONER

## 2023-01-16 PROCEDURE — P9604 ONE-WAY ALLOW PRORATED TRIP: HCPCS | Performed by: NURSE PRACTITIONER

## 2023-01-16 PROCEDURE — 85027 COMPLETE CBC AUTOMATED: CPT | Performed by: NURSE PRACTITIONER

## 2023-01-16 NOTE — TELEPHONE ENCOUNTER
"Spoke with patient and he wanted to know if anything showed up on his event monitor he is wearing from the event on 12/31/22. RN reviewed the strips via [employer] and no event strip recorded. Pt currently still wearing the event monitor and plans to send it back 1/18/23. Pt is worried to why he had syncope if nothing was captured on the event monitor.     Per Dr. Garcia's consult note from the hospital it appears to be related to his sepsis and bacteremia.   \"Patient admitted with syncope in the setting of severe sepsis and bacteremia, found to have rhabdomyolysis, mildly elevated troponin, prolonged QT interval\". Pt was informed of this and agrees that was talked about. Pt has not other questions and this time and confirmed his visit on 1/23/23 with Bobbi HENRY.   "

## 2023-01-16 NOTE — TELEPHONE ENCOUNTER
[facility] Call Center    Phone Message    May a detailed message be left on voicemail: yes     Reason for Call: Other: [facility] unit coordinator called and stated, Please call patient to further discuss.      Action Taken: Message routed to:  Other: cardiology    Travel Screening: Not Applicable     Thank you!  [facility]

## 2023-01-17 ENCOUNTER — DOCUMENTATION ONLY (OUTPATIENT)
Dept: OTHER | Facility: CLINIC | Age: 73
End: 2023-01-17

## 2023-01-23 ENCOUNTER — OFFICE VISIT (OUTPATIENT)
Dept: CARDIOLOGY | Facility: CLINIC | Age: 73
End: 2023-01-23
Payer: COMMERCIAL

## 2023-01-23 VITALS
OXYGEN SATURATION: 96 % | HEART RATE: 75 BPM | WEIGHT: 218 LBS | BODY MASS INDEX: 25.22 KG/M2 | HEIGHT: 78 IN | SYSTOLIC BLOOD PRESSURE: 124 MMHG | DIASTOLIC BLOOD PRESSURE: 80 MMHG

## 2023-01-23 DIAGNOSIS — I24.89 DEMAND ISCHEMIA (H): ICD-10-CM

## 2023-01-23 DIAGNOSIS — R55 SYNCOPE, UNSPECIFIED SYNCOPE TYPE: ICD-10-CM

## 2023-01-23 DIAGNOSIS — I10 ESSENTIAL HYPERTENSION: Primary | ICD-10-CM

## 2023-01-23 PROCEDURE — 99214 OFFICE O/P EST MOD 30 MIN: CPT | Performed by: NURSE PRACTITIONER

## 2023-01-23 RX ORDER — HYDRALAZINE HYDROCHLORIDE 25 MG/1
25 TABLET, FILM COATED ORAL 3 TIMES DAILY
Qty: 45 TABLET | Refills: 2 | Status: SHIPPED | OUTPATIENT
Start: 2023-01-23 | End: 2023-02-27 | Stop reason: ALTCHOICE

## 2023-01-23 RX ORDER — CHLORTHALIDONE 25 MG/1
25 TABLET ORAL DAILY
COMMUNITY
Start: 2023-01-20 | End: 2023-07-06

## 2023-01-23 RX ORDER — LISINOPRIL 10 MG/1
10 TABLET ORAL DAILY
Qty: 30 TABLET | Refills: 2 | Status: SHIPPED | OUTPATIENT
Start: 2023-01-23 | End: 2023-02-27

## 2023-01-23 NOTE — PROGRESS NOTES
Cardiology Clinic Progress Note  Federico Chamberlain MRN# [id number]   YOB: 1950 Age: 72 year old   Primary Cardiologist: Dr. Garcia Reason for visit: post hospitalization             Assessment and Plan:       1.  NSTEMI  -Lexiscan nuclear stress test ordered secondary to troponin elevation and hypokinesis seen on TTE while hospitalized 12/2022    2.  Syncope, likely secondary to Sepsis and rhabdomyolysis  -Completing a course of augmentin from PCP, patient concerned about need for additional antibiotics and wound monitoring  -Event monitor reviewed which preliminary showed primarily SR with 1st degree AVB during times of dizziness and lightheadedness, but no arrhythmias, final report is pending  -Discussed patient should not drive until he has final report from monitor    3. Likely obstructive sleep apnea  -Sleep study scheduled for 2/15/23    4. Hypertension  -Multiple medications were adjusted held unchanged since patient was hospitalized.  Most recently his Lasix was discontinued and chlorthalidone resumed.  His hydralazine was increased to 50 mg 3 times daily.  -With resolution of his RILEY will reduce hydralazine to 25 mg 3 times a day and start lisinopril 10 mg daily  -Patient to continue to check his blood pressure at home and bring a log of readings with him to his next visit as well as blood pressure cuff   -We will repeat BMP 1 month    Changes today: Lexiscan nuclear stress test ordered, reduce hydralazine 25mg TID, start lisinopril 10mg daily    Follow up plan: Will review final event monitor report when available, Follow up as planned on 2/14/23 with Dr. Garcia and a BMP prior. Advised patient contact Dr. Obando office if he has concerns regarding antibiotics or would like to be seen outpatient, phone number provided        History of Presenting Illness:    Federico Chamberlain is a very pleasant 72 year old male with a history of syncope, type II MI/NSTEMI, orthostasis, recent severe sepsis and right lower  extremity cellulitis secondary to frostbite of the right great toe, hypertension, aortic dilation, QT prolongation, type 2 diabetes, hyperlipidemia, history of prostate cancer, obesity.    Patient was admitted with syncope 1/1/2023 in setting of severe sepsis and bacteremia, found to have rhabdomyolysis, mildly elevated troponin, and prolonged QT interval.  Mild elevated troponins most consistent with a type II MI, however TTE 12/20/2022 showed LVEF 50% with septal lateral hypokinesis.  He had a baseline abnormal conduction with bifascicular block and first-degree AV block with an event monitor in place.  During his hospital stay he had frequent PVCs, short runs of paroxysmal SVT.  His metoprolol was increased to 50 mg twice daily.  Isordil, furosemide, and hydralazine were added.  His lisinopril, chlorthalidone, and spironolactone were all held due to rhabdomyolysis.    He went to TCU at discharge, his lasix was discontinued and previous chlorthalidone was restarted, hydralazine with increased to 50mg TID 2/2 HTN. His RILEY resolved and WBCs normalized prior to discharge.     He saw his PCP, Dr. Aponte at [facility] on 1/20/23 and discussed resuming lisinopril pending cardiology approval.  His CK returned to normal at 79, sodium 143, potassium 3.4, creatinine 0.9, and GFR greater than 60, B12 level normal at 747. He continued him on a course of 1 additional week of Augmentin.     Patient is here today for follow-up from his hospitalization. Patient reports feeling good. He has several questions today regarding his antibiotic course in TCU and outpatient. He is concerned whether his cellulitis is healing. He does not have follow up with ID and has completed having home care.     He reports having poor sleep and only getting 4 hours of sleep nightly. He has a sleep study scheduled in mid-February.     Patient denies chest pain or chest tightness. Denies dizziness, lightheadedness or other presyncopal symptoms.  "Denies tachycardia or palpitations. Denies shortness of breath, orthopnea, or PND.     Blood pressure 124/80 and HR 75 in clinic today.    Reports home blood pressure has been running high with -160. He has been checking it 30 minutes after his morning medications.         Recent Hospitalizations   As above        Social History      Social History     Socioeconomic History     Marital status:      Spouse name: Not on file     Number of children: Not on file     Years of education: Not on file     Highest education level: Not on file   Occupational History     Not on file   Tobacco Use     Smoking status: Never     Smokeless tobacco: Never   Substance and Sexual Activity     Alcohol use: No     Drug use: No     Sexual activity: Not on file   Other Topics Concern     Not on file   Social History Narrative     Not on file     Social Determinants of Health     Financial Resource Strain: Not on file   Food Insecurity: Not on file   Transportation Needs: Not on file   Physical Activity: Not on file   Stress: Not on file   Social Connections: Not on file   Intimate Partner Violence: Not on file   Housing Stability: Not on file            Review of Systems:   Skin:  not assessed     Eyes:  not assessed    ENT:  not assessed    Respiratory:  Positive for sleep apnea  Cardiovascular:    Positive for;edema  Gastroenterology: not assessed    Genitourinary:  not assessed    Musculoskeletal:  not assessed    Neurologic:  not assessed    Psychiatric:  not assessed    Heme/Lymph/Imm:  not assessed    Endocrine:  not assessed           Physical Exam:   Vitals: /80 (BP Location: Right arm, Patient Position: Sitting, Cuff Size: Adult Large)   Pulse 75   Ht 1.981 m (6' 6\")   Wt 98.9 kg (218 lb)   SpO2 96%   BMI 25.19 kg/m     Wt Readings from Last 4 Encounters:   01/23/23 98.9 kg (218 lb)   01/06/23 140.3 kg (309 lb 4.8 oz)   12/19/22 143.3 kg (316 lb)   12/03/21 131.5 kg (290 lb)     GEN: well nourished, in " no acute distress.  HEENT:  Pupils equal, round. Sclerae nonicteric.   NECK: Supple, no masses appreciated.  C/V:  Regular rate and rhythm, no murmur, rub or gallop.    RESP: Respirations are unlabored. Clear to auscultation bilaterally without wheezing, rales, or rhonchi.  GI: Abdomen soft, nontender.  EXTREM: Right LE edema, 1+. Pink, dry, skin up to mid shin, non-tender  NEURO: Alert and oriented, cooperative.  SKIN: Warm and dry.        Data:     LIPID RESULTS:  Lab Results   Component Value Date    CHOL 92 07/02/2018    HDL 32 (L) 07/02/2018    LDL <1 07/02/2018    TRIG 297 (H) 07/02/2018     LIVER ENZYME RESULTS:  Lab Results   Component Value Date    AST 27 01/10/2023    AST 15 07/02/2018    ALT 35 12/31/2022    ALT 23 07/02/2018     CBC RESULTS:  Lab Results   Component Value Date    WBC 7.4 01/16/2023    WBC 8.1 08/04/2020    RBC 4.99 01/16/2023    RBC 5.04 08/04/2020    HGB 14.0 01/16/2023    HGB 14.6 08/04/2020    HCT 43.7 01/16/2023    HCT 44.8 08/04/2020    MCV 88 01/16/2023    MCV 89 08/04/2020    MCH 28.1 01/16/2023    MCH 29.0 08/04/2020    MCHC 32.0 01/16/2023    MCHC 32.6 08/04/2020    RDW 14.2 01/16/2023    RDW 13.8 08/04/2020     01/16/2023     08/04/2020     BMP RESULTS:  Lab Results   Component Value Date     01/16/2023     08/04/2020    POTASSIUM 4.3 01/16/2023    POTASSIUM 3.2 (L) 08/04/2020    CHLORIDE 106 01/16/2023    CHLORIDE 108 08/04/2020    CO2 30 01/16/2023    CO2 28 08/04/2020    ANIONGAP 6 01/16/2023    ANIONGAP 7 08/04/2020    GLC 89 01/16/2023     (H) 08/04/2020    BUN 13 01/16/2023    BUN 18 08/04/2020    CR 0.71 01/16/2023    CR 0.92 08/04/2020    GFRESTIMATED >90 01/16/2023    GFRESTIMATED >60 12/31/2022    GFRESTIMATED 85 08/04/2020    GFRESTBLACK >90 08/04/2020    LAURA 9.2 01/16/2023    LAURA 8.6 08/04/2020      A1C RESULTS:  Lab Results   Component Value Date    A1C 6.0 (H) 12/31/2022    A1C 6.0 (H) 07/08/2018     INR RESULTS:  Lab Results    Component Value Date    INR 1.13 12/31/2022    INR 0.97 07/08/2018    INR 0.98 11/09/2007            Medications     Current Outpatient Medications   Medication Sig Dispense Refill     acetaminophen (TYLENOL) 325 MG tablet Take 3 tablets (975 mg) by mouth 3 times daily       aspirin 81 MG EC tablet Take 81 mg by mouth daily       atorvastatin (LIPITOR) 40 MG tablet Take 40 mg by mouth daily       chlorthalidone (HYGROTON) 25 MG tablet Take 25 mg by mouth daily       cyclobenzaprine (FLEXERIL) 5 MG tablet Take 5 mg by mouth 3 times daily as needed for muscle spasms       ferrous sulfate (FEROSUL) 325 (65 Fe) MG tablet Take 325 mg by mouth daily (with breakfast)       gabapentin (NEURONTIN) 800 MG tablet Take 800 mg by mouth 3 times daily       hydrALAZINE (APRESOLINE) 25 MG tablet Take 1 tablet (25 mg) by mouth 3 times daily 45 tablet 2     isosorbide mononitrate (IMDUR) 30 MG 24 hr tablet Take 1 tablet (30 mg) by mouth daily       Lidocaine (LIDOCARE) 4 % Patch Place 1-2 patches onto the skin every 24 hours To prevent lidocaine toxicity, patient should be patch free for 12 hrs daily.       lisinopril (ZESTRIL) 10 MG tablet Take 1 tablet (10 mg) by mouth daily 30 tablet 2     metFORMIN (GLUCOPHAGE) 500 MG tablet Take 500 mg by mouth daily       metoprolol succinate ER (TOPROL XL) 50 MG 24 hr tablet Take 1 tablet (50 mg) by mouth 2 times daily       multivitamin w/minerals (THERA-VIT-M) tablet Take 1 tablet by mouth At Bedtime       potassium chloride ER (KLOR-CON M) 10 MEQ CR tablet Take 20 mEq by mouth daily       Pramipexole Dihydrochloride (MIRAPEX PO) Take 3 mg by mouth At Bedtime       semaglutide (OZEMPIC, 1 MG/DOSE,) 2 MG/1.5ML pen Inject 0.5 mg Subcutaneous every 7 days On Fridays       senna-docusate (SENOKOT-S/PERICOLACE) 8.6-50 MG tablet Take 1 tablet by mouth 2 times daily as needed for constipation       silver sulfADIAZINE (SILVADENE) 1 % external cream Apply topically daily as needed       VITAMIN D,  CHOLECALCIFEROL, PO Take 4,000 Units by mouth daily            Past Medical History     Past Medical History:   Diagnosis Date     Hypercholesterolemia      Hypertension      Past Surgical History:   Procedure Laterality Date     APPENDECTOMY       BACK SURGERY       ORTHOPEDIC SURGERY       No family history on file.         Allergies   Patient has no known allergies.        Alana Cabello NP  [facility]  Pager: [phone]

## 2023-01-23 NOTE — LETTER
1/23/2023    Luis Alberto Aponte MD  [facility] [address] [place] [address]    RE: Federico ISSAC Bulmaro       Dear Colleague,     I had the pleasure of seeing Federico Chamberlain in the [facility].  Cardiology Clinic Progress Note  Federico Chamberlain MRN# [id number]   YOB: 1950 Age: 72 year old   Primary Cardiologist: Dr. Garcia Reason for visit: post hospitalization             Assessment and Plan:       1.  NSTEMI  -Lexiscan nuclear stress test ordered secondary to troponin elevation and hypokinesis seen on TTE while hospitalized 12/2022    2.  Syncope, likely secondary to Sepsis and rhabdomyolysis  -Completing a course of augmentin from PCP, patient concerned about need for additional antibiotics and wound monitoring  -Event monitor reviewed which preliminary showed primarily SR with 1st degree AVB during times of dizziness and lightheadedness, but no arrhythmias, final report is pending  -Discussed patient should not drive until he has final report from monitor    3. Likely obstructive sleep apnea  -Sleep study scheduled for 2/15/23    4. Hypertension  -Multiple medications were adjusted held unchanged since patient was hospitalized.  Most recently his Lasix was discontinued and chlorthalidone resumed.  His hydralazine was increased to 50 mg 3 times daily.  -With resolution of his RILEY will reduce hydralazine to 25 mg 3 times a day and start lisinopril 10 mg daily  -Patient to continue to check his blood pressure at home and bring a log of readings with him to his next visit as well as blood pressure cuff   -We will repeat BMP 1 month    Changes today: Lexiscan nuclear stress test ordered, reduce hydralazine 25mg TID, start lisinopril 10mg daily    Follow up plan: Will review final event monitor report when available, Follow up as planned on 2/14/23 with Dr. Garcia and a BMP prior. Advised patient contact Dr. Obando office if he has concerns regarding antibiotics or would like to be  seen outpatient, phone number provided        History of Presenting Illness:    Federico Chamberlain is a very pleasant 72 year old male with a history of syncope, type II MI/NSTEMI, orthostasis, recent severe sepsis and right lower extremity cellulitis secondary to frostbite of the right great toe, hypertension, aortic dilation, QT prolongation, type 2 diabetes, hyperlipidemia, history of prostate cancer, obesity.    Patient was admitted with syncope 1/1/2023 in setting of severe sepsis and bacteremia, found to have rhabdomyolysis, mildly elevated troponin, and prolonged QT interval.  Mild elevated troponins most consistent with a type II MI, however TTE 12/20/2022 showed LVEF 50% with septal lateral hypokinesis.  He had a baseline abnormal conduction with bifascicular block and first-degree AV block with an event monitor in place.  During his hospital stay he had frequent PVCs, short runs of paroxysmal SVT.  His metoprolol was increased to 50 mg twice daily.  Isordil, furosemide, and hydralazine were added.  His lisinopril, chlorthalidone, and spironolactone were all held due to rhabdomyolysis.    He went to TCU at discharge, his lasix was discontinued and previous chlorthalidone was restarted, hydralazine with increased to 50mg TID 2/2 HTN. His RILEY resolved and WBCs normalized prior to discharge.     He saw his PCP, Dr. Aponte at [facility] on 1/20/23 and discussed resuming lisinopril pending cardiology approval.  His CK returned to normal at 79, sodium 143, potassium 3.4, creatinine 0.9, and GFR greater than 60, B12 level normal at 747. He continued him on a course of 1 additional week of Augmentin.     Patient is here today for follow-up from his hospitalization. Patient reports feeling good. He has several questions today regarding his antibiotic course in TCU and outpatient. He is concerned whether his cellulitis is healing. He does not have follow up with ID and has completed having home care.     He  "reports having poor sleep and only getting 4 hours of sleep nightly. He has a sleep study scheduled in mid-February.     Patient denies chest pain or chest tightness. Denies dizziness, lightheadedness or other presyncopal symptoms. Denies tachycardia or palpitations. Denies shortness of breath, orthopnea, or PND.     Blood pressure 124/80 and HR 75 in clinic today.    Reports home blood pressure has been running high with -160. He has been checking it 30 minutes after his morning medications.         Recent Hospitalizations   As above        Social History      Social History     Socioeconomic History     Marital status:      Spouse name: Not on file     Number of children: Not on file     Years of education: Not on file     Highest education level: Not on file   Occupational History     Not on file   Tobacco Use     Smoking status: Never     Smokeless tobacco: Never   Substance and Sexual Activity     Alcohol use: No     Drug use: No     Sexual activity: Not on file   Other Topics Concern     Not on file   Social History Narrative     Not on file     Social Determinants of Health     Financial Resource Strain: Not on file   Food Insecurity: Not on file   Transportation Needs: Not on file   Physical Activity: Not on file   Stress: Not on file   Social Connections: Not on file   Intimate Partner Violence: Not on file   Housing Stability: Not on file            Review of Systems:   Skin:  not assessed     Eyes:  not assessed    ENT:  not assessed    Respiratory:  Positive for sleep apnea  Cardiovascular:    Positive for;edema  Gastroenterology: not assessed    Genitourinary:  not assessed    Musculoskeletal:  not assessed    Neurologic:  not assessed    Psychiatric:  not assessed    Heme/Lymph/Imm:  not assessed    Endocrine:  not assessed           Physical Exam:   Vitals: /80 (BP Location: Right arm, Patient Position: Sitting, Cuff Size: Adult Large)   Pulse 75   Ht 1.981 m (6' 6\")   Wt 98.9 " kg (218 lb)   SpO2 96%   BMI 25.19 kg/m     Wt Readings from Last 4 Encounters:   01/23/23 98.9 kg (218 lb)   01/06/23 140.3 kg (309 lb 4.8 oz)   12/19/22 143.3 kg (316 lb)   12/03/21 131.5 kg (290 lb)     GEN: well nourished, in no acute distress.  HEENT:  Pupils equal, round. Sclerae nonicteric.   NECK: Supple, no masses appreciated.  C/V:  Regular rate and rhythm, no murmur, rub or gallop.    RESP: Respirations are unlabored. Clear to auscultation bilaterally without wheezing, rales, or rhonchi.  GI: Abdomen soft, nontender.  EXTREM: Right LE edema, 1+. Pink, dry, skin up to mid shin, non-tender  NEURO: Alert and oriented, cooperative.  SKIN: Warm and dry.        Data:     LIPID RESULTS:  Lab Results   Component Value Date    CHOL 92 07/02/2018    HDL 32 (L) 07/02/2018    LDL <1 07/02/2018    TRIG 297 (H) 07/02/2018     LIVER ENZYME RESULTS:  Lab Results   Component Value Date    AST 27 01/10/2023    AST 15 07/02/2018    ALT 35 12/31/2022    ALT 23 07/02/2018     CBC RESULTS:  Lab Results   Component Value Date    WBC 7.4 01/16/2023    WBC 8.1 08/04/2020    RBC 4.99 01/16/2023    RBC 5.04 08/04/2020    HGB 14.0 01/16/2023    HGB 14.6 08/04/2020    HCT 43.7 01/16/2023    HCT 44.8 08/04/2020    MCV 88 01/16/2023    MCV 89 08/04/2020    MCH 28.1 01/16/2023    MCH 29.0 08/04/2020    MCHC 32.0 01/16/2023    MCHC 32.6 08/04/2020    RDW 14.2 01/16/2023    RDW 13.8 08/04/2020     01/16/2023     08/04/2020     BMP RESULTS:  Lab Results   Component Value Date     01/16/2023     08/04/2020    POTASSIUM 4.3 01/16/2023    POTASSIUM 3.2 (L) 08/04/2020    CHLORIDE 106 01/16/2023    CHLORIDE 108 08/04/2020    CO2 30 01/16/2023    CO2 28 08/04/2020    ANIONGAP 6 01/16/2023    ANIONGAP 7 08/04/2020    GLC 89 01/16/2023     (H) 08/04/2020    BUN 13 01/16/2023    BUN 18 08/04/2020    CR 0.71 01/16/2023    CR 0.92 08/04/2020    GFRESTIMATED >90 01/16/2023    GFRESTIMATED >60 12/31/2022     GFRESTIMATED 85 08/04/2020    GFRESTBLACK >90 08/04/2020    LAURA 9.2 01/16/2023    LAURA 8.6 08/04/2020      A1C RESULTS:  Lab Results   Component Value Date    A1C 6.0 (H) 12/31/2022    A1C 6.0 (H) 07/08/2018     INR RESULTS:  Lab Results   Component Value Date    INR 1.13 12/31/2022    INR 0.97 07/08/2018    INR 0.98 11/09/2007            Medications     Current Outpatient Medications   Medication Sig Dispense Refill     acetaminophen (TYLENOL) 325 MG tablet Take 3 tablets (975 mg) by mouth 3 times daily       aspirin 81 MG EC tablet Take 81 mg by mouth daily       atorvastatin (LIPITOR) 40 MG tablet Take 40 mg by mouth daily       chlorthalidone (HYGROTON) 25 MG tablet Take 25 mg by mouth daily       cyclobenzaprine (FLEXERIL) 5 MG tablet Take 5 mg by mouth 3 times daily as needed for muscle spasms       ferrous sulfate (FEROSUL) 325 (65 Fe) MG tablet Take 325 mg by mouth daily (with breakfast)       gabapentin (NEURONTIN) 800 MG tablet Take 800 mg by mouth 3 times daily       hydrALAZINE (APRESOLINE) 25 MG tablet Take 1 tablet (25 mg) by mouth 3 times daily 45 tablet 2     isosorbide mononitrate (IMDUR) 30 MG 24 hr tablet Take 1 tablet (30 mg) by mouth daily       Lidocaine (LIDOCARE) 4 % Patch Place 1-2 patches onto the skin every 24 hours To prevent lidocaine toxicity, patient should be patch free for 12 hrs daily.       lisinopril (ZESTRIL) 10 MG tablet Take 1 tablet (10 mg) by mouth daily 30 tablet 2     metFORMIN (GLUCOPHAGE) 500 MG tablet Take 500 mg by mouth daily       metoprolol succinate ER (TOPROL XL) 50 MG 24 hr tablet Take 1 tablet (50 mg) by mouth 2 times daily       multivitamin w/minerals (THERA-VIT-M) tablet Take 1 tablet by mouth At Bedtime       potassium chloride ER (KLOR-CON M) 10 MEQ CR tablet Take 20 mEq by mouth daily       Pramipexole Dihydrochloride (MIRAPEX PO) Take 3 mg by mouth At Bedtime       semaglutide (OZEMPIC, 1 MG/DOSE,) 2 MG/1.5ML pen Inject 0.5 mg Subcutaneous every 7 days On  Fridays       senna-docusate (SENOKOT-S/PERICOLACE) 8.6-50 MG tablet Take 1 tablet by mouth 2 times daily as needed for constipation       silver sulfADIAZINE (SILVADENE) 1 % external cream Apply topically daily as needed       VITAMIN D, CHOLECALCIFEROL, PO Take 4,000 Units by mouth daily            Past Medical History     Past Medical History:   Diagnosis Date     Hypercholesterolemia      Hypertension      Past Surgical History:   Procedure Laterality Date     APPENDECTOMY       BACK SURGERY       ORTHOPEDIC SURGERY       No family history on file.         Allergies   Patient has no known allergies.          Thank you for allowing me to participate in the care of your patient.    Sincerely,     Alana Cabello NP     [facility]

## 2023-01-23 NOTE — PATIENT INSTRUCTIONS
Today's Recommendations    Reduce your hydralazine to 25mg three times a day  Start lisinopril 10mg daily  Check your blood pressure at home 2 or so hours after your medications  I would like you to have a stress test  Please follow up with Dr. Garcia in 1 month and have repeat labs drawn before.    Please send a [employer] message or call [phone] to the RN team with questions or concerns.     Scheduling number [phone]  JENI Spain, CNP

## 2023-01-24 ENCOUNTER — LAB REQUISITION (OUTPATIENT)
Dept: LAB | Facility: CLINIC | Age: 73
End: 2023-01-24
Payer: COMMERCIAL

## 2023-01-24 DIAGNOSIS — R55 SYNCOPE AND COLLAPSE: ICD-10-CM

## 2023-01-24 LAB
ANION GAP SERPL CALCULATED.3IONS-SCNC: 13 MMOL/L (ref 7–15)
BUN SERPL-MCNC: 10.2 MG/DL (ref 8–23)
CALCIUM SERPL-MCNC: 8.8 MG/DL (ref 8.8–10.2)
CHLORIDE SERPL-SCNC: 104 MMOL/L (ref 98–107)
CREAT SERPL-MCNC: 0.68 MG/DL (ref 0.67–1.17)
DEPRECATED HCO3 PLAS-SCNC: 25 MMOL/L (ref 22–29)
ERYTHROCYTE [DISTWIDTH] IN BLOOD BY AUTOMATED COUNT: 14.5 % (ref 10–15)
GFR SERPL CREATININE-BSD FRML MDRD: >90 ML/MIN/1.73M2
GLUCOSE SERPL-MCNC: 130 MG/DL (ref 70–99)
HCT VFR BLD AUTO: 43.4 % (ref 40–53)
HGB BLD-MCNC: 13.8 G/DL (ref 13.3–17.7)
MCH RBC QN AUTO: 28.3 PG (ref 26.5–33)
MCHC RBC AUTO-ENTMCNC: 31.8 G/DL (ref 31.5–36.5)
MCV RBC AUTO: 89 FL (ref 78–100)
PLATELET # BLD AUTO: 274 10E3/UL (ref 150–450)
POTASSIUM SERPL-SCNC: 3.3 MMOL/L (ref 3.4–5.3)
RBC # BLD AUTO: 4.87 10E6/UL (ref 4.4–5.9)
SODIUM SERPL-SCNC: 142 MMOL/L (ref 136–145)
WBC # BLD AUTO: 8.1 10E3/UL (ref 4–11)

## 2023-01-24 PROCEDURE — 80048 BASIC METABOLIC PNL TOTAL CA: CPT | Mod: ORL | Performed by: FAMILY MEDICINE

## 2023-01-24 PROCEDURE — 85027 COMPLETE CBC AUTOMATED: CPT | Mod: ORL | Performed by: FAMILY MEDICINE

## 2023-01-30 ENCOUNTER — TELEPHONE (OUTPATIENT)
Dept: CARDIOLOGY | Facility: CLINIC | Age: 73
End: 2023-01-30
Payer: COMMERCIAL

## 2023-01-30 NOTE — TELEPHONE ENCOUNTER
[facility] Call Center    Phone Message    May a detailed message be left on voicemail: yes     Reason for Call: Other:      Pt is requesting a call to discuss medications.      Action Taken: Other: cardio    Travel Screening: Not Applicable     Thank you!  [facility]

## 2023-01-30 NOTE — TELEPHONE ENCOUNTER
Called pt back, states last Thursday his BP dropped to 76/51, he states he did get lightheaded & dizzy, denies feeling like he could pass out. He laid down & rested & states he started to feel better. Discussed how much water he drinks, states he drinks very little water, drinks about 4 cans of diet soda daily. Pt advised that diet soda will actually dehydrate him, advised that he needs to drink more water. Pt also is getting systolic reading in the 150-160s as well. He states he did go into the hospital with low BP in December but also had sepsis at that time also. Pt only had one reading that low, advised to continue to monitor, increase actual water consumption & follow up next month with Dr. Garcia as scheduled. Pt advised to call back if he continues to have low BP despite increased water consumption. Marjorie INFANTE

## 2023-02-02 ENCOUNTER — HOSPITAL ENCOUNTER (OUTPATIENT)
Dept: CARDIOLOGY | Facility: CLINIC | Age: 73
Discharge: HOME | End: 2023-02-02
Attending: NURSE PRACTITIONER
Payer: COMMERCIAL

## 2023-02-02 VITALS
BODY MASS INDEX: 36.45 KG/M2 | DIASTOLIC BLOOD PRESSURE: 76 MMHG | WEIGHT: 315 LBS | HEART RATE: 69 BPM | HEIGHT: 78 IN | SYSTOLIC BLOOD PRESSURE: 140 MMHG | OXYGEN SATURATION: 98 %

## 2023-02-02 DIAGNOSIS — I24.89 DEMAND ISCHEMIA (H): ICD-10-CM

## 2023-02-02 LAB
CV BLOOD PRESSURE: 44 MMHG
CV STRESS MAX HR HE: 74
RATE PRESSURE PRODUCT: 9176
STRESS ECHO BASELINE DIASTOLIC HE: 76
STRESS ECHO BASELINE HR: 69 BPM
STRESS ECHO BASELINE SYSTOLIC BP: 140
STRESS ECHO CALCULATED PERCENT HR: 49 %
STRESS ECHO LAST STRESS DIASTOLIC BP: 72
STRESS ECHO LAST STRESS SYSTOLIC BP: 124
STRESS ECHO TARGET HR: 150

## 2023-02-02 PROCEDURE — 93017 CV STRESS TEST TRACING ONLY: CPT

## 2023-02-02 PROCEDURE — 250N000011 HC RX IP 250 OP 636: Performed by: NURSE PRACTITIONER

## 2023-02-02 PROCEDURE — 343N000001 HC RX 343: Performed by: NURSE PRACTITIONER

## 2023-02-02 PROCEDURE — 78452 HT MUSCLE IMAGE SPECT MULT: CPT | Mod: 26 | Performed by: INTERNAL MEDICINE

## 2023-02-02 PROCEDURE — 93018 CV STRESS TEST I&R ONLY: CPT | Performed by: INTERNAL MEDICINE

## 2023-02-02 PROCEDURE — 93016 CV STRESS TEST SUPVJ ONLY: CPT | Performed by: INTERNAL MEDICINE

## 2023-02-02 PROCEDURE — A9502 TC99M TETROFOSMIN: HCPCS | Performed by: NURSE PRACTITIONER

## 2023-02-02 RX ORDER — ACYCLOVIR 200 MG/1
0-1 CAPSULE ORAL
Status: DISCONTINUED | OUTPATIENT
Start: 2023-02-02 | End: 2023-02-03 | Stop reason: HOSPADM

## 2023-02-02 RX ORDER — CAFFEINE CITRATE 20 MG/ML
60 SOLUTION INTRAVENOUS
Status: DISCONTINUED | OUTPATIENT
Start: 2023-02-02 | End: 2023-02-03 | Stop reason: HOSPADM

## 2023-02-02 RX ORDER — REGADENOSON 0.08 MG/ML
0.4 INJECTION, SOLUTION INTRAVENOUS ONCE
Status: COMPLETED | OUTPATIENT
Start: 2023-02-02 | End: 2023-02-02

## 2023-02-02 RX ORDER — AMINOPHYLLINE 25 MG/ML
50-100 INJECTION, SOLUTION INTRAVENOUS
Status: DISCONTINUED | OUTPATIENT
Start: 2023-02-02 | End: 2023-02-03 | Stop reason: HOSPADM

## 2023-02-02 RX ORDER — ALBUTEROL SULFATE 90 UG/1
2 AEROSOL, METERED RESPIRATORY (INHALATION) EVERY 5 MIN PRN
Status: DISCONTINUED | OUTPATIENT
Start: 2023-02-02 | End: 2023-02-03 | Stop reason: HOSPADM

## 2023-02-02 RX ADMIN — REGADENOSON 0.4 MG: 0.08 INJECTION, SOLUTION INTRAVENOUS at 11:44

## 2023-02-02 RX ADMIN — TETROFOSMIN 30.2 MCI.: 1.38 INJECTION, POWDER, LYOPHILIZED, FOR SOLUTION INTRAVENOUS at 11:20

## 2023-02-02 RX ADMIN — TETROFOSMIN 11.58 MCI.: 1.38 INJECTION, POWDER, LYOPHILIZED, FOR SOLUTION INTRAVENOUS at 09:44

## 2023-02-06 NOTE — RESULT ENCOUNTER NOTE
Results reviewed, please let the patient know that overall findings suggest a possible blockage in the RCA territory, we can discuss cath at our follow up in a few days, until then recommend he avoid strenuous physical activity and go to ED if any chest pain thanks!

## 2023-02-14 ENCOUNTER — OFFICE VISIT (OUTPATIENT)
Dept: CARDIOLOGY | Facility: CLINIC | Age: 73
End: 2023-02-14
Payer: COMMERCIAL

## 2023-02-14 ENCOUNTER — LAB (OUTPATIENT)
Dept: LAB | Facility: CLINIC | Age: 73
End: 2023-02-14
Payer: COMMERCIAL

## 2023-02-14 VITALS
WEIGHT: 315 LBS | HEIGHT: 78 IN | HEART RATE: 77 BPM | DIASTOLIC BLOOD PRESSURE: 78 MMHG | OXYGEN SATURATION: 96 % | SYSTOLIC BLOOD PRESSURE: 126 MMHG | BODY MASS INDEX: 36.45 KG/M2

## 2023-02-14 DIAGNOSIS — I50.22 CHRONIC HFREF (HEART FAILURE WITH REDUCED EJECTION FRACTION) (H): ICD-10-CM

## 2023-02-14 DIAGNOSIS — E66.01 MORBID OBESITY (H): ICD-10-CM

## 2023-02-14 DIAGNOSIS — I24.89 DEMAND ISCHEMIA (H): ICD-10-CM

## 2023-02-14 DIAGNOSIS — I10 ESSENTIAL HYPERTENSION: ICD-10-CM

## 2023-02-14 DIAGNOSIS — R94.39 ABNORMAL CARDIOVASCULAR STRESS TEST: Primary | ICD-10-CM

## 2023-02-14 LAB
ANION GAP SERPL CALCULATED.3IONS-SCNC: 11 MMOL/L (ref 7–15)
BUN SERPL-MCNC: 11.3 MG/DL (ref 8–23)
CALCIUM SERPL-MCNC: 8.7 MG/DL (ref 8.8–10.2)
CHLORIDE SERPL-SCNC: 106 MMOL/L (ref 98–107)
CREAT SERPL-MCNC: 0.75 MG/DL (ref 0.67–1.17)
DEPRECATED HCO3 PLAS-SCNC: 26 MMOL/L (ref 22–29)
GFR SERPL CREATININE-BSD FRML MDRD: >90 ML/MIN/1.73M2
GLUCOSE SERPL-MCNC: 106 MG/DL (ref 70–99)
MAGNESIUM SERPL-MCNC: 1.7 MG/DL (ref 1.7–2.3)
POTASSIUM SERPL-SCNC: 4 MMOL/L (ref 3.4–5.3)
SODIUM SERPL-SCNC: 143 MMOL/L (ref 136–145)

## 2023-02-14 PROCEDURE — 80048 BASIC METABOLIC PNL TOTAL CA: CPT | Performed by: NURSE PRACTITIONER

## 2023-02-14 PROCEDURE — 83735 ASSAY OF MAGNESIUM: CPT | Performed by: INTERNAL MEDICINE

## 2023-02-14 PROCEDURE — 99215 OFFICE O/P EST HI 40 MIN: CPT | Performed by: INTERNAL MEDICINE

## 2023-02-14 PROCEDURE — 36415 COLL VENOUS BLD VENIPUNCTURE: CPT | Performed by: NURSE PRACTITIONER

## 2023-02-14 RX ORDER — NITROGLYCERIN 0.4 MG/1
TABLET SUBLINGUAL
Qty: 25 TABLET | Refills: 3 | Status: SHIPPED | OUTPATIENT
Start: 2023-02-14

## 2023-02-14 RX ORDER — ISOSORBIDE MONONITRATE 30 MG/1
45 TABLET, EXTENDED RELEASE ORAL DAILY
Qty: 90 TABLET | Refills: 3 | Status: SHIPPED | OUTPATIENT
Start: 2023-02-14 | End: 2023-08-28

## 2023-02-14 RX ORDER — ISOSORBIDE MONONITRATE 30 MG/1
30 TABLET, EXTENDED RELEASE ORAL DAILY
Qty: 90 TABLET | Refills: 3 | Status: SHIPPED | OUTPATIENT
Start: 2023-02-14 | End: 2023-02-14

## 2023-02-14 RX ORDER — METOPROLOL SUCCINATE 50 MG/1
50 TABLET, EXTENDED RELEASE ORAL 2 TIMES DAILY
Qty: 180 TABLET | Refills: 3 | Status: SHIPPED | OUTPATIENT
Start: 2023-02-14 | End: 2023-04-28

## 2023-02-14 NOTE — PATIENT INSTRUCTIONS
February 14, 2023    Thank you for allowing our Cardiology team to participate in your care.     Please note the following changes to your heart treatment plan:     Medication changes:   - increase Imdur 30mg daily to 45 mg daily (1 and 1/2 tablets)    Tests to be done:  - cardiac catheterization/coronary angiogram and possible intervention    Follow up:  - Follow up in about 2-4 weeks with cardiology SONNY, or sooner as needed.      For scheduling, please call [phone].    Please contact our team at [phone] (Geovanna INFANTE) or [phone] for any questions or concerns.     If you are having a medical emergency, please call [phone].     Sincerely,    Kevin Garcia MD, FACC  Cardiology    [facility] [place]

## 2023-02-14 NOTE — LETTER
2/14/2023    Luis Alberto Aponte MD  [facility] [address]   [place] [address]    RE: Federico Chamberlain       Dear Colleague,     I had the pleasure of seeing Federico Chamberlain in [facility] [place] [facility].      Cardiology Clinic Progress Note:    February 14, 2023   Patient Name: Federico Chamberlain  Patient MRN: [id number]     Consult indication: CAD    HPI:    I had the opportunity to see patient Federico Chamberlain in cardiology clinic for a follow up visit. Patient is followed by our colleague Luis Alberto Aponte MD with Primary Care.     Patient is a pleasant 72-year-old male with a past medical history significant for hypertension, hyperlipidemia, diabetes, prolonged QTc, right bundle branch block, left anterior fascicular block, who was recently hospitalized 12/2022 with severe sepsis and bacteremia, found to have rhabdomyolysis secondary to frostbite.    During hospitalization, TTE 12/20/2022 demonstrated LVEF 50% with septal, inferior, lateral hypokinesis.  Troponin was mildly elevated.  Given overall clinical presentation, including bacteremia and rhabdomyolysis, ischemic evaluation was deferred at that time.  Fortunately patient was absent of any chest pain/chest pressure.    Since then, patient reports he has been recovering reasonably well, though does still have some erythema and warmth in his right foot, which has improved from before.  He denies any fevers, chills.  CBC 1/31/2013 demonstrated normalization of leukocytosis, BMP demonstrated normal renal function.  BMP today in clinic demonstrates normal renal function, normal potassium.  Cardiac event monitor 1/2023 demonstrated sinus rhythm with IVCD, intermittent first-degree block, PVCs.  No high-grade AV block.      He was recently seen by my colleague Bobbi Cabello NP, a nuclear stress test was done 2/2/2023 which demonstrates a medium sized area of mild ischemia in the basal to mid inferior inferolateral segments.  Patient does note some  occasional chest discomfort, however this is not exertional nature, and he questions whether or not this may be related to anxiety.    Assessment and Plan/Recommendations:    # Mild ischemic cardiomyopathy, LVEF 50% with inferior/lateral hypokinesis, Lexiscan stress test 2/2/2023 demonstrating ischemia in the inferior inferolateral wall segments.  Diagnosed incidentally in the setting of demand ischemia from severe sepsis/bacteremia and rhabdomyolysis from frostbite cellulitis.  # RBBB, LAFB.  No high-grade AV block on cardiac event monitor.  # Prolonged QTc     Patient has recovered reasonably well from his hospitalization, though still plans to follow-up with his PCP regarding concerns for residual cellulitis of the right foot.      - Reviewed findings of the nuclear stress test demonstrating medium sized area of ischemia in the inferior and inferolateral wall segments.  Recommend cardiac catheterization/coronary angiography and possible intervention.  - Will increase Imdur to 45 mg daily, sublingual nitroglycerin as needed, cautioned on drug interactions  - Continue aspirin, atorvastatin, chlorthalidone, metoprolol succinate  - Patient was previously on lisinopril 40 mg daily, this is decreased to 10 mg daily during recent hospitalization due to rhabdomyolysis and renal dysfunction, he was started on hydralazine and Imdur at that time as a substitute, following cardiac catheterization we will plan to change this back to lisinopril 40 mg daily  - Advised to seek medical care if he develops any severe chest discomfort/pain, abnormal shortness of breath, or other symptoms that are concerning for him in the interim  - Follow-up with cardiology SONNY in about 2 weeks, or sooner as needed    Thank you for allowing our team to participate in the care of Federico Chamberlain.  Please do not hesitate to call or page me with any questions or concerns.    Sincerely,     Kevin Garcia MD, [facility]  Cardiology  Text Page    February 14, 2023    Voice recognition software utilized.     Total time spent on this encounter: 50 minutes, providing care in this encounter including, but not limited to, reviewing prior medical records, laboratory data, imaging studies, diagnostic studies, procedure notes, formulating an assessment and plan, recommendations, discussion and counseling with patient face to face, dictation.    Past Medical History:   The ASCVD Risk score (Maria Antonia YAÑEZ, et al., 2019) failed to calculate for the following reasons:    The patient has a prior MI or stroke diagnosis  Past Medical History:   Diagnosis Date     Hypercholesterolemia      Hypertension         Past Surgical History:   Past Surgical History:   Procedure Laterality Date     APPENDECTOMY       BACK SURGERY       ORTHOPEDIC SURGERY         Medications (outpatient):  Current Outpatient Medications   Medication Sig Dispense Refill     acetaminophen (TYLENOL) 325 MG tablet Take 3 tablets (975 mg) by mouth 3 times daily       aspirin 81 MG EC tablet Take 81 mg by mouth daily       atorvastatin (LIPITOR) 40 MG tablet Take 40 mg by mouth daily       chlorthalidone (HYGROTON) 25 MG tablet Take 25 mg by mouth daily       cyclobenzaprine (FLEXERIL) 5 MG tablet Take 5 mg by mouth 3 times daily as needed for muscle spasms       diclofenac (VOLTAREN) 1 % topical gel Apply topically daily as needed for moderate pain (4-6)       ferrous sulfate (FEROSUL) 325 (65 Fe) MG tablet Take 325 mg by mouth daily (with breakfast)       gabapentin (NEURONTIN) 800 MG tablet Take 800 mg by mouth 3 times daily       hydrALAZINE (APRESOLINE) 25 MG tablet Take 1 tablet (25 mg) by mouth 3 times daily 45 tablet 2     isosorbide mononitrate (IMDUR) 30 MG 24 hr tablet Take 1.5 tablets (45 mg) by mouth daily 90 tablet 3     Lidocaine (LIDOCARE) 4 % Patch Place 1-2 patches onto the skin every 24 hours To prevent lidocaine toxicity, patient should be patch free for 12 hrs daily.       lisinopril  "(ZESTRIL) 10 MG tablet Take 1 tablet (10 mg) by mouth daily 30 tablet 2     metFORMIN (GLUCOPHAGE) 500 MG tablet Take 500 mg by mouth daily       metoprolol succinate ER (TOPROL XL) 50 MG 24 hr tablet Take 1 tablet (50 mg) by mouth 2 times daily 180 tablet 3     multivitamin w/minerals (THERA-VIT-M) tablet Take 1 tablet by mouth At Bedtime       nitroGLYcerin (NITROSTAT) 0.4 MG sublingual tablet For chest pain place 1 tablet under the tongue every 5 minutes for 3 doses. If symptoms persist 5 minutes after 1st dose call 911. 25 tablet 3     OXYCODONE-ACETAMINOPHEN PO Take by mouth daily as needed       potassium chloride ER (KLOR-CON M) 10 MEQ CR tablet Take 20 mEq by mouth daily       Pramipexole Dihydrochloride (MIRAPEX PO) Take 3 mg by mouth At Bedtime       semaglutide (OZEMPIC, 1 MG/DOSE,) 2 MG/1.5ML pen Inject 0.5 mg Subcutaneous every 7 days On Fridays       senna-docusate (SENOKOT-S/PERICOLACE) 8.6-50 MG tablet Take 1 tablet by mouth 2 times daily as needed for constipation       silver sulfADIAZINE (SILVADENE) 1 % external cream Apply topically daily as needed       VITAMIN D, CHOLECALCIFEROL, PO Take 4,000 Units by mouth daily         Allergies:  No Known Allergies    Social History:   History   Drug Use No      History   Smoking Status     Never   Smokeless Tobacco     Never     Social History    Substance and Sexual Activity      Alcohol use: No       Family History:  No family history on file.    Review of Systems:   A complete review of systems was negative except as mentioned in the History of Present Illness.     Objective & Physical Exam:  /78 (BP Location: Right arm, Patient Position: Sitting, Cuff Size: Adult Regular)   Pulse 77   Ht 1.981 m (6' 6\")   Wt 147 kg (324 lb 1.6 oz)   SpO2 96%   BMI 37.45 kg/m    Wt Readings from Last 2 Encounters:   02/14/23 147 kg (324 lb 1.6 oz)   02/02/23 145.6 kg (321 lb)     Body mass index is 37.45 kg/m .   Body surface area is 2.84 meters " squared.    Constitutional: appears stated age, in no apparent distress, appears to be well nourished  Head: normocephalic, atraumatic  Neck: supple, trachea midline   Pulmonary: clear to auscultation bilaterally, no wheezes, no rales, no increased work of breathing  Cardiovascular: JVP normal, regular rate, regular rhythm, normal S1 and S2, no S3, S4, no murmur appreciated, no lower extremity edema  Gastrointestinal: no guarding, non-rigid   Neurologic: awake, alert, moves all extremities  Skin: no jaundice, warm on limited exam  Psychiatric: affect is normal, answers questions appropriately, oriented to self and place    Data reviewed:  Lab Results   Component Value Date    WBC 8.1 01/24/2023    WBC 8.1 08/04/2020    RBC 4.87 01/24/2023    RBC 5.04 08/04/2020    HGB 13.8 01/24/2023    HGB 14.6 08/04/2020    HCT 43.4 01/24/2023    HCT 44.8 08/04/2020    MCV 89 01/24/2023    MCV 89 08/04/2020    MCH 28.3 01/24/2023    MCH 29.0 08/04/2020    MCHC 31.8 01/24/2023    MCHC 32.6 08/04/2020    RDW 14.5 01/24/2023    RDW 13.8 08/04/2020     01/24/2023     08/04/2020     Sodium   Date Value Ref Range Status   02/14/2023 143 136 - 145 mmol/L Final   08/04/2020 143 133 - 144 mmol/L Final     Potassium   Date Value Ref Range Status   02/14/2023 4.0 3.4 - 5.3 mmol/L Final   01/16/2023 4.3 3.4 - 5.3 mmol/L Final   08/04/2020 3.2 (L) 3.4 - 5.3 mmol/L Final     Chloride   Date Value Ref Range Status   02/14/2023 106 98 - 107 mmol/L Final   01/16/2023 106 94 - 109 mmol/L Final   08/04/2020 108 94 - 109 mmol/L Final     Carbon Dioxide   Date Value Ref Range Status   08/04/2020 28 20 - 32 mmol/L Final     Carbon Dioxide (CO2)   Date Value Ref Range Status   02/14/2023 26 22 - 29 mmol/L Final   01/16/2023 30 20 - 32 mmol/L Final     Anion Gap   Date Value Ref Range Status   02/14/2023 11 7 - 15 mmol/L Final   01/16/2023 6 3 - 14 mmol/L Final   08/04/2020 7 3 - 14 mmol/L Final     Glucose   Date Value Ref Range Status    02/14/2023 106 (H) 70 - 99 mg/dL Final   01/16/2023 89 70 - 99 mg/dL Final   08/04/2020 106 (H) 70 - 99 mg/dL Final     Urea Nitrogen   Date Value Ref Range Status   02/14/2023 11.3 8.0 - 23.0 mg/dL Final   01/16/2023 13 7 - 30 mg/dL Final   08/04/2020 18 7 - 30 mg/dL Final     Creatinine   Date Value Ref Range Status   02/14/2023 0.75 0.67 - 1.17 mg/dL Final   08/04/2020 0.92 0.66 - 1.25 mg/dL Final     GFR Estimate   Date Value Ref Range Status   02/14/2023 >90 >60 mL/min/1.73m2 Final     Comment:     eGFR calculated using 2021 CKD-EPI equation.   08/04/2020 85 >60 mL/min/[1.73_m2] Final     Comment:     Non  GFR Calc  Starting 12/18/2018, serum creatinine based estimated GFR (eGFR) will be   calculated using the Chronic Kidney Disease Epidemiology Collaboration   (CKD-EPI) equation.       GFR, ESTIMATED POCT   Date Value Ref Range Status   12/31/2022 >60 >60 mL/min/1.73m2 Final     Calcium   Date Value Ref Range Status   02/14/2023 8.7 (L) 8.8 - 10.2 mg/dL Final   08/04/2020 8.6 8.5 - 10.1 mg/dL Final     Bilirubin Total   Date Value Ref Range Status   12/31/2022 1.1 <=1.2 mg/dL Final   07/02/2018 0.6 0.2 - 1.3 mg/dL Final     Alkaline Phosphatase   Date Value Ref Range Status   12/31/2022 81 40 - 129 U/L Final   07/02/2018 66 40 - 150 U/L Final     ALT   Date Value Ref Range Status   12/31/2022 35 10 - 50 U/L Final   07/02/2018 23 0 - 70 U/L Final     AST   Date Value Ref Range Status   01/10/2023 27 0 - 45 U/L Final   07/02/2018 15 0 - 45 U/L Final     Recent Labs   Lab Test 07/02/18  0636   CHOL 92   HDL 32*   LDL <1   TRIG 297*      Lab Results   Component Value Date    A1C 6.0 12/31/2022    A1C 6.0 07/08/2018    A1C 6.1 07/01/2018        Recent Results (from the past 4320 hour(s))   Echocardiogram Limited   Result Value    LVEF  50-55%    [facility]    [id number]  [id number]  [id number]^SALLY^JAIDA^[facility]  Echocardiography Laboratory  [address]  [place] [address]     Name: AMADEO HAYNES  MRN: [id number]  : 1950  Study Date: 2023 07:10 AM  Age: 72 yrs  Gender: Male  Patient Location: [facility]  Reason For Study: Chest Pain  Ordering Physician: JAIDA ZAVALA  Referring Physician: Luis Alberto Aponte  Performed By: Brandy Russ RDCS     BSA: 2.7 m2  Height: 77 in  Weight: 316 lb  HR: 72  BP: 149/91 mmHg  ______________________________________________________________________________  ______________________________________________________________________________  Interpretation Summary     Technically difficult imaging  Left ventricular systolic function is low normal.  The visual ejection fraction is 50-55%.  Septal motion is consistent with conduction abnormality.  The left ventricle is normal in size.  The right ventricle is normal in structure, function and size.  The pericardium appears normal.  The rhythm was sinus with wide QRS.     Limited imgates were obtained reportedly dute to the patient's limited ablilty  to move from a supine positiion. Grossly there appears to be no change since  2022  ______________________________________________________________________________  Left Ventricle  The left ventricle is normal in size. There is normal left ventricular wall  thickness. Left ventricular systolic function is low normal. The visual  ejection fraction is 50-55%. Septal motion is consistent with conduction  abnormality. There is no thrombus seen in the left ventricle.     Right Ventricle  The right ventricle is normal in structure, function and size. There is no  mass or thrombus in the right ventricle.     Atria  Normal left atrial size. Right atrial size is normal. There is no atrial shunt  seen. The left atrial appendage is not well visualized.     Mitral Valve  The mitral valve leaflets appear normal. There is no evidence of stenosis,  fluttering, or prolapse. There is no evidence of mitral valve prolapse. There  is no  mitral regurgitation noted.     Tricuspid Valve  The tricuspid valve is not well visualized.     Aortic Valve  There is moderate trileaflet aortic sclerosis. No aortic regurgitation is  present.     Pulmonic Valve  The pulmonic valve is not well visualized. There is trace pulmonic valvular  regurgitation.     Vessels  Mild aortic root dilatation. Inferior vena cava not well visualized for  estimation of right atrial pressure.     Pericardium  The pericardium appears normal. There is no pleural effusion.     Rhythm  The rhythm was sinus with wide QRS.  ______________________________________________________________________________  Report approved by: Dr. Mg Vyas 2023 11:16 AM     ______________________________________________________________________________      Echocardiogram Complete   Result Value    LVEF  50-55%    Narrative    [id number]  [id number]  [id number]  [id number]^WANDER^ARMANDO^JACE     [facility]  Echocardiography Laboratory  [address] [place] [address]     Name: AMADEO HAYNES  MRN: [id number]  : 1950  Study Date: 2022 10:26 AM  Age: 72 yrs  Gender: Male  Patient Location: [facility]  Reason For Study: Syncope  Ordering Physician: ARMANDO VIRAMONTES  Performed By: Julianne Stanley     BSA: 2.7 m2  Height: 77 in  Weight: 319 lb  ______________________________________________________________________________  Procedure  Complete Portable Echo Adult. Optison (NDC #2423-3851) given intravenously.  ______________________________________________________________________________  Interpretation Summary     The visual ejection fraction is 50-55%.  There are regional wall motion abnormalities as specified.  Mild aortic root dilatation.  The ascending aorta is Moderately dilated.  The study was technically difficult.  ______________________________________________________________________________  Left Ventricle  The left ventricle is normal in size. There is  normal left ventricular wall  thickness. Diastolic Doppler findings (E/E' ratio and/or other parameters)  suggest left ventricular filling pressures are indeterminate. Grade I or early  diastolic dysfunction. The visual ejection fraction is 50-55%. Septal motion  is consistent with conduction abnormality. The mid anterolateral and mid  inferolateral walls appear modeartely hypokinetic. There are regional wall  motion abnormalities as specified.     Right Ventricle  The right ventricle is normal in size and function.     Atria  Normal left atrial size. Right atrial size is normal. There is no color  Doppler evidence of an atrial shunt.     Mitral Valve  There is mild (1+) mitral regurgitation.     Tricuspid Valve  There is trace tricuspid regurgitation. Right ventricular systolic pressure  could not be approximated due to inadequate tricuspid regurgitation.     Aortic Valve  The aortic valve is trileaflet. There is moderate trileaflet aortic sclerosis.  No aortic regurgitation is present. No hemodynamically significant valvular  aortic stenosis.     Pulmonic Valve  There is no pulmonic valvular regurgitation. Normal pulmonic valve velocity.     Vessels  Mild aortic root dilatation. The ascending aorta is Moderately dilated. (4.7  cm). IVC diameter and respiratory changes fall into an intermediate range  suggesting an RA pressure of 8 mmHg.     Pericardium  There is no pericardial effusion.     Rhythm  Sinus rhythm was noted.  ______________________________________________________________________________  MMode/2D Measurements & Calculations  IVSd: 1.1 cm  LVIDd: 5.3 cm  LVIDs: 3.8 cm  LVPWd: 0.95 cm  FS: 28.7 %  LV mass(C)d: 215.0 grams  LV mass(C)dI: 78.8 grams/m2  Ao root diam: 4.1 cm  LVOT diam: 2.6 cm  LVOT area: 5.3 cm2  LA Volume (BP): 60.5 ml  LA Volume Index (BP): 22.2 ml/m2     RWT: 0.36     Doppler Measurements & Calculations  MV E max kvng: 60.8 cm/sec  MV A max kvng: 76.3 cm/sec  MV E/A: 0.80  MV dec slope:  226.0 cm/sec2  MV dec time: 0.27 sec  PA acc time: 0.15 sec  E/E' av.7  Lateral E/e': 9.1  Medial E/e': 10.3     ______________________________________________________________________________  Report approved by: Roland Puckett 2022 10:29 AM              Thank you for allowing me to participate in the care of your patient.      Sincerely,     Kevin Garcia MD     [facility]  cc:   Kevin Garcia MD  [address], [address]  [place] [address]

## 2023-02-14 NOTE — PROGRESS NOTES
Cardiology Clinic Progress Note:    February 14, 2023   Patient Name: Federico Chamberlain  Patient MRN: [id number]     Consult indication: CAD    HPI:    I had the opportunity to see patient Federico Chamberlain in cardiology clinic for a follow up visit. Patient is followed by our colleague Luis Alberto Aponte MD with Primary Care.     Patient is a pleasant 72-year-old male with a past medical history significant for hypertension, hyperlipidemia, diabetes, prolonged QTc, right bundle branch block, left anterior fascicular block, who was recently hospitalized 12/2022 with severe sepsis and bacteremia, found to have rhabdomyolysis secondary to frostbite.    During hospitalization, TTE 12/20/2022 demonstrated LVEF 50% with septal, inferior, lateral hypokinesis.  Troponin was mildly elevated.  Given overall clinical presentation, including bacteremia and rhabdomyolysis, ischemic evaluation was deferred at that time.  Fortunately patient was absent of any chest pain/chest pressure.    Since then, patient reports he has been recovering reasonably well, though does still have some erythema and warmth in his right foot, which has improved from before.  He denies any fevers, chills.  CBC 1/31/2013 demonstrated normalization of leukocytosis, BMP demonstrated normal renal function.  BMP today in clinic demonstrates normal renal function, normal potassium.  Cardiac event monitor 1/2023 demonstrated sinus rhythm with IVCD, intermittent first-degree block, PVCs.  No high-grade AV block.      He was recently seen by my colleague Bobbi Cabello NP, a nuclear stress test was done 2/2/2023 which demonstrates a medium sized area of mild ischemia in the basal to mid inferior inferolateral segments.  Patient does note some occasional chest discomfort, however this is not exertional nature, and he questions whether or not this may be related to anxiety.    Assessment and Plan/Recommendations:    # Mild ischemic cardiomyopathy, LVEF 50% with  inferior/lateral hypokinesis, Lexiscan stress test 2/2/2023 demonstrating ischemia in the inferior inferolateral wall segments.  Diagnosed incidentally in the setting of demand ischemia from severe sepsis/bacteremia and rhabdomyolysis from frostbite cellulitis.  # RBBB, LAFB.  No high-grade AV block on cardiac event monitor.  # Prolonged QTc     Patient has recovered reasonably well from his hospitalization, though still plans to follow-up with his PCP regarding concerns for residual cellulitis of the right foot.      - Reviewed findings of the nuclear stress test demonstrating medium sized area of ischemia in the inferior and inferolateral wall segments.  Recommend cardiac catheterization/coronary angiography and possible intervention.  - Will increase Imdur to 45 mg daily, sublingual nitroglycerin as needed, cautioned on drug interactions  - Continue aspirin, atorvastatin, chlorthalidone, metoprolol succinate  - Patient was previously on lisinopril 40 mg daily, this is decreased to 10 mg daily during recent hospitalization due to rhabdomyolysis and renal dysfunction, he was started on hydralazine and Imdur at that time as a substitute, following cardiac catheterization we will plan to change this back to lisinopril 40 mg daily  - Advised to seek medical care if he develops any severe chest discomfort/pain, abnormal shortness of breath, or other symptoms that are concerning for him in the interim  - Follow-up with cardiology SONNY in about 2 weeks, or sooner as needed    Thank you for allowing our team to participate in the care of Federico Chamberlain.  Please do not hesitate to call or page me with any questions or concerns.    Sincerely,     Kevin Garcia MD, [facility]  Cardiology  Text Page   February 14, 2023    Voice recognition software utilized.     Total time spent on this encounter: 50 minutes, providing care in this encounter including, but not limited to, reviewing prior medical records, laboratory  data, imaging studies, diagnostic studies, procedure notes, formulating an assessment and plan, recommendations, discussion and counseling with patient face to face, dictation.    Past Medical History:   The ASCVD Risk score (Maria Antonia YAÑEZ, et al., 2019) failed to calculate for the following reasons:    The patient has a prior MI or stroke diagnosis  Past Medical History:   Diagnosis Date     Hypercholesterolemia      Hypertension         Past Surgical History:   Past Surgical History:   Procedure Laterality Date     APPENDECTOMY       BACK SURGERY       ORTHOPEDIC SURGERY         Medications (outpatient):  Current Outpatient Medications   Medication Sig Dispense Refill     acetaminophen (TYLENOL) 325 MG tablet Take 3 tablets (975 mg) by mouth 3 times daily       aspirin 81 MG EC tablet Take 81 mg by mouth daily       atorvastatin (LIPITOR) 40 MG tablet Take 40 mg by mouth daily       chlorthalidone (HYGROTON) 25 MG tablet Take 25 mg by mouth daily       cyclobenzaprine (FLEXERIL) 5 MG tablet Take 5 mg by mouth 3 times daily as needed for muscle spasms       diclofenac (VOLTAREN) 1 % topical gel Apply topically daily as needed for moderate pain (4-6)       ferrous sulfate (FEROSUL) 325 (65 Fe) MG tablet Take 325 mg by mouth daily (with breakfast)       gabapentin (NEURONTIN) 800 MG tablet Take 800 mg by mouth 3 times daily       hydrALAZINE (APRESOLINE) 25 MG tablet Take 1 tablet (25 mg) by mouth 3 times daily 45 tablet 2     isosorbide mononitrate (IMDUR) 30 MG 24 hr tablet Take 1.5 tablets (45 mg) by mouth daily 90 tablet 3     Lidocaine (LIDOCARE) 4 % Patch Place 1-2 patches onto the skin every 24 hours To prevent lidocaine toxicity, patient should be patch free for 12 hrs daily.       lisinopril (ZESTRIL) 10 MG tablet Take 1 tablet (10 mg) by mouth daily 30 tablet 2     metFORMIN (GLUCOPHAGE) 500 MG tablet Take 500 mg by mouth daily       metoprolol succinate ER (TOPROL XL) 50 MG 24 hr tablet Take 1 tablet (50 mg)  "by mouth 2 times daily 180 tablet 3     multivitamin w/minerals (THERA-VIT-M) tablet Take 1 tablet by mouth At Bedtime       nitroGLYcerin (NITROSTAT) 0.4 MG sublingual tablet For chest pain place 1 tablet under the tongue every 5 minutes for 3 doses. If symptoms persist 5 minutes after 1st dose call 911. 25 tablet 3     OXYCODONE-ACETAMINOPHEN PO Take by mouth daily as needed       potassium chloride ER (KLOR-CON M) 10 MEQ CR tablet Take 20 mEq by mouth daily       Pramipexole Dihydrochloride (MIRAPEX PO) Take 3 mg by mouth At Bedtime       semaglutide (OZEMPIC, 1 MG/DOSE,) 2 MG/1.5ML pen Inject 0.5 mg Subcutaneous every 7 days On Fridays       senna-docusate (SENOKOT-S/PERICOLACE) 8.6-50 MG tablet Take 1 tablet by mouth 2 times daily as needed for constipation       silver sulfADIAZINE (SILVADENE) 1 % external cream Apply topically daily as needed       VITAMIN D, CHOLECALCIFEROL, PO Take 4,000 Units by mouth daily         Allergies:  No Known Allergies    Social History:   History   Drug Use No      History   Smoking Status     Never   Smokeless Tobacco     Never     Social History    Substance and Sexual Activity      Alcohol use: No       Family History:  No family history on file.    Review of Systems:   A complete review of systems was negative except as mentioned in the History of Present Illness.     Objective & Physical Exam:  /78 (BP Location: Right arm, Patient Position: Sitting, Cuff Size: Adult Regular)   Pulse 77   Ht 1.981 m (6' 6\")   Wt 147 kg (324 lb 1.6 oz)   SpO2 96%   BMI 37.45 kg/m    Wt Readings from Last 2 Encounters:   02/14/23 147 kg (324 lb 1.6 oz)   02/02/23 145.6 kg (321 lb)     Body mass index is 37.45 kg/m .   Body surface area is 2.84 meters squared.    Constitutional: appears stated age, in no apparent distress, appears to be well nourished  Head: normocephalic, atraumatic  Neck: supple, trachea midline   Pulmonary: clear to auscultation bilaterally, no wheezes, no rales, " no increased work of breathing  Cardiovascular: JVP normal, regular rate, regular rhythm, normal S1 and S2, no S3, S4, no murmur appreciated, no lower extremity edema  Gastrointestinal: no guarding, non-rigid   Neurologic: awake, alert, moves all extremities  Skin: no jaundice, warm on limited exam  Psychiatric: affect is normal, answers questions appropriately, oriented to self and place    Data reviewed:  Lab Results   Component Value Date    WBC 8.1 01/24/2023    WBC 8.1 08/04/2020    RBC 4.87 01/24/2023    RBC 5.04 08/04/2020    HGB 13.8 01/24/2023    HGB 14.6 08/04/2020    HCT 43.4 01/24/2023    HCT 44.8 08/04/2020    MCV 89 01/24/2023    MCV 89 08/04/2020    MCH 28.3 01/24/2023    MCH 29.0 08/04/2020    MCHC 31.8 01/24/2023    MCHC 32.6 08/04/2020    RDW 14.5 01/24/2023    RDW 13.8 08/04/2020     01/24/2023     08/04/2020     Sodium   Date Value Ref Range Status   02/14/2023 143 136 - 145 mmol/L Final   08/04/2020 143 133 - 144 mmol/L Final     Potassium   Date Value Ref Range Status   02/14/2023 4.0 3.4 - 5.3 mmol/L Final   01/16/2023 4.3 3.4 - 5.3 mmol/L Final   08/04/2020 3.2 (L) 3.4 - 5.3 mmol/L Final     Chloride   Date Value Ref Range Status   02/14/2023 106 98 - 107 mmol/L Final   01/16/2023 106 94 - 109 mmol/L Final   08/04/2020 108 94 - 109 mmol/L Final     Carbon Dioxide   Date Value Ref Range Status   08/04/2020 28 20 - 32 mmol/L Final     Carbon Dioxide (CO2)   Date Value Ref Range Status   02/14/2023 26 22 - 29 mmol/L Final   01/16/2023 30 20 - 32 mmol/L Final     Anion Gap   Date Value Ref Range Status   02/14/2023 11 7 - 15 mmol/L Final   01/16/2023 6 3 - 14 mmol/L Final   08/04/2020 7 3 - 14 mmol/L Final     Glucose   Date Value Ref Range Status   02/14/2023 106 (H) 70 - 99 mg/dL Final   01/16/2023 89 70 - 99 mg/dL Final   08/04/2020 106 (H) 70 - 99 mg/dL Final     Urea Nitrogen   Date Value Ref Range Status   02/14/2023 11.3 8.0 - 23.0 mg/dL Final   01/16/2023 13 7 - 30 mg/dL  Final   2020 18 7 - 30 mg/dL Final     Creatinine   Date Value Ref Range Status   2023 0.75 0.67 - 1.17 mg/dL Final   2020 0.92 0.66 - 1.25 mg/dL Final     GFR Estimate   Date Value Ref Range Status   2023 >90 >60 mL/min/1.73m2 Final     Comment:     eGFR calculated using  CKD-EPI equation.   2020 85 >60 mL/min/[1.73_m2] Final     Comment:     Non  GFR Calc  Starting 2018, serum creatinine based estimated GFR (eGFR) will be   calculated using the Chronic Kidney Disease Epidemiology Collaboration   (CKD-EPI) equation.       GFR, ESTIMATED POCT   Date Value Ref Range Status   2022 >60 >60 mL/min/1.73m2 Final     Calcium   Date Value Ref Range Status   2023 8.7 (L) 8.8 - 10.2 mg/dL Final   2020 8.6 8.5 - 10.1 mg/dL Final     Bilirubin Total   Date Value Ref Range Status   2022 1.1 <=1.2 mg/dL Final   2018 0.6 0.2 - 1.3 mg/dL Final     Alkaline Phosphatase   Date Value Ref Range Status   2022 81 40 - 129 U/L Final   2018 66 40 - 150 U/L Final     ALT   Date Value Ref Range Status   2022 35 10 - 50 U/L Final   2018 23 0 - 70 U/L Final     AST   Date Value Ref Range Status   01/10/2023 27 0 - 45 U/L Final   2018 15 0 - 45 U/L Final     Recent Labs   Lab Test 18  0636   CHOL 92   HDL 32*   LDL <1   TRIG 297*      Lab Results   Component Value Date    A1C 6.0 2022    A1C 6.0 2018    A1C 6.1 2018        Recent Results (from the past 4320 hour(s))   Echocardiogram Limited   Result Value    LVEF  50-55%    Narrative    [id number]  [id number]  [id number]  [id number]^SALLY^JAIDA^[facility]  Echocardiography Laboratory  [address] [place] [address]     Name: AMADEO HAYNES  MRN: [id number]  : 1950  Study Date: 2023 07:10 AM  Age: 72 yrs  Gender: Male  Patient Location: [facility]  Reason For Study: Chest Pain  Ordering Physician: JAIDA ZAVALA  MERYL  Referring Physician: Luis Alberto Aponte  Performed By: Brandy Russ RDCS     BSA: 2.7 m2  Height: 77 in  Weight: 316 lb  HR: 72  BP: 149/91 mmHg  ______________________________________________________________________________  ______________________________________________________________________________  Interpretation Summary     Technically difficult imaging  Left ventricular systolic function is low normal.  The visual ejection fraction is 50-55%.  Septal motion is consistent with conduction abnormality.  The left ventricle is normal in size.  The right ventricle is normal in structure, function and size.  The pericardium appears normal.  The rhythm was sinus with wide QRS.     Limited imgates were obtained reportedly dute to the patient's limited ablilty  to move from a supine positiion. Grossly there appears to be no change since  12/20/2022  ______________________________________________________________________________  Left Ventricle  The left ventricle is normal in size. There is normal left ventricular wall  thickness. Left ventricular systolic function is low normal. The visual  ejection fraction is 50-55%. Septal motion is consistent with conduction  abnormality. There is no thrombus seen in the left ventricle.     Right Ventricle  The right ventricle is normal in structure, function and size. There is no  mass or thrombus in the right ventricle.     Atria  Normal left atrial size. Right atrial size is normal. There is no atrial shunt  seen. The left atrial appendage is not well visualized.     Mitral Valve  The mitral valve leaflets appear normal. There is no evidence of stenosis,  fluttering, or prolapse. There is no evidence of mitral valve prolapse. There  is no mitral regurgitation noted.     Tricuspid Valve  The tricuspid valve is not well visualized.     Aortic Valve  There is moderate trileaflet aortic sclerosis. No aortic regurgitation is  present.     Pulmonic Valve  The pulmonic valve  is not well visualized. There is trace pulmonic valvular  regurgitation.     Vessels  Mild aortic root dilatation. Inferior vena cava not well visualized for  estimation of right atrial pressure.     Pericardium  The pericardium appears normal. There is no pleural effusion.     Rhythm  The rhythm was sinus with wide QRS.  ______________________________________________________________________________  Report approved by: Dr. Mg Vyas 2023 11:16 AM     ______________________________________________________________________________      Echocardiogram Complete   Result Value    LVEF  50-55%    Narrative    [id number]  [id number]  [id number]  [id number]^WANDER^ARMANDO^JACE     [facility]  Echocardiography Laboratory  [address] [place] [address]     Name: AMADEO HAYNES  MRN: [id number]  : 1950  Study Date: 2022 10:26 AM  Age: 72 yrs  Gender: Male  Patient Location: [facility]  Reason For Study: Syncope  Ordering Physician: ARMANDO VIRAMONTES  Performed By: Julianne Stanley     BSA: 2.7 m2  Height: 77 in  Weight: 319 lb  ______________________________________________________________________________  Procedure  Complete Portable Echo Adult. Optison (NDC #[id number]) given intravenously.  ______________________________________________________________________________  Interpretation Summary     The visual ejection fraction is 50-55%.  There are regional wall motion abnormalities as specified.  Mild aortic root dilatation.  The ascending aorta is Moderately dilated.  The study was technically difficult.  ______________________________________________________________________________  Left Ventricle  The left ventricle is normal in size. There is normal left ventricular wall  thickness. Diastolic Doppler findings (E/E' ratio and/or other parameters)  suggest left ventricular filling pressures are indeterminate. Grade I or early  diastolic dysfunction. The visual ejection fraction  is 50-55%. Septal motion  is consistent with conduction abnormality. The mid anterolateral and mid  inferolateral walls appear modeartely hypokinetic. There are regional wall  motion abnormalities as specified.     Right Ventricle  The right ventricle is normal in size and function.     Atria  Normal left atrial size. Right atrial size is normal. There is no color  Doppler evidence of an atrial shunt.     Mitral Valve  There is mild (1+) mitral regurgitation.     Tricuspid Valve  There is trace tricuspid regurgitation. Right ventricular systolic pressure  could not be approximated due to inadequate tricuspid regurgitation.     Aortic Valve  The aortic valve is trileaflet. There is moderate trileaflet aortic sclerosis.  No aortic regurgitation is present. No hemodynamically significant valvular  aortic stenosis.     Pulmonic Valve  There is no pulmonic valvular regurgitation. Normal pulmonic valve velocity.     Vessels  Mild aortic root dilatation. The ascending aorta is Moderately dilated. (4.7  cm). IVC diameter and respiratory changes fall into an intermediate range  suggesting an RA pressure of 8 mmHg.     Pericardium  There is no pericardial effusion.     Rhythm  Sinus rhythm was noted.  ______________________________________________________________________________  MMode/2D Measurements & Calculations  IVSd: 1.1 cm  LVIDd: 5.3 cm  LVIDs: 3.8 cm  LVPWd: 0.95 cm  FS: 28.7 %  LV mass(C)d: 215.0 grams  LV mass(C)dI: 78.8 grams/m2  Ao root diam: 4.1 cm  LVOT diam: 2.6 cm  LVOT area: 5.3 cm2  LA Volume (BP): 60.5 ml  LA Volume Index (BP): 22.2 ml/m2     RWT: 0.36     Doppler Measurements & Calculations  MV E max kvng: 60.8 cm/sec  MV A max kvng: 76.3 cm/sec  MV E/A: 0.80  MV dec slope: 226.0 cm/sec2  MV dec time: 0.27 sec  PA acc time: 0.15 sec  E/E' av.7  Lateral E/e': 9.1  Medial E/e': 10.3     ______________________________________________________________________________  Report approved by: Miguel Sahu  Roland 12/20/2022 10:29 AM

## 2023-02-15 NOTE — RESULT ENCOUNTER NOTE
Recommend starting mag oxide 400 daily and KCL 10meq daily due to prolonged QTc, also we should get an ECG at next follow up visit thanks!

## 2023-02-16 ENCOUNTER — TELEPHONE (OUTPATIENT)
Dept: CARDIOLOGY | Facility: CLINIC | Age: 73
End: 2023-02-16
Payer: COMMERCIAL

## 2023-02-16 DIAGNOSIS — I50.22 CHRONIC HFREF (HEART FAILURE WITH REDUCED EJECTION FRACTION) (H): ICD-10-CM

## 2023-02-16 DIAGNOSIS — R94.39 ABNORMAL CARDIOVASCULAR STRESS TEST: Primary | ICD-10-CM

## 2023-02-16 DIAGNOSIS — I24.89 DEMAND ISCHEMIA (H): Primary | ICD-10-CM

## 2023-02-16 RX ORDER — ASPIRIN 81 MG/1
243 TABLET, CHEWABLE ORAL ONCE
Status: CANCELLED | OUTPATIENT
Start: 2023-02-16

## 2023-02-16 RX ORDER — POTASSIUM CHLORIDE 1500 MG/1
20 TABLET, EXTENDED RELEASE ORAL
Status: CANCELLED | OUTPATIENT
Start: 2023-02-16

## 2023-02-16 RX ORDER — SODIUM CHLORIDE 9 MG/ML
INJECTION, SOLUTION INTRAVENOUS CONTINUOUS
Status: CANCELLED | OUTPATIENT
Start: 2023-02-16

## 2023-02-16 RX ORDER — LIDOCAINE 40 MG/G
CREAM TOPICAL
Status: CANCELLED | OUTPATIENT
Start: 2023-02-16

## 2023-02-16 RX ORDER — ASPIRIN 325 MG
325 TABLET ORAL ONCE
Status: CANCELLED | OUTPATIENT
Start: 2023-02-16 | End: 2023-02-16

## 2023-02-16 NOTE — TELEPHONE ENCOUNTER
Call placed to pt to review results and recommendations:     Component      Latest Ref Rng & Units 2/14/2023   Sodium      136 - 145 mmol/L 143   Potassium      3.4 - 5.3 mmol/L 4.0   Chloride      98 - 107 mmol/L 106   Carbon Dioxide (CO2)      22 - 29 mmol/L 26   Anion Gap      7 - 15 mmol/L 11   Urea Nitrogen      8.0 - 23.0 mg/dL 11.3   Creatinine      0.67 - 1.17 mg/dL 0.75   Calcium      8.8 - 10.2 mg/dL 8.7 (L)   Glucose      70 - 99 mg/dL 106 (H)   GFR Estimate      >60 mL/min/1.73m2 >90   Magnesium      1.7 - 2.3 mg/dL 1.7       ----- Message from Kevin Garcia MD sent at 2/15/2023 12:33 PM CST -----  Recommend starting mag oxide 400 daily and KCL 10meq daily due to prolonged QTc, also we should get an ECG at next follow up visit thanks!    Pt will start mag Ox supplement. Pt takes potassium Cl 20meq daily already. Pt advised to continue this as is and start Mag. Pt will have EKG recheck at upcoming OV and review with provider at time of OV.   ZABRINA Bautista RN, BSN. 02/22/23 3:36 PM

## 2023-02-16 NOTE — TELEPHONE ENCOUNTER
Coronary angiogram/PCI/Right Heart Cath prep instructions.     Patient is scheduled for a Coronary Angiogram at [facility] - [address] [place] [address] - Main Entrance of the Hospital on 2/817/2023.  Check in time is at 10:00AM and procedure to follow.    Patient instructed to remain NPO for solid foods 8 hours prior to arrival and may have clear liquids up to 2 hours prior to arrival.    Patient Patient does not require extra fluids prior to procedure.    Patient is on metformin and has been advised to hold Metformin the day of the procedure. They should continue to hold until after follow up BMP scheduled 2/20/2023 at 1:00PM is reviewed.  Pt will be called and advised when they can resume their metformin.    Patient is not on anticoagulation.    Patient is not on diuretics.       Patient is taking ASA 81mg daily and will take 4 tabs (324mg) the morning of the procedure.    Pt is not on a SGLT2 inhibitor.    Patient advised to take their other daily medications the morning of the procedure with small sips of water.     Verified patient does not have a contrast allergy.    Verified patient has someone available to drive them home from the hospital and can stay with them for 24 hours after the procedure.     Patient advised to notify care team with any new COVID like symptoms prior to procedure.    Patient will check their temperature the morning of procedure and call [facility] at [phone] if temp is >100.0.    Patient is aware of visitor policy.    Patient expresses understanding of above instructions and denies further questions at this time.      ZABRINA Bautista RN, BSN.   [facility]  02/16/23 4:13 PM

## 2023-02-16 NOTE — TELEPHONE ENCOUNTER
----- Message from Astrid Perez RN sent at 2/15/2023  7:48 AM CST -----  Regarding: FW: Angio 2/17 with Dr Mazariegos    ----- Message -----  From: Ines Duff  Sent: 2/14/2023   5:26 PM CST  To: Donn Cervantes [facility] Heart Nursing Team  Subject: Angio 2/17 with Dr Mazariegos                     Angiogram Orders    Location: [facility] - [address] [place] [address] - Main Entrance of the [facility]    Procedure: LHC w/possible PCI  Procedure Date: 2/17/23    Patient Arrival Time: 10am    Procedure Time: 1200 (pending emergency) [place]    Ordering Cardiologist: Dr Garcia    Performing Cardiologist: Dr. Mazariegos    Cardiac Assessment Completed: Yes  Date: 2/14/23  Provider: Dr Garcia    Pre-Procedure Labs completed: on admit    Post Procedure SONNY appointment scheduled: Yes  Date: 2/27/23  Provider: Bobbi Cabello    Patient Diabetic on Meds/Insulin:  Yes  If on Metformin, has 2 day post procedure BMP been scheduled: Yes  [facility]  (Thursday and Friday procedures should have Monday BMP)  Patient on Coumadin/Warfarin:  No  Patient on Pradaxa/Xarelto/Eliquis:  No  Patient on Invokana/Farxiga/Jardiance/Steglatro:  No    Does Patient have a history of bypass:  No        Pt advised to report any COVID like symptoms to care team prior to procedure.    Appointment was scheduled: Face to Face    Thank you, Ines

## 2023-02-17 ENCOUNTER — HOSPITAL ENCOUNTER (OUTPATIENT)
Facility: CLINIC | Age: 73
Discharge: HOME | End: 2023-02-17
Attending: INTERNAL MEDICINE | Admitting: INTERNAL MEDICINE
Payer: COMMERCIAL

## 2023-02-17 VITALS
TEMPERATURE: 97.7 F | OXYGEN SATURATION: 99 % | DIASTOLIC BLOOD PRESSURE: 93 MMHG | HEART RATE: 67 BPM | RESPIRATION RATE: 16 BRPM | SYSTOLIC BLOOD PRESSURE: 163 MMHG

## 2023-02-17 DIAGNOSIS — I50.22 CHRONIC HFREF (HEART FAILURE WITH REDUCED EJECTION FRACTION) (H): ICD-10-CM

## 2023-02-17 DIAGNOSIS — R94.39 ABNORMAL CARDIOVASCULAR STRESS TEST: ICD-10-CM

## 2023-02-17 PROBLEM — Z98.890 STATUS POST CORONARY ANGIOGRAM: Status: ACTIVE | Noted: 2023-02-17

## 2023-02-17 LAB
ACT BLD: 292 SECONDS (ref 74–150)
ANION GAP SERPL CALCULATED.3IONS-SCNC: 9 MMOL/L (ref 7–15)
APTT PPP: 31 SECONDS (ref 22–38)
BUN SERPL-MCNC: 19 MG/DL (ref 8–23)
CALCIUM SERPL-MCNC: 9.3 MG/DL (ref 8.8–10.2)
CHLORIDE SERPL-SCNC: 103 MMOL/L (ref 98–107)
CREAT SERPL-MCNC: 0.84 MG/DL (ref 0.67–1.17)
DEPRECATED HCO3 PLAS-SCNC: 28 MMOL/L (ref 22–29)
ERYTHROCYTE [DISTWIDTH] IN BLOOD BY AUTOMATED COUNT: 15 % (ref 10–15)
GFR SERPL CREATININE-BSD FRML MDRD: >90 ML/MIN/1.73M2
GLUCOSE SERPL-MCNC: 105 MG/DL (ref 70–99)
HCT VFR BLD AUTO: 44.5 % (ref 40–53)
HGB BLD-MCNC: 14.4 G/DL (ref 13.3–17.7)
INR PPP: 0.97 (ref 0.85–1.15)
MCH RBC QN AUTO: 28.9 PG (ref 26.5–33)
MCHC RBC AUTO-ENTMCNC: 32.4 G/DL (ref 31.5–36.5)
MCV RBC AUTO: 89 FL (ref 78–100)
PLATELET # BLD AUTO: 220 10E3/UL (ref 150–450)
POTASSIUM SERPL-SCNC: 3.4 MMOL/L (ref 3.4–5.3)
RBC # BLD AUTO: 4.99 10E6/UL (ref 4.4–5.9)
SODIUM SERPL-SCNC: 140 MMOL/L (ref 136–145)
WBC # BLD AUTO: 7.3 10E3/UL (ref 4–11)

## 2023-02-17 PROCEDURE — 272N000001 HC OR GENERAL SUPPLY STERILE: Performed by: INTERNAL MEDICINE

## 2023-02-17 PROCEDURE — C1887 CATHETER, GUIDING: HCPCS | Performed by: INTERNAL MEDICINE

## 2023-02-17 PROCEDURE — 93799 UNLISTED CV SVC/PROCEDURE: CPT | Performed by: INTERNAL MEDICINE

## 2023-02-17 PROCEDURE — 85730 THROMBOPLASTIN TIME PARTIAL: CPT | Performed by: INTERNAL MEDICINE

## 2023-02-17 PROCEDURE — 93571 IV DOP VEL&/PRESS C FLO 1ST: CPT | Performed by: INTERNAL MEDICINE

## 2023-02-17 PROCEDURE — 99152 MOD SED SAME PHYS/QHP 5/>YRS: CPT | Performed by: INTERNAL MEDICINE

## 2023-02-17 PROCEDURE — 85027 COMPLETE CBC AUTOMATED: CPT | Performed by: INTERNAL MEDICINE

## 2023-02-17 PROCEDURE — C1769 GUIDE WIRE: HCPCS | Performed by: INTERNAL MEDICINE

## 2023-02-17 PROCEDURE — 258N000003 HC RX IP 258 OP 636: Performed by: INTERNAL MEDICINE

## 2023-02-17 PROCEDURE — 250N000011 HC RX IP 250 OP 636: Performed by: INTERNAL MEDICINE

## 2023-02-17 PROCEDURE — 93458 L HRT ARTERY/VENTRICLE ANGIO: CPT | Performed by: INTERNAL MEDICINE

## 2023-02-17 PROCEDURE — 93572 IV DOP VEL&/PRESS C FLO EA: CPT | Performed by: INTERNAL MEDICINE

## 2023-02-17 PROCEDURE — 93571 IV DOP VEL&/PRESS C FLO 1ST: CPT | Mod: 26 | Performed by: INTERNAL MEDICINE

## 2023-02-17 PROCEDURE — 250N000009 HC RX 250: Performed by: INTERNAL MEDICINE

## 2023-02-17 PROCEDURE — 80048 BASIC METABOLIC PNL TOTAL CA: CPT | Performed by: INTERNAL MEDICINE

## 2023-02-17 PROCEDURE — C1894 INTRO/SHEATH, NON-LASER: HCPCS | Performed by: INTERNAL MEDICINE

## 2023-02-17 PROCEDURE — 93572 IV DOP VEL&/PRESS C FLO EA: CPT | Mod: 26 | Performed by: INTERNAL MEDICINE

## 2023-02-17 PROCEDURE — 85347 COAGULATION TIME ACTIVATED: CPT

## 2023-02-17 PROCEDURE — 250N000013 HC RX MED GY IP 250 OP 250 PS 637

## 2023-02-17 PROCEDURE — 93458 L HRT ARTERY/VENTRICLE ANGIO: CPT | Mod: 26 | Performed by: INTERNAL MEDICINE

## 2023-02-17 PROCEDURE — 99153 MOD SED SAME PHYS/QHP EA: CPT | Performed by: INTERNAL MEDICINE

## 2023-02-17 PROCEDURE — 93010 ELECTROCARDIOGRAM REPORT: CPT | Performed by: INTERNAL MEDICINE

## 2023-02-17 PROCEDURE — 85610 PROTHROMBIN TIME: CPT | Performed by: INTERNAL MEDICINE

## 2023-02-17 PROCEDURE — 36415 COLL VENOUS BLD VENIPUNCTURE: CPT | Performed by: INTERNAL MEDICINE

## 2023-02-17 RX ORDER — IOPAMIDOL 755 MG/ML
INJECTION, SOLUTION INTRAVASCULAR
Status: DISCONTINUED | OUTPATIENT
Start: 2023-02-17 | End: 2023-02-17 | Stop reason: HOSPADM

## 2023-02-17 RX ORDER — NALOXONE HYDROCHLORIDE 0.4 MG/ML
0.4 INJECTION, SOLUTION INTRAMUSCULAR; INTRAVENOUS; SUBCUTANEOUS
Status: DISCONTINUED | OUTPATIENT
Start: 2023-02-17 | End: 2023-02-17 | Stop reason: HOSPADM

## 2023-02-17 RX ORDER — SODIUM CHLORIDE 9 MG/ML
INJECTION, SOLUTION INTRAVENOUS CONTINUOUS
Status: DISCONTINUED | OUTPATIENT
Start: 2023-02-17 | End: 2023-02-17 | Stop reason: HOSPADM

## 2023-02-17 RX ORDER — FLUMAZENIL 0.1 MG/ML
0.2 INJECTION, SOLUTION INTRAVENOUS
Status: DISCONTINUED | OUTPATIENT
Start: 2023-02-17 | End: 2023-02-17 | Stop reason: HOSPADM

## 2023-02-17 RX ORDER — ASPIRIN 325 MG
325 TABLET ORAL ONCE
Status: COMPLETED | OUTPATIENT
Start: 2023-02-17 | End: 2023-02-17

## 2023-02-17 RX ORDER — ADENOSINE 3 MG/ML
INJECTION, SOLUTION INTRAVENOUS
Status: DISCONTINUED
Start: 2023-02-17 | End: 2023-02-17 | Stop reason: HOSPADM

## 2023-02-17 RX ORDER — VERAPAMIL HYDROCHLORIDE 2.5 MG/ML
INJECTION, SOLUTION INTRAVENOUS
Status: DISCONTINUED
Start: 2023-02-17 | End: 2023-02-17 | Stop reason: HOSPADM

## 2023-02-17 RX ORDER — FENTANYL CITRATE 50 UG/ML
INJECTION, SOLUTION INTRAMUSCULAR; INTRAVENOUS
Status: DISCONTINUED | OUTPATIENT
Start: 2023-02-17 | End: 2023-02-17 | Stop reason: HOSPADM

## 2023-02-17 RX ORDER — NITROGLYCERIN 5 MG/ML
VIAL (ML) INTRAVENOUS
Status: DISCONTINUED | OUTPATIENT
Start: 2023-02-17 | End: 2023-02-17 | Stop reason: HOSPADM

## 2023-02-17 RX ORDER — NALOXONE HYDROCHLORIDE 0.4 MG/ML
0.2 INJECTION, SOLUTION INTRAMUSCULAR; INTRAVENOUS; SUBCUTANEOUS
Status: DISCONTINUED | OUTPATIENT
Start: 2023-02-17 | End: 2023-02-17 | Stop reason: HOSPADM

## 2023-02-17 RX ORDER — ATROPINE SULFATE 0.1 MG/ML
0.5 INJECTION INTRAVENOUS
Status: DISCONTINUED | OUTPATIENT
Start: 2023-02-17 | End: 2023-02-17 | Stop reason: HOSPADM

## 2023-02-17 RX ORDER — HYDRALAZINE HYDROCHLORIDE 20 MG/ML
INJECTION INTRAMUSCULAR; INTRAVENOUS
Status: DISCONTINUED
Start: 2023-02-17 | End: 2023-02-17 | Stop reason: HOSPADM

## 2023-02-17 RX ORDER — POTASSIUM CHLORIDE 1500 MG/1
TABLET, EXTENDED RELEASE ORAL
Status: COMPLETED
Start: 2023-02-17 | End: 2023-02-17

## 2023-02-17 RX ORDER — LIDOCAINE HYDROCHLORIDE 10 MG/ML
INJECTION, SOLUTION EPIDURAL; INFILTRATION; INTRACAUDAL; PERINEURAL
Status: DISCONTINUED
Start: 2023-02-17 | End: 2023-02-17 | Stop reason: HOSPADM

## 2023-02-17 RX ORDER — HEPARIN SODIUM 1000 [USP'U]/ML
INJECTION, SOLUTION INTRAVENOUS; SUBCUTANEOUS
Status: DISCONTINUED
Start: 2023-02-17 | End: 2023-02-17 | Stop reason: HOSPADM

## 2023-02-17 RX ORDER — NITROGLYCERIN 5 MG/ML
VIAL (ML) INTRAVENOUS
Status: DISCONTINUED
Start: 2023-02-17 | End: 2023-02-17 | Stop reason: HOSPADM

## 2023-02-17 RX ORDER — FENTANYL CITRATE 50 UG/ML
INJECTION, SOLUTION INTRAMUSCULAR; INTRAVENOUS
Status: DISCONTINUED
Start: 2023-02-17 | End: 2023-02-17 | Stop reason: HOSPADM

## 2023-02-17 RX ORDER — ASPIRIN 81 MG/1
243 TABLET, CHEWABLE ORAL ONCE
Status: COMPLETED | OUTPATIENT
Start: 2023-02-17 | End: 2023-02-17

## 2023-02-17 RX ORDER — VERAPAMIL HYDROCHLORIDE 2.5 MG/ML
INJECTION, SOLUTION INTRAVENOUS
Status: DISCONTINUED | OUTPATIENT
Start: 2023-02-17 | End: 2023-02-17 | Stop reason: HOSPADM

## 2023-02-17 RX ORDER — FENTANYL CITRATE 50 UG/ML
25 INJECTION, SOLUTION INTRAMUSCULAR; INTRAVENOUS
Status: DISCONTINUED | OUTPATIENT
Start: 2023-02-17 | End: 2023-02-17 | Stop reason: HOSPADM

## 2023-02-17 RX ORDER — HEPARIN SODIUM 1000 [USP'U]/ML
INJECTION, SOLUTION INTRAVENOUS; SUBCUTANEOUS
Status: DISCONTINUED | OUTPATIENT
Start: 2023-02-17 | End: 2023-02-17 | Stop reason: HOSPADM

## 2023-02-17 RX ORDER — POTASSIUM CHLORIDE 1500 MG/1
20 TABLET, EXTENDED RELEASE ORAL
Status: COMPLETED | OUTPATIENT
Start: 2023-02-17 | End: 2023-02-17

## 2023-02-17 RX ORDER — LIDOCAINE 40 MG/G
CREAM TOPICAL
Status: DISCONTINUED | OUTPATIENT
Start: 2023-02-17 | End: 2023-02-17 | Stop reason: HOSPADM

## 2023-02-17 RX ORDER — ADENOSINE 3 MG/ML
INJECTION, SOLUTION INTRAVENOUS
Status: DISCONTINUED | OUTPATIENT
Start: 2023-02-17 | End: 2023-02-17 | Stop reason: HOSPADM

## 2023-02-17 RX ORDER — ACETAMINOPHEN 325 MG/1
650 TABLET ORAL EVERY 4 HOURS PRN
Status: DISCONTINUED | OUTPATIENT
Start: 2023-02-17 | End: 2023-02-17 | Stop reason: HOSPADM

## 2023-02-17 RX ORDER — HYDRALAZINE HYDROCHLORIDE 20 MG/ML
INJECTION INTRAMUSCULAR; INTRAVENOUS
Status: DISCONTINUED | OUTPATIENT
Start: 2023-02-17 | End: 2023-02-17 | Stop reason: HOSPADM

## 2023-02-17 RX ADMIN — POTASSIUM CHLORIDE 20 MEQ: 1500 TABLET, EXTENDED RELEASE ORAL at 11:41

## 2023-02-17 RX ADMIN — SODIUM CHLORIDE: 9 INJECTION, SOLUTION INTRAVENOUS at 10:50

## 2023-02-17 ASSESSMENT — ACTIVITIES OF DAILY LIVING (ADL)
ADLS_ACUITY_SCORE: 35
ADLS_ACUITY_SCORE: 35
ADLS_ACUITY_SCORE: 38

## 2023-02-17 NOTE — Clinical Note
The right DP pulse is 1+. The right PT pulse is 1+. The right radial pulse is 2+. The right ulnar pulse is 1+.

## 2023-02-17 NOTE — PROVIDER NOTIFICATION
"Pt recovering from Angio.  ECG per bedside monitor is sinus giuseppe-sinus rhythm with PACs, BBB.  Intermittent bigemeny is noted.  Dr. Mazariegos notified, \" he was having that.\"  Pt is not symptomatic, this rhythm happens most often when asleep.  "

## 2023-02-17 NOTE — PRE-PROCEDURE
GENERAL PRE-PROCEDURE:   Procedure:  Cor angio possible PCI  Date/Time:  2/17/2023 12:09 PM    Verbal consent obtained?: Yes    Written consent obtained?: Yes    Risks and benefits: Risks, benefits and alternatives were discussed    Consent given by:  Patient  Patient states understanding of procedure being performed: Yes    Patient's understanding of procedure matches consent: Yes    Procedure consent matches procedure scheduled: Yes    Expected level of sedation:  Moderate  Appropriately NPO:  Yes  Mallampati  :  Grade 1- soft palate, uvula, tonsillar pillars, and posterior pharyngeal wall visible  Lungs:  Lungs clear with good breath sounds bilaterally  Heart:  Normal heart sounds and rate  History & Physical reviewed:  History and physical reviewed and no updates needed  Statement of review:  I have reviewed the lab findings, diagnostic data, medications, and the plan for sedation

## 2023-02-17 NOTE — DISCHARGE INSTRUCTIONS
"  Discharge Instructions for Cardiac Catheterization   Cardiac catheterization is an invasive procedure to evaluate for certain heart problems involving the hearts chambers, valves, and blood vessels.  A thin, flexible tube (catheter) is put in a blood vessel in your groin or arm. Once the catheter is advanced into the heart measurements can be taken to assess blood flow, pressure, and oxygen. The healthcare provider can inject contrast fluid into your blood, which then flows to your heart. X-rays pictures can then be taken of your heart.  Often \"coronary angiography\" is performed as part of a cardiac catheterization which looks for blocked areas in the arteries that send blood to the heart. If a significant blockage is found your doctor may attempt to open up the artery which often involves placing a stent. Your provider will review the results of your procedure with you. Be sure to ask any questions you have before you leave. This sheet will help you take care of yourself at home.  Home care  Don't drive or make any important decisions for at least 24 hours after getting any type of sedation or anesthesia.   Arrange to have a responsible adult drive you home after your procedure.  Only do light and easy activities for the next  2 to 3 days. Ask for help with chores and errands while you recover. Have someone drive you to your appointments.  Don't lift anything heavy until your healthcare team tells you when it's safe to lift again.  Ask your healthcare team when you can expect to return to work. Unless your job involves lifting, you may be able to return to your normal activities within a couple of days.  Take your medicines as directed. Don't skip doses.  Drink  6 to 8 glasses of water a day. This is to help flush the contrast dye out of your body. Call your healthcare team if your urine has any change in color.  Take your temperature each day for 7 days. If you feel cold and clammy or start sweating, take your " temperature right away and call your healthcare team.  Check your incisions every day for signs of infection. These include redness, swelling, and drainage. It's normal to have a small bruise or bump where the catheter was inserted. A bruise that's getting larger is not normal and should be reported to your healthcare team. If you see blood forming in the incision, call your healthcare team. Go to the emergency department if you have uncontrolled bleeding from the artery site. This is especially true if you take medicines that make it hard for your blood to clot. Examples are aspirin, clopidogrel, and warfarin.  Eat a healthy diet. Make sure it's low in fat, salt, and cholesterol. Ask your healthcare team for diet information.  Stop smoking. Enroll in a stop-smoking program or ask your healthcare team for help. Stop-smoking programs can be life saving.  Exercise as your healthcare team tells you to. Your healthcare team may recommend you start a cardiac rehabilitation program. Cardiac rehab is an exercise program in which trained healthcare staff watch your progress and stress on your heart while you exercise. Ask your team how to enroll.  Don't swim or take baths until your healthcare team says it s OK. You can shower the day after the procedure. Keep the site clean and dry. This keeps the incision from getting wet and infected until the skin and artery can heal.  Be sure to follow all after-care instructions.     Follow-up care  Make a follow-up appointment as advised by our staff. It's common to have a follow-up appointment 2 to 4 weeks after an angioplasty or coronary stent procedure.  Make a yearly appointment, too. This is to make sure you are still doing well and not having any new symptoms.  Don't wait for a follow-up appointment if your medicines aren't working or you are having heart-related symptoms.    When to seek medical care  Call your healthcare provider right away if you have any of the  following:  Chest pain  Constant or increasing pain or numbness in your leg  Fever of 100.4  F ( 38.0 C) or higher, or as directed by your healthcare provider  Symptoms of infection. These include redness, swelling, drainage, or warmth at the incision site.  Shortness of breath  A leg that feels cold or appears blue  Bleeding, bruising, or a lot of swelling where the catheter was inserted  Blood in your urine  Black or tarry stools  Any unusual bleeding  [employer] last reviewed this educational content on 10/1/2019    0056-3511 [employer]. All rights reserved. This information is not intended as a substitute for professional medical care. Always follow your healthcare professional's instructions.

## 2023-02-20 ENCOUNTER — LAB (OUTPATIENT)
Dept: LAB | Facility: CLINIC | Age: 73
End: 2023-02-20
Payer: COMMERCIAL

## 2023-02-20 DIAGNOSIS — Z98.890 S/P CORONARY ANGIOGRAM: Primary | ICD-10-CM

## 2023-02-20 DIAGNOSIS — Z98.890 S/P CORONARY ANGIOGRAM: ICD-10-CM

## 2023-02-20 LAB
ANION GAP SERPL CALCULATED.3IONS-SCNC: 10 MMOL/L (ref 7–15)
BUN SERPL-MCNC: 15.5 MG/DL (ref 8–23)
CALCIUM SERPL-MCNC: 9.3 MG/DL (ref 8.8–10.2)
CHLORIDE SERPL-SCNC: 104 MMOL/L (ref 98–107)
CREAT SERPL-MCNC: 0.76 MG/DL (ref 0.67–1.17)
DEPRECATED HCO3 PLAS-SCNC: 26 MMOL/L (ref 22–29)
GFR SERPL CREATININE-BSD FRML MDRD: >90 ML/MIN/1.73M2
GLUCOSE SERPL-MCNC: 96 MG/DL (ref 70–99)
POTASSIUM SERPL-SCNC: 3.4 MMOL/L (ref 3.4–5.3)
SODIUM SERPL-SCNC: 140 MMOL/L (ref 136–145)

## 2023-02-20 PROCEDURE — 80048 BASIC METABOLIC PNL TOTAL CA: CPT | Performed by: INTERNAL MEDICINE

## 2023-02-20 PROCEDURE — 36415 COLL VENOUS BLD VENIPUNCTURE: CPT | Performed by: INTERNAL MEDICINE

## 2023-02-21 LAB
ATRIAL RATE - MUSE: 59 BPM
DIASTOLIC BLOOD PRESSURE - MUSE: NORMAL MMHG
INTERPRETATION ECG - MUSE: NORMAL
P AXIS - MUSE: 86 DEGREES
PR INTERVAL - MUSE: 194 MS
QRS DURATION - MUSE: 180 MS
QT - MUSE: 530 MS
QTC - MUSE: 524 MS
R AXIS - MUSE: -52 DEGREES
SYSTOLIC BLOOD PRESSURE - MUSE: NORMAL MMHG
T AXIS - MUSE: -15 DEGREES
VENTRICULAR RATE- MUSE: 59 BPM

## 2023-02-22 RX ORDER — MAGNESIUM OXIDE 400 MG/1
400 TABLET ORAL DAILY
Qty: 90 TABLET | Refills: 0 | Status: SHIPPED | OUTPATIENT
Start: 2023-02-22 | End: 2023-05-24

## 2023-02-22 RX ORDER — POTASSIUM CHLORIDE 750 MG/1
10 TABLET, EXTENDED RELEASE ORAL DAILY
Qty: 90 TABLET | Refills: 0 | Status: SHIPPED | OUTPATIENT
Start: 2023-02-22 | End: 2023-02-22

## 2023-02-22 RX ORDER — POTASSIUM CHLORIDE 750 MG/1
10 TABLET, EXTENDED RELEASE ORAL DAILY
Qty: 90 TABLET | Refills: 0 | Status: SHIPPED | OUTPATIENT
Start: 2023-02-22 | End: 2023-02-22 | Stop reason: ALTCHOICE

## 2023-02-27 ENCOUNTER — OFFICE VISIT (OUTPATIENT)
Dept: CARDIOLOGY | Facility: CLINIC | Age: 73
End: 2023-02-27
Payer: COMMERCIAL

## 2023-02-27 VITALS
WEIGHT: 315 LBS | OXYGEN SATURATION: 98 % | SYSTOLIC BLOOD PRESSURE: 148 MMHG | HEART RATE: 77 BPM | HEIGHT: 78 IN | DIASTOLIC BLOOD PRESSURE: 86 MMHG | BODY MASS INDEX: 36.45 KG/M2

## 2023-02-27 DIAGNOSIS — I10 ESSENTIAL HYPERTENSION: ICD-10-CM

## 2023-02-27 DIAGNOSIS — R94.39 ABNORMAL CARDIOVASCULAR STRESS TEST: ICD-10-CM

## 2023-02-27 DIAGNOSIS — I50.22 CHRONIC HFREF (HEART FAILURE WITH REDUCED EJECTION FRACTION) (H): ICD-10-CM

## 2023-02-27 PROCEDURE — 93000 ELECTROCARDIOGRAM COMPLETE: CPT | Performed by: NURSE PRACTITIONER

## 2023-02-27 PROCEDURE — 99214 OFFICE O/P EST MOD 30 MIN: CPT | Performed by: NURSE PRACTITIONER

## 2023-02-27 RX ORDER — LISINOPRIL 40 MG/1
40 TABLET ORAL DAILY
Qty: 90 TABLET | Refills: 3 | Status: SHIPPED | OUTPATIENT
Start: 2023-02-27

## 2023-02-27 NOTE — PATIENT INSTRUCTIONS
Today's Recommendations    Stop hydralazine  Start taking your lisinopril 40mg daily   Your angiogram did not show any artery blockages that limited blood flow   Bring your blood pressure cuff with you to your follow up visit  I would like to recheck your electrolytes and kidney function in 2 weeks  Please follow up with me in 6 weeks.    Please send a [employer] message or call [phone] to the RN team with questions or concerns.     Scheduling number [phone]  JENI Spian, CNP

## 2023-02-27 NOTE — LETTER
2/27/2023    Luis Alberto Aponte MD  [facility] [address]   [place] [address]    RE: Federico DAVENPORT Bulmaro       Dear Colleague,     I had the pleasure of seeing Federico Chamberlain in the [facility].  Cardiology Clinic Progress Note  Federico Chamberlain MRN# [id number]   YOB: 1950 Age: 72 year old   Primary Cardiologist: Dr. Garcia Reason for visit: Post angiogram follow up             Assessment and Plan:       1.  Coronary artery disease, nonobstructive proximal RCA and first marginal disease       NSTEMI  -Lexiscan nuclear stress test ordered secondary to troponin elevation and hypokinesis during hospitalization 12/2022, result was abnormal with mild ischemia in the basal to mid inferior and inferolateral segments.  -2/2023 Coronary angiogram with no flow limiting stenosis   -Continue aspirin, atorvastatin 40mg daily   -No current anginal symptoms  -Continue imdur, consider reduction in dosing once blood pressure better controlled in light of lack of obstructive coronary disease    2.  Syncope, likely secondary to Sepsis and rhabdomyolysis  -Event monitor demonstrated sinus rhythm with IVCD, intermittent first-degree block, PVCs with no high-grade AV block  -Patient has a referral to see neurology in June 2023    3. Likely obstructive sleep apnea  -Sleep study on 2/15/23 was inconclusive, plan is to repeat this to assess for best device    4. Hypertension  -Stop hydralazine and resume lisinopril 40mg daily with resolution of RILEY   -We will repeat BMP 1 month    5. Right leg cellulitis  -Patient questions if it is completely resolved  -Seeing PCP tomorrow to have it reassessed     6. Borderline magnesium level and QT prolongation  -QTc stable today on EKG  -Recheck Magnesium level with next labs in 3 weeks   -Continue magnesium oxide    Changes today:  Stop hydralazine and increase lisinopril    Follow up plan: Follow up in 6 weeks to reassess blood pressure control and review labs          History of Presenting Illness:    Federico Chamberlain is a very pleasant 72 year old male with a history of syncope, type II MI/NSTEMI, orthostasis, recent severe sepsis and right lower extremity cellulitis secondary to frostbite of the right great toe, hypertension, aortic dilation, QT prolongation, type 2 diabetes, hyperlipidemia, history of prostate cancer, obesity.    Patient was admitted with syncope 1/1/2023 in setting of severe sepsis and bacteremia, found to have rhabdomyolysis, mildly elevated troponin, and prolonged QT interval.  Mild elevated troponins most consistent with a type II MI, however TTE 12/20/2022 showed LVEF 50% with septal lateral hypokinesis.  He had a baseline abnormal conduction with bifascicular block and first-degree AV block with an event monitor in place.  During his hospital stay he had frequent PVCs, short runs of paroxysmal SVT.  His metoprolol was increased to 50 mg twice daily.  Isordil, furosemide, and hydralazine were added.  His lisinopril, chlorthalidone, and spironolactone were all held due to rhabdomyolysis.    He went to TCU at discharge, his lasix was discontinued and previous chlorthalidone was restarted, hydralazine with increased to 50mg TID 2/2 HTN. His RILEY resolved and WBCs normalized prior to discharge.     He saw his PCP, Dr. Aponte at [facility] on 1/20/23 and discussed resuming lisinopril pending cardiology approval.  His CK returned to normal at 79, sodium 143, potassium 3.4, creatinine 0.9, and GFR greater than 60, B12 level normal at 747. He continued him on a course of 1 additional week of Augmentin.     I first met Federico in January 2023 following his hospitalization.  We ordered a Lexiscan nuclear stress test, reduced his hydralazine to 25 mg 3 times a day and resumed his lisinopril at 10 mg a day.    Lexiscan nuclear stress test was abnormal demonstrating a mild ischemia in the basal to mid inferior and inferolateral segments. He saw Dr. Garcia in  follow to review. He increased his imdur and recommended a coronary angiogram.      Angiogram was completed 2023 and showed moderate stenosis in the mid RCA and OM1, neither were flow-limiting by assessment with IFR.    Patient is here today for follow-up from his coronary angiogram. He reports feeling well. He continues to question why he had a syncopal episode and we reviewed the hospital notes which question if it was secondary dehydration in the setting of sepsis.     Patient denies chest pain or chest tightness. Denies dizziness, lightheadedness or other presyncopal symptoms. Denies tachycardia or palpitations. Denies shortness of breath, orthopnea, or PND.     Labs from 2023 showing sodium 140, potassium 3.4, BUN 15, creatinine 0.76, GFR greater than 90.  Magnesium level from 2023 1.7.    EKG reviewed from today showing bifascicular block with stable QTc at 523.    Blood pressure 148/86 and HR 77 in clinic today.    Right wrist puncture site healed with no bruit. Non-tender.         Recent Hospitalizations   As above        Social History      Social History     Socioeconomic History     Marital status:      Spouse name: Not on file     Number of children: Not on file     Years of education: Not on file     Highest education level: Not on file   Occupational History     Not on file   Tobacco Use     Smoking status: Former     Types: Cigarettes     Quit date: 1992     Years since quittin.0     Smokeless tobacco: Never   Substance and Sexual Activity     Alcohol use: Not Currently     Drug use: No     Sexual activity: Not on file   Other Topics Concern     Not on file   Social History Narrative     Not on file     Social Determinants of Health     Financial Resource Strain: Not on file   Food Insecurity: Not on file   Transportation Needs: Not on file   Physical Activity: Not on file   Stress: Not on file   Social Connections: Not on file   Intimate Partner Violence: Not on file  "  Housing Stability: Not on file            Review of Systems:   Skin:  Positive for     Eyes:       ENT:       Respiratory:  Negative for shortness of breath;dyspnea on exertion;dyspnea at rest  Cardiovascular:  Negative for;chest pain;palpitations;syncope or near-syncope;dizziness;lightheadedness;fatigue    Gastroenterology:      Genitourinary:       Musculoskeletal:       Neurologic:       Psychiatric:       Heme/Lymph/Imm:       Endocrine:              Physical Exam:   Vitals: BP (!) 148/86 (BP Location: Right arm, Patient Position: Sitting, Cuff Size: Adult Large)   Pulse 77   Ht 1.981 m (6' 6\")   Wt 145.2 kg (320 lb)   SpO2 98%   BMI 36.98 kg/m     Wt Readings from Last 4 Encounters:   02/27/23 145.2 kg (320 lb)   02/14/23 147 kg (324 lb 1.6 oz)   02/02/23 145.6 kg (321 lb)   01/23/23 98.9 kg (218 lb)     GEN: well nourished, in no acute distress.  HEENT:  Pupils equal, round. Sclerae nonicteric.   NECK: Supple, no masses appreciated.  C/V:  Regular rate and rhythm, no murmur, rub or gallop.    RESP: Respirations are unlabored. Clear to auscultation bilaterally without wheezing, rales, or rhonchi.  GI: Abdomen soft, nontender.  EXTREM: Right LE edema, 1+. Pink, dry, skin up to mid shin, non-tender  NEURO: Alert and oriented, cooperative.  SKIN: Warm and dry. Right wrist puncture site healed with no bruit, non-tender       Data:     LIPID RESULTS:  Lab Results   Component Value Date    CHOL 92 07/02/2018    HDL 32 (L) 07/02/2018    LDL <1 07/02/2018    TRIG 297 (H) 07/02/2018     LIVER ENZYME RESULTS:  Lab Results   Component Value Date    AST 27 01/10/2023    AST 15 07/02/2018    ALT 35 12/31/2022    ALT 23 07/02/2018     CBC RESULTS:  Lab Results   Component Value Date    WBC 7.3 02/17/2023    WBC 8.1 08/04/2020    RBC 4.99 02/17/2023    RBC 5.04 08/04/2020    HGB 14.4 02/17/2023    HGB 14.6 08/04/2020    HCT 44.5 02/17/2023    HCT 44.8 08/04/2020    MCV 89 02/17/2023    MCV 89 08/04/2020    MCH 28.9 " 02/17/2023    MCH 29.0 08/04/2020    MCHC 32.4 02/17/2023    MCHC 32.6 08/04/2020    RDW 15.0 02/17/2023    RDW 13.8 08/04/2020     02/17/2023     08/04/2020     BMP RESULTS:  Lab Results   Component Value Date     02/20/2023     08/04/2020    POTASSIUM 3.4 02/20/2023    POTASSIUM 4.3 01/16/2023    POTASSIUM 3.2 (L) 08/04/2020    CHLORIDE 104 02/20/2023    CHLORIDE 106 01/16/2023    CHLORIDE 108 08/04/2020    CO2 26 02/20/2023    CO2 30 01/16/2023    CO2 28 08/04/2020    ANIONGAP 10 02/20/2023    ANIONGAP 6 01/16/2023    ANIONGAP 7 08/04/2020    GLC 96 02/20/2023    GLC 89 01/16/2023     (H) 08/04/2020    BUN 15.5 02/20/2023    BUN 13 01/16/2023    BUN 18 08/04/2020    CR 0.76 02/20/2023    CR 0.92 08/04/2020    GFRESTIMATED >90 02/20/2023    GFRESTIMATED >60 12/31/2022    GFRESTIMATED 85 08/04/2020    GFRESTBLACK >90 08/04/2020    LAURA 9.3 02/20/2023    LAURA 8.6 08/04/2020      A1C RESULTS:  Lab Results   Component Value Date    A1C 6.0 (H) 12/31/2022    A1C 6.0 (H) 07/08/2018     INR RESULTS:  Lab Results   Component Value Date    INR 0.97 02/17/2023    INR 1.13 12/31/2022    INR 0.97 07/08/2018    INR 0.98 11/09/2007            Medications     Current Outpatient Medications   Medication Sig Dispense Refill     aspirin 81 MG EC tablet Take 81 mg by mouth daily       atorvastatin (LIPITOR) 40 MG tablet Take 40 mg by mouth daily       chlorthalidone (HYGROTON) 25 MG tablet Take 25 mg by mouth daily       cyclobenzaprine (FLEXERIL) 5 MG tablet Take 5 mg by mouth 3 times daily as needed for muscle spasms       diclofenac (VOLTAREN) 1 % topical gel Apply topically daily as needed for moderate pain (4-6)       ferrous sulfate (FEROSUL) 325 (65 Fe) MG tablet Take 325 mg by mouth daily (with breakfast)       gabapentin (NEURONTIN) 800 MG tablet Take 800 mg by mouth 3 times daily       isosorbide mononitrate (IMDUR) 30 MG 24 hr tablet Take 1.5 tablets (45 mg) by mouth daily 90 tablet 3      lisinopril (ZESTRIL) 40 MG tablet Take 1 tablet (40 mg) by mouth daily 90 tablet 3     magnesium oxide (MAG-OX) 400 MG tablet Take 1 tablet (400 mg) by mouth daily 90 tablet 0     metFORMIN (GLUCOPHAGE) 500 MG tablet Take 500 mg by mouth daily       metoprolol succinate ER (TOPROL XL) 50 MG 24 hr tablet Take 1 tablet (50 mg) by mouth 2 times daily 180 tablet 3     OXYCODONE-ACETAMINOPHEN PO Take by mouth daily as needed       potassium chloride ER (KLOR-CON M) 10 MEQ CR tablet Take 20 mEq by mouth daily       Pramipexole Dihydrochloride (MIRAPEX PO) Take 3 mg by mouth At Bedtime       semaglutide (OZEMPIC, 1 MG/DOSE,) 2 MG/1.5ML pen Inject 0.5 mg Subcutaneous every 7 days On Fridays       senna-docusate (SENOKOT-S/PERICOLACE) 8.6-50 MG tablet Take 1 tablet by mouth 2 times daily as needed for constipation       VITAMIN D, CHOLECALCIFEROL, PO Take 4,000 Units by mouth daily       acetaminophen (TYLENOL) 325 MG tablet Take 3 tablets (975 mg) by mouth 3 times daily (Patient not taking: Reported on 2/27/2023)       Lidocaine (LIDOCARE) 4 % Patch Place 1-2 patches onto the skin every 24 hours To prevent lidocaine toxicity, patient should be patch free for 12 hrs daily. (Patient not taking: Reported on 2/27/2023)       multivitamin w/minerals (THERA-VIT-M) tablet Take 1 tablet by mouth At Bedtime (Patient not taking: Reported on 2/27/2023)       nitroGLYcerin (NITROSTAT) 0.4 MG sublingual tablet For chest pain place 1 tablet under the tongue every 5 minutes for 3 doses. If symptoms persist 5 minutes after 1st dose call 911. (Patient not taking: Reported on 2/27/2023) 25 tablet 3     silver sulfADIAZINE (SILVADENE) 1 % external cream Apply topically daily as needed (Patient not taking: Reported on 2/27/2023)            Past Medical History     Past Medical History:   Diagnosis Date     Hypercholesterolemia      Hypertension      Past Surgical History:   Procedure Laterality Date     APPENDECTOMY       BACK SURGERY       CV  CORONARY ANGIOGRAM N/A 2/17/2023    Procedure: Coronary Angiogram RADIAL ACCESS;  Surgeon: Malou Mazariegos MD;  Location:  [facility]     CV FRACTIONAL FLOW RATIO WIRE N/A 2/17/2023    Procedure: Fractional Flow Ratio Wire;  Surgeon: Malou Mazariegos MD;  Location: [facility]     CV INSTANTANEOUS WAVE-FREE RATIO N/A 2/17/2023    Procedure: Instantaneous Wave-Free Ratio;  Surgeon: Malou Mazariegos MD;  Location:  [facility]ARD[facility] CATH LAB     CV LEFT HEART CATH N/A 2/17/2023    Procedure: Left Heart Catheterization;  Surgeon: Malou Mazariegos MD;  Location: [facility]     ORTHOPEDIC SURGERY       No family history on file.         Allergies   Patient has no known allergies.    Alana Cabello NP  [facility]  Pager: [phone]    Thank you for allowing me to participate in the care of your patient.      Sincerely,     Alana Cabello NP     [facility]  cc:   Kevin Garcia MD  [address], [place] [address] [place] [id number]

## 2023-02-27 NOTE — PROGRESS NOTES
Cardiology Clinic Progress Note  Federico Chamberlain MRN# [id number]   YOB: 1950 Age: 72 year old   Primary Cardiologist: Dr. Garcia Reason for visit: Post angiogram follow up             Assessment and Plan:       1.  Coronary artery disease, nonobstructive proximal RCA and first marginal disease       NSTEMI  -Lexiscan nuclear stress test ordered secondary to troponin elevation and hypokinesis during hospitalization 12/2022, result was abnormal with mild ischemia in the basal to mid inferior and inferolateral segments.  -2/2023 Coronary angiogram with no flow limiting stenosis   -Continue aspirin, atorvastatin 40mg daily   -No current anginal symptoms  -Continue imdur, consider reduction in dosing once blood pressure better controlled in light of lack of obstructive coronary disease    2.  Syncope, likely secondary to Sepsis and rhabdomyolysis  -Event monitor demonstrated sinus rhythm with IVCD, intermittent first-degree block, PVCs with no high-grade AV block  -Patient has a referral to see neurology in June 2023    3. Likely obstructive sleep apnea  -Sleep study on 2/15/23 was inconclusive, plan is to repeat this to assess for best device    4. Hypertension  -Stop hydralazine and resume lisinopril 40mg daily with resolution of RILEY   -We will repeat BMP 1 month    5. Right leg cellulitis  -Patient questions if it is completely resolved  -Seeing PCP tomorrow to have it reassessed     6. Borderline magnesium level and QT prolongation  -QTc stable today on EKG  -Recheck Magnesium level with next labs in 3 weeks   -Continue magnesium oxide    Changes today:  Stop hydralazine and increase lisinopril    Follow up plan: Follow up in 6 weeks to reassess blood pressure control and review labs         History of Presenting Illness:    Federico Chamberlain is a very pleasant 72 year old male with a history of syncope, type II MI/NSTEMI, orthostasis, recent severe sepsis and right lower extremity cellulitis secondary to  frostbite of the right great toe, hypertension, aortic dilation, QT prolongation, type 2 diabetes, hyperlipidemia, history of prostate cancer, obesity.    Patient was admitted with syncope 1/1/2023 in setting of severe sepsis and bacteremia, found to have rhabdomyolysis, mildly elevated troponin, and prolonged QT interval.  Mild elevated troponins most consistent with a type II MI, however TTE 12/20/2022 showed LVEF 50% with septal lateral hypokinesis.  He had a baseline abnormal conduction with bifascicular block and first-degree AV block with an event monitor in place.  During his hospital stay he had frequent PVCs, short runs of paroxysmal SVT.  His metoprolol was increased to 50 mg twice daily.  Isordil, furosemide, and hydralazine were added.  His lisinopril, chlorthalidone, and spironolactone were all held due to rhabdomyolysis.    He went to TCU at discharge, his lasix was discontinued and previous chlorthalidone was restarted, hydralazine with increased to 50mg TID 2/2 HTN. His RILEY resolved and WBCs normalized prior to discharge.     He saw his PCP, Dr. Aponte at [facility] on 1/20/23 and discussed resuming lisinopril pending cardiology approval.  His CK returned to normal at 79, sodium 143, potassium 3.4, creatinine 0.9, and GFR greater than 60, B12 level normal at 747. He continued him on a course of 1 additional week of Augmentin.     I first met Federico in January 2023 following his hospitalization.  We ordered a Lexiscan nuclear stress test, reduced his hydralazine to 25 mg 3 times a day and resumed his lisinopril at 10 mg a day.    Lexiscan nuclear stress test was abnormal demonstrating a mild ischemia in the basal to mid inferior and inferolateral segments. He saw Dr. Garcia in follow to review. He increased his imdur and recommended a coronary angiogram.      Angiogram was completed 2/17/2023 and showed moderate stenosis in the mid RCA and OM1, neither were flow-limiting by assessment with  IFR.    Patient is here today for follow-up from his coronary angiogram. He reports feeling well. He continues to question why he had a syncopal episode and we reviewed the hospital notes which question if it was secondary dehydration in the setting of sepsis.     Patient denies chest pain or chest tightness. Denies dizziness, lightheadedness or other presyncopal symptoms. Denies tachycardia or palpitations. Denies shortness of breath, orthopnea, or PND.     Labs from 2023 showing sodium 140, potassium 3.4, BUN 15, creatinine 0.76, GFR greater than 90.  Magnesium level from 2023 1.7.    EKG reviewed from today showing bifascicular block with stable QTc at 523.    Blood pressure 148/86 and HR 77 in clinic today.    Right wrist puncture site healed with no bruit. Non-tender.         Recent Hospitalizations   As above        Social History      Social History     Socioeconomic History     Marital status:      Spouse name: Not on file     Number of children: Not on file     Years of education: Not on file     Highest education level: Not on file   Occupational History     Not on file   Tobacco Use     Smoking status: Former     Types: Cigarettes     Quit date: 1992     Years since quittin.0     Smokeless tobacco: Never   Substance and Sexual Activity     Alcohol use: Not Currently     Drug use: No     Sexual activity: Not on file   Other Topics Concern     Not on file   Social History Narrative     Not on file     Social Determinants of Health     Financial Resource Strain: Not on file   Food Insecurity: Not on file   Transportation Needs: Not on file   Physical Activity: Not on file   Stress: Not on file   Social Connections: Not on file   Intimate Partner Violence: Not on file   Housing Stability: Not on file            Review of Systems:   Skin:  Positive for     Eyes:       ENT:       Respiratory:  Negative for shortness of breath;dyspnea on exertion;dyspnea at rest  Cardiovascular:   "Negative for;chest pain;palpitations;syncope or near-syncope;dizziness;lightheadedness;fatigue    Gastroenterology:      Genitourinary:       Musculoskeletal:       Neurologic:       Psychiatric:       Heme/Lymph/Imm:       Endocrine:              Physical Exam:   Vitals: BP (!) 148/86 (BP Location: Right arm, Patient Position: Sitting, Cuff Size: Adult Large)   Pulse 77   Ht 1.981 m (6' 6\")   Wt 145.2 kg (320 lb)   SpO2 98%   BMI 36.98 kg/m     Wt Readings from Last 4 Encounters:   02/27/23 145.2 kg (320 lb)   02/14/23 147 kg (324 lb 1.6 oz)   02/02/23 145.6 kg (321 lb)   01/23/23 98.9 kg (218 lb)     GEN: well nourished, in no acute distress.  HEENT:  Pupils equal, round. Sclerae nonicteric.   NECK: Supple, no masses appreciated.  C/V:  Regular rate and rhythm, no murmur, rub or gallop.    RESP: Respirations are unlabored. Clear to auscultation bilaterally without wheezing, rales, or rhonchi.  GI: Abdomen soft, nontender.  EXTREM: Right LE edema, 1+. Pink, dry, skin up to mid shin, non-tender  NEURO: Alert and oriented, cooperative.  SKIN: Warm and dry. Right wrist puncture site healed with no bruit, non-tender       Data:     LIPID RESULTS:  Lab Results   Component Value Date    CHOL 92 07/02/2018    HDL 32 (L) 07/02/2018    LDL <1 07/02/2018    TRIG 297 (H) 07/02/2018     LIVER ENZYME RESULTS:  Lab Results   Component Value Date    AST 27 01/10/2023    AST 15 07/02/2018    ALT 35 12/31/2022    ALT 23 07/02/2018     CBC RESULTS:  Lab Results   Component Value Date    WBC 7.3 02/17/2023    WBC 8.1 08/04/2020    RBC 4.99 02/17/2023    RBC 5.04 08/04/2020    HGB 14.4 02/17/2023    HGB 14.6 08/04/2020    HCT 44.5 02/17/2023    HCT 44.8 08/04/2020    MCV 89 02/17/2023    MCV 89 08/04/2020    MCH 28.9 02/17/2023    MCH 29.0 08/04/2020    MCHC 32.4 02/17/2023    MCHC 32.6 08/04/2020    RDW 15.0 02/17/2023    RDW 13.8 08/04/2020     02/17/2023     08/04/2020     BMP RESULTS:  Lab Results   Component " Value Date     02/20/2023     08/04/2020    POTASSIUM 3.4 02/20/2023    POTASSIUM 4.3 01/16/2023    POTASSIUM 3.2 (L) 08/04/2020    CHLORIDE 104 02/20/2023    CHLORIDE 106 01/16/2023    CHLORIDE 108 08/04/2020    CO2 26 02/20/2023    CO2 30 01/16/2023    CO2 28 08/04/2020    ANIONGAP 10 02/20/2023    ANIONGAP 6 01/16/2023    ANIONGAP 7 08/04/2020    GLC 96 02/20/2023    GLC 89 01/16/2023     (H) 08/04/2020    BUN 15.5 02/20/2023    BUN 13 01/16/2023    BUN 18 08/04/2020    CR 0.76 02/20/2023    CR 0.92 08/04/2020    GFRESTIMATED >90 02/20/2023    GFRESTIMATED >60 12/31/2022    GFRESTIMATED 85 08/04/2020    GFRESTBLACK >90 08/04/2020    LAURA 9.3 02/20/2023    LAURA 8.6 08/04/2020      A1C RESULTS:  Lab Results   Component Value Date    A1C 6.0 (H) 12/31/2022    A1C 6.0 (H) 07/08/2018     INR RESULTS:  Lab Results   Component Value Date    INR 0.97 02/17/2023    INR 1.13 12/31/2022    INR 0.97 07/08/2018    INR 0.98 11/09/2007            Medications     Current Outpatient Medications   Medication Sig Dispense Refill     aspirin 81 MG EC tablet Take 81 mg by mouth daily       atorvastatin (LIPITOR) 40 MG tablet Take 40 mg by mouth daily       chlorthalidone (HYGROTON) 25 MG tablet Take 25 mg by mouth daily       cyclobenzaprine (FLEXERIL) 5 MG tablet Take 5 mg by mouth 3 times daily as needed for muscle spasms       diclofenac (VOLTAREN) 1 % topical gel Apply topically daily as needed for moderate pain (4-6)       ferrous sulfate (FEROSUL) 325 (65 Fe) MG tablet Take 325 mg by mouth daily (with breakfast)       gabapentin (NEURONTIN) 800 MG tablet Take 800 mg by mouth 3 times daily       isosorbide mononitrate (IMDUR) 30 MG 24 hr tablet Take 1.5 tablets (45 mg) by mouth daily 90 tablet 3     lisinopril (ZESTRIL) 40 MG tablet Take 1 tablet (40 mg) by mouth daily 90 tablet 3     magnesium oxide (MAG-OX) 400 MG tablet Take 1 tablet (400 mg) by mouth daily 90 tablet 0     metFORMIN (GLUCOPHAGE) 500 MG tablet  Take 500 mg by mouth daily       metoprolol succinate ER (TOPROL XL) 50 MG 24 hr tablet Take 1 tablet (50 mg) by mouth 2 times daily 180 tablet 3     OXYCODONE-ACETAMINOPHEN PO Take by mouth daily as needed       potassium chloride ER (KLOR-CON M) 10 MEQ CR tablet Take 20 mEq by mouth daily       Pramipexole Dihydrochloride (MIRAPEX PO) Take 3 mg by mouth At Bedtime       semaglutide (OZEMPIC, 1 MG/DOSE,) 2 MG/1.5ML pen Inject 0.5 mg Subcutaneous every 7 days On Fridays       senna-docusate (SENOKOT-S/PERICOLACE) 8.6-50 MG tablet Take 1 tablet by mouth 2 times daily as needed for constipation       VITAMIN D, CHOLECALCIFEROL, PO Take 4,000 Units by mouth daily       acetaminophen (TYLENOL) 325 MG tablet Take 3 tablets (975 mg) by mouth 3 times daily (Patient not taking: Reported on 2/27/2023)       Lidocaine (LIDOCARE) 4 % Patch Place 1-2 patches onto the skin every 24 hours To prevent lidocaine toxicity, patient should be patch free for 12 hrs daily. (Patient not taking: Reported on 2/27/2023)       multivitamin w/minerals (THERA-VIT-M) tablet Take 1 tablet by mouth At Bedtime (Patient not taking: Reported on 2/27/2023)       nitroGLYcerin (NITROSTAT) 0.4 MG sublingual tablet For chest pain place 1 tablet under the tongue every 5 minutes for 3 doses. If symptoms persist 5 minutes after 1st dose call 911. (Patient not taking: Reported on 2/27/2023) 25 tablet 3     silver sulfADIAZINE (SILVADENE) 1 % external cream Apply topically daily as needed (Patient not taking: Reported on 2/27/2023)            Past Medical History     Past Medical History:   Diagnosis Date     Hypercholesterolemia      Hypertension      Past Surgical History:   Procedure Laterality Date     APPENDECTOMY       BACK SURGERY       CV CORONARY ANGIOGRAM N/A 2/17/2023    Procedure: Coronary Angiogram RADIAL ACCESS;  Surgeon: Malou Mazariegos MD;  Location:  [facility]     CV FRACTIONAL FLOW RATIO WIRE N/A 2/17/2023    Procedure:  Fractional Flow Ratio Wire;  Surgeon: Malou Mazariegos MD;  Location:  [facility]     CV INSTANTANEOUS WAVE-FREE RATIO N/A 2/17/2023    Procedure: Instantaneous Wave-Free Ratio;  Surgeon: Malou Mazariegos MD;  Location:  [facility]     CV LEFT HEART CATH N/A 2/17/2023    Procedure: Left Heart Catheterization;  Surgeon: Malou Mazariegos MD;  Location:  [facility]ATH [facility]     ORTHOPEDIC SURGERY       No family history on file.         Allergies   Patient has no known allergies.        Alana Cabello NP  [facility]  Pager: [phone]

## 2023-02-28 ENCOUNTER — LAB (OUTPATIENT)
Dept: LAB | Facility: CLINIC | Age: 73
End: 2023-02-28
Payer: COMMERCIAL

## 2023-02-28 DIAGNOSIS — I24.89 DEMAND ISCHEMIA (H): ICD-10-CM

## 2023-02-28 DIAGNOSIS — I10 ESSENTIAL HYPERTENSION: ICD-10-CM

## 2023-02-28 LAB
ANION GAP SERPL CALCULATED.3IONS-SCNC: 13 MMOL/L (ref 7–15)
BUN SERPL-MCNC: 13.8 MG/DL (ref 8–23)
CALCIUM SERPL-MCNC: 9.2 MG/DL (ref 8.8–10.2)
CHLORIDE SERPL-SCNC: 105 MMOL/L (ref 98–107)
CK SERPL-CCNC: 173 U/L (ref 39–308)
CREAT SERPL-MCNC: 0.94 MG/DL (ref 0.67–1.17)
DEPRECATED HCO3 PLAS-SCNC: 25 MMOL/L (ref 22–29)
ERYTHROCYTE [DISTWIDTH] IN BLOOD BY AUTOMATED COUNT: 14.5 % (ref 10–15)
GFR SERPL CREATININE-BSD FRML MDRD: 87 ML/MIN/1.73M2
GLUCOSE SERPL-MCNC: 129 MG/DL (ref 70–99)
HCT VFR BLD AUTO: 43.5 % (ref 40–53)
HGB BLD-MCNC: 14 G/DL (ref 13.3–17.7)
MAGNESIUM SERPL-MCNC: 2.1 MG/DL (ref 1.7–2.3)
MCH RBC QN AUTO: 28.6 PG (ref 26.5–33)
MCHC RBC AUTO-ENTMCNC: 32.2 G/DL (ref 31.5–36.5)
MCV RBC AUTO: 89 FL (ref 78–100)
PLATELET # BLD AUTO: 229 10E3/UL (ref 150–450)
POTASSIUM SERPL-SCNC: 3.5 MMOL/L (ref 3.4–5.3)
RBC # BLD AUTO: 4.9 10E6/UL (ref 4.4–5.9)
SODIUM SERPL-SCNC: 143 MMOL/L (ref 136–145)
WBC # BLD AUTO: 7.3 10E3/UL (ref 4–11)

## 2023-02-28 PROCEDURE — 82550 ASSAY OF CK (CPK): CPT

## 2023-02-28 PROCEDURE — 83735 ASSAY OF MAGNESIUM: CPT

## 2023-02-28 PROCEDURE — 36415 COLL VENOUS BLD VENIPUNCTURE: CPT

## 2023-02-28 PROCEDURE — 85027 COMPLETE CBC AUTOMATED: CPT

## 2023-02-28 PROCEDURE — 80048 BASIC METABOLIC PNL TOTAL CA: CPT

## 2023-03-06 NOTE — RESULT ENCOUNTER NOTE
Yes should repeat the labs since lisinopril was restarted just the 1 day before BMP was checked, should have a repeat BMP and Mg level thanks!

## 2023-03-07 ENCOUNTER — TELEPHONE (OUTPATIENT)
Dept: CARDIOLOGY | Facility: CLINIC | Age: 73
End: 2023-03-07
Payer: COMMERCIAL

## 2023-03-07 DIAGNOSIS — I50.22 CHRONIC HFREF (HEART FAILURE WITH REDUCED EJECTION FRACTION) (H): Primary | ICD-10-CM

## 2023-03-07 NOTE — TELEPHONE ENCOUNTER
Call placed to review results and recommendations:     Component      Latest Ref Rng & Units 2/28/2023   Sodium      136 - 145 mmol/L 143   Potassium      3.4 - 5.3 mmol/L 3.5   Chloride      98 - 107 mmol/L 105   Carbon Dioxide (CO2)      22 - 29 mmol/L 25   Anion Gap      7 - 15 mmol/L 13   Urea Nitrogen      8.0 - 23.0 mg/dL 13.8   Creatinine      0.67 - 1.17 mg/dL 0.94   Calcium      8.8 - 10.2 mg/dL 9.2   Glucose      70 - 99 mg/dL 129 (H)   GFR Estimate      >60 mL/min/1.73m2 87   Magnesium      1.7 - 2.3 mg/dL 2.1      Kevin Garcia MD   3/6/2023  7:23 AM CST Back to Top      Yes should repeat the labs since lisinopril was restarted just the 1 day before BMP was checked, should have a repeat BMP and Mg level thanks!       Pt verbalized understanding. Orders replaced. Pt would like to have this completed through [facility]. Pt advised he would need to call and make Lab only appointment.     Pt is wondering of magnesium will need to be long term and whether or not it to improve his prolonged Qtc. Pt will plan to have labs and review this information at the time the results are reviewed.   ZABRINA Bautista RN, BSN. 03/07/23 11:32 AM

## 2023-03-16 ENCOUNTER — LAB (OUTPATIENT)
Dept: LAB | Facility: CLINIC | Age: 73
End: 2023-03-16
Payer: COMMERCIAL

## 2023-03-16 DIAGNOSIS — I50.22 CHRONIC HFREF (HEART FAILURE WITH REDUCED EJECTION FRACTION) (H): ICD-10-CM

## 2023-03-16 LAB
ANION GAP SERPL CALCULATED.3IONS-SCNC: 8 MMOL/L (ref 7–15)
BUN SERPL-MCNC: 15.9 MG/DL (ref 8–23)
CALCIUM SERPL-MCNC: 9.1 MG/DL (ref 8.8–10.2)
CHLORIDE SERPL-SCNC: 104 MMOL/L (ref 98–107)
CREAT SERPL-MCNC: 0.77 MG/DL (ref 0.67–1.17)
DEPRECATED HCO3 PLAS-SCNC: 31 MMOL/L (ref 22–29)
GFR SERPL CREATININE-BSD FRML MDRD: >90 ML/MIN/1.73M2
GLUCOSE SERPL-MCNC: 134 MG/DL (ref 70–99)
MAGNESIUM SERPL-MCNC: 1.9 MG/DL (ref 1.7–2.3)
POTASSIUM SERPL-SCNC: 4.1 MMOL/L (ref 3.4–5.3)
SODIUM SERPL-SCNC: 143 MMOL/L (ref 136–145)

## 2023-03-16 PROCEDURE — 36415 COLL VENOUS BLD VENIPUNCTURE: CPT | Performed by: INTERNAL MEDICINE

## 2023-03-16 PROCEDURE — 80048 BASIC METABOLIC PNL TOTAL CA: CPT | Performed by: INTERNAL MEDICINE

## 2023-03-16 PROCEDURE — 83735 ASSAY OF MAGNESIUM: CPT | Performed by: INTERNAL MEDICINE

## 2023-04-15 ENCOUNTER — HEALTH MAINTENANCE LETTER (OUTPATIENT)
Age: 73
End: 2023-04-15

## 2023-04-28 ENCOUNTER — OFFICE VISIT (OUTPATIENT)
Dept: CARDIOLOGY | Facility: CLINIC | Age: 73
End: 2023-04-28
Attending: NURSE PRACTITIONER
Payer: COMMERCIAL

## 2023-04-28 VITALS
SYSTOLIC BLOOD PRESSURE: 164 MMHG | OXYGEN SATURATION: 96 % | HEART RATE: 63 BPM | WEIGHT: 315 LBS | BODY MASS INDEX: 36.45 KG/M2 | HEIGHT: 78 IN | DIASTOLIC BLOOD PRESSURE: 98 MMHG

## 2023-04-28 DIAGNOSIS — I25.10 CORONARY ARTERY DISEASE INVOLVING NATIVE CORONARY ARTERY OF NATIVE HEART WITHOUT ANGINA PECTORIS: Primary | ICD-10-CM

## 2023-04-28 DIAGNOSIS — I10 ESSENTIAL HYPERTENSION: ICD-10-CM

## 2023-04-28 PROCEDURE — 99214 OFFICE O/P EST MOD 30 MIN: CPT | Performed by: NURSE PRACTITIONER

## 2023-04-28 RX ORDER — METOPROLOL SUCCINATE 100 MG/1
100 TABLET, EXTENDED RELEASE ORAL 2 TIMES DAILY
COMMUNITY
Start: 2023-04-28 | End: 2023-09-18

## 2023-04-28 RX ORDER — FUROSEMIDE 20 MG
20 TABLET ORAL DAILY
COMMUNITY
Start: 2023-04-24 | End: 2023-05-19

## 2023-04-28 NOTE — PROGRESS NOTES
Cardiology Clinic Progress Note  Federico Chamberlain MRN# [id number]   YOB: 1950 Age: 72 year old   Primary Cardiologist: Dr. Garcia Reason for visit: 6 week follow up             Assessment and Plan:     1.  Coronary artery disease (Nonobstructive moderate proximal RCA and first marginal disease)       NSTEMI  -Lexiscan nuclear stress test ordered secondary to troponin elevation and hypokinesis during hospitalization 12/2022, result was abnormal with mild ischemia in the basal to mid inferior and inferolateral segments.  -2/2023 Coronary angiogram with no flow limiting stenosis   -Continue aspirin, Imdur, atorvastatin 40mg daily   -No current anginal symptoms    2.  Syncope, likely secondary to Sepsis and rhabdomyolysis  -Event monitor demonstrated sinus rhythm with IVCD, intermittent first-degree block, PVCs with no high-grade AV block  -Patient has an appointment to see neurology, 5/26/2023    3. Obstructive sleep apnea  -Sleep study positive for sleep apnea  -Awaiting arrival of his CPAP machine     4. Hypertension, uncontrolled  -Stoped hydralazine and resume lisinopril 40mg daily with resolution of RILEY   -Metoprolol XL increased to 100mg BID earlier this week by PCP  -Patient has not yet started taking this dose, nor is he checking his blood pressure at home    5.  Ongoing right leg cellulitis and edema, following with infectious disease   -Recently completed another round of augmentin/doxycycline  -Negative venous ultrasound of RLE for DVT  -PCP gave patient prescription for furosemide 20 mg daily earlier this week, he has not started taking this, suspect this will be helpful as prior to hospitalization in December 2022 patient was on spironolactone and chlorthalidone which were likely managing his edema.  Since that time he has not been on a diuretic as both were discontinued prior to his discharge from TCU given RILEY.  Will need close monitoring of renal function with start of diuresis.    6.   Hypomagnesemia and QT prolongation  -Recheck Magnesium level 3/16/23 1.9  -Continue magnesium oxide    7. Obesity, BMI 36.76  -Encouraged healthy lifestyle changes including healthy diet and exercising   -On ozempic which has been helpful for weight loss and improving hemoglobin A1c    8. Type II diabetes, controlled  -HgbA1c 5.9%, 4/2023  -On ozempic    Changes today: Start taking furosemide 20 mg daily x1 week, weigh yourself at the end of the week, call us with how your edema, weight, and status of your right leg tenderness, start taking your increased dose of metoprolol ordered by your PCP, check your blood pressure daily x1 week, repeat BMP in 2 weeks    Follow up plan: Follow-up with me in 1 month        History of Presenting Illness:    Federico Chamberlain is a very pleasant 72 year old male with a history of syncope, type II MI/NSTEMI, orthostasis, recent severe sepsis and right lower extremity cellulitis secondary to frostbite of the right great toe, hypertension, aortic dilation, QT prolongation, type 2 diabetes, hyperlipidemia, history of prostate cancer, obesity.    Patient was admitted with syncope 1/1/2023 in setting of severe sepsis and bacteremia, found to have rhabdomyolysis, mildly elevated troponin, and prolonged QT interval.  Mild elevated troponins most consistent with a type II MI, however TTE 12/20/2022 showed LVEF 50% with septal lateral hypokinesis.  He had a baseline abnormal conduction with bifascicular block and first-degree AV block with an event monitor in place.  During his hospital stay he had frequent PVCs, short runs of paroxysmal SVT.  His metoprolol was increased to 50 mg twice daily.  Isordil, furosemide, and hydralazine were added.  His lisinopril, chlorthalidone, and spironolactone were all held due to rhabdomyolysis.    He went to TCU at discharge, his lasix was discontinued and previous chlorthalidone was restarted, hydralazine with increased to 50mg TID 2/2 HTN. His RILEY resolved  and WBCs normalized prior to discharge.     He saw his PCP, Dr. Aponte at [facility] on 1/20/23 and discussed resuming lisinopril pending cardiology approval.  His CK returned to normal at 79, sodium 143, potassium 3.4, creatinine 0.9, and GFR greater than 60, B12 level normal at 747. He continued him on a course of 1 additional week of Augmentin.     I first met Federico in January 2023 following his hospitalization. I ordered a Lexiscan nuclear stress test, given the hypokinesis seen on his hospital echocardiogram. I also reduced his hydralazine to 25 mg 3 times a day and resumed his lisinopril at 10 mg a day.    Lexiscan nuclear stress test was abnormal demonstrating a mild ischemia in the basal to mid inferior and inferolateral segments. He saw Dr. Garcia in follow up to review at which time, he increased his imdur and recommended a coronary angiogram.      Coronary angiogram was completed 2/17/2023 and showed moderate stenosis in the mid RCA and OM1, neither were flow-limiting by assessment with IFR.    At his last visit, March 2023, we stopped his hydralazine and resumed his lisinopril after lab work showed resolution of his RILEY.    Repeat BMP from 3/16/2023 showed normal electrolytes and renal function.    Patient is here today for a 6-week follow-up. He reports feeling well.  He continues to follow with infectious disease for the cellulitis in his right lower leg.  He was recently treated with another course of Augmentin and doxycycline which improved the tenderness and redness significantly however he continues to have lower extremity edema and some residual tenderness over the site of his previous cellulitis.    He does also have a similar amount of edema in his left lower extremity.    Patient denies chest pain or chest tightness. Denies dizziness, lightheadedness or other presyncopal symptoms. Denies tachycardia or palpitations. Denies shortness of breath, orthopnea, or PND.     Blood pressure 164/98 and  "HR 63 in clinic today.    He is considering move to [place], [place] to be closer to his son.        Recent Hospitalizations   As above        Social History      Social History     Socioeconomic History     Marital status:      Spouse name: Not on file     Number of children: Not on file     Years of education: Not on file     Highest education level: Not on file   Occupational History     Not on file   Tobacco Use     Smoking status: Former     Types: Cigarettes     Quit date: 1992     Years since quittin.2     Smokeless tobacco: Never   Vaping Use     Vaping status: Not on file   Substance and Sexual Activity     Alcohol use: Yes     Comment: Occ, 1-2 beers a month     Drug use: No     Sexual activity: Not on file   Other Topics Concern     Not on file   Social History Narrative     Not on file     Social Determinants of Health     Financial Resource Strain: Not on file   Food Insecurity: Not on file   Transportation Needs: Not on file   Physical Activity: Not on file   Stress: Not on file   Social Connections: Not on file   Intimate Partner Violence: Not on file   Housing Stability: Not on file            Review of Systems:   Skin:  not assessed     Eyes:  not assessed    ENT:  not assessed    Respiratory:  Positive for sleep apnea  Cardiovascular:    edema;Positive for  Gastroenterology: not assessed    Genitourinary:  not assessed    Musculoskeletal:  not assessed    Neurologic:  not assessed    Psychiatric:  not assessed    Heme/Lymph/Imm:  not assessed    Endocrine:  not assessed           Physical Exam:   Vitals: BP (!) 164/98 (BP Location: Right arm, Patient Position: Sitting, Cuff Size: Adult Large)   Pulse 63   Ht 1.981 m (6' 6\")   Wt 144.3 kg (318 lb 1.6 oz)   SpO2 96%   BMI 36.76 kg/m     Wt Readings from Last 4 Encounters:   23 144.3 kg (318 lb 1.6 oz)   23 145.2 kg (320 lb)   23 147 kg (324 lb 1.6 oz)   23 145.6 kg (321 lb)     GEN: well nourished, in " no acute distress.  HEENT:  Pupils equal, round. Sclerae nonicteric.   NECK: Supple, no masses appreciated. No JVD with patient supine  C/V:  Regular rate and rhythm, no murmur, rub or gallop.    RESP: Respirations are unlabored. Clear to auscultation bilaterally without wheezing, rales, or rhonchi.  GI: Abdomen soft, nontender.  EXTREM: Bilateral LE edema to knee, 1+. Slightly pink, dry, skin up to mid shin, non-tender  NEURO: Alert and oriented, cooperative.  SKIN: Warm and dry.       Data:     LIPID RESULTS:  Lab Results   Component Value Date    CHOL 92 07/02/2018    HDL 32 (L) 07/02/2018    LDL <1 07/02/2018    TRIG 297 (H) 07/02/2018     LIVER ENZYME RESULTS:  Lab Results   Component Value Date    AST 27 01/10/2023    AST 15 07/02/2018    ALT 35 12/31/2022    ALT 23 07/02/2018     CBC RESULTS:  Lab Results   Component Value Date    WBC 7.3 02/28/2023    WBC 8.1 08/04/2020    RBC 4.90 02/28/2023    RBC 5.04 08/04/2020    HGB 14.0 02/28/2023    HGB 14.6 08/04/2020    HCT 43.5 02/28/2023    HCT 44.8 08/04/2020    MCV 89 02/28/2023    MCV 89 08/04/2020    MCH 28.6 02/28/2023    MCH 29.0 08/04/2020    MCHC 32.2 02/28/2023    MCHC 32.6 08/04/2020    RDW 14.5 02/28/2023    RDW 13.8 08/04/2020     02/28/2023     08/04/2020     BMP RESULTS:  Lab Results   Component Value Date     03/16/2023     08/04/2020    POTASSIUM 4.1 03/16/2023    POTASSIUM 4.3 01/16/2023    POTASSIUM 3.2 (L) 08/04/2020    CHLORIDE 104 03/16/2023    CHLORIDE 106 01/16/2023    CHLORIDE 108 08/04/2020    CO2 31 (H) 03/16/2023    CO2 30 01/16/2023    CO2 28 08/04/2020    ANIONGAP 8 03/16/2023    ANIONGAP 6 01/16/2023    ANIONGAP 7 08/04/2020     (H) 03/16/2023    GLC 89 01/16/2023     (H) 08/04/2020    BUN 15.9 03/16/2023    BUN 13 01/16/2023    BUN 18 08/04/2020    CR 0.77 03/16/2023    CR 0.92 08/04/2020    GFRESTIMATED >90 03/16/2023    GFRESTIMATED >60 12/31/2022    GFRESTIMATED 85 08/04/2020    GFRESTBLACK  >90 08/04/2020    LAURA 9.1 03/16/2023    LAURA 8.6 08/04/2020      A1C RESULTS:  Lab Results   Component Value Date    A1C 6.0 (H) 12/31/2022    A1C 6.0 (H) 07/08/2018     INR RESULTS:  Lab Results   Component Value Date    INR 0.97 02/17/2023    INR 1.13 12/31/2022    INR 0.97 07/08/2018    INR 0.98 11/09/2007            Medications     Current Outpatient Medications   Medication Sig Dispense Refill     aspirin 81 MG EC tablet Take 81 mg by mouth daily       atorvastatin (LIPITOR) 40 MG tablet Take 40 mg by mouth daily       chlorthalidone (HYGROTON) 25 MG tablet Take 25 mg by mouth daily       diclofenac (VOLTAREN) 1 % topical gel Apply topically daily as needed for moderate pain (4-6)       ferrous sulfate (FEROSUL) 325 (65 Fe) MG tablet Take 325 mg by mouth daily (with breakfast)       furosemide (LASIX) 20 MG tablet Take 20 mg by mouth daily       gabapentin (NEURONTIN) 800 MG tablet Take 800 mg by mouth 3 times daily       isosorbide mononitrate (IMDUR) 30 MG 24 hr tablet Take 1.5 tablets (45 mg) by mouth daily 90 tablet 3     lisinopril (ZESTRIL) 40 MG tablet Take 1 tablet (40 mg) by mouth daily 90 tablet 3     magnesium oxide (MAG-OX) 400 MG tablet Take 1 tablet (400 mg) by mouth daily 90 tablet 0     metFORMIN (GLUCOPHAGE) 500 MG tablet Take 500 mg by mouth daily       metoprolol succinate ER (TOPROL XL) 100 MG 24 hr tablet Take 1 tablet (100 mg) by mouth 2 times daily       potassium chloride ER (KLOR-CON M) 10 MEQ CR tablet Take 20 mEq by mouth daily       Pramipexole Dihydrochloride (MIRAPEX PO) Take 3 mg by mouth At Bedtime       semaglutide (OZEMPIC, 1 MG/DOSE,) 2 MG/1.5ML pen Inject 0.5 mg Subcutaneous every 7 days On Fridays       VITAMIN D, CHOLECALCIFEROL, PO Take 4,000 Units by mouth daily       acetaminophen (TYLENOL) 325 MG tablet Take 3 tablets (975 mg) by mouth 3 times daily (Patient not taking: Reported on 2/27/2023)       cyclobenzaprine (FLEXERIL) 5 MG tablet Take 5 mg by mouth 3 times daily  as needed for muscle spasms (Patient not taking: Reported on 4/28/2023)       Lidocaine (LIDOCARE) 4 % Patch Place 1-2 patches onto the skin every 24 hours To prevent lidocaine toxicity, patient should be patch free for 12 hrs daily. (Patient not taking: Reported on 2/27/2023)       multivitamin w/minerals (THERA-VIT-M) tablet Take 1 tablet by mouth At Bedtime (Patient not taking: Reported on 2/27/2023)       nitroGLYcerin (NITROSTAT) 0.4 MG sublingual tablet For chest pain place 1 tablet under the tongue every 5 minutes for 3 doses. If symptoms persist 5 minutes after 1st dose call 911. (Patient not taking: Reported on 2/27/2023) 25 tablet 3     OXYCODONE-ACETAMINOPHEN PO Take by mouth daily as needed (Patient not taking: Reported on 4/28/2023)       senna-docusate (SENOKOT-S/PERICOLACE) 8.6-50 MG tablet Take 1 tablet by mouth 2 times daily as needed for constipation (Patient not taking: Reported on 4/28/2023)       silver sulfADIAZINE (SILVADENE) 1 % external cream Apply topically daily as needed (Patient not taking: Reported on 2/27/2023)            Past Medical History     Past Medical History:   Diagnosis Date     Hypercholesterolemia      Hypertension      Past Surgical History:   Procedure Laterality Date     APPENDECTOMY       BACK SURGERY       CV CORONARY ANGIOGRAM N/A 2/17/2023    Procedure: Coronary Angiogram RADIAL ACCESS;  Surgeon: Malou Mazariegos MD;  Location:  [facility]     CV FRACTIONAL FLOW RATIO WIRE N/A 2/17/2023    Procedure: Fractional Flow Ratio Wire;  Surgeon: Malou Mazariegos MD;  Location:  [facility]     CV INSTANTANEOUS WAVE-FREE RATIO N/A 2/17/2023    Procedure: Instantaneous Wave-Free Ratio;  Surgeon: Malou Mazariegos MD;  Location:  [facility]     CV LEFT HEART CATH N/A 2/17/2023    Procedure: Left Heart Catheterization;  Surgeon: Malou Mazariegos MD;  Location: [facility]     ORTHOPEDIC SURGERY       History reviewed. No  pertinent family history.         Allergies   Patient has no known allergies.        Alana Cabello NP  [facility]  Pager: [phone]

## 2023-04-28 NOTE — PATIENT INSTRUCTIONS
Today's Recommendations    Try taking the furosemide 20mg daily for 1 week, weight yourself at the end of the week and call me with how the swelling is looking/what your weight is/right leg pain  Check your blood pressure at home a few times a week and keep a log after you start your increase dose of metoprolol  Lets plan to recheck your labs in 2 weeks   Please follow up with me in 1 month.    Please send a [employer] message or call [phone] to the RN team with questions or concerns.     Scheduling number [phone]  JENI Spain, CNP

## 2023-04-28 NOTE — LETTER
4/28/2023    Luis Alberto Aponte MD  [facility] [address]   [place] [address]    RE: Federico DAVENPORT Bulmaro       Dear Colleague,     I had the pleasure of seeing Federico Chamberlain in the [facility].  Cardiology Clinic Progress Note  Federico Chamberlain MRN# [id number]   YOB: 1950 Age: 72 year old   Primary Cardiologist: Dr. Garcia Reason for visit: 6 week follow up             Assessment and Plan:     1.  Coronary artery disease (Nonobstructive moderate proximal RCA and first marginal disease)       NSTEMI  -Lexiscan nuclear stress test ordered secondary to troponin elevation and hypokinesis during hospitalization 12/2022, result was abnormal with mild ischemia in the basal to mid inferior and inferolateral segments.  -2/2023 Coronary angiogram with no flow limiting stenosis   -Continue aspirin, Imdur, atorvastatin 40mg daily   -No current anginal symptoms    2.  Syncope, likely secondary to Sepsis and rhabdomyolysis  -Event monitor demonstrated sinus rhythm with IVCD, intermittent first-degree block, PVCs with no high-grade AV block  -Patient has an appointment to see neurology, 5/26/2023    3. Obstructive sleep apnea  -Sleep study positive for sleep apnea  -Awaiting arrival of his CPAP machine     4. Hypertension, uncontrolled  -Stoped hydralazine and resume lisinopril 40mg daily with resolution of RILEY   -Metoprolol XL increased to 100mg BID earlier this week by PCP  -Patient has not yet started taking this dose, nor is he checking his blood pressure at home    5.  Ongoing right leg cellulitis and edema, following with infectious disease   -Recently completed another round of augmentin/doxycycline  -Negative venous ultrasound of RLE for DVT  -PCP gave patient prescription for furosemide 20 mg daily earlier this week, he has not started taking this, suspect this will be helpful as prior to hospitalization in December 2022 patient was on spironolactone and chlorthalidone which were  likely managing his edema.  Since that time he has not been on a diuretic as both were discontinued prior to his discharge from TCU given RILEY.  Will need close monitoring of renal function with start of diuresis.    6.  Hypomagnesemia and QT prolongation  -Recheck Magnesium level 3/16/23 1.9  -Continue magnesium oxide    7. Obesity, BMI 36.76  -Encouraged healthy lifestyle changes including healthy diet and exercising   -On ozempic which has been helpful for weight loss and improving hemoglobin A1c    8. Type II diabetes, controlled  -HgbA1c 5.9%, 4/2023  -On ozempic    Changes today: Start taking furosemide 20 mg daily x1 week, weigh yourself at the end of the week, call us with how your edema, weight, and status of your right leg tenderness, start taking your increased dose of metoprolol ordered by your PCP, check your blood pressure daily x1 week, repeat BMP in 2 weeks    Follow up plan: Follow-up with me in 1 month        History of Presenting Illness:    Federico Chamberlain is a very pleasant 72 year old male with a history of syncope, type II MI/NSTEMI, orthostasis, recent severe sepsis and right lower extremity cellulitis secondary to frostbite of the right great toe, hypertension, aortic dilation, QT prolongation, type 2 diabetes, hyperlipidemia, history of prostate cancer, obesity.    Patient was admitted with syncope 1/1/2023 in setting of severe sepsis and bacteremia, found to have rhabdomyolysis, mildly elevated troponin, and prolonged QT interval.  Mild elevated troponins most consistent with a type II MI, however TTE 12/20/2022 showed LVEF 50% with septal lateral hypokinesis.  He had a baseline abnormal conduction with bifascicular block and first-degree AV block with an event monitor in place.  During his hospital stay he had frequent PVCs, short runs of paroxysmal SVT.  His metoprolol was increased to 50 mg twice daily.  Isordil, furosemide, and hydralazine were added.  His lisinopril, chlorthalidone, and  spironolactone were all held due to rhabdomyolysis.    He went to TCU at discharge, his lasix was discontinued and previous chlorthalidone was restarted, hydralazine with increased to 50mg TID 2/2 HTN. His RILEY resolved and WBCs normalized prior to discharge.     He saw his PCP, Dr. Aponte at [facility] on 1/20/23 and discussed resuming lisinopril pending cardiology approval.  His CK returned to normal at 79, sodium 143, potassium 3.4, creatinine 0.9, and GFR greater than 60, B12 level normal at 747. He continued him on a course of 1 additional week of Augmentin.     I first met Federico in January 2023 following his hospitalization. I ordered a Lexiscan nuclear stress test, given the hypokinesis seen on his hospital echocardiogram. I also reduced his hydralazine to 25 mg 3 times a day and resumed his lisinopril at 10 mg a day.    Lexiscan nuclear stress test was abnormal demonstrating a mild ischemia in the basal to mid inferior and inferolateral segments. He saw Dr. Garcia in follow up to review at which time, he increased his imdur and recommended a coronary angiogram.      Coronary angiogram was completed 2/17/2023 and showed moderate stenosis in the mid RCA and OM1, neither were flow-limiting by assessment with IFR.    At his last visit, March 2023, we stopped his hydralazine and resumed his lisinopril after lab work showed resolution of his RILEY.    Repeat BMP from 3/16/2023 showed normal electrolytes and renal function.    Patient is here today for a 6-week follow-up. He reports feeling well.  He continues to follow with infectious disease for the cellulitis in his right lower leg.  He was recently treated with another course of Augmentin and doxycycline which improved the tenderness and redness significantly however he continues to have lower extremity edema and some residual tenderness over the site of his previous cellulitis.    He does also have a similar amount of edema in his left lower  "extremity.    Patient denies chest pain or chest tightness. Denies dizziness, lightheadedness or other presyncopal symptoms. Denies tachycardia or palpitations. Denies shortness of breath, orthopnea, or PND.     Blood pressure 164/98 and HR 63 in clinic today.    He is considering move to [place], [place] to be closer to his son.        Recent Hospitalizations   As above        Social History      Social History     Socioeconomic History    Marital status:      Spouse name: Not on file    Number of children: Not on file    Years of education: Not on file    Highest education level: Not on file   Occupational History    Not on file   Tobacco Use    Smoking status: Former     Types: Cigarettes     Quit date: 1992     Years since quittin.2    Smokeless tobacco: Never   Vaping Use    Vaping status: Not on file   Substance and Sexual Activity    Alcohol use: Yes     Comment: Occ, 1-2 beers a month    Drug use: No    Sexual activity: Not on file   Other Topics Concern    Not on file   Social History Narrative    Not on file     Social Determinants of Health     Financial Resource Strain: Not on file   Food Insecurity: Not on file   Transportation Needs: Not on file   Physical Activity: Not on file   Stress: Not on file   Social Connections: Not on file   Intimate Partner Violence: Not on file   Housing Stability: Not on file            Review of Systems:   Skin:  not assessed     Eyes:  not assessed    ENT:  not assessed    Respiratory:  Positive for sleep apnea  Cardiovascular:    edema;Positive for  Gastroenterology: not assessed    Genitourinary:  not assessed    Musculoskeletal:  not assessed    Neurologic:  not assessed    Psychiatric:  not assessed    Heme/Lymph/Imm:  not assessed    Endocrine:  not assessed           Physical Exam:   Vitals: BP (!) 164/98 (BP Location: Right arm, Patient Position: Sitting, Cuff Size: Adult Large)   Pulse 63   Ht 1.981 m (6' 6\")   Wt 144.3 kg (318 lb 1.6 oz)  "  SpO2 96%   BMI 36.76 kg/m     Wt Readings from Last 4 Encounters:   04/28/23 144.3 kg (318 lb 1.6 oz)   02/27/23 145.2 kg (320 lb)   02/14/23 147 kg (324 lb 1.6 oz)   02/02/23 145.6 kg (321 lb)     GEN: well nourished, in no acute distress.  HEENT:  Pupils equal, round. Sclerae nonicteric.   NECK: Supple, no masses appreciated. No JVD with patient supine  C/V:  Regular rate and rhythm, no murmur, rub or gallop.    RESP: Respirations are unlabored. Clear to auscultation bilaterally without wheezing, rales, or rhonchi.  GI: Abdomen soft, nontender.  EXTREM: Bilateral LE edema to knee, 1+. Slightly pink, dry, skin up to mid shin, non-tender  NEURO: Alert and oriented, cooperative.  SKIN: Warm and dry.       Data:     LIPID RESULTS:  Lab Results   Component Value Date    CHOL 92 07/02/2018    HDL 32 (L) 07/02/2018    LDL <1 07/02/2018    TRIG 297 (H) 07/02/2018     LIVER ENZYME RESULTS:  Lab Results   Component Value Date    AST 27 01/10/2023    AST 15 07/02/2018    ALT 35 12/31/2022    ALT 23 07/02/2018     CBC RESULTS:  Lab Results   Component Value Date    WBC 7.3 02/28/2023    WBC 8.1 08/04/2020    RBC 4.90 02/28/2023    RBC 5.04 08/04/2020    HGB 14.0 02/28/2023    HGB 14.6 08/04/2020    HCT 43.5 02/28/2023    HCT 44.8 08/04/2020    MCV 89 02/28/2023    MCV 89 08/04/2020    MCH 28.6 02/28/2023    MCH 29.0 08/04/2020    MCHC 32.2 02/28/2023    MCHC 32.6 08/04/2020    RDW 14.5 02/28/2023    RDW 13.8 08/04/2020     02/28/2023     08/04/2020     BMP RESULTS:  Lab Results   Component Value Date     03/16/2023     08/04/2020    POTASSIUM 4.1 03/16/2023    POTASSIUM 4.3 01/16/2023    POTASSIUM 3.2 (L) 08/04/2020    CHLORIDE 104 03/16/2023    CHLORIDE 106 01/16/2023    CHLORIDE 108 08/04/2020    CO2 31 (H) 03/16/2023    CO2 30 01/16/2023    CO2 28 08/04/2020    ANIONGAP 8 03/16/2023    ANIONGAP 6 01/16/2023    ANIONGAP 7 08/04/2020     (H) 03/16/2023    GLC 89 01/16/2023     (H)  08/04/2020    BUN 15.9 03/16/2023    BUN 13 01/16/2023    BUN 18 08/04/2020    CR 0.77 03/16/2023    CR 0.92 08/04/2020    GFRESTIMATED >90 03/16/2023    GFRESTIMATED >60 12/31/2022    GFRESTIMATED 85 08/04/2020    GFRESTBLACK >90 08/04/2020    LAURA 9.1 03/16/2023    LAURA 8.6 08/04/2020      A1C RESULTS:  Lab Results   Component Value Date    A1C 6.0 (H) 12/31/2022    A1C 6.0 (H) 07/08/2018     INR RESULTS:  Lab Results   Component Value Date    INR 0.97 02/17/2023    INR 1.13 12/31/2022    INR 0.97 07/08/2018    INR 0.98 11/09/2007            Medications     Current Outpatient Medications   Medication Sig Dispense Refill    aspirin 81 MG EC tablet Take 81 mg by mouth daily      atorvastatin (LIPITOR) 40 MG tablet Take 40 mg by mouth daily      chlorthalidone (HYGROTON) 25 MG tablet Take 25 mg by mouth daily      diclofenac (VOLTAREN) 1 % topical gel Apply topically daily as needed for moderate pain (4-6)      ferrous sulfate (FEROSUL) 325 (65 Fe) MG tablet Take 325 mg by mouth daily (with breakfast)      furosemide (LASIX) 20 MG tablet Take 20 mg by mouth daily      gabapentin (NEURONTIN) 800 MG tablet Take 800 mg by mouth 3 times daily      isosorbide mononitrate (IMDUR) 30 MG 24 hr tablet Take 1.5 tablets (45 mg) by mouth daily 90 tablet 3    lisinopril (ZESTRIL) 40 MG tablet Take 1 tablet (40 mg) by mouth daily 90 tablet 3    magnesium oxide (MAG-OX) 400 MG tablet Take 1 tablet (400 mg) by mouth daily 90 tablet 0    metFORMIN (GLUCOPHAGE) 500 MG tablet Take 500 mg by mouth daily      metoprolol succinate ER (TOPROL XL) 100 MG 24 hr tablet Take 1 tablet (100 mg) by mouth 2 times daily      potassium chloride ER (KLOR-CON M) 10 MEQ CR tablet Take 20 mEq by mouth daily      Pramipexole Dihydrochloride (MIRAPEX PO) Take 3 mg by mouth At Bedtime      semaglutide (OZEMPIC, 1 MG/DOSE,) 2 MG/1.5ML pen Inject 0.5 mg Subcutaneous every 7 days On Fridays      VITAMIN D, CHOLECALCIFEROL, PO Take 4,000 Units by mouth daily       acetaminophen (TYLENOL) 325 MG tablet Take 3 tablets (975 mg) by mouth 3 times daily (Patient not taking: Reported on 2/27/2023)      cyclobenzaprine (FLEXERIL) 5 MG tablet Take 5 mg by mouth 3 times daily as needed for muscle spasms (Patient not taking: Reported on 4/28/2023)      Lidocaine (LIDOCARE) 4 % Patch Place 1-2 patches onto the skin every 24 hours To prevent lidocaine toxicity, patient should be patch free for 12 hrs daily. (Patient not taking: Reported on 2/27/2023)      multivitamin w/minerals (THERA-VIT-M) tablet Take 1 tablet by mouth At Bedtime (Patient not taking: Reported on 2/27/2023)      nitroGLYcerin (NITROSTAT) 0.4 MG sublingual tablet For chest pain place 1 tablet under the tongue every 5 minutes for 3 doses. If symptoms persist 5 minutes after 1st dose call 911. (Patient not taking: Reported on 2/27/2023) 25 tablet 3    OXYCODONE-ACETAMINOPHEN PO Take by mouth daily as needed (Patient not taking: Reported on 4/28/2023)      senna-docusate (SENOKOT-S/PERICOLACE) 8.6-50 MG tablet Take 1 tablet by mouth 2 times daily as needed for constipation (Patient not taking: Reported on 4/28/2023)      silver sulfADIAZINE (SILVADENE) 1 % external cream Apply topically daily as needed (Patient not taking: Reported on 2/27/2023)            Past Medical History     Past Medical History:   Diagnosis Date    Hypercholesterolemia     Hypertension      Past Surgical History:   Procedure Laterality Date    APPENDECTOMY      BACK SURGERY      CV CORONARY ANGIOGRAM N/A 2/17/2023    Procedure: Coronary Angiogram RADIAL ACCESS;  Surgeon: Malou Mazariegos MD;  Location:  [facility]    CV FRACTIONAL FLOW RATIO WIRE N/A 2/17/2023    Procedure: Fractional Flow Ratio Wire;  Surgeon: Malou Mazariegos MD;  Location:  [facility]    CV INSTANTANEOUS WAVE-FREE RATIO N/A 2/17/2023    Procedure: Instantaneous Wave-Free Ratio;  Surgeon: Malou Mazariegos MD;  Location:  [facility]ARD[facility] CATH LAB     CV LEFT HEART CATH N/A 2/17/2023    Procedure: Left Heart Catheterization;  Surgeon: Malou Mazariegos MD;  Location: [facility]    ORTHOPEDIC SURGERY       History reviewed. No pertinent family history.         Allergies   Patient has no known allergies.        Alana Cabello NP  [facility]  Pager: [phone]        Thank you for allowing me to participate in the care of your patient.      Sincerely,     Alana Cabello NP     [facility]  cc:   Alana Cabello NP  [address] [place] [address]

## 2023-05-16 ENCOUNTER — LAB (OUTPATIENT)
Dept: LAB | Facility: CLINIC | Age: 73
End: 2023-05-16
Payer: COMMERCIAL

## 2023-05-16 DIAGNOSIS — I10 ESSENTIAL HYPERTENSION: ICD-10-CM

## 2023-05-16 LAB
ANION GAP SERPL CALCULATED.3IONS-SCNC: 9 MMOL/L (ref 7–15)
BUN SERPL-MCNC: 16.9 MG/DL (ref 8–23)
CALCIUM SERPL-MCNC: 9.4 MG/DL (ref 8.8–10.2)
CHLORIDE SERPL-SCNC: 106 MMOL/L (ref 98–107)
CREAT SERPL-MCNC: 0.9 MG/DL (ref 0.67–1.17)
DEPRECATED HCO3 PLAS-SCNC: 29 MMOL/L (ref 22–29)
GFR SERPL CREATININE-BSD FRML MDRD: >90 ML/MIN/1.73M2
GLUCOSE SERPL-MCNC: 109 MG/DL (ref 70–99)
POTASSIUM SERPL-SCNC: 3.7 MMOL/L (ref 3.4–5.3)
SODIUM SERPL-SCNC: 144 MMOL/L (ref 136–145)

## 2023-05-16 PROCEDURE — 36415 COLL VENOUS BLD VENIPUNCTURE: CPT | Performed by: NURSE PRACTITIONER

## 2023-05-16 PROCEDURE — 80048 BASIC METABOLIC PNL TOTAL CA: CPT | Performed by: NURSE PRACTITIONER

## 2023-05-17 ENCOUNTER — MYC MEDICAL ADVICE (OUTPATIENT)
Dept: CARDIOLOGY | Facility: CLINIC | Age: 73
End: 2023-05-17
Payer: COMMERCIAL

## 2023-05-17 DIAGNOSIS — R60.9 EDEMA: Primary | ICD-10-CM

## 2023-05-19 RX ORDER — FUROSEMIDE 20 MG
20 TABLET ORAL DAILY
Qty: 30 TABLET | Refills: 3 | Status: SHIPPED | OUTPATIENT
Start: 2023-05-19 | End: 2023-06-28

## 2023-05-24 DIAGNOSIS — I24.89 DEMAND ISCHEMIA (H): ICD-10-CM

## 2023-05-24 RX ORDER — MAGNESIUM OXIDE 400 MG/1
400 TABLET ORAL DAILY
Qty: 90 TABLET | Refills: 3 | Status: SHIPPED | OUTPATIENT
Start: 2023-05-24 | End: 2023-08-28

## 2023-05-30 ENCOUNTER — DOCUMENTATION ONLY (OUTPATIENT)
Dept: OTHER | Facility: CLINIC | Age: 73
End: 2023-05-30
Payer: COMMERCIAL

## 2023-05-30 PROBLEM — Z71.89 ACP (ADVANCE CARE PLANNING): Chronic | Status: RESOLVED | Noted: 2018-07-02 | Resolved: 2023-05-30

## 2023-06-26 NOTE — PROGRESS NOTES
Cardiology Clinic Progress Note  Federico Chamberlain MRN# [id number]   YOB: 1950 Age: 72 year old   Primary Cardiologist: Dr. Garcia Reason for visit: 2 month follow up             Assessment and Plan:     1.  Coronary artery disease (Nonobstructive moderate proximal RCA and first marginal disease)       NSTEMI  -2/2023 Lexiscan nuclear stress test ordered secondary to troponin elevation and hypokinesis during hospitalization 12/2022, result was abnormal with mild ischemia in the basal to mid inferior and inferolateral segments.  -2/2023 Coronary angiogram with no flow limiting stenosis   -Continue aspirin, Imdur, atorvastatin 40mg daily   -No current anginal symptoms    2.  Syncope, likely secondary to Sepsis and rhabdomyolysis  -12/2022 Event monitor demonstrated sinus rhythm with IVCD, intermittent first-degree block, PVCs with no high-grade AV block  -Patient saw neurology in May 2023 and episodes of syncope felt to be secondary to hypoperfusion in setting of hypotension and sepsis    3. Obstructive sleep apnea, following with pulmonology   -Sleep study positive for sleep apnea  -Still awaiting arrival of his CPAP machine, suspect daytime fatigue secondary to poor sleep and untreated TOBI    4. Hypertension, uncontrolled  -Previously on spironolactone and chlorthalidone 25mg bid prior to hospitalization, stopped in the setting of RILEY and hydralazine initiated  -Stopped hydralazine and resumed lisinopril 40mg daily with resolution of RILEY   -Furosemide 20mg daily added with ongoing LE edema and edema improved  -Continue Metoprolol XL to 100mg BID, continue chorthalidone 25mg daily     5. Right leg cellulitis and edema, following with infectious disease, resolved   -Recently completed another round of augmentin/doxycycline  -Negative venous ultrasound of RLE for DVT  -PCP gave patient prescription for furosemide 20 mg daily earlier this week, he has not started taking this, suspect this will be helpful as  prior to hospitalization in December 2022 patient was on spironolactone and chlorthalidone which were likely managing his edema.  Since that time his chlorthalidone was resumed and furosemide was added with improvement in edema.     6.  Hypomagnesemia and QT prolongation  -Recheck Magnesium level 3/16/23 1.9  -Continue magnesium oxide    7. Obesity, BMI 36.76  -Encouraged healthy lifestyle changes including healthy diet and exercising   -On ozempic which has been helpful for weight loss and improving hemoglobin A1c    8. Type II diabetes, controlled  -HgbA1c 5.9%, 4/2023  -On ozempic    Changes today: No medication changes made today, BMP in 3 months    Follow up plan: Follow up with Dr. Garcia in 6 months or sooner if needed        History of Presenting Illness:    Federico Chamberlain is a very pleasant 72 year old male with a history of syncope, type II MI/NSTEMI, orthostasis, recent severe sepsis and right lower extremity cellulitis secondary to frostbite of the right great toe, hypertension, aortic dilation, QT prolongation, type 2 diabetes, hyperlipidemia, history of prostate cancer, obesity.    Patient was admitted with syncope 1/1/2023 in setting of severe sepsis and bacteremia, found to have rhabdomyolysis, mildly elevated troponin, and prolonged QT interval.  Mild elevated troponins most consistent with a type II MI, however TTE 12/20/2022 showed LVEF 50% with septal lateral hypokinesis.  He had a baseline abnormal conduction with bifascicular block and first-degree AV block with an event monitor in place.  During his hospital stay he had frequent PVCs, short runs of paroxysmal SVT.  His metoprolol was increased to 50 mg twice daily.  Isordil, furosemide, and hydralazine were added.  His lisinopril, chlorthalidone, and spironolactone were all held due to rhabdomyolysis.    He went to TCU at discharge, his lasix was discontinued and previous chlorthalidone was restarted, hydralazine with increased to 50mg TID 2/2  HTN. His RILEY resolved and WBCs normalized prior to discharge.     He saw his PCP, Dr. Aponte at [facility] on 1/20/23 and discussed resuming lisinopril pending cardiology approval.  His CK returned to normal at 79, sodium 143, potassium 3.4, creatinine 0.9, and GFR greater than 60, B12 level normal at 747. He continued him on a course of 1 additional week of Augmentin.     I first met Federico in January 2023 following his hospitalization. I ordered a Lexiscan nuclear stress test, given the hypokinesis seen on his hospital echocardiogram. I also reduced his hydralazine to 25 mg 3 times a day and resumed his lisinopril at 10 mg a day.    Lexiscan nuclear stress test was abnormal demonstrating a mild ischemia in the basal to mid inferior and inferolateral segments. He saw Dr. Garcia in follow up to review at which time, he increased his imdur and recommended a coronary angiogram.      Coronary angiogram was completed 2/17/2023 and showed moderate stenosis in the mid RCA and OM1, neither were flow-limiting by assessment with IFR.    At his last visit, March 2023, we stopped his hydralazine and increased his lisinopril after lab work showed resolution of his RILEY. Renal function and electrolytes remained stable on subsequent BMP.    When I saw him in April he had recently completed a course of antibiotics for continued right leg cellulitis and continued to have bilateral lower extremity edema. I started him in furosemide 20mg daily and follow up labs showed stable electrolytes and renal function.     Patient is here today for a 2 month follow up. He continues to feel fatigued, unchanged from prior visits. He was recently diagnosed with sleep apnea and is awaiting his CPAP. He typically only sleepy 3 hours a night.     Patient denies chest pain or chest tightness. Denies dizziness, lightheadedness or other presyncopal symptoms. Denies tachycardia or palpitations. Denies shortness of breath, orthopnea, or PND.     Blood  "pressure 133/77 and HR 67 in clinic today.    He is considering move to [place], [place] to be closer to his son, however he is hesitating to do this as he enjoys his 4 bedroom home here and his sone would like him to move into a 1 bedroom apartment.        Recent Hospitalizations   As above        Social History      Social History     Socioeconomic History     Marital status:      Spouse name: Not on file     Number of children: Not on file     Years of education: Not on file     Highest education level: Not on file   Occupational History     Not on file   Tobacco Use     Smoking status: Former     Types: Cigarettes     Quit date: 1992     Years since quittin.4     Smokeless tobacco: Never   Substance and Sexual Activity     Alcohol use: Yes     Comment: Occ, 1-2 beers a month     Drug use: No     Sexual activity: Not on file   Other Topics Concern     Not on file   Social History Narrative     Not on file     Social Determinants of Health     Financial Resource Strain: Not on file   Food Insecurity: Not on file   Transportation Needs: Not on file   Physical Activity: Not on file   Stress: Not on file   Social Connections: Not on file   Intimate Partner Violence: Not on file   Housing Stability: Not on file            Review of Systems:   Skin:  not assessed     Eyes:  not assessed    ENT:  not assessed    Respiratory:  Positive for sleep apnea;CPAP  Cardiovascular:  Negative    Gastroenterology: not assessed    Genitourinary:  not assessed    Musculoskeletal:  not assessed    Neurologic:  not assessed    Psychiatric:  not assessed    Heme/Lymph/Imm:  not assessed    Endocrine:  not assessed           Physical Exam:   Vitals: /77 (BP Location: Right arm, Patient Position: Sitting, Cuff Size: Adult Regular)   Pulse 67   Ht 1.981 m (6' 6\")   Wt 144.6 kg (318 lb 12.8 oz)   SpO2 97%   BMI 36.84 kg/m     Wt Readings from Last 4 Encounters:   23 144.6 kg (318 lb 12.8 oz)   23 " 144.3 kg (318 lb 1.6 oz)   02/27/23 145.2 kg (320 lb)   02/14/23 147 kg (324 lb 1.6 oz)     GEN: well nourished, in no acute distress.  HEENT:  Pupils equal, round. Sclerae nonicteric.   NECK: Supple, no masses appreciated. No JVD with patient supine  C/V:  Regular rate and rhythm, no murmur, rub or gallop.    RESP: Respirations are unlabored. Clear to auscultation bilaterally without wheezing, rales, or rhonchi.  GI: Abdomen soft, nontender.  EXTREM: trace Bilateral LE edema to just above the ankle, bilateral hemosiderin staining, left leg brace in place  NEURO: Alert and oriented, cooperative.  SKIN: Warm and dry.       Data:     LIPID RESULTS:  Lab Results   Component Value Date    CHOL 92 07/02/2018    HDL 32 (L) 07/02/2018    LDL <1 07/02/2018    TRIG 297 (H) 07/02/2018     LIVER ENZYME RESULTS:  Lab Results   Component Value Date    AST 27 01/10/2023    AST 15 07/02/2018    ALT 35 12/31/2022    ALT 23 07/02/2018     CBC RESULTS:  Lab Results   Component Value Date    WBC 7.3 02/28/2023    WBC 8.1 08/04/2020    RBC 4.90 02/28/2023    RBC 5.04 08/04/2020    HGB 14.0 02/28/2023    HGB 14.6 08/04/2020    HCT 43.5 02/28/2023    HCT 44.8 08/04/2020    MCV 89 02/28/2023    MCV 89 08/04/2020    MCH 28.6 02/28/2023    MCH 29.0 08/04/2020    MCHC 32.2 02/28/2023    MCHC 32.6 08/04/2020    RDW 14.5 02/28/2023    RDW 13.8 08/04/2020     02/28/2023     08/04/2020     BMP RESULTS:  Lab Results   Component Value Date     05/16/2023     08/04/2020    POTASSIUM 3.7 05/16/2023    POTASSIUM 4.3 01/16/2023    POTASSIUM 3.2 (L) 08/04/2020    CHLORIDE 106 05/16/2023    CHLORIDE 106 01/16/2023    CHLORIDE 108 08/04/2020    CO2 29 05/16/2023    CO2 30 01/16/2023    CO2 28 08/04/2020    ANIONGAP 9 05/16/2023    ANIONGAP 6 01/16/2023    ANIONGAP 7 08/04/2020     (H) 05/16/2023    GLC 89 01/16/2023     (H) 08/04/2020    BUN 16.9 05/16/2023    BUN 13 01/16/2023    BUN 18 08/04/2020    CR 0.90  05/16/2023    CR 0.92 08/04/2020    GFRESTIMATED >90 05/16/2023    GFRESTIMATED >60 12/31/2022    GFRESTIMATED 85 08/04/2020    GFRESTBLACK >90 08/04/2020    LAURA 9.4 05/16/2023    LAURA 8.6 08/04/2020      A1C RESULTS:  Lab Results   Component Value Date    A1C 6.0 (H) 12/31/2022    A1C 6.0 (H) 07/08/2018     INR RESULTS:  Lab Results   Component Value Date    INR 0.97 02/17/2023    INR 1.13 12/31/2022    INR 0.97 07/08/2018    INR 0.98 11/09/2007            Medications     Current Outpatient Medications   Medication Sig Dispense Refill     aspirin 81 MG EC tablet Take 81 mg by mouth daily       atorvastatin (LIPITOR) 40 MG tablet Take 40 mg by mouth daily       chlorthalidone (HYGROTON) 25 MG tablet Take 25 mg by mouth daily       cyclobenzaprine (FLEXERIL) 5 MG tablet Take 5 mg by mouth 3 times daily as needed for muscle spasms       diclofenac (VOLTAREN) 1 % topical gel Apply topically daily as needed for moderate pain (4-6)       ferrous sulfate (FEROSUL) 325 (65 Fe) MG tablet Take 325 mg by mouth daily (with breakfast)       furosemide (LASIX) 20 MG tablet Take 1 tablet (20 mg) by mouth daily 30 tablet 10     gabapentin (NEURONTIN) 800 MG tablet Take 800 mg by mouth 3 times daily       isosorbide mononitrate (IMDUR) 30 MG 24 hr tablet Take 1.5 tablets (45 mg) by mouth daily 90 tablet 3     Lidocaine (LIDOCARE) 4 % Patch Place 1-2 patches onto the skin every 24 hours To prevent lidocaine toxicity, patient should be patch free for 12 hrs daily.       lisinopril (ZESTRIL) 40 MG tablet Take 1 tablet (40 mg) by mouth daily 90 tablet 3     magnesium oxide (MAG-OX) 400 MG tablet Take 1 tablet (400 mg) by mouth daily 90 tablet 3     metFORMIN (GLUCOPHAGE) 500 MG tablet Take 500 mg by mouth daily       metoprolol succinate ER (TOPROL XL) 100 MG 24 hr tablet Take 1 tablet (100 mg) by mouth 2 times daily       nitroGLYcerin (NITROSTAT) 0.4 MG sublingual tablet For chest pain place 1 tablet under the tongue every 5 minutes  for 3 doses. If symptoms persist 5 minutes after 1st dose call 911. 25 tablet 3     potassium chloride ER (KLOR-CON M) 10 MEQ CR tablet Take 20 mEq by mouth daily       Pramipexole Dihydrochloride (MIRAPEX PO) Take 3 mg by mouth At Bedtime       semaglutide (OZEMPIC, 1 MG/DOSE,) 2 MG/1.5ML pen Inject 0.5 mg Subcutaneous every 7 days On Fridays       VITAMIN D, CHOLECALCIFEROL, PO Take 4,000 Units by mouth daily       zolpidem (AMBIEN) 5 MG tablet Take 5 mg by mouth nightly as needed for sleep       acetaminophen (TYLENOL) 325 MG tablet Take 3 tablets (975 mg) by mouth 3 times daily (Patient not taking: Reported on 2/27/2023)       multivitamin w/minerals (THERA-VIT-M) tablet Take 1 tablet by mouth At Bedtime (Patient not taking: Reported on 2/27/2023)       OXYCODONE-ACETAMINOPHEN PO Take by mouth daily as needed (Patient not taking: Reported on 4/28/2023)       senna-docusate (SENOKOT-S/PERICOLACE) 8.6-50 MG tablet Take 1 tablet by mouth 2 times daily as needed for constipation (Patient not taking: Reported on 4/28/2023)       silver sulfADIAZINE (SILVADENE) 1 % external cream Apply topically daily as needed (Patient not taking: Reported on 2/27/2023)            Past Medical History     Past Medical History:   Diagnosis Date     Hypercholesterolemia      Hypertension      Past Surgical History:   Procedure Laterality Date     APPENDECTOMY       BACK SURGERY       CV CORONARY ANGIOGRAM N/A 2/17/2023    Procedure: Coronary Angiogram RADIAL ACCESS;  Surgeon: Malou Mazariegos MD;  Location:  [facility]ARD[facility] CATH LAB     CV FRACTIONAL FLOW RATIO WIRE N/A 2/17/2023    Procedure: Fractional Flow Ratio Wire;  Surgeon: Malou Mazariegos MD;  Location: [facility]     CV INSTANTANEOUS WAVE-FREE RATIO N/A 2/17/2023    Procedure: Instantaneous Wave-Free Ratio;  Surgeon: Malou Mazariegos MD;  Location:  [facility]ARD[facility] CATH LAB     CV LEFT HEART CATH N/A 2/17/2023    Procedure: Left Heart Catheterization;   Surgeon: Malou Mazariegos MD;  Location: [facility]     ORTHOPEDIC SURGERY       No family history on file.         Allergies   Patient has no known allergies.        Alana Cabello NP  [facility]  Pager: [phone]

## 2023-06-28 ENCOUNTER — OFFICE VISIT (OUTPATIENT)
Dept: CARDIOLOGY | Facility: CLINIC | Age: 73
End: 2023-06-28
Attending: NURSE PRACTITIONER
Payer: COMMERCIAL

## 2023-06-28 VITALS
BODY MASS INDEX: 36.45 KG/M2 | DIASTOLIC BLOOD PRESSURE: 77 MMHG | HEIGHT: 78 IN | OXYGEN SATURATION: 97 % | WEIGHT: 315 LBS | HEART RATE: 67 BPM | SYSTOLIC BLOOD PRESSURE: 133 MMHG

## 2023-06-28 DIAGNOSIS — I10 ESSENTIAL HYPERTENSION: ICD-10-CM

## 2023-06-28 DIAGNOSIS — I25.10 CORONARY ARTERY DISEASE INVOLVING NATIVE CORONARY ARTERY OF NATIVE HEART WITHOUT ANGINA PECTORIS: ICD-10-CM

## 2023-06-28 DIAGNOSIS — R60.0 BILATERAL LOWER EXTREMITY EDEMA: ICD-10-CM

## 2023-06-28 PROCEDURE — 99214 OFFICE O/P EST MOD 30 MIN: CPT | Performed by: NURSE PRACTITIONER

## 2023-06-28 RX ORDER — ZOLPIDEM TARTRATE 5 MG/1
5 TABLET ORAL
Status: ON HOLD | COMMUNITY
End: 2023-09-24

## 2023-06-28 RX ORDER — FUROSEMIDE 20 MG
20 TABLET ORAL DAILY
Qty: 30 TABLET | Refills: 10 | Status: SHIPPED | OUTPATIENT
Start: 2023-06-28 | End: 2023-08-28

## 2023-06-28 NOTE — PATIENT INSTRUCTIONS
Today's Recommendations    I would like you to verify your dose of lisinopril today   I am happy to see your leg swelling is better and your labs looked good after we started this   Continue all medications without changes.  Please follow up with Dr. Garcia in 6 months.    Please send a [employer] message or call [phone] to the RN team with questions or concerns.     Scheduling number [phone]  JENI Spain, CNP      Latest Reference Range & Units 05/16/23 14:37   Sodium 136 - 145 mmol/L 144   Potassium 3.4 - 5.3 mmol/L 3.7   Chloride 98 - 107 mmol/L 106   Carbon Dioxide (CO2) 22 - 29 mmol/L 29   Urea Nitrogen 8.0 - 23.0 mg/dL 16.9   Creatinine 0.67 - 1.17 mg/dL 0.90   GFR Estimate >60 mL/min/1.73m2 >90   Calcium 8.8 - 10.2 mg/dL 9.4   Anion Gap 7 - 15 mmol/L 9   Glucose 70 - 99 mg/dL 109 (H)   (H): Data is abnormally high

## 2023-06-28 NOTE — LETTER
6/28/2023    Luis Alberto Aponte MD  [facility] [address] [place] [address]    RE: Federico DAVENPORT Bulmaro       Dear Colleague,     I had the pleasure of seeing Federico Chamberlain in the [facility].  Cardiology Clinic Progress Note  Federico Chamberlain MRN# [id number]   YOB: 1950 Age: 72 year old   Primary Cardiologist: Dr. Garcia Reason for visit: 2 month follow up             Assessment and Plan:     1.  Coronary artery disease (Nonobstructive moderate proximal RCA and first marginal disease)       NSTEMI  -2/2023 Lexiscan nuclear stress test ordered secondary to troponin elevation and hypokinesis during hospitalization 12/2022, result was abnormal with mild ischemia in the basal to mid inferior and inferolateral segments.  -2/2023 Coronary angiogram with no flow limiting stenosis   -Continue aspirin, Imdur, atorvastatin 40mg daily   -No current anginal symptoms    2.  Syncope, likely secondary to Sepsis and rhabdomyolysis  -12/2022 Event monitor demonstrated sinus rhythm with IVCD, intermittent first-degree block, PVCs with no high-grade AV block  -Patient saw neurology in May 2023 and episodes of syncope felt to be secondary to hypoperfusion in setting of hypotension and sepsis    3. Obstructive sleep apnea, following with pulmonology   -Sleep study positive for sleep apnea  -Still awaiting arrival of his CPAP machine, suspect daytime fatigue secondary to poor sleep and untreated TOBI    4. Hypertension, uncontrolled  -Previously on spironolactone and chlorthalidone 25mg bid prior to hospitalization, stopped in the setting of RILEY and hydralazine initiated  -Stopped hydralazine and resumed lisinopril 40mg daily with resolution of RILEY   -Furosemide 20mg daily added with ongoing LE edema and edema improved  -Continue Metoprolol XL to 100mg BID, continue chorthalidone 25mg daily     5. Right leg cellulitis and edema, following with infectious disease, resolved   -Recently completed  another round of augmentin/doxycycline  -Negative venous ultrasound of RLE for DVT  -PCP gave patient prescription for furosemide 20 mg daily earlier this week, he has not started taking this, suspect this will be helpful as prior to hospitalization in December 2022 patient was on spironolactone and chlorthalidone which were likely managing his edema.  Since that time his chlorthalidone was resumed and furosemide was added with improvement in edema.     6.  Hypomagnesemia and QT prolongation  -Recheck Magnesium level 3/16/23 1.9  -Continue magnesium oxide    7. Obesity, BMI 36.76  -Encouraged healthy lifestyle changes including healthy diet and exercising   -On ozempic which has been helpful for weight loss and improving hemoglobin A1c    8. Type II diabetes, controlled  -HgbA1c 5.9%, 4/2023  -On ozempic    Changes today: No medication changes made today, BMP in 3 months    Follow up plan: Follow up with Dr. Garcia in 6 months or sooner if needed        History of Presenting Illness:    Federico Chamberlain is a very pleasant 72 year old male with a history of syncope, type II MI/NSTEMI, orthostasis, recent severe sepsis and right lower extremity cellulitis secondary to frostbite of the right great toe, hypertension, aortic dilation, QT prolongation, type 2 diabetes, hyperlipidemia, history of prostate cancer, obesity.    Patient was admitted with syncope 1/1/2023 in setting of severe sepsis and bacteremia, found to have rhabdomyolysis, mildly elevated troponin, and prolonged QT interval.  Mild elevated troponins most consistent with a type II MI, however TTE 12/20/2022 showed LVEF 50% with septal lateral hypokinesis.  He had a baseline abnormal conduction with bifascicular block and first-degree AV block with an event monitor in place.  During his hospital stay he had frequent PVCs, short runs of paroxysmal SVT.  His metoprolol was increased to 50 mg twice daily.  Isordil, furosemide, and hydralazine were added.  His  lisinopril, chlorthalidone, and spironolactone were all held due to rhabdomyolysis.    He went to TCU at discharge, his lasix was discontinued and previous chlorthalidone was restarted, hydralazine with increased to 50mg TID 2/2 HTN. His RILEY resolved and WBCs normalized prior to discharge.     He saw his PCP, Dr. Aponte at [facility] on 1/20/23 and discussed resuming lisinopril pending cardiology approval.  His CK returned to normal at 79, sodium 143, potassium 3.4, creatinine 0.9, and GFR greater than 60, B12 level normal at 747. He continued him on a course of 1 additional week of Augmentin.     I first met Federico in January 2023 following his hospitalization. I ordered a Lexiscan nuclear stress test, given the hypokinesis seen on his hospital echocardiogram. I also reduced his hydralazine to 25 mg 3 times a day and resumed his lisinopril at 10 mg a day.    Lexiscan nuclear stress test was abnormal demonstrating a mild ischemia in the basal to mid inferior and inferolateral segments. He saw Dr. Garcia in follow up to review at which time, he increased his imdur and recommended a coronary angiogram.      Coronary angiogram was completed 2/17/2023 and showed moderate stenosis in the mid RCA and OM1, neither were flow-limiting by assessment with IFR.    At his last visit, March 2023, we stopped his hydralazine and increased his lisinopril after lab work showed resolution of his RILEY. Renal function and electrolytes remained stable on subsequent BMP.    When I saw him in April he had recently completed a course of antibiotics for continued right leg cellulitis and continued to have bilateral lower extremity edema. I started him in furosemide 20mg daily and follow up labs showed stable electrolytes and renal function.     Patient is here today for a 2 month follow up. He continues to feel fatigued, unchanged from prior visits. He was recently diagnosed with sleep apnea and is awaiting his CPAP. He typically only sleepy  3 hours a night.     Patient denies chest pain or chest tightness. Denies dizziness, lightheadedness or other presyncopal symptoms. Denies tachycardia or palpitations. Denies shortness of breath, orthopnea, or PND.     Blood pressure 133/77 and HR 67 in clinic today.    He is considering move to [place], [place] to be closer to his son, however he is hesitating to do this as he enjoys his 4 bedroom home here and his sone would like him to move into a 1 bedroom apartment.        Recent Hospitalizations   As above        Social History      Social History     Socioeconomic History    Marital status:      Spouse name: Not on file    Number of children: Not on file    Years of education: Not on file    Highest education level: Not on file   Occupational History    Not on file   Tobacco Use    Smoking status: Former     Types: Cigarettes     Quit date: 1992     Years since quittin.4    Smokeless tobacco: Never   Substance and Sexual Activity    Alcohol use: Yes     Comment: Occ, 1-2 beers a month    Drug use: No    Sexual activity: Not on file   Other Topics Concern    Not on file   Social History Narrative    Not on file     Social Determinants of Health     Financial Resource Strain: Not on file   Food Insecurity: Not on file   Transportation Needs: Not on file   Physical Activity: Not on file   Stress: Not on file   Social Connections: Not on file   Intimate Partner Violence: Not on file   Housing Stability: Not on file            Review of Systems:   Skin:  not assessed     Eyes:  not assessed    ENT:  not assessed    Respiratory:  Positive for sleep apnea;CPAP  Cardiovascular:  Negative    Gastroenterology: not assessed    Genitourinary:  not assessed    Musculoskeletal:  not assessed    Neurologic:  not assessed    Psychiatric:  not assessed    Heme/Lymph/Imm:  not assessed    Endocrine:  not assessed           Physical Exam:   Vitals: /77 (BP Location: Right arm, Patient Position:  "Sitting, Cuff Size: Adult Regular)   Pulse 67   Ht 1.981 m (6' 6\")   Wt 144.6 kg (318 lb 12.8 oz)   SpO2 97%   BMI 36.84 kg/m     Wt Readings from Last 4 Encounters:   06/28/23 144.6 kg (318 lb 12.8 oz)   04/28/23 144.3 kg (318 lb 1.6 oz)   02/27/23 145.2 kg (320 lb)   02/14/23 147 kg (324 lb 1.6 oz)     GEN: well nourished, in no acute distress.  HEENT:  Pupils equal, round. Sclerae nonicteric.   NECK: Supple, no masses appreciated. No JVD with patient supine  C/V:  Regular rate and rhythm, no murmur, rub or gallop.    RESP: Respirations are unlabored. Clear to auscultation bilaterally without wheezing, rales, or rhonchi.  GI: Abdomen soft, nontender.  EXTREM: trace Bilateral LE edema to just above the ankle, bilateral hemosiderin staining, left leg brace in place  NEURO: Alert and oriented, cooperative.  SKIN: Warm and dry.       Data:     LIPID RESULTS:  Lab Results   Component Value Date    CHOL 92 07/02/2018    HDL 32 (L) 07/02/2018    LDL <1 07/02/2018    TRIG 297 (H) 07/02/2018     LIVER ENZYME RESULTS:  Lab Results   Component Value Date    AST 27 01/10/2023    AST 15 07/02/2018    ALT 35 12/31/2022    ALT 23 07/02/2018     CBC RESULTS:  Lab Results   Component Value Date    WBC 7.3 02/28/2023    WBC 8.1 08/04/2020    RBC 4.90 02/28/2023    RBC 5.04 08/04/2020    HGB 14.0 02/28/2023    HGB 14.6 08/04/2020    HCT 43.5 02/28/2023    HCT 44.8 08/04/2020    MCV 89 02/28/2023    MCV 89 08/04/2020    MCH 28.6 02/28/2023    MCH 29.0 08/04/2020    MCHC 32.2 02/28/2023    MCHC 32.6 08/04/2020    RDW 14.5 02/28/2023    RDW 13.8 08/04/2020     02/28/2023     08/04/2020     BMP RESULTS:  Lab Results   Component Value Date     05/16/2023     08/04/2020    POTASSIUM 3.7 05/16/2023    POTASSIUM 4.3 01/16/2023    POTASSIUM 3.2 (L) 08/04/2020    CHLORIDE 106 05/16/2023    CHLORIDE 106 01/16/2023    CHLORIDE 108 08/04/2020    CO2 29 05/16/2023    CO2 30 01/16/2023    CO2 28 08/04/2020    " ANIONGAP 9 05/16/2023    ANIONGAP 6 01/16/2023    ANIONGAP 7 08/04/2020     (H) 05/16/2023    GLC 89 01/16/2023     (H) 08/04/2020    BUN 16.9 05/16/2023    BUN 13 01/16/2023    BUN 18 08/04/2020    CR 0.90 05/16/2023    CR 0.92 08/04/2020    GFRESTIMATED >90 05/16/2023    GFRESTIMATED >60 12/31/2022    GFRESTIMATED 85 08/04/2020    GFRESTBLACK >90 08/04/2020    LAURA 9.4 05/16/2023    LAURA 8.6 08/04/2020      A1C RESULTS:  Lab Results   Component Value Date    A1C 6.0 (H) 12/31/2022    A1C 6.0 (H) 07/08/2018     INR RESULTS:  Lab Results   Component Value Date    INR 0.97 02/17/2023    INR 1.13 12/31/2022    INR 0.97 07/08/2018    INR 0.98 11/09/2007            Medications     Current Outpatient Medications   Medication Sig Dispense Refill    aspirin 81 MG EC tablet Take 81 mg by mouth daily      atorvastatin (LIPITOR) 40 MG tablet Take 40 mg by mouth daily      chlorthalidone (HYGROTON) 25 MG tablet Take 25 mg by mouth daily      cyclobenzaprine (FLEXERIL) 5 MG tablet Take 5 mg by mouth 3 times daily as needed for muscle spasms      diclofenac (VOLTAREN) 1 % topical gel Apply topically daily as needed for moderate pain (4-6)      ferrous sulfate (FEROSUL) 325 (65 Fe) MG tablet Take 325 mg by mouth daily (with breakfast)      furosemide (LASIX) 20 MG tablet Take 1 tablet (20 mg) by mouth daily 30 tablet 10    gabapentin (NEURONTIN) 800 MG tablet Take 800 mg by mouth 3 times daily      isosorbide mononitrate (IMDUR) 30 MG 24 hr tablet Take 1.5 tablets (45 mg) by mouth daily 90 tablet 3    Lidocaine (LIDOCARE) 4 % Patch Place 1-2 patches onto the skin every 24 hours To prevent lidocaine toxicity, patient should be patch free for 12 hrs daily.      lisinopril (ZESTRIL) 40 MG tablet Take 1 tablet (40 mg) by mouth daily 90 tablet 3    magnesium oxide (MAG-OX) 400 MG tablet Take 1 tablet (400 mg) by mouth daily 90 tablet 3    metFORMIN (GLUCOPHAGE) 500 MG tablet Take 500 mg by mouth daily      metoprolol  succinate ER (TOPROL XL) 100 MG 24 hr tablet Take 1 tablet (100 mg) by mouth 2 times daily      nitroGLYcerin (NITROSTAT) 0.4 MG sublingual tablet For chest pain place 1 tablet under the tongue every 5 minutes for 3 doses. If symptoms persist 5 minutes after 1st dose call 911. 25 tablet 3    potassium chloride ER (KLOR-CON M) 10 MEQ CR tablet Take 20 mEq by mouth daily      Pramipexole Dihydrochloride (MIRAPEX PO) Take 3 mg by mouth At Bedtime      semaglutide (OZEMPIC, 1 MG/DOSE,) 2 MG/1.5ML pen Inject 0.5 mg Subcutaneous every 7 days On Fridays      VITAMIN D, CHOLECALCIFEROL, PO Take 4,000 Units by mouth daily      zolpidem (AMBIEN) 5 MG tablet Take 5 mg by mouth nightly as needed for sleep      acetaminophen (TYLENOL) 325 MG tablet Take 3 tablets (975 mg) by mouth 3 times daily (Patient not taking: Reported on 2/27/2023)      multivitamin w/minerals (THERA-VIT-M) tablet Take 1 tablet by mouth At Bedtime (Patient not taking: Reported on 2/27/2023)      OXYCODONE-ACETAMINOPHEN PO Take by mouth daily as needed (Patient not taking: Reported on 4/28/2023)      senna-docusate (SENOKOT-S/PERICOLACE) 8.6-50 MG tablet Take 1 tablet by mouth 2 times daily as needed for constipation (Patient not taking: Reported on 4/28/2023)      silver sulfADIAZINE (SILVADENE) 1 % external cream Apply topically daily as needed (Patient not taking: Reported on 2/27/2023)            Past Medical History     Past Medical History:   Diagnosis Date    Hypercholesterolemia     Hypertension      Past Surgical History:   Procedure Laterality Date    APPENDECTOMY      BACK SURGERY      CV CORONARY ANGIOGRAM N/A 2/17/2023    Procedure: Coronary Angiogram RADIAL ACCESS;  Surgeon: Malou Mazariegos MD;  Location:  [facility]ARD[facility] CATH LAB    CV FRACTIONAL FLOW RATIO WIRE N/A 2/17/2023    Procedure: Fractional Flow Ratio Wire;  Surgeon: Malou Mazariegos MD;  Location:  [facility] CATH LAB    CV INSTANTANEOUS WAVE-FREE RATIO N/A 2/17/2023     Procedure: Instantaneous Wave-Free Ratio;  Surgeon: Malou Mazariegos MD;  Location:  HEART CARDIAC CATH LAB    CV LEFT HEART CATH N/A 2/17/2023    Procedure: Left Heart Catheterization;  Surgeon: Malou Mazariegos MD;  Location:  HEART CARDIAC CATH LAB    ORTHOPEDIC SURGERY       No family history on file.         Allergies   Patient has no known allergies.        Alana Cabello NP  [facility]  Pager: [phone]      Thank you for allowing me to participate in the care of your patient.      Sincerely,     Alana Cabello NP     [facility]  cc:   Alana Cabello NP  [address] [place]  [place] [address]

## 2023-07-05 ENCOUNTER — MYC MEDICAL ADVICE (OUTPATIENT)
Dept: CARDIOLOGY | Facility: CLINIC | Age: 73
End: 2023-07-05
Payer: COMMERCIAL

## 2023-07-06 DIAGNOSIS — I10 ESSENTIAL HYPERTENSION: Primary | ICD-10-CM

## 2023-07-06 DIAGNOSIS — I25.10 CORONARY ARTERY DISEASE INVOLVING NATIVE CORONARY ARTERY OF NATIVE HEART WITHOUT ANGINA PECTORIS: ICD-10-CM

## 2023-07-06 RX ORDER — CHLORTHALIDONE 25 MG/1
25 TABLET ORAL DAILY
Qty: 90 TABLET | Refills: 2 | Status: SHIPPED | OUTPATIENT
Start: 2023-07-06

## 2023-08-28 DIAGNOSIS — I24.89 DEMAND ISCHEMIA (H): ICD-10-CM

## 2023-08-28 DIAGNOSIS — I10 ESSENTIAL HYPERTENSION: ICD-10-CM

## 2023-08-28 RX ORDER — ISOSORBIDE MONONITRATE 30 MG/1
45 TABLET, EXTENDED RELEASE ORAL DAILY
Qty: 90 TABLET | Refills: 1 | Status: SHIPPED | OUTPATIENT
Start: 2023-08-28 | End: 2023-08-29

## 2023-08-28 RX ORDER — FUROSEMIDE 20 MG
20 TABLET ORAL DAILY
Qty: 90 TABLET | Refills: 0 | Status: SHIPPED | OUTPATIENT
Start: 2023-08-28 | End: 2023-08-29

## 2023-08-28 RX ORDER — MAGNESIUM OXIDE 400 MG/1
400 TABLET ORAL DAILY
Qty: 90 TABLET | Refills: 2 | Status: SHIPPED | OUTPATIENT
Start: 2023-08-28 | End: 2023-08-29

## 2023-08-29 DIAGNOSIS — I24.89 DEMAND ISCHEMIA (H): ICD-10-CM

## 2023-08-29 DIAGNOSIS — I10 ESSENTIAL HYPERTENSION: ICD-10-CM

## 2023-08-29 RX ORDER — ISOSORBIDE MONONITRATE 30 MG/1
45 TABLET, EXTENDED RELEASE ORAL DAILY
Qty: 135 TABLET | Refills: 3 | Status: CANCELLED | OUTPATIENT
Start: 2023-08-29

## 2023-08-29 RX ORDER — MAGNESIUM OXIDE 400 MG/1
400 TABLET ORAL DAILY
Qty: 90 TABLET | Refills: 1 | Status: SHIPPED | OUTPATIENT
Start: 2023-08-29 | End: 2024-04-23

## 2023-08-29 RX ORDER — FUROSEMIDE 20 MG
20 TABLET ORAL DAILY
Qty: 90 TABLET | Refills: 1 | Status: SHIPPED | OUTPATIENT
Start: 2023-08-29 | End: 2024-06-24

## 2023-08-29 RX ORDER — ISOSORBIDE MONONITRATE 30 MG/1
45 TABLET, EXTENDED RELEASE ORAL DAILY
Qty: 135 TABLET | Refills: 1 | Status: SHIPPED | OUTPATIENT
Start: 2023-08-29 | End: 2024-01-04

## 2023-09-10 ENCOUNTER — HEALTH MAINTENANCE LETTER (OUTPATIENT)
Age: 73
End: 2023-09-10

## 2023-09-18 DIAGNOSIS — I10 ESSENTIAL HYPERTENSION: ICD-10-CM

## 2023-09-18 RX ORDER — METOPROLOL SUCCINATE 100 MG/1
100 TABLET, EXTENDED RELEASE ORAL 2 TIMES DAILY
Qty: 180 TABLET | Refills: 3 | Status: SHIPPED | OUTPATIENT
Start: 2023-09-18 | End: 2024-01-04

## 2023-09-23 ENCOUNTER — APPOINTMENT (OUTPATIENT)
Dept: CT IMAGING | Facility: CLINIC | Age: 73
DRG: 872 | End: 2023-09-23
Attending: EMERGENCY MEDICINE
Payer: COMMERCIAL

## 2023-09-23 ENCOUNTER — HOSPITAL ENCOUNTER (INPATIENT)
Facility: CLINIC | Age: 73
LOS: 3 days | Discharge: HOME HEALTH | DRG: 872 | End: 2023-09-27
Attending: EMERGENCY MEDICINE | Admitting: INTERNAL MEDICINE
Payer: COMMERCIAL

## 2023-09-23 DIAGNOSIS — R55 SYNCOPE, UNSPECIFIED SYNCOPE TYPE: ICD-10-CM

## 2023-09-23 DIAGNOSIS — R78.81 GRAM-POSITIVE BACTEREMIA: Primary | ICD-10-CM

## 2023-09-23 DIAGNOSIS — R50.9 FEVER, UNSPECIFIED FEVER CAUSE: ICD-10-CM

## 2023-09-23 DIAGNOSIS — S09.90XA CLOSED HEAD INJURY, INITIAL ENCOUNTER: ICD-10-CM

## 2023-09-23 DIAGNOSIS — R78.81 BACTEREMIA: ICD-10-CM

## 2023-09-23 DIAGNOSIS — G89.29 CHRONIC MIDLINE LOW BACK PAIN WITHOUT SCIATICA: ICD-10-CM

## 2023-09-23 DIAGNOSIS — M54.50 CHRONIC MIDLINE LOW BACK PAIN WITHOUT SCIATICA: ICD-10-CM

## 2023-09-23 LAB
ALBUMIN SERPL BCG-MCNC: 4 G/DL (ref 3.5–5.2)
ALP SERPL-CCNC: 76 U/L (ref 40–129)
ALT SERPL W P-5'-P-CCNC: 21 U/L (ref 0–70)
ANION GAP SERPL CALCULATED.3IONS-SCNC: 10 MMOL/L (ref 7–15)
AST SERPL W P-5'-P-CCNC: 23 U/L (ref 0–45)
BASOPHILS # BLD AUTO: 0 10E3/UL (ref 0–0.2)
BASOPHILS NFR BLD AUTO: 0 %
BILIRUB SERPL-MCNC: 1.2 MG/DL
BUN SERPL-MCNC: 16.7 MG/DL (ref 8–23)
CALCIUM SERPL-MCNC: 8.5 MG/DL (ref 8.8–10.2)
CHLORIDE SERPL-SCNC: 97 MMOL/L (ref 98–107)
CREAT SERPL-MCNC: 0.84 MG/DL (ref 0.67–1.17)
DEPRECATED HCO3 PLAS-SCNC: 28 MMOL/L (ref 22–29)
EGFRCR SERPLBLD CKD-EPI 2021: >90 ML/MIN/1.73M2
EOSINOPHIL # BLD AUTO: 0 10E3/UL (ref 0–0.7)
EOSINOPHIL NFR BLD AUTO: 0 %
ERYTHROCYTE [DISTWIDTH] IN BLOOD BY AUTOMATED COUNT: 13.4 % (ref 10–15)
GLUCOSE SERPL-MCNC: 120 MG/DL (ref 70–99)
HCT VFR BLD AUTO: 42.6 % (ref 40–53)
HGB BLD-MCNC: 14.1 G/DL (ref 13.3–17.7)
IMM GRANULOCYTES # BLD: 0 10E3/UL
IMM GRANULOCYTES NFR BLD: 0 %
LACTATE SERPL-SCNC: 1.5 MMOL/L (ref 0.7–2)
LIPASE SERPL-CCNC: 13 U/L (ref 13–60)
LYMPHOCYTES # BLD AUTO: 0.6 10E3/UL (ref 0.8–5.3)
LYMPHOCYTES NFR BLD AUTO: 6 %
MCH RBC QN AUTO: 29.6 PG (ref 26.5–33)
MCHC RBC AUTO-ENTMCNC: 33.1 G/DL (ref 31.5–36.5)
MCV RBC AUTO: 90 FL (ref 78–100)
MONOCYTES # BLD AUTO: 0.6 10E3/UL (ref 0–1.3)
MONOCYTES NFR BLD AUTO: 6 %
NEUTROPHILS # BLD AUTO: 8.6 10E3/UL (ref 1.6–8.3)
NEUTROPHILS NFR BLD AUTO: 88 %
NRBC # BLD AUTO: 0 10E3/UL
NRBC BLD AUTO-RTO: 0 /100
PLATELET # BLD AUTO: 154 10E3/UL (ref 150–450)
POTASSIUM SERPL-SCNC: 3.2 MMOL/L (ref 3.4–5.3)
PROT SERPL-MCNC: 6.9 G/DL (ref 6.4–8.3)
RBC # BLD AUTO: 4.76 10E6/UL (ref 4.4–5.9)
SARS-COV-2 RNA RESP QL NAA+PROBE: NEGATIVE
SODIUM SERPL-SCNC: 135 MMOL/L (ref 136–145)
WBC # BLD AUTO: 9.9 10E3/UL (ref 4–11)

## 2023-09-23 PROCEDURE — 72131 CT LUMBAR SPINE W/O DYE: CPT

## 2023-09-23 PROCEDURE — 71260 CT THORAX DX C+: CPT

## 2023-09-23 PROCEDURE — 87635 SARS-COV-2 COVID-19 AMP PRB: CPT | Performed by: EMERGENCY MEDICINE

## 2023-09-23 PROCEDURE — 72125 CT NECK SPINE W/O DYE: CPT

## 2023-09-23 PROCEDURE — 250N000011 HC RX IP 250 OP 636: Performed by: EMERGENCY MEDICINE

## 2023-09-23 PROCEDURE — 96376 TX/PRO/DX INJ SAME DRUG ADON: CPT

## 2023-09-23 PROCEDURE — 36415 COLL VENOUS BLD VENIPUNCTURE: CPT | Performed by: EMERGENCY MEDICINE

## 2023-09-23 PROCEDURE — 96374 THER/PROPH/DIAG INJ IV PUSH: CPT | Mod: 59

## 2023-09-23 PROCEDURE — 82077 ASSAY SPEC XCP UR&BREATH IA: CPT | Performed by: EMERGENCY MEDICINE

## 2023-09-23 PROCEDURE — 99285 EMERGENCY DEPT VISIT HI MDM: CPT | Mod: 25

## 2023-09-23 PROCEDURE — 87077 CULTURE AEROBIC IDENTIFY: CPT | Performed by: EMERGENCY MEDICINE

## 2023-09-23 PROCEDURE — 84145 PROCALCITONIN (PCT): CPT | Performed by: INTERNAL MEDICINE

## 2023-09-23 PROCEDURE — 258N000003 HC RX IP 258 OP 636: Performed by: EMERGENCY MEDICINE

## 2023-09-23 PROCEDURE — 96361 HYDRATE IV INFUSION ADD-ON: CPT

## 2023-09-23 PROCEDURE — 83735 ASSAY OF MAGNESIUM: CPT | Performed by: INTERNAL MEDICINE

## 2023-09-23 PROCEDURE — 83690 ASSAY OF LIPASE: CPT | Performed by: EMERGENCY MEDICINE

## 2023-09-23 PROCEDURE — 93005 ELECTROCARDIOGRAM TRACING: CPT

## 2023-09-23 PROCEDURE — 250N000013 HC RX MED GY IP 250 OP 250 PS 637: Performed by: EMERGENCY MEDICINE

## 2023-09-23 PROCEDURE — 85025 COMPLETE CBC W/AUTO DIFF WBC: CPT | Performed by: EMERGENCY MEDICINE

## 2023-09-23 PROCEDURE — 84443 ASSAY THYROID STIM HORMONE: CPT | Performed by: INTERNAL MEDICINE

## 2023-09-23 PROCEDURE — 86140 C-REACTIVE PROTEIN: CPT | Performed by: INTERNAL MEDICINE

## 2023-09-23 PROCEDURE — 83605 ASSAY OF LACTIC ACID: CPT | Performed by: EMERGENCY MEDICINE

## 2023-09-23 PROCEDURE — 80053 COMPREHEN METABOLIC PANEL: CPT | Performed by: EMERGENCY MEDICINE

## 2023-09-23 PROCEDURE — 87149 DNA/RNA DIRECT PROBE: CPT | Performed by: EMERGENCY MEDICINE

## 2023-09-23 PROCEDURE — 70450 CT HEAD/BRAIN W/O DYE: CPT

## 2023-09-23 RX ORDER — IOPAMIDOL 755 MG/ML
500 INJECTION, SOLUTION INTRAVASCULAR ONCE
Status: COMPLETED | OUTPATIENT
Start: 2023-09-23 | End: 2023-09-23

## 2023-09-23 RX ORDER — MORPHINE SULFATE 4 MG/ML
4 INJECTION, SOLUTION INTRAMUSCULAR; INTRAVENOUS ONCE
Status: COMPLETED | OUTPATIENT
Start: 2023-09-23 | End: 2023-09-23

## 2023-09-23 RX ORDER — ACETAMINOPHEN 500 MG
1000 TABLET ORAL ONCE
Status: COMPLETED | OUTPATIENT
Start: 2023-09-23 | End: 2023-09-23

## 2023-09-23 RX ADMIN — SODIUM CHLORIDE 65 ML: 9 INJECTION, SOLUTION INTRAVENOUS at 21:45

## 2023-09-23 RX ADMIN — IOPAMIDOL 100 ML: 755 INJECTION, SOLUTION INTRAVENOUS at 22:27

## 2023-09-23 RX ADMIN — MORPHINE SULFATE 4 MG: 4 INJECTION, SOLUTION INTRAMUSCULAR; INTRAVENOUS at 23:36

## 2023-09-23 RX ADMIN — MORPHINE SULFATE 4 MG: 4 INJECTION, SOLUTION INTRAMUSCULAR; INTRAVENOUS at 22:21

## 2023-09-23 RX ADMIN — ACETAMINOPHEN 1000 MG: 500 TABLET, FILM COATED ORAL at 22:10

## 2023-09-23 ASSESSMENT — ACTIVITIES OF DAILY LIVING (ADL)
ADLS_ACUITY_SCORE: 35
ADLS_ACUITY_SCORE: 35

## 2023-09-24 ENCOUNTER — APPOINTMENT (OUTPATIENT)
Dept: CARDIOLOGY | Facility: CLINIC | Age: 73
DRG: 872 | End: 2023-09-24
Attending: INTERNAL MEDICINE
Payer: COMMERCIAL

## 2023-09-24 ENCOUNTER — APPOINTMENT (OUTPATIENT)
Dept: MRI IMAGING | Facility: CLINIC | Age: 73
DRG: 872 | End: 2023-09-24
Attending: INTERNAL MEDICINE
Payer: COMMERCIAL

## 2023-09-24 ENCOUNTER — APPOINTMENT (OUTPATIENT)
Dept: PHYSICAL THERAPY | Facility: CLINIC | Age: 73
DRG: 872 | End: 2023-09-24
Attending: INTERNAL MEDICINE
Payer: COMMERCIAL

## 2023-09-24 PROBLEM — M54.50 CHRONIC MIDLINE LOW BACK PAIN WITHOUT SCIATICA: Status: ACTIVE | Noted: 2023-09-24

## 2023-09-24 PROBLEM — G89.29 CHRONIC MIDLINE LOW BACK PAIN WITHOUT SCIATICA: Status: ACTIVE | Noted: 2023-09-24

## 2023-09-24 PROBLEM — S09.90XA CLOSED HEAD INJURY, INITIAL ENCOUNTER: Status: ACTIVE | Noted: 2023-09-24

## 2023-09-24 PROBLEM — R50.9 FEVER, UNSPECIFIED FEVER CAUSE: Status: ACTIVE | Noted: 2023-09-24

## 2023-09-24 LAB
ALBUMIN UR-MCNC: NEGATIVE MG/DL
AMMONIA PLAS-SCNC: 44 UMOL/L (ref 16–60)
APPEARANCE UR: CLEAR
BASE EXCESS BLDV CALC-SCNC: 3.2 MMOL/L (ref -7.7–1.9)
BILIRUB UR QL STRIP: NEGATIVE
COLOR UR AUTO: ABNORMAL
CRP SERPL-MCNC: 17.73 MG/L
ENTEROCOCCUS FAECALIS: NOT DETECTED
ENTEROCOCCUS FAECIUM: NOT DETECTED
ERYTHROCYTE [DISTWIDTH] IN BLOOD BY AUTOMATED COUNT: 13.7 % (ref 10–15)
ETHANOL SERPL-MCNC: <0.01 G/DL
GLUCOSE BLDC GLUCOMTR-MCNC: 100 MG/DL (ref 70–99)
GLUCOSE BLDC GLUCOMTR-MCNC: 115 MG/DL (ref 70–99)
GLUCOSE BLDC GLUCOMTR-MCNC: 115 MG/DL (ref 70–99)
GLUCOSE BLDC GLUCOMTR-MCNC: 124 MG/DL (ref 70–99)
GLUCOSE BLDC GLUCOMTR-MCNC: 131 MG/DL (ref 70–99)
GLUCOSE UR STRIP-MCNC: NEGATIVE MG/DL
HBA1C MFR BLD: 5.9 %
HCO3 BLDV-SCNC: 29 MMOL/L (ref 21–28)
HCT VFR BLD AUTO: 40.9 % (ref 40–53)
HGB BLD-MCNC: 13.7 G/DL (ref 13.3–17.7)
HGB UR QL STRIP: NEGATIVE
KETONES UR STRIP-MCNC: NEGATIVE MG/DL
LEUKOCYTE ESTERASE UR QL STRIP: NEGATIVE
LISTERIA SPECIES (DETECTED/NOT DETECTED): NOT DETECTED
LVEF ECHO: NORMAL
MAGNESIUM SERPL-MCNC: 1.7 MG/DL (ref 1.7–2.3)
MCH RBC QN AUTO: 30.3 PG (ref 26.5–33)
MCHC RBC AUTO-ENTMCNC: 33.5 G/DL (ref 31.5–36.5)
MCV RBC AUTO: 91 FL (ref 78–100)
NITRATE UR QL: NEGATIVE
O2/TOTAL GAS SETTING VFR VENT: 0 %
PCO2 BLDV: 45 MM HG (ref 40–50)
PH BLDV: 7.41 [PH] (ref 7.32–7.43)
PH UR STRIP: 7 [PH] (ref 5–7)
PLATELET # BLD AUTO: 132 10E3/UL (ref 150–450)
PO2 BLDV: 46 MM HG (ref 25–47)
POTASSIUM SERPL-SCNC: 3.5 MMOL/L (ref 3.4–5.3)
PROCALCITONIN SERPL IA-MCNC: 0.05 NG/ML
RBC # BLD AUTO: 4.52 10E6/UL (ref 4.4–5.9)
RBC URINE: 3 /HPF
SP GR UR STRIP: 1.01 (ref 1–1.03)
STAPHYLOCOCCUS AUREUS: NOT DETECTED
STAPHYLOCOCCUS EPIDERMIDIS: NOT DETECTED
STAPHYLOCOCCUS LUGDUNENSIS: NOT DETECTED
STAPHYLOCOCCUS SPECIES: NOT DETECTED
STREPTOCOCCUS AGALACTIAE: DETECTED
STREPTOCOCCUS ANGINOSUS GROUP: NOT DETECTED
STREPTOCOCCUS PNEUMONIAE: NOT DETECTED
STREPTOCOCCUS PYOGENES: NOT DETECTED
TSH SERPL DL<=0.005 MIU/L-ACNC: 0.68 UIU/ML (ref 0.3–4.2)
UROBILINOGEN UR STRIP-MCNC: NORMAL MG/DL
WBC # BLD AUTO: 12.3 10E3/UL (ref 4–11)
WBC URINE: <1 /HPF

## 2023-09-24 PROCEDURE — 36415 COLL VENOUS BLD VENIPUNCTURE: CPT | Performed by: INTERNAL MEDICINE

## 2023-09-24 PROCEDURE — 84132 ASSAY OF SERUM POTASSIUM: CPT | Performed by: INTERNAL MEDICINE

## 2023-09-24 PROCEDURE — 250N000011 HC RX IP 250 OP 636: Performed by: EMERGENCY MEDICINE

## 2023-09-24 PROCEDURE — 93306 TTE W/DOPPLER COMPLETE: CPT | Mod: 26 | Performed by: INTERNAL MEDICINE

## 2023-09-24 PROCEDURE — 82140 ASSAY OF AMMONIA: CPT | Performed by: INTERNAL MEDICINE

## 2023-09-24 PROCEDURE — 250N000011 HC RX IP 250 OP 636: Performed by: INTERNAL MEDICINE

## 2023-09-24 PROCEDURE — 96376 TX/PRO/DX INJ SAME DRUG ADON: CPT

## 2023-09-24 PROCEDURE — 93306 TTE W/DOPPLER COMPLETE: CPT

## 2023-09-24 PROCEDURE — A9585 GADOBUTROL INJECTION: HCPCS | Performed by: INTERNAL MEDICINE

## 2023-09-24 PROCEDURE — 87015 SPECIMEN INFECT AGNT CONCNTJ: CPT | Performed by: INTERNAL MEDICINE

## 2023-09-24 PROCEDURE — 87207 SMEAR SPECIAL STAIN: CPT | Performed by: INTERNAL MEDICINE

## 2023-09-24 PROCEDURE — 99223 1ST HOSP IP/OBS HIGH 75: CPT | Mod: AI | Performed by: INTERNAL MEDICINE

## 2023-09-24 PROCEDURE — 94660 CPAP INITIATION&MGMT: CPT

## 2023-09-24 PROCEDURE — 82962 GLUCOSE BLOOD TEST: CPT

## 2023-09-24 PROCEDURE — 250N000013 HC RX MED GY IP 250 OP 250 PS 637: Performed by: INTERNAL MEDICINE

## 2023-09-24 PROCEDURE — 72158 MRI LUMBAR SPINE W/O & W/DYE: CPT

## 2023-09-24 PROCEDURE — G0378 HOSPITAL OBSERVATION PER HR: HCPCS

## 2023-09-24 PROCEDURE — 97530 THERAPEUTIC ACTIVITIES: CPT | Mod: GP | Performed by: PHYSICAL THERAPIST

## 2023-09-24 PROCEDURE — 250N000011 HC RX IP 250 OP 636: Mod: JZ | Performed by: INTERNAL MEDICINE

## 2023-09-24 PROCEDURE — 85027 COMPLETE CBC AUTOMATED: CPT | Performed by: INTERNAL MEDICINE

## 2023-09-24 PROCEDURE — 250N000013 HC RX MED GY IP 250 OP 250 PS 637: Performed by: EMERGENCY MEDICINE

## 2023-09-24 PROCEDURE — 87040 BLOOD CULTURE FOR BACTERIA: CPT | Performed by: INTERNAL MEDICINE

## 2023-09-24 PROCEDURE — 258N000003 HC RX IP 258 OP 636: Performed by: INTERNAL MEDICINE

## 2023-09-24 PROCEDURE — 86788 WEST NILE VIRUS AB IGM: CPT | Performed by: INTERNAL MEDICINE

## 2023-09-24 PROCEDURE — 96375 TX/PRO/DX INJ NEW DRUG ADDON: CPT

## 2023-09-24 PROCEDURE — 99207 PR NO BILLABLE SERVICE THIS VISIT: CPT | Performed by: INTERNAL MEDICINE

## 2023-09-24 PROCEDURE — 999N000157 HC STATISTIC RCP TIME EA 10 MIN

## 2023-09-24 PROCEDURE — 83036 HEMOGLOBIN GLYCOSYLATED A1C: CPT | Performed by: INTERNAL MEDICINE

## 2023-09-24 PROCEDURE — 255N000002 HC RX 255 OP 636: Performed by: INTERNAL MEDICINE

## 2023-09-24 PROCEDURE — 81001 URINALYSIS AUTO W/SCOPE: CPT | Performed by: EMERGENCY MEDICINE

## 2023-09-24 PROCEDURE — 97161 PT EVAL LOW COMPLEX 20 MIN: CPT | Mod: GP | Performed by: PHYSICAL THERAPIST

## 2023-09-24 PROCEDURE — 72157 MRI CHEST SPINE W/O & W/DYE: CPT

## 2023-09-24 PROCEDURE — 120N000001 HC R&B MED SURG/OB

## 2023-09-24 PROCEDURE — 82803 BLOOD GASES ANY COMBINATION: CPT | Performed by: INTERNAL MEDICINE

## 2023-09-24 RX ORDER — CHLORTHALIDONE 25 MG/1
25 TABLET ORAL DAILY
Status: DISCONTINUED | OUTPATIENT
Start: 2023-09-24 | End: 2023-09-27 | Stop reason: HOSPADM

## 2023-09-24 RX ORDER — ACETAMINOPHEN 325 MG/1
325-650 TABLET ORAL EVERY 6 HOURS PRN
COMMUNITY

## 2023-09-24 RX ORDER — DEXTROSE MONOHYDRATE 25 G/50ML
25-50 INJECTION, SOLUTION INTRAVENOUS
Status: DISCONTINUED | OUTPATIENT
Start: 2023-09-24 | End: 2023-09-27 | Stop reason: HOSPADM

## 2023-09-24 RX ORDER — ASPIRIN 81 MG/1
81 TABLET ORAL DAILY
Status: DISCONTINUED | OUTPATIENT
Start: 2023-09-24 | End: 2023-09-27 | Stop reason: HOSPADM

## 2023-09-24 RX ORDER — POTASSIUM CHLORIDE 1500 MG/1
20 TABLET, EXTENDED RELEASE ORAL ONCE
Status: COMPLETED | OUTPATIENT
Start: 2023-09-24 | End: 2023-09-24

## 2023-09-24 RX ORDER — NALOXONE HYDROCHLORIDE 0.4 MG/ML
0.4 INJECTION, SOLUTION INTRAMUSCULAR; INTRAVENOUS; SUBCUTANEOUS
Status: DISCONTINUED | OUTPATIENT
Start: 2023-09-24 | End: 2023-09-27 | Stop reason: HOSPADM

## 2023-09-24 RX ORDER — AMOXICILLIN 250 MG
1 CAPSULE ORAL 2 TIMES DAILY PRN
Status: DISCONTINUED | OUTPATIENT
Start: 2023-09-24 | End: 2023-09-27 | Stop reason: HOSPADM

## 2023-09-24 RX ORDER — ONDANSETRON 2 MG/ML
4 INJECTION INTRAMUSCULAR; INTRAVENOUS EVERY 6 HOURS PRN
Status: DISCONTINUED | OUTPATIENT
Start: 2023-09-24 | End: 2023-09-27 | Stop reason: HOSPADM

## 2023-09-24 RX ORDER — NALOXONE HYDROCHLORIDE 0.4 MG/ML
0.2 INJECTION, SOLUTION INTRAMUSCULAR; INTRAVENOUS; SUBCUTANEOUS
Status: DISCONTINUED | OUTPATIENT
Start: 2023-09-24 | End: 2023-09-27 | Stop reason: HOSPADM

## 2023-09-24 RX ORDER — NICOTINE POLACRILEX 4 MG
15-30 LOZENGE BUCCAL
Status: DISCONTINUED | OUTPATIENT
Start: 2023-09-24 | End: 2023-09-27 | Stop reason: HOSPADM

## 2023-09-24 RX ORDER — LISINOPRIL 40 MG/1
40 TABLET ORAL DAILY
Status: DISCONTINUED | OUTPATIENT
Start: 2023-09-24 | End: 2023-09-27 | Stop reason: HOSPADM

## 2023-09-24 RX ORDER — GADOBUTROL 604.72 MG/ML
14 INJECTION INTRAVENOUS ONCE
Status: COMPLETED | OUTPATIENT
Start: 2023-09-24 | End: 2023-09-24

## 2023-09-24 RX ORDER — LIDOCAINE 4 G/G
1-2 PATCH TOPICAL
Status: DISCONTINUED | OUTPATIENT
Start: 2023-09-25 | End: 2023-09-27 | Stop reason: HOSPADM

## 2023-09-24 RX ORDER — PRAMIPEXOLE DIHYDROCHLORIDE 1 MG/1
3 TABLET ORAL AT BEDTIME
Status: DISCONTINUED | OUTPATIENT
Start: 2023-09-24 | End: 2023-09-27 | Stop reason: HOSPADM

## 2023-09-24 RX ORDER — SODIUM CHLORIDE 9 MG/ML
INJECTION, SOLUTION INTRAVENOUS CONTINUOUS
Status: ACTIVE | OUTPATIENT
Start: 2023-09-24 | End: 2023-09-24

## 2023-09-24 RX ORDER — POTASSIUM CHLORIDE 1500 MG/1
40 TABLET, EXTENDED RELEASE ORAL ONCE
Status: COMPLETED | OUTPATIENT
Start: 2023-09-24 | End: 2023-09-24

## 2023-09-24 RX ORDER — ACETAMINOPHEN 650 MG/1
650 SUPPOSITORY RECTAL EVERY 6 HOURS PRN
Status: DISCONTINUED | OUTPATIENT
Start: 2023-09-24 | End: 2023-09-24

## 2023-09-24 RX ORDER — ACETAMINOPHEN 325 MG/1
975 TABLET ORAL 3 TIMES DAILY
Status: DISCONTINUED | OUTPATIENT
Start: 2023-09-24 | End: 2023-09-27 | Stop reason: HOSPADM

## 2023-09-24 RX ORDER — ATORVASTATIN CALCIUM 40 MG/1
40 TABLET, FILM COATED ORAL DAILY
Status: DISCONTINUED | OUTPATIENT
Start: 2023-09-24 | End: 2023-09-27 | Stop reason: HOSPADM

## 2023-09-24 RX ORDER — METOPROLOL SUCCINATE 100 MG/1
100 TABLET, EXTENDED RELEASE ORAL 2 TIMES DAILY
Status: DISCONTINUED | OUTPATIENT
Start: 2023-09-24 | End: 2023-09-27 | Stop reason: HOSPADM

## 2023-09-24 RX ORDER — OXYCODONE HYDROCHLORIDE 5 MG/1
5 TABLET ORAL EVERY 4 HOURS PRN
Status: DISCONTINUED | OUTPATIENT
Start: 2023-09-24 | End: 2023-09-27 | Stop reason: HOSPADM

## 2023-09-24 RX ORDER — ACETAMINOPHEN 325 MG/1
650 TABLET ORAL EVERY 6 HOURS PRN
Status: DISCONTINUED | OUTPATIENT
Start: 2023-09-24 | End: 2023-09-24

## 2023-09-24 RX ORDER — GABAPENTIN 400 MG/1
800 CAPSULE ORAL 3 TIMES DAILY
Status: DISCONTINUED | OUTPATIENT
Start: 2023-09-24 | End: 2023-09-27 | Stop reason: HOSPADM

## 2023-09-24 RX ORDER — KETOROLAC TROMETHAMINE 15 MG/ML
15 INJECTION, SOLUTION INTRAMUSCULAR; INTRAVENOUS ONCE
Status: COMPLETED | OUTPATIENT
Start: 2023-09-24 | End: 2023-09-24

## 2023-09-24 RX ORDER — AMOXICILLIN 250 MG
2 CAPSULE ORAL 2 TIMES DAILY PRN
Status: DISCONTINUED | OUTPATIENT
Start: 2023-09-24 | End: 2023-09-27 | Stop reason: HOSPADM

## 2023-09-24 RX ORDER — ONDANSETRON 4 MG/1
4 TABLET, ORALLY DISINTEGRATING ORAL EVERY 6 HOURS PRN
Status: DISCONTINUED | OUTPATIENT
Start: 2023-09-24 | End: 2023-09-27 | Stop reason: HOSPADM

## 2023-09-24 RX ORDER — PIPERACILLIN SODIUM, TAZOBACTAM SODIUM 4; .5 G/20ML; G/20ML
4.5 INJECTION, POWDER, LYOPHILIZED, FOR SOLUTION INTRAVENOUS EVERY 6 HOURS
Status: DISCONTINUED | OUTPATIENT
Start: 2023-09-24 | End: 2023-09-25

## 2023-09-24 RX ORDER — LIDOCAINE 4 G/G
1-2 PATCH TOPICAL
Status: DISCONTINUED | OUTPATIENT
Start: 2023-09-24 | End: 2023-09-24

## 2023-09-24 RX ADMIN — DICLOFENAC SODIUM 2 G: 10 GEL TOPICAL at 14:40

## 2023-09-24 RX ADMIN — ACETAMINOPHEN 975 MG: 325 TABLET, FILM COATED ORAL at 14:26

## 2023-09-24 RX ADMIN — SODIUM CHLORIDE: 9 INJECTION, SOLUTION INTRAVENOUS at 03:16

## 2023-09-24 RX ADMIN — OXYCODONE HYDROCHLORIDE 5 MG: 5 TABLET ORAL at 22:49

## 2023-09-24 RX ADMIN — KETOROLAC TROMETHAMINE 15 MG: 15 INJECTION INTRAMUSCULAR; INTRAVENOUS at 00:51

## 2023-09-24 RX ADMIN — LISINOPRIL 40 MG: 40 TABLET ORAL at 10:11

## 2023-09-24 RX ADMIN — METOPROLOL SUCCINATE 100 MG: 100 TABLET, EXTENDED RELEASE ORAL at 10:10

## 2023-09-24 RX ADMIN — CHLORTHALIDONE 25 MG: 25 TABLET ORAL at 10:11

## 2023-09-24 RX ADMIN — GADOBUTROL 14 ML: 604.72 INJECTION INTRAVENOUS at 16:41

## 2023-09-24 RX ADMIN — GABAPENTIN 800 MG: 400 CAPSULE ORAL at 20:16

## 2023-09-24 RX ADMIN — POTASSIUM CHLORIDE 20 MEQ: 1500 TABLET, EXTENDED RELEASE ORAL at 01:45

## 2023-09-24 RX ADMIN — VANCOMYCIN HYDROCHLORIDE 1500 MG: 10 INJECTION, POWDER, LYOPHILIZED, FOR SOLUTION INTRAVENOUS at 18:34

## 2023-09-24 RX ADMIN — ISOSORBIDE MONONITRATE 45 MG: 30 TABLET, EXTENDED RELEASE ORAL at 10:11

## 2023-09-24 RX ADMIN — METFORMIN HYDROCHLORIDE 500 MG: 500 TABLET ORAL at 18:33

## 2023-09-24 RX ADMIN — GABAPENTIN 800 MG: 400 CAPSULE ORAL at 14:26

## 2023-09-24 RX ADMIN — METOPROLOL SUCCINATE 100 MG: 100 TABLET, EXTENDED RELEASE ORAL at 20:16

## 2023-09-24 RX ADMIN — ASPIRIN 81 MG: 81 TABLET ORAL at 10:10

## 2023-09-24 RX ADMIN — GABAPENTIN 800 MG: 400 CAPSULE ORAL at 10:11

## 2023-09-24 RX ADMIN — OXYCODONE HYDROCHLORIDE 5 MG: 5 TABLET ORAL at 14:37

## 2023-09-24 RX ADMIN — ACETAMINOPHEN 975 MG: 325 TABLET, FILM COATED ORAL at 20:16

## 2023-09-24 RX ADMIN — ATORVASTATIN CALCIUM 40 MG: 40 TABLET, FILM COATED ORAL at 10:10

## 2023-09-24 RX ADMIN — POTASSIUM CHLORIDE 40 MEQ: 1500 TABLET, EXTENDED RELEASE ORAL at 12:11

## 2023-09-24 RX ADMIN — PIPERACILLIN AND TAZOBACTAM 4.5 G: 4; .5 INJECTION, POWDER, FOR SOLUTION INTRAVENOUS at 20:23

## 2023-09-24 RX ADMIN — PIPERACILLIN AND TAZOBACTAM 4.5 G: 4; .5 INJECTION, POWDER, FOR SOLUTION INTRAVENOUS at 15:29

## 2023-09-24 RX ADMIN — SODIUM CHLORIDE: 9 INJECTION, SOLUTION INTRAVENOUS at 12:48

## 2023-09-24 RX ADMIN — PRAMIPEXOLE DIHYDROCHLORIDE 3 MG: 1 TABLET ORAL at 21:13

## 2023-09-24 ASSESSMENT — ACTIVITIES OF DAILY LIVING (ADL)
ADLS_ACUITY_SCORE: 18
ADLS_ACUITY_SCORE: 35
ADLS_ACUITY_SCORE: 35
ADLS_ACUITY_SCORE: 18

## 2023-09-24 NOTE — PROGRESS NOTES
DATE/TIME OF CALL RECEIVED FROM LAB:  09/24/23 at 12:32 PM   LAB TEST:  Blood culture  LAB VALUE:  Gram positive for cocci in pairs and chains  PROVIDER NOTIFIED?: Yes  PROVIDER NAME: Choco Riggins  DATE/TIME LAB VALUE REPORTED TO PROVIDER: 1227  MECHANISM OF PROVIDER NOTIFICATION: Page  PROVIDER RESPONSE: Yes - already treating patient

## 2023-09-24 NOTE — ED TRIAGE NOTES
BIBA patient passed out/fainted at [facility], hit back of head, unsure if LOC. Complaining of dizziness and abdominal pain to EMS. Complaining of back pain to nursing staff. Denies feeling sick at this time. Arrived in C collar. A&Ox4.      Triage Assessment       Row Name 09/23/23 2111       Triage Assessment (Adult)    Airway WDL WDL       Respiratory WDL    Respiratory WDL WDL       Skin Circulation/Temperature WDL    Skin Circulation/Temperature WDL WDL       Cardiac WDL    Cardiac WDL WDL       Peripheral/Neurovascular WDL    Peripheral Neurovascular WDL WDL       Cognitive/Neuro/Behavioral WDL    Cognitive/Neuro/Behavioral WDL WDL

## 2023-09-24 NOTE — ED NOTES
[facility]  ED Nurse Handoff Report    ED Chief complaint: Fall and Abdominal Pain  . ED Diagnosis:   Final diagnoses:   Syncope, unspecified syncope type   Closed head injury, initial encounter   Fever, unspecified fever cause   Chronic midline low back pain without sciatica       Allergies: No Known Allergies    Code Status: Full Code    Activity level - Baseline/Home:  independent.  Activity Level - Current:   assist of 1.   Lift room needed: No.   Bariatric: No   Needed: No   Isolation: No.   Infection: Not Applicable.     Respiratory status: Room air    Vital Signs (within 30 minutes):   Vitals:    09/24/23 0030 09/24/23 0045 09/24/23 0050 09/24/23 0051   BP: (!) 143/76 137/87     Pulse: 98 96     Resp:       Temp:   99  F (37.2  C)    TempSrc:   Oral    SpO2:    94%       Cardiac Rhythm:  ,      Pain level:    Patient confused: Yes.   Patient Falls Risk: activity supervised.   Elimination Status:  Straighted cathed.       Patient Report - Per provider note: Initial Complaint: he history is provided by the patient. The history is limited by the condition of the patient.      Federico Chamberlain is a 73 year old male who presents for a fall and back pain. Patient has had severe pain back that possibly occurred after a fall last week. He was at [facility] today when the pain onset again causing him to lose consciousness. Patient endorses a headache earlier but denies during exam. He also endorses mild neck pain. He thinks he has had a mild fever. Per the triage note, patient says he hit his head during the fall. Patient denies abdominal pain or chest pain. Patient says he lives alone.    .   Focused Assessment:      Abnormal Results:   Labs Ordered and Resulted from Time of ED Arrival to Time of ED Departure   COMPREHENSIVE METABOLIC PANEL - Abnormal       Result Value    Sodium 135 (*)     Potassium 3.2 (*)     Chloride 97 (*)     Carbon Dioxide (CO2) 28      Anion Gap 10      Urea  Nitrogen 16.7      Creatinine 0.84      Calcium 8.5 (*)     Glucose 120 (*)     Alkaline Phosphatase 76      AST 23      ALT 21      Protein Total 6.9      Albumin 4.0      Bilirubin Total 1.2      GFR Estimate >90     CBC WITH PLATELETS AND DIFFERENTIAL - Abnormal    WBC Count 9.9      RBC Count 4.76      Hemoglobin 14.1      Hematocrit 42.6      MCV 90      MCH 29.6      MCHC 33.1      RDW 13.4      Platelet Count 154      % Neutrophils 88      % Lymphocytes 6      % Monocytes 6      % Eosinophils 0      % Basophils 0      % Immature Granulocytes 0      NRBCs per 100 WBC 0      Absolute Neutrophils 8.6 (*)     Absolute Lymphocytes 0.6 (*)     Absolute Monocytes 0.6      Absolute Eosinophils 0.0      Absolute Basophils 0.0      Absolute Immature Granulocytes 0.0      Absolute NRBCs 0.0     ROUTINE UA WITH MICROSCOPIC REFLEX TO CULTURE - Abnormal    Color Urine Light Yellow      Appearance Urine Clear      Glucose Urine Negative      Bilirubin Urine Negative      Ketones Urine Negative      Specific Gravity Urine >1.035 (*)     Blood Urine Negative      pH Urine 7.0      Protein Albumin Urine Negative      Urobilinogen Urine Normal      Nitrite Urine Negative      Leukocyte Esterase Urine Negative      RBC Urine 3 (*)     WBC Urine <1     LIPASE - Normal    Lipase 13     LACTIC ACID WHOLE BLOOD - Normal    Lactic Acid 1.5     COVID-19 VIRUS (CORONAVIRUS) BY PCR - Normal    SARS CoV2 PCR Negative     BLOOD CULTURE   BLOOD CULTURE        CT Chest/Abdomen/Pelvis w Contrast   Final Result   IMPRESSION:   1.  No pneumothorax or pleural effusion.   2.  Prominence and heterogeneity of the inferior left gluteal muscle relative to the right, incompletely visualized. Soft tissue hematoma not excluded.    3.  Prominent left retroperitoneal lymph node prominent left groin lymph nodes.   4.  Aneurysmal ascending thoracic aorta.   5.  Nonobstructing right renal calculus.   6.  Small thyroid nodules.      Lumbar spine CT w/o  contrast   Final Result   IMPRESSION:   HEAD CT:   No acute intracranial process.      LUMBAR SPINE CT:   No CT evidence for acute fracture or post traumatic subluxation. Postoperative and degenerative changes, as described.         CT Cervical Spine w/o Contrast   Final Result   IMPRESSION:   1.  No definite fracture.   2.  Degenerative changes, as described.   3.  11 mm nodule left thyroid gland; ultrasound recommended, if not done previously.         CT Head w/o Contrast   Final Result   IMPRESSION:   HEAD CT:   No acute intracranial process.      LUMBAR SPINE CT:   No CT evidence for acute fracture or post traumatic subluxation. Postoperative and degenerative changes, as described.             Treatments provided:   Family Comments:   OBS brochure/video discussed/provided to patient:  Yes  ED Medications:   Medications   potassium chloride ER (KLOR-CON M) CR tablet 20 mEq (has no administration in time range)   sodium chloride 0.9% BOLUS 1,000 mL (65 mLs Intravenous $New Bag 9/23/23 2145)   acetaminophen (TYLENOL) tablet 1,000 mg (1,000 mg Oral $Given 9/23/23 2210)   morphine (PF) injection 4 mg (4 mg Intravenous $Given 9/23/23 2221)   sodium chloride (PF) 0.9% PF flush 100 mL (100 mLs Intravenous $Given 9/23/23 2226)   iopamidol (ISOVUE-370) solution 500 mL (100 mLs Intravenous $Given 9/23/23 2227)   morphine (PF) injection 4 mg (4 mg Intravenous $Given 9/23/23 2336)   ketorolac (TORADOL) injection 15 mg (15 mg Intravenous $Given 9/24/23 0051)       Drips infusing:  No  For the majority of the shift this patient was Green.   Interventions performed were .    Sepsis treatment initiated: No    Cares/treatment/interventions/medications to be completed following ED care:     ED Nurse Name: Donell Alejandra RN  1:35 AM   RECEIVING UNIT ED HANDOFF REVIEW    Above ED Nurse Handoff Report was reviewed: Yes  Reviewed by: Tawanda Conway RN on September 24, 2023 at 2:06 AM

## 2023-09-24 NOTE — ED NOTES
Pt stated he was unable to urinate and that he was in a lot of pain and discomfort in his lower back. Therefore, a straight catheter was performed in order to drain the Pts bladder and to collect a clean urine sample for UA. Straight catheter performed per hospital sterile protocol during procedure. MD and RN notified.

## 2023-09-24 NOTE — PROGRESS NOTES
A&Ox4. VSS. IV R lower forearm clean, dry, intact. Pt complained of pain, heat pad applied. Scheduled tylenol and PRN oxy given. Voltaren gel applied. Pain improved. Shivering at beginning of shift, afebrile. Sweaty with exertion. Denies nausea. Denies dizziness. , 155 and 100. No insulin given. MRI. Ax1 walker. Tele on - SR BBB 70's.

## 2023-09-24 NOTE — PROVIDER NOTIFICATION
DATE/TIME OF CALL RECEIVED FROM LAB:  09/24/23 at 3:20 PM   LAB TEST:  Blood culture  LAB VALUE:  Streptococcus agalactiae  PROVIDER NOTIFIED?: Yes  PROVIDER NAME: Choco Riggins  DATE/TIME LAB VALUE REPORTED TO PROVIDER: 1513  MECHANISM OF PROVIDER NOTIFICATION: Page  PROVIDER RESPONSE: No

## 2023-09-24 NOTE — PLAN OF CARE
"PRIMARY DIAGNOSIS: \"GENERIC\" NURSING  OUTPATIENT/OBSERVATION GOALS TO BE MET BEFORE DISCHARGE:  ADLs back to baseline: No    Activity and level of assistance: Ax1 walker    Pain status: Improved-controlled with oral pain medications.    Return to near baseline physical activity: No     Discharge Planner Nurse   Safe discharge environment identified: Yes  Barriers to discharge: Yes       Entered by: Maru Gambino RN 09/24/2023    A&Ox4. VSS. Pt reports pain improving with oxy and voltaren gel. MRI checklist done. Tele on.   Please review provider order for any additional goals.   Nurse to notify provider when observation goals have been met and patient is ready for discharge.           "

## 2023-09-24 NOTE — PROGRESS NOTES
09/24/23 1612   Appointment Info   Signing Clinician's Name / Credentials (PT) Deysi Hartman DPT   Rehab Comments (PT) baseline wears L AFO   Living Environment   People in Home alone   Current Living Arrangements house   Home Accessibility stairs to enter home;stairs within home   Number of Stairs, Main Entrance 2   Stair Railings, Main Entrance none   Number of Stairs, Within Home, Primary six   Stair Railings, Within Home, Primary   (1-2 depending on which section of stairs)   Transportation Anticipated car, drives self   Living Environment Comments Reports living in split level home alone with his dog; neighbor is taking care of dog while pt in hospital; typically IND with all ADLs/IADLs and mobility without AD, wears AFO on L d/t prevous back surgeries; has fenced in backyard to let dog out so doesn't have to walk him immediately upon return home   Self-Care   Usual Activity Tolerance good   Current Activity Tolerance moderate   Equipment Currently Used at Home none  (has walker and cane to use as needed)   Fall history within last six months yes   Number of times patient has fallen within last six months 1   General Information   Onset of Illness/Injury or Date of Surgery 09/23/23   Referring Physician Choco Riggins MD   Patient/Family Therapy Goals Statement (PT) to go home to his dog soon   Pertinent History of Current Problem (include personal factors and/or comorbidities that impact the POC) 73-year-old male with history of diabetes mellitus, hypertension, previous syncopal event, and sepsis with bacteremia due to Streptococcus dysgalactiae cellulitis in January 2023. He presented to the ED on 9/23/23 for evaluation of fever and passing out.  ED staff noted intermittent confusion with clearing mental status. He also reported some bilateral hand weakness, confusion, and chills for the last several days; sepsis vs SIRS as working diagnosis   Existing Precautions/Restrictions fall   General  "Observations Supine, NAD   Cognition   Affect/Mental Status (Cognition) WFL  (but states he feels he is having \"confusing\" thoughts)   Orientation Status (Cognition) oriented x 3   Follows Commands (Cognition) over 90% accuracy   Pain Assessment   Patient Currently in Pain   (at rest, denies pain d/t just getting pain meds and Voltaren gel applied, following activity 8/10)   Integumentary/Edema   Integumentary/Edema Comments See RN assessment   Posture    Posture Forward head position;Protracted shoulders   Range of Motion (ROM)   Range of Motion ROM is WFL   Strength (Manual Muscle Testing)   Strength (Manual Muscle Testing) strength is WFL  (except L DF, wears AFO)   Bed Mobility   Bed Mobility no deficits identified   Comment, (Bed Mobility) inc in pain   Transfers   Transfers sit-stand transfer   Sit-Stand Transfer   Sit-Stand [place] (Transfers) contact guard;verbal cues   Assistive Device (Sit-Stand Transfers) walker, front-wheeled   Comment, (Sit-Stand Transfer) initially pulls to walker   Gait/Stairs (Locomotion)   [place] Level (Gait) contact guard   Assistive Device (Gait) walker, front-wheeled   Distance in Feet 5' for eval, add'l for treat   Pattern (Gait) step-through   Deviations/Abnormal Patterns (Gait) base of support, wide;gait speed decreased;stride length decreased   Balance   Balance Comments Fair, no overt LOB, had x1 muscle spasm in L glute that caused minor leg buckle that pt self corrected   Sensory Examination   Sensory Perception Comments reports baseline neuropathy in B feet   Clinical Impression   Criteria for Skilled Therapeutic Intervention Yes, treatment indicated   PT Diagnosis (PT) decreased functional mobility   Influenced by the following impairments pain, weakness   Functional limitations due to impairments transfers, ambulation, stair climbing   Clinical Presentation (PT Evaluation Complexity) Evolving/Changing   Clinical Presentation Rationale clinical picture, " triggered sepsis   Clinical Decision Making (Complexity) low complexity   Planned Therapy Interventions (PT) balance training;bed mobility training;gait training;joint mobilization;ROM (range of motion);stair training;strengthening;transfer training;progressive activity/exercise;risk factor education;home program guidelines   Anticipated Equipment Needs at Discharge (PT)   (none anticipated, has SPC and walker as needed)   Risk & Benefits of therapy have been explained evaluation/treatment results reviewed;risks/benefits reviewed;care plan/treatment goals reviewed;current/potential barriers reviewed;participants voiced agreement with care plan;participants included;patient   PT Total Evaluation Time   PT Eval, Low Complexity Minutes (53137) 8   Physical Therapy Goals   PT Frequency Daily   PT Predicted Duration/Target Date for Goal Attainment 10/01/23   PT Goals Transfers;Gait;Stairs   PT: Transfers Modified independent;Sit to/from stand;Bed to/from chair;Assistive device   PT: Gait Modified independent;Rolling walker;150 feet   PT: Stairs Modified independent;7 stairs  (with 1 rail)   PT Discharge Planning   PT Plan progress ambulation distance and trial stairs when able   PT Discharge Recommendation (DC Rec) home with home care physical therapy   PT Rationale for DC Rec Anticipate with further medical management and improvement in back pain, pt will be able to return home with HHPT (which pt is hopeful for).  Discussed with pt that if pain continues to be limiting factor, may need to consider TCU.   PT Brief overview of current status CGA with 2WW short distance ambulation   Total Session Time   Timed Code Treatment Minutes 16   Total Session Time (sum of timed and untimed services) 24

## 2023-09-24 NOTE — H&P
Chief complaint: Acute on chronic back pain, passing out, fever noted in the emergency department    HPI:  72 year old male with PMH including type II DM, HTN, HLD, RLS, RBBB, first-degree AVB, MDD, lumbar fusion, gout, TIA, prostate cancer s/p prostatectomy, erectile dysfunction, and previous episode of syncope in December 2022 in the setting of sepsis who presented with a passing out episode in the setting of acute on chronic back pain.  He was noted to have a fever in the emergency department    Patient reportedly was at [facility] today.  He was seated in the chair.  Per ER provider he reportedly had worsening back pain while trying to get up from the chair and subsequently passed out.  He tells me he does not recall this.  He was brought to the ER for evaluation.  In the emerged department he was noted have a fever    Vital signs in the ER notable for temperature 102 degrees.  Heart rate 90.  Systolic blood pressure near 180.  Saturation 92% on room air    Lab work rather unremarkable in the emergency department.  Urinalysis shows no evidence of infection.  COVID-negative.  Potassium slightly low at 3.2.  Lipase normal.  CBC including white blood cell count normal.  Lactic acid normal.  Blood cultures obtained and pending    Imaging included head CT scan showing no acute findings.  Cervical spine CT scan showed incidental thyroid gland nodule.  Lumbar spine CT scan showed no acute finding.  CT chest abdomen pelvis with contrast shows no acute infectious source.  There was some prominence of the inferior left gluteal muscle relative to the right, soft tissue hematoma not excluded.  Nonobstructing renal calculus noted    I spoke with Dr. Yanes of the emergency department.  Patient will be admitted to observation.  He was not administered any antibiotics in the ER as there was no clear bacterial infection source and plan is to monitor off antibiotics for now      PMH:  #Severe Sepsis secondary to strep  dysgalactiae bacteremia from RLE cellulitis, right great toe infection. Frostbite of right great toe  #Syncope.   #NSTEMI, suspected type 2 in setting of severe sepsis  #Hypertension.   #Dilated aorta.   #RBBB. First-degree AVB. LAFB.   #QTC prolongation.   #Multiple PVCs  #Fall. Rhabdomyolysis. Acute kidney injury. Left chest wall pain, likely due to contusion  #Type II DM  #History lumbar fusion/ Chronic pain  #RLS  #History of prostate cancer. Erectile dysfunction  #Obesity    Medications:  Current Facility-Administered Medications   Medication    potassium chloride ER (KLOR-CON M) CR tablet 20 mEq     Current Outpatient Medications   Medication    acetaminophen (TYLENOL) 325 MG tablet    aspirin 81 MG EC tablet    atorvastatin (LIPITOR) 40 MG tablet    chlorthalidone (HYGROTON) 25 MG tablet    cyclobenzaprine (FLEXERIL) 5 MG tablet    diclofenac (VOLTAREN) 1 % topical gel    ferrous sulfate (FEROSUL) 325 (65 Fe) MG tablet    furosemide (LASIX) 20 MG tablet    gabapentin (NEURONTIN) 800 MG tablet    isosorbide mononitrate (IMDUR) 30 MG 24 hr tablet    Lidocaine (LIDOCARE) 4 % Patch    lisinopril (ZESTRIL) 40 MG tablet    magnesium oxide (MAG-OX) 400 MG tablet    metFORMIN (GLUCOPHAGE) 500 MG tablet    metoprolol succinate ER (TOPROL XL) 100 MG 24 hr tablet    multivitamin w/minerals (THERA-VIT-M) tablet    nitroGLYcerin (NITROSTAT) 0.4 MG sublingual tablet    OXYCODONE-ACETAMINOPHEN PO    potassium chloride ER (KLOR-CON M) 10 MEQ CR tablet    Pramipexole Dihydrochloride (MIRAPEX PO)    semaglutide (OZEMPIC, 1 MG/DOSE,) 2 MG/1.5ML pen    senna-docusate (SENOKOT-S/PERICOLACE) 8.6-50 MG tablet    silver sulfADIAZINE (SILVADENE) 1 % external cream    VITAMIN D, CHOLECALCIFEROL, PO    zolpidem (AMBIEN) 5 MG tablet        Allergies:   No Known Allergies     FH: Noncontributory to this admission    Social history: Denies excessive alcohol use.  Denies smoking    Review of systems: See HPI for details.  Comprehensive  greater than 10 point review of systems otherwise negative besides a detail above    Physical exam:  /87   Pulse 96   Temp 99  F (37.2  C) (Oral)   Resp 20   SpO2 94%      Last 24 hours Vitals signs,imaging,microbiology;laboratory results were reviewed by me in EPIC  GENERAL:  Comfortable.  PSYCH: pleasant, oriented, No acute distress.  HEART:  Normal S1, S2 with no edema.  LUNGS:  Clear to auscultation, normal Respiratory effort.  ABDOMEN:  Soft, no hepatosplenomegaly, normal bowel sounds.  SKIN:  Dry to touch, No rash.     Pertinent laboratory and imaging data reviewed above in HPI    Impression: 73-year-old male with a history of gout is mellitus, hypertension, previous syncopal event, and history of sepsis with bacteremia due to Streptococcus dysgalactiae cellulitis January 2023 who presents to the hospital this evening for concerns about fever and passing out.  ER staff noted intermittent confusion with clearing mental status.      Vitals signs in ER notable for fever.  Labs and imaging unremarkable.      1.  Fever: Etiology not currently clear.  No localizing symptoms suggestive of bacterial infectious source.  White blood cell count normal.  Urinalysis unremarkable.  Pan CT imaging shows no infection source.  Consider viral etiology among other possibilities.  Blood cultures pending.  Monitor off antibiotics    2.  History of Streptococcus dysgalactiae bacteremia felt related to cellulitis January 2023    3.  Diabetes mellitus history    4.  Passing out episode.  Possibly due to vasovagal episode.  Consider medications and would review med list when reconciled by pharmacy.  Would also consider possibility of fever/infection related to syncopal event.  Hypoglycemia would be another consideration in the setting of his diabetes history; glucose was slightly elevated in the ER.  Seizure remains in the differential but seems unlikely.  Notably he does have a history of syncope x2, most recently during  infection for sepsis noted above January 2023    5.  Incidental thyroid nodule noted on CT imaging with recommendation for outpatient ultrasound    6.  Reported confusion in the emergency department which would wax and wane.  Source unclear.  Possibly due to fever.  Consider medications and would review medication list when reconciled by pharmacy    7.  Bifascicular block on EKG.  This is chronic on my chart review of previous EKG from February 2023.  He does appear to have a new first-degree AV block tonight with IL interval 214 that was not present from EKG in February    Plan:    1.  Observation status    2.  IV fluids overnight    3.  Telemetry monitoring    4.  Echocardiogram in the morning given reports of fainting episode    5.  Check magnesium level    6.  Check TSH level in the setting of incidental thyroid nodule noted on CT imaging    7.  Given intermittent confusion and encephalopathy in the ER, will check ammonia level and VBG for completeness    8.  If intermittent encephalopathy persists or he develops headache or neck stiffness would consider lumbar puncture for completeness.  I do not currently suspect bacterial meningitis and I do not feel a need for an urgent lumbar puncture this evening    9.  Review and resume appropriate home medications when clarified by pharmacy    10.  Potassium supplementation protocol    11.  Consider outpatient thyroid ultrasound in the setting of incidental thyroid nodule finding on CT imaging tonight    12.  Check orthostatic blood pressure and heart rate on admission    13.  Check hgb A1c level    14.  Check procalcitonin and CRP levels    CODE STATUS is full code

## 2023-09-24 NOTE — PLAN OF CARE
"PRIMARY DIAGNOSIS: \"GENERIC\" NURSING  OUTPATIENT/OBSERVATION GOALS TO BE MET BEFORE DISCHARGE:  ADLs back to baseline: No    Activity and level of assistance: Up with maximum assistance. Consider SW and/or PT evaluation.     Pain status: No improvement noted. Consider adjustment in pain regimen.    Return to near baseline physical activity: No     Discharge Planner Nurse   Safe discharge environment identified: Yes  Barriers to discharge: Yes       Entered by: Maru Gambino RN 09/24/2023    A&Ox4. VSS. Resting in bed. Complaining of back pain (chronic per pt), refused tylenol. MD notified. Applied heat pad on back. IV was leaking and pt requested to have it moved due to not being able to bend his arm. Removed IV. Placed new IV in R lower forearm.   Please review provider order for any additional goals.   Nurse to notify provider when observation goals have been met and patient is ready for discharge.    "

## 2023-09-24 NOTE — PROGRESS NOTES
[facility] - [facility] Note  [facility] Observation Dept    Referral Source: Admission request    Saw pt. Federico per admission request. A brief introduction to [facility] was given. He was in a lot of pain and had just called the nurse for more medication. Federico said he was having difficulty with his brain and remembering things. He was trying to recall why he had requested [facility]. I informed Federico that I can visit with him at a later time. Federico was getting physical therapy and was not available when I went to see him again.    Plan: Send follow-up request to unit  for Monday 9/25    Karlene Gordon MA   Intern    [facility] routine referrals *[phone]   [facility] available 24/7 for emergent requests/referrals, either by paging the on-call  or by entering an ASAP/STAT consult in Epic (this will also page the on-call ).

## 2023-09-24 NOTE — PROGRESS NOTES
Vitals are Temp: 98.5  F (36.9  C) Temp src: Oral BP: (!) 160/90 Pulse: 85   Resp: 18 SpO2: 95 %.  Patient is Alert and Oriented x4. Patient is 2 assist with Walker.  Pt is on a Regular diet.  Denies pain.  Patient has Normal Saline 0.9% running at 100 mL per hour. Patient slept well.

## 2023-09-24 NOTE — PLAN OF CARE
"PRIMARY DIAGNOSIS: \"GENERIC\" NURSING  OUTPATIENT/OBSERVATION GOALS TO BE MET BEFORE DISCHARGE:  ADLs back to baseline: No    Activity and level of assistance: Up with maximum assistance. Consider SW and/or PT evaluation.     Pain status: No improvement noted. Consider adjustment in pain regimen.    Return to near baseline physical activity: No     Discharge Planner Nurse   Safe discharge environment identified: Yes  Barriers to discharge: Yes       Entered by: Maru Gambino RN 09/24/2023    A&Ox4. VSS. Ate lunch. . Complaining of lower back pain radiating down LLE. Scheduled tylenol given. PRN oxy given. PRN voltaren gel applied.    Please review provider order for any additional goals.   Nurse to notify provider when observation goals have been met and patient is ready for discharge.  "

## 2023-09-24 NOTE — PHARMACY-VANCOMYCIN DOSING SERVICE
"Pharmacy Vancomycin Initial Note  Date of Service 2023  Patient's  1950  73 year old, male    Indication: Bacteremia    Current estimated CrCl = Estimated Creatinine Clearance: 125.3 mL/min (based on SCr of 0.84 mg/dL).    Creatinine for last 3 days  2023: 10:04 PM Creatinine 0.84 mg/dL    Recent Vancomycin Level(s) for last 3 days  No results found for requested labs within last 3 days.      Vancomycin IV Administrations (past 72 hours)        No vancomycin orders with administrations in past 72 hours.                    Nephrotoxins and other renal medications (From now, onward)      Start     Dose/Rate Route Frequency Ordered Stop    23 1600  vancomycin (VANCOCIN) 1,500 mg in 0.9% NaCl 250 mL intermittent infusion         1,500 mg  over 90 Minutes Intravenous EVERY 12 HOURS 23 1508      23 1500  piperacillin-tazobactam (ZOSYN) 4.5 g vial to attach to  mL bag        Note to Pharmacy: For [facility], [facility] and [facility]: For Zosyn-naive patients, use the \"Zosyn initial dose + extended infusion\" order panel.    4.5 g  over 30 Minutes Intravenous EVERY 6 HOURS 23 1440      23 0900  lisinopril (ZESTRIL) tablet 40 mg        Note to Pharmacy: PTA Sig:Take 1 tablet (40 mg) by mouth daily      40 mg Oral DAILY 23 0841              Contrast Orders - past 72 hours (72h ago, onward)      Start     Dose/Rate Route Frequency Stop    23 2230  iopamidol (ISOVUE-370) solution 500 mL         500 mL Intravenous ONCE 23 2227            [employer] Prediction of Planned Initial Vancomycin Regimen  Loading dose: N/A  Regimen: 1500 mg IV every 12 hours.  Start time: 15:06 on 2023  Exposure target: AUC24 (range)400-600 mg/L.hr   AUC24,ss: 535 mg/L.hr  Probability of AUC24 > 400: 80 %  Ctrough,ss: 17.7 mg/L  Probability of Ctrough,ss > 20: 38 %  Probability of nephrotoxicity (Lodise LUZ ELENA ): 14 %          Plan:  Start vancomycin  1500 mg IV q12h.   Vancomycin " monitoring method: AUC  Vancomycin therapeutic monitoring goal: 400-600 mg*h/L  Pharmacy will check vancomycin levels as appropriate in 1-3 Days.    Serum creatinine levels will be ordered a minimum of twice weekly.      Kermit Severson, RPH

## 2023-09-24 NOTE — CARE PLAN
ROOM 220    Living Situation  single family home, living alone  Facility name:  : [facility]    Activity level at baseline: INDEPENDENT  Activity level on admit: ASSIST OF 2    Who will be transporting you at discharge: [facility]    Patient registered to observation; given Patient Bill of Rights; given the opportunity to ask questions about observation status and their plan of care.  Patient has been oriented to the observation room, bathroom and call light is in place.    Discussed discharge goals and expectations with patient/family.

## 2023-09-24 NOTE — PROGRESS NOTES
[facility]    Medicine Progress Note - Hospitalist Service    Date of Admission:  9/23/2023    Assessment & Plan   Federico Spence is a 73-year-old male with history of diabetes mellitus, hypertension, previous syncopal event, and sepsis with bacteremia due to Streptococcus dysgalactiae cellulitis in January 2023. He presented to the ED on 9/23/23 for evaluation of fever and passing out.  ED staff noted intermittent confusion with clearing mental status. He also reported some bilateral hand weakness, confusion, and chills for the last several days. He has had no recent travel. He has a dog but no other pets. He does walk in the woods daily and had a tick bite a week or so ago. ED evaluation showed fever with temp 102 and 101.6 on repeat. Heart rate was 90s to 100s. Blood pressure was high. Laboratory evaluation showed potassium 3.2, CRP 17.73, lactic acid 1.5, procalcitonin 0.05, unremarkable CBC, and normal UA. Blood cultures were sent. Imaging, including CT C spine and C chest/abdomen/pelvis was unremarkable. He was admitted for further cares. The morning after admission WBC was 12.3. Echo was unremarkable. He continued to feel weak and have chills.     Problem list    Sepsis vs SIRS (fever, tachycardia, leukocytosis)  Suspected infection  Acute on chronic back pain  -Change to inpatient  -Continue to monitor blood cultures.   -Check West Nile IGM and parasite smear for ehrlichia and babesia  -With increased back pain will get MRI of thoracic and lumbar spine to ensure no discitis.   -Continue supportive cares  -If recurrent fever would start empiric antibiotic (Zosyn and vancomycin) pending blood cultures    Addendum:  1 of 2 blood cultures from presentation is growing gram positive cocci in pairs and chains. I repeated a blood culture and started Vancomycin and Zosyn.      History of Streptococcus dysgalactiae bacteremia felt related to cellulitis January 2023  -No signs of cellulitis at  this time     Diabetes mellitus, type 2  -Resume PTA metformin  -Continue Novolog insulin by sliding scale as needed     Syncopal episode  -Suspect related to febrile illness  -Echo unremarkable  -Continue telemetry     Incidental thyroid nodule noted on CT  -Outpatient ultrasound     Bifascicular block on EKG  -Noted on previous EKG from February 2023.  He does appear to have a new first-degree AV block tonight with LA interval 214 that was not present from EKG in February     Weakness  -Suspect related to concurrent febrile illness  -PT and OT consuls     Diet: Regular Diet Adult    DVT Prophylaxis: Pneumatic Compression Devices  Alvarez Catheter: Not present  Lines: None     Cardiac Monitoring: ACTIVE order. Indication: Syncope- low cardiac risk (24 hours)  Code Status: Full Code      Clinically Significant Risk Factors Present on Admission        # Hypokalemia: Lowest K = 3.2 mmol/L in last 2 days, will replace as needed         # Drug Induced Platelet Defect: home medication list includes an antiplatelet medication   # Hypertension: Noted on problem list                 Disposition Plan      Expected Discharge Date: 09/25/2023                  Choco Riggins MD  Hospitalist Service  [facility]  Securely message with [employer] (more info)  Text page via [employer] Paging/Directory   ______________________________________________________________________    Interval History   Still with chills and weakness. Denies current confusion but has been having confusion prior to admissio. Low back pain is worse than normal.     Physical Exam   Vital Signs: Temp: 98.1  F (36.7  C) Temp src: Oral BP: 132/64 Pulse: 71   Resp: 18 SpO2: 94 % O2 Device: None (Room air)    Weight: 0 lbs 0 oz    GENERAL:  Comfortable. Cooperative.  PSYCH: pleasant, oriented, No acute distress.  EYES: PERRLA, Normal conjunctiva.  HEART:  Regular rate and rhythm. No JVD. Pulses normal. No edema.  LUNGS:  Clear to auscultation,  normal Respiratory effort.  ABDOMEN:  Soft, no hepatosplenomegaly, normal bowel sounds.  EXTREMETIES: No clubbing, cyanosis or ischemia  SKIN:  Dry to touch, No rash.      Medical Decision Making       45 MINUTES SPENT BY ME on the date of service doing chart review, history, exam, documentation & further activities per the note.      Data     I have personally reviewed the following data over the past 24 hrs:    12.3 (H)  \   13.7   / 132 (L)     135 (L) 97 (L) 16.7 /  115 (H)   3.2 (L) 28 0.84 \     ALT: 21 AST: 23 AP: 76 TBILI: 1.2   ALB: 4.0 TOT PROTEIN: 6.9 LIPASE: 13     TSH: 0.68 T4: N/A A1C: 5.9 (H)     Procal: 0.05 (H) CRP: 17.73 (H) Lactic Acid: 1.5         Imaging results reviewed over the past 24 hrs:   Recent Results (from the past 24 hour(s))   CT Head w/o Contrast    Narrative    EXAM: CT HEAD W/O CONTRAST, CT LUMBAR SPINE W/O CONTRAST  LOCATION: [facility]  DATE: 9/23/2023    INDICATION: Head, neck, and back injury  COMPARISON: 01/01/2023  TECHNIQUE:   1) Routine CT Head without IV contrast. Multiplanar reformats. Dose reduction techniques were used.   2) Routine CT Lumbar Spine without IV contrast. Multiplanar reformats. Dose reduction techniques were used.     FINDINGS:   HEAD CT:   INTRACRANIAL CONTENTS: No intracranial hemorrhage, extraaxial collection, or mass effect.  No CT evidence of acute infarct. Mild presumed chronic small vessel ischemic changes. Mild generalized volume loss. No hydrocephalus.     VISUALIZED ORBITS/SINUSES/MASTOIDS: No intraorbital abnormality. No paranasal sinus mucosal disease. No middle ear or mastoid effusion.    BONES/SOFT TISSUES: No acute abnormality.    LUMBAR SPINE CT:  VERTEBRA: Normal vertebral body heights and alignment. No fracture or posttraumatic subluxation. Bilateral transpedicular screw and jerman fixation L3-L5. Post laminectomies L3-L4 and L4-L5.    CANAL/FORAMINA: Moderate degenerative changes with mild to moderate canal narrowing  at T11-T12 and the right, mild at T12-L1, moderate at L1-L2 6, moderate to marked at L2-L3. Marked bilateral foraminal narrowing L1-L2, marked right at T11-T12.   Multilevel mild to moderate neural foraminal narrowing.    PARASPINAL: Large left renal cysts.      Impression    IMPRESSION:  HEAD CT:  No acute intracranial process.    LUMBAR SPINE CT:  No CT evidence for acute fracture or post traumatic subluxation. Postoperative and degenerative changes, as described.     CT Cervical Spine w/o Contrast    Narrative    EXAM: CT CERVICAL SPINE W/O CONTRAST  LOCATION: [facility]  DATE: 9/23/2023    INDICATION: Injury, neck pain  COMPARISON: None.  TECHNIQUE: Routine CT Cervical Spine without IV contrast. Multiplanar reformats. Dose reduction techniques were used.    FINDINGS:  VERTEBRA: Normal normal vertebral body heights. Straightening of cervical lordosis. No fracture or posttraumatic subluxation.     CANAL/FORAMINA: Moderate degenerative changes with moderate canal narrowing at C2-C3, C3-C4, C4-C5, C5-C6, C6-C7. Multilevel prominent neural foraminal narrowing bilaterally.    PARASPINAL: 11 mm nodule left thyroid gland; ultrasound recommended, if not done previously.      Impression    IMPRESSION:  1.  No definite fracture.  2.  Degenerative changes, as described.  3.  11 mm nodule left thyroid gland; ultrasound recommended, if not done previously.     Lumbar spine CT w/o contrast    Narrative    EXAM: CT HEAD W/O CONTRAST, CT LUMBAR SPINE W/O CONTRAST  LOCATION: [facility]  DATE: 9/23/2023    INDICATION: Head, neck, and back injury  COMPARISON: 01/01/2023  TECHNIQUE:   1) Routine CT Head without IV contrast. Multiplanar reformats. Dose reduction techniques were used.   2) Routine CT Lumbar Spine without IV contrast. Multiplanar reformats. Dose reduction techniques were used.     FINDINGS:   HEAD CT:   INTRACRANIAL CONTENTS: No intracranial hemorrhage, extraaxial  collection, or mass effect.  No CT evidence of acute infarct. Mild presumed chronic small vessel ischemic changes. Mild generalized volume loss. No hydrocephalus.     VISUALIZED ORBITS/SINUSES/MASTOIDS: No intraorbital abnormality. No paranasal sinus mucosal disease. No middle ear or mastoid effusion.    BONES/SOFT TISSUES: No acute abnormality.    LUMBAR SPINE CT:  VERTEBRA: Normal vertebral body heights and alignment. No fracture or posttraumatic subluxation. Bilateral transpedicular screw and jerman fixation L3-L5. Post laminectomies L3-L4 and L4-L5.    CANAL/FORAMINA: Moderate degenerative changes with mild to moderate canal narrowing at T11-T12 and the right, mild at T12-L1, moderate at L1-L2 6, moderate to marked at L2-L3. Marked bilateral foraminal narrowing L1-L2, marked right at T11-T12.   Multilevel mild to moderate neural foraminal narrowing.    PARASPINAL: Large left renal cysts.      Impression    IMPRESSION:  HEAD CT:  No acute intracranial process.    LUMBAR SPINE CT:  No CT evidence for acute fracture or post traumatic subluxation. Postoperative and degenerative changes, as described.     CT Chest/Abdomen/Pelvis w Contrast    Narrative    EXAM: CT CHEST/ABDOMEN/PELVIS W CONTRAST  LOCATION: [facility]  DATE: 9/23/2023    INDICATION: trauma, abd back pain  COMPARISON: None.  TECHNIQUE: CT scan of the chest, abdomen, and pelvis was performed following injection of IV contrast. Multiplanar reformats were obtained. Dose reduction techniques were used.   CONTRAST: 100mL Isovue 370    FINDINGS:   LUNGS AND PLEURA: Mosaic attenuation. Respiratory motion. No pneumothorax. Mild basilar atelectasis.    MEDIASTINUM/AXILLAE: Atherosclerotic aorta. No adenopathy or pericardial effusion. Small thyroid nodules. Aneurysmal ascending thoracic aorta, 4.5 cm.    CORONARY ARTERY CALCIFICATION: Moderate.    HEPATOBILIARY: Hepatic cysts. Gallbladder is distended.    PANCREAS: Atrophic.    SPLEEN: Upper  normal size.    ADRENAL GLANDS: Normal.    KIDNEYS/BLADDER: Bilateral renal cysts. No follow-up needed. 8 mm right renal calculus subcentimeter renal hypodensities too small to characterize.    BOWEL: Normal caliber. Moderate amount of colonic stool.    LYMPH NODES: Prominent left retroperitoneal node series 4 image 2:30 A1 0.5 x 1.2 cm. Prominent left groin nodes. 2.2 x 1.1 cm node image 368 and 2.2 x 1.7 cm node image 393.    VASCULATURE: Atherosclerotic vascular calcification.    PELVIC ORGANS: Absent prostate.    MUSCULOSKELETAL: Prominence of the left gluteal musculature relative to the right, inferior aspect. Postsurgical change lumbar spine. Degenerative change osseous structures.      Impression    IMPRESSION:  1.  No pneumothorax or pleural effusion.  2.  Prominence and heterogeneity of the inferior left gluteal muscle relative to the right, incompletely visualized. Soft tissue hematoma not excluded.   3.  Prominent left retroperitoneal lymph node prominent left groin lymph nodes.  4.  Aneurysmal ascending thoracic aorta.  5.  Nonobstructing right renal calculus.  6.  Small thyroid nodules.   Echocardiogram Complete   Result Value    LVEF  50-55%    [facility]    [id number]  [id number]  [id number]  [id number]^PETRONA^RAF^GABBY     [facility]  Echocardiography Laboratory  [address] [place] [address]     Name: AMADEO HAYNES  MRN: [id number]  : 1950  Study Date: 2023 08:59 AM  Age: 73 yrs  Gender: Male  Patient Location: [facility]  Reason For Study: Syncope  Ordering Physician: RAF RUSS  Performed By: Tess Gentile     BSA: 2.7 m2  Height: 78 in  Weight: 318 lb  HR: 71  BP: 160/90 mmHg  ______________________________________________________________________________  Procedure  Complete Portable Echo Adult.  ______________________________________________________________________________  Interpretation Summary     The left ventricle is borderline dilated.  The  visual ejection fraction is 50-55%.  There is borderline global hypokinesia of the left ventricle.  The right ventricle is normal in structure, function and size.  The left atrium is borderline dilated.  There is trace to mild mitral regurgitation.  Mild aortic root dilatation.  The ascending aorta is Mildly dilated.  The rhythm was atrial flutter.  Compared to prior study, changes are noted.  ______________________________________________________________________________  Left Ventricle  The left ventricle is borderline dilated. There is normal left ventricular  wall thickness. The visual ejection fraction is 50-55%. There is borderline  global hypokinesia of the left ventricle.     Right Ventricle  The right ventricle is normal in structure, function and size.     Atria  The left atrium is borderline dilated. Right atrial size is normal.     Mitral Valve  The mitral valve leaflets are mildly thickened. There is trace to mild mitral  regurgitation.     Tricuspid Valve  The tricuspid valve is normal in structure and function. There is trace  tricuspid regurgitation.     Aortic Valve  There is moderate trileaflet aortic sclerosis. No aortic regurgitation is  present.     Pulmonic Valve  The pulmonic valve is not well visualized. There is no pulmonic valvular  regurgitation.     Vessels  Mild aortic root dilatation. The ascending aorta is Mildly dilated.     Pericardium  There is no pericardial effusion.     Rhythm  The rhythm was atrial flutter.  ______________________________________________________________________________  MMode/2D Measurements & Calculations     IVSd: 1.1 cm  LVIDd: 5.7 cm  LVIDs: 5.1 cm  LVPWd: 1.1 cm  IVC diam: 2.6 cm  FS: 9.0 %  LV mass(C)d: 247.6 grams  LV mass(C)dI: 90.1 grams/m2  Ao root diam: 4.1 cm  asc Aorta Diam: 4.2 cm  LVOT diam: 2.5 cm  LVOT area: 4.7 cm2  Ao root diam index Ht(cm/m): 2.1  Ao root diam index BSA (cm/m2): 1.5  Asc Ao diam index BSA (cm/m2): 1.5  Asc Ao diam index  Ht(cm/m): 2.1  LA Volume (BP): 84.6 ml     LA Volume Index (BP): 30.8 ml/m2  RV Base: 4.0 cm  RWT: 0.39  TAPSE: 2.5 cm     Doppler Measurements & Calculations  MV E max wayne: 53.4 cm/sec  MV A max wayne: 80.4 cm/sec  MV E/A: 0.66  MV max PG: 3.8 mmHg  MV mean P.7 mmHg  MV V2 VTI: 21.8 cm  MV dec time: 0.23 sec  PA acc time: 0.13 sec  E/E' av.6  Lateral E/e': 5.1  Medial E/e': 8.0  RV S Wayne: 13.2 cm/sec     ______________________________________________________________________________  Report approved by: Roland France 2023 09:58 AM           Recent Labs   Lab 23  0753 23  0620 23  0310 23  2204   WBC  --  12.3*  --  9.9   HGB  --  13.7  --  14.1   MCV  --  91  --  90   PLT  --  132*  --  154   NA  --   --   --  135*   POTASSIUM  --   --   --  3.2*   CHLORIDE  --   --   --  97*   CO2  --   --   --  28   BUN  --   --   --  16.7   CR  --   --   --  0.84   ANIONGAP  --   --   --  10   LAURA  --   --   --  8.5*   *  --  124* 120*   ALBUMIN  --   --   --  4.0   PROTTOTAL  --   --   --  6.9   BILITOTAL  --   --   --  1.2   ALKPHOS  --   --   --  76   ALT  --   --   --  21   AST  --   --   --  23   LIPASE  --   --   --  13

## 2023-09-24 NOTE — PHARMACY-ADMISSION MEDICATION HISTORY
Pharmacist Admission Medication History    Admission medication history is complete. The information provided in this note is only as accurate as the sources available at the time of the update.    Medication reconciliation/reorder completed by provider prior to medication history? No    Information Source(s): Patient and CareEverywhere/SureScripts via in-person    Pertinent Information: None    Changes made to PTA medication list:  Added: None  Deleted: Silvadene, Senokot, Oxycodone-APAP, Multivitamin, Lidocaine patch, Flexeril, Voltaren  Changed: None    Medication Affordability:  Not including over the counter (OTC) medications, was there a time in the past 3 months when you did not take your medications as prescribed because of cost?: No    Allergies reviewed with patient and updates made in EHR: no    Medication History Completed By: Shamika Kenny RPH 9/24/2023 9:28 AM    Prior to Admission medications    Medication Sig Last Dose Taking? Auth Provider Long Term End Date   acetaminophen (TYLENOL) 325 MG tablet Take 325-650 mg by mouth every 6 hours as needed for mild pain Unknown at PRN Yes Unknown, Entered By History     aspirin 81 MG EC tablet Take 81 mg by mouth daily 9/23/2023 Yes Unknown, Entered By History     atorvastatin (LIPITOR) 40 MG tablet Take 40 mg by mouth daily 9/23/2023 Yes Unknown, Entered By History No    chlorthalidone (HYGROTON) 25 MG tablet Take 1 tablet (25 mg) by mouth daily 9/23/2023 Yes Alana Cabello NP Yes    ferrous sulfate (FEROSUL) 325 (65 Fe) MG tablet Take 325 mg by mouth daily (with breakfast) 9/23/2023 Yes Unknown, Entered By History     furosemide (LASIX) 20 MG tablet Take 1 tablet (20 mg) by mouth daily 9/23/2023 Yes Kevin Gracia MD Yes    gabapentin (NEURONTIN) 800 MG tablet Take 800 mg by mouth 3 times daily 9/23/2023 Yes Unknown, Entered By History Yes    isosorbide mononitrate (IMDUR) 30 MG 24 hr tablet Take 1.5 tablets (45 mg) by mouth daily 9/23/2023 Yes Kevin Garcia  MD VIKTOR Yes    lisinopril (ZESTRIL) 40 MG tablet Take 1 tablet (40 mg) by mouth daily 9/23/2023 Yes Alana Cabello NP Yes    magnesium oxide (MAG-OX) 400 MG tablet Take 1 tablet (400 mg) by mouth daily 9/23/2023 Yes Kvein Garcia MD     metFORMIN (GLUCOPHAGE) 500 MG tablet Take 500 mg by mouth daily 9/23/2023 Yes Unknown, Entered By History No    metoprolol succinate ER (TOPROL XL) 100 MG 24 hr tablet Take 1 tablet (100 mg) by mouth 2 times daily 9/23/2023 Yes Alana Cabello NP Yes    nitroGLYcerin (NITROSTAT) 0.4 MG sublingual tablet For chest pain place 1 tablet under the tongue every 5 minutes for 3 doses. If symptoms persist 5 minutes after 1st dose call 911. Unknown at PRN Yes Kevin Garcia MD Yes    potassium chloride ER (KLOR-CON M) 10 MEQ CR tablet Take 20 mEq by mouth daily 9/23/2023 Yes Unknown, Entered By History     pramipexole (MIRAPEX) 1 MG tablet Take 3 mg by mouth At Bedtime 9/23/2023 Yes Unknown, Entered By History     semaglutide (OZEMPIC, 1 MG/DOSE,) 2 MG/1.5ML pen Inject 0.5 mg Subcutaneous every 7 days On Fridays 9/15/2023 Yes Unknown, Entered By History     VITAMIN D, CHOLECALCIFEROL, PO Take 4,000 Units by mouth daily 9/23/2023 Yes Unknown, Entered By History

## 2023-09-24 NOTE — ED PROVIDER NOTES
History     Chief Complaint:  Fall and Abdominal Pain       The history is provided by the patient. The history is limited by the condition of the patient.      Federico Chamberlain is a 73 year old male who presents for a fall and back pain. Patient has had severe pain back that possibly occurred after a fall last week. He was at [facility] today when the pain onset again causing him to lose consciousness. Patient endorses a headache earlier but denies during exam. He also endorses mild neck pain. He thinks he has had a mild fever. Per the triage note, patient says he hit his head during the fall. Patient denies abdominal pain or chest pain. Patient says he lives alone.     Later in his stay the patient notes he did take a THC gummie (not atypical for him) and also drank valerie with intent to control his chronic back pain.     Independent Historian:   None - Patient Only    Review of External Notes:   Outpatient clinic notes reviewed.       Medications:    Aspirin   Atorvastatin   Chlorthalidone   Cyclobenzaprine   Ferrous sulfate   Furosemide   Gabapentin   Isosorbide mononitrate   Lisinopril   Magnesium oxide   Metformin   Metoprolol succinate   Nitroglycerin   Oxycodone-acetaminophen  Potassium chloride  Pramipexole dihydrochloride  Semaglutide   Senna-docusate   Zolpidem   Amlodpine  Metoprolol    Past Medical History:    Acute kidney failure  Cellulitis  Gout  NSTEMI  TOBI  Malignant neoplasm of prostate  TIA  Cerebral infarction  Streptococcal Sepsis  Aorta dilation  Chronic pain  Male erectile dysfunction  Hypertension  Hyperlipidemia  Rhabdomyolyses  Syncope  Type 2 diabetes  Prostate cancer    Past Surgical History:    Lumbar spinal fusion  Appendectomy  Coronary angiogram  Orthopedic surgery  Left heart catheterization       Physical Exam   Patient Vitals for the past 24 hrs:   BP Temp Temp src Pulse Resp SpO2   09/24/23 0051 -- -- -- -- -- 94 %   09/24/23 0050 -- 99  F (37.2  C) Oral -- -- --   09/24/23 0045  137/87 -- -- 96 -- --   09/24/23 0030 (!) 143/76 -- -- 98 -- --   09/24/23 0015 (!) 140/79 -- -- 96 -- --   09/23/23 2337 -- -- -- -- -- 99 %   09/23/23 2330 (!) 157/74 -- -- 99 -- --   09/23/23 2314 -- (!) 101.6  F (38.7  C) -- -- -- --   09/23/23 2310 (!) 170/96 -- -- -- -- --   09/23/23 2210 (!) 174/106 -- -- 103 -- 94 %   09/23/23 2110 (!) 177/101 -- -- 90 -- 92 %   09/23/23 2109 (!) 177/101 (!) 102  F (38.9  C) Oral 89 20 94 %        Physical Exam  General: Elderly male, laying on the stretcher  Eyes: PERRL, Conjunctive within normal limits.  EOMI.  HENT: No scalp tenderness or palpable hematoma.  Dry mucous membranes, oropharynx clear.   Neck: Maintained in rigid cervical collar.  No palpable step-off or bony deformity.  Mild tenderness, nonlocalized.  CV: Normal S1S2, no murmur, rub or gallop. Regular rate and rhythm  Resp: Clear to auscultation bilaterally, no wheezes, rales or rhonchi. Normal respiratory effort.  GI: Abdomen is soft, nontender and nondistended. No palpable masses. No rebound or guarding.  MSK: Tender midline low lumbar spine without palpable step-off or crepitus.  No pitting edema.  Nontender on palpation of the extremities. Normal active range of motion.  Skin: Warm and dry. No rashes or lesions or ecchymoses on visible skin.  Neuro: Alert and oriented to person.  Thinks it is 1950.  Gives his YOB: 1950.. Responds appropriately to all means but needs to be refocused and asked multiple times.  Station light touch intact over the bilateral upper and lower extremities.  Normal muscle tone.  Psych: Flat affect.    Emergency Department Course   ECG  ECG taken at 2124, ECG read at 2125  Sinus rhythm with 1st degree A-V block   Right bundle branch block   Left anterior fascicular block   Bifascicular block   Septal infarct , age undetermined   Abnormal ECG    Rate 91 bpm. MO interval 214 ms. QRS duration 176 ms. QT/QTc 412/506 ms. P-R-T axes 41 -64 74.     Imaging:  CT  Chest/Abdomen/Pelvis w Contrast   Final Result   IMPRESSION:   1.  No pneumothorax or pleural effusion.   2.  Prominence and heterogeneity of the inferior left gluteal muscle relative to the right, incompletely visualized. Soft tissue hematoma not excluded.    3.  Prominent left retroperitoneal lymph node prominent left groin lymph nodes.   4.  Aneurysmal ascending thoracic aorta.   5.  Nonobstructing right renal calculus.   6.  Small thyroid nodules.      Lumbar spine CT w/o contrast   Final Result   IMPRESSION:   HEAD CT:   No acute intracranial process.      LUMBAR SPINE CT:   No CT evidence for acute fracture or post traumatic subluxation. Postoperative and degenerative changes, as described.         CT Cervical Spine w/o Contrast   Final Result   IMPRESSION:   1.  No definite fracture.   2.  Degenerative changes, as described.   3.  11 mm nodule left thyroid gland; ultrasound recommended, if not done previously.         CT Head w/o Contrast   Final Result   IMPRESSION:   HEAD CT:   No acute intracranial process.      LUMBAR SPINE CT:   No CT evidence for acute fracture or post traumatic subluxation. Postoperative and degenerative changes, as described.            Report per radiology    Laboratory:  Labs Ordered and Resulted from Time of ED Arrival to Time of ED Departure   COMPREHENSIVE METABOLIC PANEL - Abnormal       Result Value    Sodium 135 (*)     Potassium 3.2 (*)     Chloride 97 (*)     Carbon Dioxide (CO2) 28      Anion Gap 10      Urea Nitrogen 16.7      Creatinine 0.84      Calcium 8.5 (*)     Glucose 120 (*)     Alkaline Phosphatase 76      AST 23      ALT 21      Protein Total 6.9      Albumin 4.0      Bilirubin Total 1.2      GFR Estimate >90     CBC WITH PLATELETS AND DIFFERENTIAL - Abnormal    WBC Count 9.9      RBC Count 4.76      Hemoglobin 14.1      Hematocrit 42.6      MCV 90      MCH 29.6      MCHC 33.1      RDW 13.4      Platelet Count 154      % Neutrophils 88      % Lymphocytes 6      %  Monocytes 6      % Eosinophils 0      % Basophils 0      % Immature Granulocytes 0      NRBCs per 100 WBC 0      Absolute Neutrophils 8.6 (*)     Absolute Lymphocytes 0.6 (*)     Absolute Monocytes 0.6      Absolute Eosinophils 0.0      Absolute Basophils 0.0      Absolute Immature Granulocytes 0.0      Absolute NRBCs 0.0     ROUTINE UA WITH MICROSCOPIC REFLEX TO CULTURE - Abnormal    Color Urine Light Yellow      Appearance Urine Clear      Glucose Urine Negative      Bilirubin Urine Negative      Ketones Urine Negative      Specific Gravity Urine >1.035 (*)     Blood Urine Negative      pH Urine 7.0      Protein Albumin Urine Negative      Urobilinogen Urine Normal      Nitrite Urine Negative      Leukocyte Esterase Urine Negative      RBC Urine 3 (*)     WBC Urine <1     LIPASE - Normal    Lipase 13     LACTIC ACID WHOLE BLOOD - Normal    Lactic Acid 1.5     COVID-19 VIRUS (CORONAVIRUS) BY PCR - Normal    SARS CoV2 PCR Negative     ETHYL ALCOHOL LEVEL - Normal    Alcohol ethyl <0.01     BLOOD CULTURE   BLOOD CULTURE      Emergency Department Course & Assessments:       Interventions:  Medications   sodium chloride 0.9% BOLUS 1,000 mL (0 mLs Intravenous Stopped 9/23/23 2245)   acetaminophen (TYLENOL) tablet 1,000 mg (1,000 mg Oral $Given 9/23/23 2210)   morphine (PF) injection 4 mg (4 mg Intravenous $Given 9/23/23 2221)   sodium chloride (PF) 0.9% PF flush 100 mL (100 mLs Intravenous $Given 9/23/23 2226)   iopamidol (ISOVUE-370) solution 500 mL (100 mLs Intravenous $Given 9/23/23 2227)   morphine (PF) injection 4 mg (4 mg Intravenous $Given 9/23/23 2336)   ketorolac (TORADOL) injection 15 mg (15 mg Intravenous $Given 9/24/23 0051)   potassium chloride ER (KLOR-CON M) CR tablet 20 mEq (20 mEq Oral $Given 9/24/23 0145)        Assessments:  2121 I obtained history and examined the patient as noted above.  I reassessed the patient.  He appears uncomfortable, rolling around the stretcher.  He is otherwise alert and  able to give history.  I reassessed the patient.  He appears sleepy.  He is awakened and alert.  He notes he is feeling tired.  He denies any new concerns.  He relates that he did take a THC gummy and valerie earlier today due to the back pain that he has, reporting this is chronic.    Independent Interpretation (X-rays, CTs, rhythm strip):  I reviewed the patient's head CT.  No evidence of intracranial hemorrhage.    Consultations/Discussion of Management or Tests:   0128 I discussed the patient with Dr. Suazo of the hospitalist service.  He accepts the patient to his care.    Social Determinants of Health affecting care:   None    Disposition:  The patient was admitted to the hospital under the care of Dr. Suazo.     Impression & Plan    Medical Decision Making:  Federico Chamberlain is a 73-year-old male presents emergency department after witnessed syncopal event at [facility] with associated head injury.  He reported that he had extreme pain prior to syncope in his back which is chronic for him.  He may have had a vagal event as a result.  He is intermittently delirious here in the emergency department, unclear if this is medication related versus infectious versus related to head injury.  Comprehensive evaluation is undertaken as the patient did have an incidental fever noted.  He had no focus of infection on examination.  Back pain was considered as a possible source but has not had recent back injections and epidural abscess seems unlikely.  He is moving his extremities normally does not have any focal neurologic abnormalities.  He has no rash to suggest cellulitis or skin infection.  Extensive imaging did not demonstrate a source.  Urinalysis did not show evidence of UTI.  With regards to the fever, there is no leukocytosis or elevated lactic.  His vital signs did not demonstrate tachycardia or hypotension.  There is no clinical evidence of sepsis or septic shock at this time.  Blood cultures are pending, ordered  given his age and initial presentation.  IV antibiotics did not seem clinically indicated at this time with unclear source of elevated temperature, further care likely to be determined by reassessments and blood cultures.  He is not meningitic and his delirium did seem to clear over time suggesting that this could have been related to THC gummy he reportedly ingested earlier.  He also uses gabapentin.  He does not report any over ingestion of medications.    With regards to the syncope, no evidence of malignant arrhythmia.  He is not complaining of chest pain, this presentation is not seem consistent with acute MI.  Given historical report, vasovagal seems likely.  Fortunately, there is no evidence of intracranial hemorrhage.  Concussion could be causing some amount of mental status change, but again he seemed to clear over time suggesting possibly ingestion related.  He will be admitted to an observation telemetry bed for ongoing reassessments.  This was discussed with the hospitalist who agreed with the plan.  Patient also felt comfortable with admission.  All questions were answered prior to change of care.      Diagnosis:    ICD-10-CM    1. Syncope, unspecified syncope type  R55       2. Closed head injury, initial encounter  S09.90XA       3. Fever, unspecified fever cause  R50.9       4. Chronic midline low back pain without sciatica  M54.50     G89.29             Scribe Disclosure:  I, Mayte Pandey, am serving as a scribe at 10:24 PM on 9/23/2023 to document services personally performed by Nae Yanes MD based on my observations and the provider's statements to me.     9/23/2023   Nae Yanes MD Jonkman, Tracy Dianne, MD  09/24/23 5028

## 2023-09-25 ENCOUNTER — APPOINTMENT (OUTPATIENT)
Dept: PHYSICAL THERAPY | Facility: CLINIC | Age: 73
DRG: 872 | End: 2023-09-25
Payer: COMMERCIAL

## 2023-09-25 ENCOUNTER — APPOINTMENT (OUTPATIENT)
Dept: OCCUPATIONAL THERAPY | Facility: CLINIC | Age: 73
DRG: 872 | End: 2023-09-25
Attending: INTERNAL MEDICINE
Payer: COMMERCIAL

## 2023-09-25 LAB
ANAPLASMA BLD MOD GIEMSA: NEGATIVE
ATRIAL RATE - MUSE: 91 BPM
B MICROTI BLD SMEAR: NEGATIVE
CREAT SERPL-MCNC: 0.95 MG/DL (ref 0.67–1.17)
DIASTOLIC BLOOD PRESSURE - MUSE: NORMAL MMHG
EGFRCR SERPLBLD CKD-EPI 2021: 86 ML/MIN/1.73M2
EHRLICHIA SPEC QL MICRO: NEGATIVE
GLUCOSE BLDC GLUCOMTR-MCNC: 100 MG/DL (ref 70–99)
GLUCOSE BLDC GLUCOMTR-MCNC: 116 MG/DL (ref 70–99)
GLUCOSE BLDC GLUCOMTR-MCNC: 85 MG/DL (ref 70–99)
GLUCOSE BLDC GLUCOMTR-MCNC: 95 MG/DL (ref 70–99)
GLUCOSE BLDC GLUCOMTR-MCNC: 98 MG/DL (ref 70–99)
INTERPRETATION ECG - MUSE: NORMAL
P AXIS - MUSE: 41 DEGREES
POTASSIUM SERPL-SCNC: 3.2 MMOL/L (ref 3.4–5.3)
POTASSIUM SERPL-SCNC: 3.6 MMOL/L (ref 3.4–5.3)
PR INTERVAL - MUSE: 214 MS
QRS DURATION - MUSE: 176 MS
QT - MUSE: 412 MS
QTC - MUSE: 506 MS
R AXIS - MUSE: -64 DEGREES
SYSTOLIC BLOOD PRESSURE - MUSE: NORMAL MMHG
T AXIS - MUSE: 74 DEGREES
VENTRICULAR RATE- MUSE: 91 BPM

## 2023-09-25 PROCEDURE — 82565 ASSAY OF CREATININE: CPT | Performed by: INTERNAL MEDICINE

## 2023-09-25 PROCEDURE — 250N000011 HC RX IP 250 OP 636: Mod: JZ | Performed by: INTERNAL MEDICINE

## 2023-09-25 PROCEDURE — 99222 1ST HOSP IP/OBS MODERATE 55: CPT | Performed by: INTERNAL MEDICINE

## 2023-09-25 PROCEDURE — 36415 COLL VENOUS BLD VENIPUNCTURE: CPT | Performed by: INTERNAL MEDICINE

## 2023-09-25 PROCEDURE — 36415 COLL VENOUS BLD VENIPUNCTURE: CPT | Performed by: NURSE PRACTITIONER

## 2023-09-25 PROCEDURE — 97165 OT EVAL LOW COMPLEX 30 MIN: CPT | Mod: GO

## 2023-09-25 PROCEDURE — 250N000013 HC RX MED GY IP 250 OP 250 PS 637: Performed by: INTERNAL MEDICINE

## 2023-09-25 PROCEDURE — 250N000011 HC RX IP 250 OP 636: Performed by: INTERNAL MEDICINE

## 2023-09-25 PROCEDURE — 99233 SBSQ HOSP IP/OBS HIGH 50: CPT | Performed by: INTERNAL MEDICINE

## 2023-09-25 PROCEDURE — 97530 THERAPEUTIC ACTIVITIES: CPT | Mod: GP | Performed by: PHYSICAL THERAPIST

## 2023-09-25 PROCEDURE — 97535 SELF CARE MNGMENT TRAINING: CPT | Mod: GO

## 2023-09-25 PROCEDURE — 84132 ASSAY OF SERUM POTASSIUM: CPT | Performed by: INTERNAL MEDICINE

## 2023-09-25 PROCEDURE — 84132 ASSAY OF SERUM POTASSIUM: CPT | Performed by: NURSE PRACTITIONER

## 2023-09-25 PROCEDURE — 258N000003 HC RX IP 258 OP 636: Performed by: INTERNAL MEDICINE

## 2023-09-25 PROCEDURE — 120N000001 HC R&B MED SURG/OB

## 2023-09-25 PROCEDURE — 87040 BLOOD CULTURE FOR BACTERIA: CPT | Performed by: INTERNAL MEDICINE

## 2023-09-25 RX ORDER — POTASSIUM CHLORIDE 1500 MG/1
20 TABLET, EXTENDED RELEASE ORAL DAILY
Status: DISCONTINUED | OUTPATIENT
Start: 2023-09-25 | End: 2023-09-27 | Stop reason: HOSPADM

## 2023-09-25 RX ORDER — POTASSIUM CHLORIDE 1500 MG/1
40 TABLET, EXTENDED RELEASE ORAL ONCE
Status: COMPLETED | OUTPATIENT
Start: 2023-09-25 | End: 2023-09-25

## 2023-09-25 RX ORDER — MAGNESIUM OXIDE 400 MG/1
400 TABLET ORAL DAILY
Status: DISCONTINUED | OUTPATIENT
Start: 2023-09-25 | End: 2023-09-27 | Stop reason: HOSPADM

## 2023-09-25 RX ORDER — CEFTRIAXONE 2 G/1
2 INJECTION, POWDER, FOR SOLUTION INTRAMUSCULAR; INTRAVENOUS EVERY 24 HOURS
Status: DISCONTINUED | OUTPATIENT
Start: 2023-09-25 | End: 2023-09-27 | Stop reason: HOSPADM

## 2023-09-25 RX ORDER — FUROSEMIDE 20 MG
20 TABLET ORAL DAILY
Status: DISCONTINUED | OUTPATIENT
Start: 2023-09-25 | End: 2023-09-27 | Stop reason: HOSPADM

## 2023-09-25 RX ORDER — VITAMIN B COMPLEX
100 TABLET ORAL DAILY
Status: DISCONTINUED | OUTPATIENT
Start: 2023-09-25 | End: 2023-09-27 | Stop reason: HOSPADM

## 2023-09-25 RX ADMIN — ATORVASTATIN CALCIUM 40 MG: 40 TABLET, FILM COATED ORAL at 09:59

## 2023-09-25 RX ADMIN — METOPROLOL SUCCINATE 100 MG: 100 TABLET, EXTENDED RELEASE ORAL at 20:51

## 2023-09-25 RX ADMIN — Medication 100 MCG: at 15:06

## 2023-09-25 RX ADMIN — POTASSIUM CHLORIDE 40 MEQ: 1500 TABLET, EXTENDED RELEASE ORAL at 10:14

## 2023-09-25 RX ADMIN — MAGNESIUM OXIDE TAB 400 MG (241.3 MG ELEMENTAL MG) 400 MG: 400 (241.3 MG) TAB at 15:05

## 2023-09-25 RX ADMIN — VANCOMYCIN HYDROCHLORIDE 1500 MG: 10 INJECTION, POWDER, LYOPHILIZED, FOR SOLUTION INTRAVENOUS at 04:39

## 2023-09-25 RX ADMIN — FUROSEMIDE 20 MG: 20 TABLET ORAL at 15:06

## 2023-09-25 RX ADMIN — PRAMIPEXOLE DIHYDROCHLORIDE 3 MG: 1 TABLET ORAL at 21:49

## 2023-09-25 RX ADMIN — POTASSIUM CHLORIDE 20 MEQ: 1500 TABLET, EXTENDED RELEASE ORAL at 15:06

## 2023-09-25 RX ADMIN — PIPERACILLIN AND TAZOBACTAM 4.5 G: 4; .5 INJECTION, POWDER, FOR SOLUTION INTRAVENOUS at 09:49

## 2023-09-25 RX ADMIN — LISINOPRIL 40 MG: 40 TABLET ORAL at 10:00

## 2023-09-25 RX ADMIN — PIPERACILLIN AND TAZOBACTAM 4.5 G: 4; .5 INJECTION, POWDER, FOR SOLUTION INTRAVENOUS at 04:38

## 2023-09-25 RX ADMIN — GABAPENTIN 800 MG: 400 CAPSULE ORAL at 09:59

## 2023-09-25 RX ADMIN — CHLORTHALIDONE 25 MG: 25 TABLET ORAL at 10:01

## 2023-09-25 RX ADMIN — ASPIRIN 81 MG: 81 TABLET ORAL at 09:59

## 2023-09-25 RX ADMIN — METFORMIN HYDROCHLORIDE 500 MG: 500 TABLET ORAL at 16:50

## 2023-09-25 RX ADMIN — ISOSORBIDE MONONITRATE 45 MG: 30 TABLET, EXTENDED RELEASE ORAL at 10:00

## 2023-09-25 RX ADMIN — METOPROLOL SUCCINATE 100 MG: 100 TABLET, EXTENDED RELEASE ORAL at 10:00

## 2023-09-25 RX ADMIN — GABAPENTIN 800 MG: 400 CAPSULE ORAL at 15:06

## 2023-09-25 RX ADMIN — CEFTRIAXONE 2 G: 2 INJECTION, POWDER, FOR SOLUTION INTRAMUSCULAR; INTRAVENOUS at 15:03

## 2023-09-25 RX ADMIN — GABAPENTIN 800 MG: 400 CAPSULE ORAL at 20:51

## 2023-09-25 ASSESSMENT — ACTIVITIES OF DAILY LIVING (ADL)
ADLS_ACUITY_SCORE: 21
DEPENDENT_IADLS:: INDEPENDENT
ADLS_ACUITY_SCORE: 18
ADLS_ACUITY_SCORE: 21
ADLS_ACUITY_SCORE: 18
ADLS_ACUITY_SCORE: 21
ADLS_ACUITY_SCORE: 18
ADLS_ACUITY_SCORE: 18
ADLS_ACUITY_SCORE: 21
ADLS_ACUITY_SCORE: 18
ADLS_ACUITY_SCORE: 21

## 2023-09-25 NOTE — CONSULTS
[facility]    Infectious Disease Consultation     Date of Admission:  9/23/2023  Date of Consult (When I saw the patient): 09/25/23    Assessment & Plan   Federico Chamberlain is a 73 year old who was admitted on 9/23/2023.     Impression: 1 73-year-old male with acute episode of syncope, encephalopathy and subsequent obvious sepsis and rigors, found to have group B strep bacteremia, likely mild cellulitis even though none is evident currently, no clear other primary or secondary sites.  2 chronic venous stasis prior episode of group C strep bacteremia with obvious leg cellulitis interestingly had syncope with that episode as well.  3 prior episode of rhabdomyolysis with the sepsis not on this occasion    REC 1 plan 2-week course of IV ceftriaxone, convert to that now, wait 1 more day for midline and plan therapy options, social service to see.  2 of note has been seen ID at [facility] last in March where he had a possible mild cellulitis episode, probably follow-up again with them        Anderson Obando MD    Reason for Consult   Reason for consult: I was asked to evaluate this patient for bacteremia.    Primary Care Physician   Luis Alberto Aponte    Chief Complaint   Syncope and confusion    History is obtained from the patient and medical records    History of Present Illness   Federico Chamberlain is a 73 year old male who presents with group B strep bacteremia.  Patient known to us from episode in late 2022 where he had an episode of syncope and found to have group C strep bacteremia.  On that occasion he had an obvious leg cellulitis also had rhabdomyolysis and was fairly ill.  He recovered from that illness with IV antibiotic course.  I did not see him in follow-up he was seen by [facility].  They have actually seen him again for a possible subtle leg cellulitis in March.  Since then he has done okay has been primarily on diuretics rather than any kind of compression therapy of his legs.   Has had some mild chronic edema but thinks its been okay.  On this occasion really did not think he was ill went to the store and promptly passed out.  When he woke up he was encephalopathic and subsequently had rigors and fever to 102.  He was found to have group B strep bacteremia.  He currently has some slight discomfort in his lower abdomen but nothing significant no real leg discomfort no other obvious secondary pain site    Past Medical History   I have reviewed this patient's medical history and updated it with pertinent information if needed.   Past Medical History:   Diagnosis Date    Hypercholesterolemia     Hypertension        Past Surgical History   I have reviewed this patient's surgical history and updated it with pertinent information if needed.  Past Surgical History:   Procedure Laterality Date    APPENDECTOMY      BACK SURGERY      CV CORONARY ANGIOGRAM N/A 2/17/2023    Procedure: Coronary Angiogram RADIAL ACCESS;  Surgeon: Malou Mazariegos MD;  Location:  HEART CARDIAC CATH LAB    CV FRACTIONAL FLOW RATIO WIRE N/A 2/17/2023    Procedure: Fractional Flow Ratio Wire;  Surgeon: Malou Mazariegos MD;  Location:  HEART CARDIAC CATH LAB    CV INSTANTANEOUS WAVE-FREE RATIO N/A 2/17/2023    Procedure: Instantaneous Wave-Free Ratio;  Surgeon: Malou Mazariegos MD;  Location:  HEART CARDIAC CATH LAB    CV LEFT HEART CATH N/A 2/17/2023    Procedure: Left Heart Catheterization;  Surgeon: Malou Mazariegos MD;  Location:  HEART CARDIAC CATH LAB    ORTHOPEDIC SURGERY         Prior to Admission Medications   Prior to Admission Medications   Prescriptions Last Dose Informant Patient Reported? Taking?   VITAMIN D, CHOLECALCIFEROL, PO 9/23/2023  Yes Yes   Sig: Take 4,000 Units by mouth daily   acetaminophen (TYLENOL) 325 MG tablet Unknown at PRN  Yes Yes   Sig: Take 325-650 mg by mouth every 6 hours as needed for mild pain   aspirin 81 MG EC tablet 9/23/2023  Yes Yes   Sig: Take 81 mg by mouth daily    atorvastatin (LIPITOR) 40 MG tablet 9/23/2023  Yes Yes   Sig: Take 40 mg by mouth daily   chlorthalidone (HYGROTON) 25 MG tablet 9/23/2023  No Yes   Sig: Take 1 tablet (25 mg) by mouth daily   ferrous sulfate (FEROSUL) 325 (65 Fe) MG tablet 9/23/2023  Yes Yes   Sig: Take 325 mg by mouth daily (with breakfast)   furosemide (LASIX) 20 MG tablet 9/23/2023  No Yes   Sig: Take 1 tablet (20 mg) by mouth daily   gabapentin (NEURONTIN) 800 MG tablet 9/23/2023  Yes Yes   Sig: Take 800 mg by mouth 3 times daily   isosorbide mononitrate (IMDUR) 30 MG 24 hr tablet 9/23/2023  No Yes   Sig: Take 1.5 tablets (45 mg) by mouth daily   lisinopril (ZESTRIL) 40 MG tablet 9/23/2023  No Yes   Sig: Take 1 tablet (40 mg) by mouth daily   magnesium oxide (MAG-OX) 400 MG tablet 9/23/2023  No Yes   Sig: Take 1 tablet (400 mg) by mouth daily   metFORMIN (GLUCOPHAGE) 500 MG tablet 9/23/2023  Yes Yes   Sig: Take 500 mg by mouth daily   metoprolol succinate ER (TOPROL XL) 100 MG 24 hr tablet 9/23/2023  No Yes   Sig: Take 1 tablet (100 mg) by mouth 2 times daily   nitroGLYcerin (NITROSTAT) 0.4 MG sublingual tablet Unknown at PRN  No Yes   Sig: For chest pain place 1 tablet under the tongue every 5 minutes for 3 doses. If symptoms persist 5 minutes after 1st dose call 911.   potassium chloride ER (KLOR-CON M) 10 MEQ CR tablet 9/23/2023  Yes Yes   Sig: Take 20 mEq by mouth daily   pramipexole (MIRAPEX) 1 MG tablet 9/23/2023  Yes Yes   Sig: Take 3 mg by mouth At Bedtime   semaglutide (OZEMPIC, 1 MG/DOSE,) 2 MG/1.5ML pen 9/15/2023  Yes Yes   Sig: Inject 0.5 mg Subcutaneous every 7 days On Fridays      Facility-Administered Medications: None     Allergies   No Known Allergies    Immunization History   Immunization History   Administered Date(s) Administered    COVID-19 Bivalent 18+ (Moderna) 09/11/2022    COVID-19 Monovalent 18+ (Moderna) 10/26/2021, 06/17/2022    COVID-19 Vaccine (Shea) 03/08/2021       Social History   I have reviewed this  patient's social history and updated it with pertinent information if needed. Federico Chamberlain  reports that he quit smoking about 31 years ago. His smoking use included cigarettes. He has never used smokeless tobacco. He reports current alcohol use. He reports that he does not use drugs.    Family History   I have reviewed this patient's family history and updated it with pertinent information if needed.   No family history on file.  Nothing relevant in the family history    Review of Systems   The 10 point Review of Systems is negative for any major localizing symptoms currently, feels better not having fevers and chills not really noticing any leg issues    Physical Exam   Temp: 97.9  F (36.6  C) Temp src: Oral BP: 115/67 Pulse: 65   Resp: 16 SpO2: 95 % O2 Device: None (Room air) Oxygen Delivery: 1/2 LPM  Vital Signs with Ranges  Temp:  [97.8  F (36.6  C)-98.7  F (37.1  C)] 97.9  F (36.6  C)  Pulse:  [56-70] 65  Resp:  [12-18] 16  BP: ()/(45-91) 115/67  FiO2 (%):  [21 %] 21 %  SpO2:  [92 %-100 %] 95 %  310 lbs 6.52 oz  Body mass index is 35.87 kg/m .    GENERAL APPEARANCE:  awake  EYES: Eyes grossly normal to inspection  NECK: no adenopathy  RESP: lungs clear   CV: regular rates and rhythm  LYMPHATICS: normal ant/post cervical and supraclavicular nodes  ABDOMEN: soft, nontender  MS: extremities normal venous stasis without clear infection or open wounds  SKIN: no suspicious lesions or rashes        Data   All laboratory and imaging data in the past 24 hours reviewed  No results for input(s): CULT in the last 168 hours.  No lab results found.    Invalid input(s): UC       All cultures:  Recent Labs   Lab 09/24/23  1501 09/23/23  2257 09/23/23  2204   CULTURE No growth after 12 hours No growth after 1 day Positive on the 1st day of incubation*  Streptococcus agalactiae (Group B Streptococcus)*      Blood culture:  Results for orders placed or performed during the hospital encounter of 09/23/23   Blood Culture  Arm, Left    Specimen: Arm, Left; Blood   Result Value Ref Range    Culture No growth after 12 hours    Blood Culture Arm, Right    Specimen: Arm, Right; Blood   Result Value Ref Range    Culture No growth after 1 day    Blood Culture Peripheral Blood    Specimen: Peripheral Blood   Result Value Ref Range    Culture Positive on the 1st day of incubation (A)     Culture Streptococcus agalactiae (Group B Streptococcus) (AA)    Results for orders placed or performed during the hospital encounter of 12/31/22   Blood Culture Arm, Left    Specimen: Arm, Left; Blood   Result Value Ref Range    Culture No Growth    Blood Culture Arm, Left    Specimen: Arm, Left; Blood   Result Value Ref Range    Culture No Growth    Blood Culture Arm, Left    Specimen: Arm, Left; Blood   Result Value Ref Range    Culture Positive on the 1st day of incubation (A)     Culture Streptococcus dysgalactiae (Group G Streptococcus) (AA)        Susceptibility    Streptococcus dysgalactiae (Group G Streptococcus) - ARTHUR     Ampicillin <=0.06 Susceptible ug/mL     Penicillin <=0.03 Susceptible ug/mL     Clindamycin <=0.06 Susceptible ug/mL     Cefotaxime <=0.25 Susceptible ug/mL     Ceftriaxone <=0.25 Susceptible ug/mL     Vancomycin 0.5 Susceptible ug/mL     Meropenem <=0.06 Susceptible ug/mL   Blood Culture Peripheral Blood    Specimen: Peripheral Blood   Result Value Ref Range    Culture No Growth       Urine culture:  No results found for this or any previous visit.

## 2023-09-25 NOTE — PLAN OF CARE
Shift [id number]  Problem: Syncope    Alert and oriented x4  Vital signs q 4 hours  Up with 1 assist, walker, and gait belt  Reports left back/buttocks pain that worsens with repositioning   Glucose checks ACHS   Potassium replaced this morning  Right IV site saline locked  Per tele tech, patient is in SR  Consults: PT, OT, ID, SW    Patient lives at home alone and reports a left food drop, therefore he wears an AFO when OOB and walking long distances (ankle foot orthosis).     1200 tele: SR, BBB, and a first degree block   1600: Sinus giuseppe with PVCs     Vital signs:  Temp: 97.9  F (36.6  C) Temp src: Oral BP: 115/67 Pulse: 65   Resp: 16 SpO2: 95 % O2 Device: None (Room air)

## 2023-09-25 NOTE — PROGRESS NOTES
A&Ox4. VSS. IV R lower forearm clean, dry, intact. Pt complained of pain, heat pad applied. Scheduled tylenol and PRN Tylenol given. Up with . Ax1 walker. Tele on - SR BBB 50's. Pt slept fine.  Plan :medically ready to be discharge

## 2023-09-25 NOTE — PROGRESS NOTES
Notified by nursing of an episode of SOB with associated hypoxia. Pt was placed on supplemental O2 and quickly improved. He denies chest pain or palpitations. He does note that he has had similar episodes of SOB approximately twice per week for the past year or so. He does note that he has had a cardiac event monitor that was unremarkable. He was dx with sleep apnea a few months ago and started CPAP about 2 weeks ago.   On chart review during this hospitalization, echo done earlier today, in report, pt was noted to be in a flutter during exam. L atrium dilation noted, global hypokinesis noted of L ventricle and aortic root is mildly dilated, which are changes from previous. Bedside nurse did call tele tech during episode of SOB and they reported sinus bradycardia. Pt denies known hx of a fib or flutter.    Unclear etiology of SOB although could be related to a flutter, although tele tech does not endorse a flutter at the time of the event today.     Pt admitted for sepsis vs SIRS with positive blood cultures. MRI of the thoracic and lumbar spine are pending. Pt is on Vanco and Zosyn.    Pt is currently in sinus rhythm so will hold off on starting anticoagulation.    Will defer further work up to morning rounder but may consider cardiology consult.    CPAP ordered for sleep.    Vannesa Copeland PA-C

## 2023-09-25 NOTE — CONSULTS
Care Management Initial Consult    General Information  Assessment completed with: Patient,    Type of CM/SW Visit: Initial Assessment    Primary Care Provider verified and updated as needed:     Readmission within the last 30 days: no previous admission in last 30 days      Reason for Consult: discharge planning  Advance Care Planning:            Communication Assessment  Patient's communication style: spoken language (English or Bilingual)    Hearing Difficulty or Deaf: no   Wear Glasses or Blind: no    Cognitive  Cognitive/Neuro/Behavioral: WDL                      Living Environment:   People in home: alone     Current living Arrangements: house      Able to return to prior arrangements: yes       Family/Social Support:  Care provided by: self  Provides care for: pet(s)  Marital Status:   Sibling(s)          Description of Support System: Supportive, Involved    Support Assessment: Adequate family and caregiver support    Current Resources:   Patient receiving home care services: No     Community Resources: None  Equipment currently used at home: none (has walker and cane to use as needed)  Supplies currently used at home: None    Employment/Financial:  Employment Status:          Financial Concerns:             Does the patient's insurance plan have a 3 day qualifying hospital stay waiver?  Yes     Which insurance plan 3 day waiver is available? Alternative insurance waiver    Will the waiver be used for post-acute placement? No    Lifestyle & Psychosocial Needs:  Social Determinants of Health     Food Insecurity: Not on file   Depression: Not on file   Housing Stability: Not on file   Tobacco Use: Medium Risk (6/28/2023)    Patient History     Smoking Tobacco Use: Former     Smokeless Tobacco Use: Never     Passive Exposure: Not on file   Financial Resource Strain: Not on file   Alcohol Use: Not on file   Transportation Needs: Not on file   Physical Activity: Not on file   Interpersonal Safety: Not on  file   Stress: Not on file   Social Connections: Not on file       Functional Status:  Prior to admission patient needed assistance:   Dependent ADLs:: Independent  Dependent IADLs:: Independent  Assesssment of Functional Status: Not at baseline with ADL Functioning    Mental Health Status:          Chemical Dependency Status:                Values/Beliefs:  Spiritual, Cultural Beliefs, Amish Practices, Values that affect care:                 Additional Information:    SW met with pt at bedside to introduce SW role. Pt reports that he lives at home alone with his dog. Pt explains that he is independent with his mobility and cares at baseline. Pt reports that he has an ex-spouse who is supportive in [place] and his brother in [place]. Pt explains that he has had homecare in the past and would be interested in homecare again with no preferred agency. Noted ID is consulted. Will await recommendations for potential IV antibiotics.     TEODORO Husain, OZ  Inpatient Care Coordination  Emergency Room /Georgette Jackson, OZ

## 2023-09-25 NOTE — PROGRESS NOTES
A BiPAP of  18/13 @ 21% was applied to the pt via the mask for NOC use.  The bridge of the nose looks good and remains intact. Pt is tolerating it well. Will continue to monitor and assess the pt's current respiratory status and needs.     Cristobal Mitchell, RT on 9/24/2023 at 10:36 PM

## 2023-09-25 NOTE — PROGRESS NOTES
SPIRITUAL HEALTH SERVICES - Progress Note  RH Observation    Referral Source: Triage Request.    Present: Pt was alone in the Room.    Assessment/Intervention: Introduced Pt to [facility].Pt expressed concern about his situation.Pt named his Ex-wife, son and his neighbor as supportive people in his life.I provided devotional reading. Pt welcomed prayer.    Plan: I and other  remain available as needed.    Titus Velez, [facility]    Intern    [facility] routine referrals *[phone]  [facility] available 24/7 for emergent requests/referrals, either by paging the on-call  or by entering an ASAP/STAT consult in Epic (this will also page the on-call ).

## 2023-09-25 NOTE — PROGRESS NOTES
[facility]    Medicine Progress Note - Hospitalist Service    Date of Admission:  9/23/2023    Assessment & Plan   Federico Spence is a 73-year-old male with history of diabetes mellitus, hypertension, previous syncopal event, and sepsis with bacteremia due to Streptococcus dysgalactiae cellulitis in January 2023. He presented to the ED on 9/23/23 for evaluation of fever and passing out.  ED staff noted intermittent confusion with clearing mental status. He also reported some bilateral hand weakness, confusion, and chills for the last several days. He has had no recent travel. He has a dog but no other pets. He does walk in the woods daily and had a tick bite a week or so ago. ED evaluation showed fever with temp 102 and 101.6 on repeat. Heart rate was 90s to 100s. Blood pressure was high. Laboratory evaluation showed potassium 3.2, CRP 17.73, lactic acid 1.5, procalcitonin 0.05, unremarkable CBC, and normal UA. Blood cultures were sent. Imaging, including CT C spine and C chest/abdomen/pelvis was unremarkable. He was admitted for further cares. The morning after admission WBC was 12.3. Echo was unremarkable. He continued to feel weak and have chills.     Problem list    Sepsis (fever, tachycardia, leukocytosis)  Streptococcus agalactiae bacteremia, unclear source  -Septic coccus agalactiae is growing from 1 of 2 initial blood cultures.  Repeat blood cultures ordered yesterday and today.  -Continue to monitor blood cultures.   -I checked West Nile IGM and parasite smear for ehrlichia and babesia before diagnosis was more clear.  West Nile test is pending and per Ehrlichia and Babesia testing is negative.  -With increased back pain I obtained MRI of thoracic and lumbar spine to ensure no discitis.  No discitis was noted.  There were some other abnormalities discussed below.  -Infectious disease consulted.  -Discontinued vancomycin and Zosyn.  Treating with Rocephin now.  Will need a couple of  weeks of outpatient IV antibiotic on discharge    Acute on chronic back pain  Lumbar spondylosis with severe spinal stenosis  Thoracic spondylosis without high-grade stenosis  -MRI of thoracic spine showed spondylosis without high-grade stenosis  -MRI of the lumbar spine showed spondylosis with severe spinal stenosis  -No discitis or osteomyelitis was noted  -Pain is improving with PT and medical management.  He has no alarm symptoms such as new weakness, foot drop, incontinence of bowel, or incontinence of bladder.  -Would not be a candidate for a spine procedure with concern for bacteremia.  -I would recommend outpatient follow-up with spine surgery after discharge    Episode of shortness of breath with hypoxia in the evening on 9/24/2023  -Suspect related to bacteremia and chills  -No hypoxia now  -Monitor     History of Streptococcus dysgalactiae bacteremia felt related to cellulitis January 2023  -No signs of cellulitis at this time     Diabetes mellitus, type 2  -Continue PTA metformin  -Continue Novolog insulin by sliding scale as needed     Syncopal episode  -Suspect related to febrile illness  -Echo was unremarkable except for suggestion of atrial flutter (see below)  -Continue telemetry    Question of atrial flutter on echocardiogram  -No atrial fibrillation or atrial flutter noted on telemetry monitoring.  -I have asked cardiology (Dr. Garcia) to review echo.  He found no atrial fibrillation or flutter.  He suspected artifact.  I suspect he was having chills during procedure which resulted in artifact resembling atrial flutter.  -No treatment or evaluation for atrial flutter.  -K to discontinue telemetry.     Incidental thyroid nodule noted on CT  -Outpatient ultrasound     Bifascicular block on EKG  -Noted on previous EKG from February 2023.  He does appear to have a new first-degree AV block tonight with DE interval 214 that was not present from EKG in February     Weakness  -Suspect related to concurrent  "febrile illness  -PT and OT consuls       Diet: Regular Diet Adult    DVT Prophylaxis: Pneumatic Compression Devices  Alvarez Catheter: Not present  Lines: None     Cardiac Monitoring: ACTIVE order. Indication: Syncope- low cardiac risk (24 hours)  Code Status: Full Code      Clinically Significant Risk Factors        # Hypokalemia: Lowest K = 3.2 mmol/L in last 2 days, will replace as needed           # Hypertension: Noted on problem list        # Obesity: Estimated body mass index is 35.87 kg/m  as calculated from the following:    Height as of this encounter: 1.981 m (6' 6\").    Weight as of this encounter: 140.8 kg (310 lb 6.5 oz)., PRESENT ON ADMISSION            Disposition Plan      Expected Discharge Date: 09/29/2023,  3:00 PM    Destination: home with family;home with help/services            Choco Riggins MD  Hospitalist Service  [facility]  Securely message with [employer] (more info)  Text page via [employer] Paging/Directory   ______________________________________________________________________    Interval History   Doing better.  No chills overnight.  He did have an episode of shortness of breath with hypoxia last night.    Physical Exam   Vital Signs: Temp: 97.9  F (36.6  C) Temp src: Oral BP: 115/67 Pulse: 65   Resp: 16 SpO2: 95 % O2 Device: None (Room air) Oxygen Delivery: 1/2 LPM  Weight: 310 lbs 6.52 oz    GENERAL:  Comfortable. Cooperative.  PSYCH: pleasant, oriented, No acute distress.  EYES: PERRLA, Normal conjunctiva.  HEART:  Regular rate and rhythm. No JVD. Pulses normal. No edema.  LUNGS:  Clear to auscultation, normal Respiratory effort.  ABDOMEN:  Soft, no hepatosplenomegaly, normal bowel sounds.  EXTREMETIES: No clubbing, cyanosis or ischemia  SKIN:  Dry to touch, No rash.      Medical Decision Making       60 MINUTES SPENT BY ME on the date of service doing chart review, history, exam, documentation & further activities per the note.      Data     I have personally " reviewed the following data over the past 24 hrs:    N/A  \   N/A   / N/A     N/A N/A N/A /  98   3.6 N/A 0.95 \       Imaging results reviewed over the past 24 hrs:   Recent Results (from the past 24 hour(s))   MR Thoracic Spine w/o & w Contrast    Narrative    For Patients: As a result of the 21st Century Cures Act, medical imaging exams and procedure reports are released immediately into your electronic medical record. You may view this report before your referring provider. If you have questions, please   contact your health care provider.    EXAM: MR THORACIC SPINE W/O and W CONTRAST, MR LUMBAR SPINE W/O and W CONTRAST  LOCATION: [facility]  DATE/TIME: 9/24/2023 6:36 PM CDT    INDICATION: Back pain, fever. Eval for discitis.  COMPARISON: None.  CONTRAST: 14mL Gadavist  TECHNIQUE:   1) Routine Thoracic Spine MRI without and with IV contrast.  2) Routine Lumbar Spine MRI without and with IV contrast.    FINDINGS:  THORACIC SPINE:  Alignment: Normal coronal and sagittal alignment.   Vertebral height: No acute fracture. Normal height of vertebral bodies.  Marrow signal: Normal. No pathologic contrast enhancement.    Spinal cord: No abnormal signal. No pathologic contrast enhancement.    Discs: Moderate multilevel interbody degeneration.    Facets: Scattered moderate facet arthropathy  Spinal Canal/Foramina: Mild central stenosis at multiple levels. At T11-T12, moderate to severe right foraminal stenosis.    Extraspinal: No extraspinal abnormality.     LUMBAR SPINE:   Nomenclature based on 5 lumbar type vertebral bodies.   Instrumented posterior decompression and fusion L3-L5.  Minor levocurvature, apex at L2-L3. Minor retrolisthesis L1-L2 and L2-L3, 2 mm.  Normal vertebral body heights.   Prominent Modic type II (fatty) end plate reactive changes at at L2-L3, moderate Modic type I changes inferior endplate L1. No pathologic contrast enhancement.  Normal distal spinal cord and cauda equina  with conus medullaris at T12.   Left kidney simple cyst, no additional imaging warranted. Unremarkable visualized bony pelvis.    T12-L1: Normal height of disc. Normal disc signal without herniation. Severe left, mild right facet hypertrophy. No recess stenosis. No central spinal stenosis, no right foramen stenosis, no left foramen stenosis.    L1-L2: Slight loss of disc height. Desiccated disc with prominent disc bulge. Moderate bilateral facet hypertrophy. No recess stenosis. Moderate central spinal stenosis, minimal right foramen stenosis, minimal left foramen stenosis.    L2-L3: Moderate loss of disc height. Degenerated disc Dorsal disc osteophyte. Severe bilateral facet and ligamentum flavum hypertrophy. Severe bilateral recess stenosis. Severe central spinal stenosis, moderate right foramen stenosis, moderate left   foramen stenosis.    L3-L4: Posterior decompression and instrumented fusion. Moderate loss of disc height. Signal suggestive of ankylosis without osteophyte. Facet ankylosis. No recess stenosis. No central spinal stenosis, no right foramen stenosis, no left foramen stenosis.    L4-L5: Posterior decompression and instrumented fusion. Moderate loss of disc height. Signal suggestive of ankylosis without osteophyte. Facet ankylosis. No recess stenosis. No central spinal stenosis, no right foramen stenosis, mild left foramen   stenosis.    L5-S1: Normal height of disc. Desiccated disc with shallow disc bulge. Moderate bilateral facet hypertrophy. No recess stenosis. No central spinal stenosis, mild right foramen stenosis, mild left foramen stenosis.      Impression    IMPRESSION:  THORACIC SPINE MRI:  1.  No evidence of discitis osteomyelitis.  2.  Thoracic spondylosis without high-grade central stenosis.  3.  At T11-T12, moderate to severe right foraminal stenosis.    LUMBAR SPINE MRI:  1.  No evidence of discitis osteomyelitis.  2.  Lumbar spondylosis is severe central spinal stenosis at L2-L3.   MR  Lumbar Spine w/o & w Contrast    Narrative    For Patients: As a result of the 21st Century Cures Act, medical imaging exams and procedure reports are released immediately into your electronic medical record. You may view this report before your referring provider. If you have questions, please   contact your health care provider.    EXAM: MR THORACIC SPINE W/O and W CONTRAST, MR LUMBAR SPINE W/O and W CONTRAST  LOCATION: [facility]  DATE/TIME: 9/24/2023 6:36 PM CDT    INDICATION: Back pain, fever. Eval for discitis.  COMPARISON: None.  CONTRAST: 14mL Gadavist  TECHNIQUE:   1) Routine Thoracic Spine MRI without and with IV contrast.  2) Routine Lumbar Spine MRI without and with IV contrast.    FINDINGS:  THORACIC SPINE:  Alignment: Normal coronal and sagittal alignment.   Vertebral height: No acute fracture. Normal height of vertebral bodies.  Marrow signal: Normal. No pathologic contrast enhancement.    Spinal cord: No abnormal signal. No pathologic contrast enhancement.    Discs: Moderate multilevel interbody degeneration.    Facets: Scattered moderate facet arthropathy  Spinal Canal/Foramina: Mild central stenosis at multiple levels. At T11-T12, moderate to severe right foraminal stenosis.    Extraspinal: No extraspinal abnormality.     LUMBAR SPINE:   Nomenclature based on 5 lumbar type vertebral bodies.   Instrumented posterior decompression and fusion L3-L5.  Minor levocurvature, apex at L2-L3. Minor retrolisthesis L1-L2 and L2-L3, 2 mm.  Normal vertebral body heights.   Prominent Modic type II (fatty) end plate reactive changes at at L2-L3, moderate Modic type I changes inferior endplate L1. No pathologic contrast enhancement.  Normal distal spinal cord and cauda equina with conus medullaris at T12.   Left kidney simple cyst, no additional imaging warranted. Unremarkable visualized bony pelvis.    T12-L1: Normal height of disc. Normal disc signal without herniation. Severe left, mild  right facet hypertrophy. No recess stenosis. No central spinal stenosis, no right foramen stenosis, no left foramen stenosis.    L1-L2: Slight loss of disc height. Desiccated disc with prominent disc bulge. Moderate bilateral facet hypertrophy. No recess stenosis. Moderate central spinal stenosis, minimal right foramen stenosis, minimal left foramen stenosis.    L2-L3: Moderate loss of disc height. Degenerated disc Dorsal disc osteophyte. Severe bilateral facet and ligamentum flavum hypertrophy. Severe bilateral recess stenosis. Severe central spinal stenosis, moderate right foramen stenosis, moderate left   foramen stenosis.    L3-L4: Posterior decompression and instrumented fusion. Moderate loss of disc height. Signal suggestive of ankylosis without osteophyte. Facet ankylosis. No recess stenosis. No central spinal stenosis, no right foramen stenosis, no left foramen stenosis.    L4-L5: Posterior decompression and instrumented fusion. Moderate loss of disc height. Signal suggestive of ankylosis without osteophyte. Facet ankylosis. No recess stenosis. No central spinal stenosis, no right foramen stenosis, mild left foramen   stenosis.    L5-S1: Normal height of disc. Desiccated disc with shallow disc bulge. Moderate bilateral facet hypertrophy. No recess stenosis. No central spinal stenosis, mild right foramen stenosis, mild left foramen stenosis.      Impression    IMPRESSION:  THORACIC SPINE MRI:  1.  No evidence of discitis osteomyelitis.  2.  Thoracic spondylosis without high-grade central stenosis.  3.  At T11-T12, moderate to severe right foraminal stenosis.    LUMBAR SPINE MRI:  1.  No evidence of discitis osteomyelitis.  2.  Lumbar spondylosis is severe central spinal stenosis at L2-L3.     Recent Labs   Lab 09/25/23  1501 09/25/23  1209 09/25/23  0917 09/25/23  0842 09/25/23  0544 09/25/23  0321 09/24/23  2156 09/24/23  1848 09/24/23  0753 09/24/23  0620 09/24/23  0310 09/23/23  2204   WBC  --   --   --    --   --   --   --   --   --  12.3*  --  9.9   HGB  --   --   --   --   --   --   --   --   --  13.7  --  14.1   MCV  --   --   --   --   --   --   --   --   --  91  --  90   PLT  --   --   --   --   --   --   --   --   --  132*  --  154   NA  --   --   --   --   --   --   --   --   --   --   --  135*   POTASSIUM 3.6  --  3.2*  --   --   --   --  3.5  --   --   --  3.2*   CHLORIDE  --   --   --   --   --   --   --   --   --   --   --  97*   CO2  --   --   --   --   --   --   --   --   --   --   --  28   BUN  --   --   --   --   --   --   --   --   --   --   --  16.7   CR  --   --   --   --  0.95  --   --   --   --   --   --  0.84   ANIONGAP  --   --   --   --   --   --   --   --   --   --   --  10   LAURA  --   --   --   --   --   --   --   --   --   --   --  8.5*   GLC  --  98  --  95  --  100*   < >  --    < >  --    < > 120*   ALBUMIN  --   --   --   --   --   --   --   --   --   --   --  4.0   PROTTOTAL  --   --   --   --   --   --   --   --   --   --   --  6.9   BILITOTAL  --   --   --   --   --   --   --   --   --   --   --  1.2   ALKPHOS  --   --   --   --   --   --   --   --   --   --   --  76   ALT  --   --   --   --   --   --   --   --   --   --   --  21   AST  --   --   --   --   --   --   --   --   --   --   --  23   LIPASE  --   --   --   --   --   --   --   --   --   --   --  13    < > = values in this interval not displayed.

## 2023-09-25 NOTE — PROGRESS NOTES
09/25/23 1500   Appointment Info   Signing Clinician's Name / Credentials (OT) Agnes Palma OTR/L   Rehab Comments (OT) wears L AFO at baseline   Living Environment   People in Home alone   Current Living Arrangements house   Home Accessibility stairs to enter home;stairs within home   Number of Stairs, Main Entrance 2   Stair Railings, Main Entrance none   Number of Stairs, Within Home, Primary seven   Stair Railings, Within Home, Primary railings safe and in good condition   Transportation Anticipated car, drives self   Living Environment Comments tubshower with grab bars and shower chair; comfort height toilet with vanity next to   Self-Care   Equipment Currently Used at Home grab bar, tub/shower;shower chair;other (see comments)  (has walker and cane if needed; pt has bidet)   Fall history within last six months yes   Number of times patient has fallen within last six months 1   Activity/Exercise/Self-Care Comment At baseline pt is indep with ADLs and IADLs.   General Information   Onset of Illness/Injury or Date of Surgery 09/23/23   Referring Physician Choco Riggins MD   Patient/Family Therapy Goal Statement (OT) return home   Additional Occupational Profile Info/Pertinent History of Current Problem per chart: 73-year-old male with history of diabetes mellitus, hypertension, previous syncopal event, and sepsis with bacteremia due to Streptococcus dysgalactiae cellulitis in January 2023. He presented to the ED on 9/23/23 for evaluation of fever and passing out. ED staff noted intermittent confusion with clearing mental status. He also reported some bilateral hand weakness, confusion, and chills for the last several days; sepsis vs SIRS as working diagnosis   Cognitive Screens/Assessments   Cognitive Assessments Completed [facility] Mental Status Exam (UMS):  Total Score out of /30 25/30   [facility] Norms 21-26 equals mild neurocognitive disorder   [facility] Domains assessed: orientation,  memory, attention, executive functions   SLUMS Interpretation Based on pt's score, indicating he may have mild cog impairment.   Pain Assessment   Patient Currently in Pain No   Transfers   Transfers sit-stand transfer;toilet transfer   Sit-Stand Transfer   Sit-Stand [place] (Transfers) supervision   Toilet Transfer   [place] Level (Toilet Transfer) supervision   Clinical Impression   Criteria for Skilled Therapeutic Interventions Met (OT) Yes, treatment indicated   OT Diagnosis weakness   OT Problem List-Impairments impacting ADL problems related to;activity tolerance impaired;cognition;strength;balance   Assessment of Occupational Performance 5 or more Performance Deficits   Identified Performance Deficits dressing, toileting, showering, functional transfers, and IADLs   Planned Therapy Interventions (OT) ADL retraining;IADL retraining;cognition;transfer training;home program guidelines;progressive activity/exercise;risk factor education   Clinical Decision Making Complexity (OT) low complexity   Anticipated Equipment Needs Upon Discharge (OT) tub bench   Risk & Benefits of therapy have been explained evaluation/treatment results reviewed;care plan/treatment goals reviewed;risks/benefits reviewed;current/potential barriers reviewed;participants voiced agreement with care plan;participants included;patient   OT Total Evaluation Time   OT Eval, Low Complexity Minutes (90951) 15   OT Goals   Therapy Frequency (OT) Daily   OT Predicted Duration/Target Date for Goal Attainment 10/02/23   OT Goals Hygiene/Grooming;Upper Body Dressing;Lower Body Dressing;Toilet Transfer/Toileting;Cognition;OT Goal 1;OT Goal 2   OT: Hygiene/Grooming modified independent   OT: Upper Body Dressing Modified independent   OT: Lower Body Dressing Modified independent   OT: Toilet Transfer/Toileting Modified independent   OT: Cognitive Goal Met   OT: Goal 1 Pt will complete simple med set up with zero errors.   OT: Goal 2 Pt will be  mod I with tubshower transfer.   Interventions   Interventions Quick Adds Self-Care/Home Management   Self-Care/Home Management   Self-Care/Home Mgmt/ADL, Compensatory, Meal Prep Minutes (49899) 9   Treatment Detail/Skilled Intervention Pt is min A with donning socks over toes; pt reports has sock aide at home he utilizes. Pt is SBA with STS from bed with 2ww. Pt is SBA with ambulating in room with 2ww. Pt is SBA with toileting, including toilet transfer. Pt is educated on cog screen score. Discussion on having assist with IADLs like meds and driving and pt verbalizes understanding. Pt is receptive to OT suggestions.  Pt left in bed with call light in reach.   OT Discharge Planning   OT Plan simple med set up; TB dressing with AE; tubshower transfer   OT Discharge Recommendation (DC Rec) home with assist;home with home care occupational therapy   OT Rationale for DC Rec At this time, pt is slightly below baseline due to decreased activity tolerance and mild cog impairment. OT would recommend Ax1 with IADLs such as driving, meals, and meds at this time. Recommend IP OT and HHOT to address above deficits.   OT Brief overview of current status 25/30 SLUMS-mild cog impairment; SBA toileting; min A LB dressing w/o AE   Total Session Time   Timed Code Treatment Minutes 9   Total Session Time (sum of timed and untimed services) 24

## 2023-09-26 ENCOUNTER — APPOINTMENT (OUTPATIENT)
Dept: PHYSICAL THERAPY | Facility: CLINIC | Age: 73
DRG: 872 | End: 2023-09-26
Payer: COMMERCIAL

## 2023-09-26 ENCOUNTER — APPOINTMENT (OUTPATIENT)
Dept: OCCUPATIONAL THERAPY | Facility: CLINIC | Age: 73
DRG: 872 | End: 2023-09-26
Payer: COMMERCIAL

## 2023-09-26 ENCOUNTER — HOME INFUSION (PRE-WILLOW HOME INFUSION) (OUTPATIENT)
Dept: PHARMACY | Facility: CLINIC | Age: 73
End: 2023-09-26
Payer: COMMERCIAL

## 2023-09-26 LAB
ANION GAP SERPL CALCULATED.3IONS-SCNC: 10 MMOL/L (ref 7–15)
BACTERIA BLD CULT: ABNORMAL
BACTERIA BLD CULT: ABNORMAL
BUN SERPL-MCNC: 14 MG/DL (ref 8–23)
CALCIUM SERPL-MCNC: 8.5 MG/DL (ref 8.8–10.2)
CHLORIDE SERPL-SCNC: 104 MMOL/L (ref 98–107)
CREAT SERPL-MCNC: 0.76 MG/DL (ref 0.67–1.17)
DEPRECATED HCO3 PLAS-SCNC: 25 MMOL/L (ref 22–29)
EGFRCR SERPLBLD CKD-EPI 2021: >90 ML/MIN/1.73M2
ERYTHROCYTE [DISTWIDTH] IN BLOOD BY AUTOMATED COUNT: 13.5 % (ref 10–15)
GLUCOSE BLDC GLUCOMTR-MCNC: 103 MG/DL (ref 70–99)
GLUCOSE BLDC GLUCOMTR-MCNC: 107 MG/DL (ref 70–99)
GLUCOSE BLDC GLUCOMTR-MCNC: 81 MG/DL (ref 70–99)
GLUCOSE BLDC GLUCOMTR-MCNC: 95 MG/DL (ref 70–99)
GLUCOSE BLDC GLUCOMTR-MCNC: 96 MG/DL (ref 70–99)
GLUCOSE SERPL-MCNC: 86 MG/DL (ref 70–99)
HCT VFR BLD AUTO: 37.3 % (ref 40–53)
HGB BLD-MCNC: 12.6 G/DL (ref 13.3–17.7)
MCH RBC QN AUTO: 30 PG (ref 26.5–33)
MCHC RBC AUTO-ENTMCNC: 33.8 G/DL (ref 31.5–36.5)
MCV RBC AUTO: 89 FL (ref 78–100)
PLATELET # BLD AUTO: 140 10E3/UL (ref 150–450)
POTASSIUM SERPL-SCNC: 3.4 MMOL/L (ref 3.4–5.3)
POTASSIUM SERPL-SCNC: 3.6 MMOL/L (ref 3.4–5.3)
RBC # BLD AUTO: 4.2 10E6/UL (ref 4.4–5.9)
SODIUM SERPL-SCNC: 139 MMOL/L (ref 136–145)
WBC # BLD AUTO: 5.9 10E3/UL (ref 4–11)
WNV IGM SER IA-ACNC: 0 IV

## 2023-09-26 PROCEDURE — 250N000013 HC RX MED GY IP 250 OP 250 PS 637: Performed by: INTERNAL MEDICINE

## 2023-09-26 PROCEDURE — 36415 COLL VENOUS BLD VENIPUNCTURE: CPT | Performed by: HOSPITALIST

## 2023-09-26 PROCEDURE — 97530 THERAPEUTIC ACTIVITIES: CPT | Mod: GP | Performed by: PHYSICAL THERAPIST

## 2023-09-26 PROCEDURE — 250N000011 HC RX IP 250 OP 636: Mod: JZ | Performed by: INTERNAL MEDICINE

## 2023-09-26 PROCEDURE — 99233 SBSQ HOSP IP/OBS HIGH 50: CPT | Performed by: HOSPITALIST

## 2023-09-26 PROCEDURE — 84132 ASSAY OF SERUM POTASSIUM: CPT | Performed by: HOSPITALIST

## 2023-09-26 PROCEDURE — 36415 COLL VENOUS BLD VENIPUNCTURE: CPT | Performed by: INTERNAL MEDICINE

## 2023-09-26 PROCEDURE — 97535 SELF CARE MNGMENT TRAINING: CPT | Mod: GO

## 2023-09-26 PROCEDURE — 250N000013 HC RX MED GY IP 250 OP 250 PS 637: Performed by: HOSPITALIST

## 2023-09-26 PROCEDURE — 87040 BLOOD CULTURE FOR BACTERIA: CPT | Performed by: INTERNAL MEDICINE

## 2023-09-26 PROCEDURE — 36569 INSJ PICC 5 YR+ W/O IMAGING: CPT

## 2023-09-26 PROCEDURE — 94660 CPAP INITIATION&MGMT: CPT

## 2023-09-26 PROCEDURE — 99232 SBSQ HOSP IP/OBS MODERATE 35: CPT | Performed by: INTERNAL MEDICINE

## 2023-09-26 PROCEDURE — 272N000748 HC KIT, CATH 3FR OR 4FR SINGLE LUMEN POWERMIDLINE

## 2023-09-26 PROCEDURE — 120N000001 HC R&B MED SURG/OB

## 2023-09-26 PROCEDURE — 999N000157 HC STATISTIC RCP TIME EA 10 MIN

## 2023-09-26 PROCEDURE — 85027 COMPLETE CBC AUTOMATED: CPT | Performed by: INTERNAL MEDICINE

## 2023-09-26 PROCEDURE — 80048 BASIC METABOLIC PNL TOTAL CA: CPT | Performed by: INTERNAL MEDICINE

## 2023-09-26 RX ORDER — POTASSIUM CHLORIDE 1500 MG/1
40 TABLET, EXTENDED RELEASE ORAL ONCE
Status: COMPLETED | OUTPATIENT
Start: 2023-09-26 | End: 2023-09-26

## 2023-09-26 RX ORDER — CEFTRIAXONE 1 G/1
2000 INJECTION, POWDER, FOR SOLUTION INTRAMUSCULAR; INTRAVENOUS DAILY
Qty: 600 ML | Refills: 0 | Status: SHIPPED | OUTPATIENT
Start: 2023-09-26 | End: 2023-10-07

## 2023-09-26 RX ADMIN — MAGNESIUM OXIDE TAB 400 MG (241.3 MG ELEMENTAL MG) 400 MG: 400 (241.3 MG) TAB at 08:42

## 2023-09-26 RX ADMIN — ISOSORBIDE MONONITRATE 45 MG: 30 TABLET, EXTENDED RELEASE ORAL at 08:41

## 2023-09-26 RX ADMIN — CEFTRIAXONE 2 G: 2 INJECTION, POWDER, FOR SOLUTION INTRAMUSCULAR; INTRAVENOUS at 16:12

## 2023-09-26 RX ADMIN — OXYCODONE HYDROCHLORIDE 5 MG: 5 TABLET ORAL at 00:33

## 2023-09-26 RX ADMIN — ASPIRIN 81 MG: 81 TABLET ORAL at 08:39

## 2023-09-26 RX ADMIN — LISINOPRIL 40 MG: 40 TABLET ORAL at 08:41

## 2023-09-26 RX ADMIN — POTASSIUM CHLORIDE 40 MEQ: 1500 TABLET, EXTENDED RELEASE ORAL at 08:43

## 2023-09-26 RX ADMIN — PRAMIPEXOLE DIHYDROCHLORIDE 3 MG: 1 TABLET ORAL at 21:58

## 2023-09-26 RX ADMIN — METFORMIN HYDROCHLORIDE 500 MG: 500 TABLET ORAL at 18:03

## 2023-09-26 RX ADMIN — FUROSEMIDE 20 MG: 20 TABLET ORAL at 08:41

## 2023-09-26 RX ADMIN — GABAPENTIN 800 MG: 400 CAPSULE ORAL at 20:38

## 2023-09-26 RX ADMIN — CHLORTHALIDONE 25 MG: 25 TABLET ORAL at 08:43

## 2023-09-26 RX ADMIN — Medication 100 MCG: at 08:39

## 2023-09-26 RX ADMIN — SENNOSIDES AND DOCUSATE SODIUM 2 TABLET: 8.6; 5 TABLET ORAL at 08:46

## 2023-09-26 RX ADMIN — DICLOFENAC SODIUM 2 G: 10 GEL TOPICAL at 04:15

## 2023-09-26 RX ADMIN — GABAPENTIN 800 MG: 400 CAPSULE ORAL at 08:39

## 2023-09-26 RX ADMIN — GABAPENTIN 800 MG: 400 CAPSULE ORAL at 14:54

## 2023-09-26 RX ADMIN — ATORVASTATIN CALCIUM 40 MG: 40 TABLET, FILM COATED ORAL at 08:42

## 2023-09-26 RX ADMIN — POTASSIUM CHLORIDE 20 MEQ: 1500 TABLET, EXTENDED RELEASE ORAL at 08:43

## 2023-09-26 RX ADMIN — METOPROLOL SUCCINATE 100 MG: 100 TABLET, EXTENDED RELEASE ORAL at 08:41

## 2023-09-26 RX ADMIN — METOPROLOL SUCCINATE 100 MG: 100 TABLET, EXTENDED RELEASE ORAL at 20:38

## 2023-09-26 ASSESSMENT — ACTIVITIES OF DAILY LIVING (ADL)
ADLS_ACUITY_SCORE: 20
ADLS_ACUITY_SCORE: 21
ADLS_ACUITY_SCORE: 20
ADLS_ACUITY_SCORE: 20

## 2023-09-26 NOTE — PLAN OF CARE
"  Shift Summary: 1500 - 1900    Pt is alert and oriented x4, rated pain a 3/10 did not want any medication, stated that resting helps. Pt is on regular diet, BS checks 81 before meals, Apple juice given and dinner ordered, Independent in the room, on IV Abx, had PICC line placed this evening, patent. Plan is to discharge home with IV Abx.     BP (!) 147/86 (BP Location: Left arm)   Pulse 59   Temp 97.6  F (36.4  C) (Oral)   Resp 18   Ht 1.981 m (6' 6\")   Wt 140.8 kg (310 lb 6.5 oz)   SpO2 99%   BMI 35.87 kg/m      "

## 2023-09-26 NOTE — PROGRESS NOTES
Pt does not have coverage for iv abx in the home with their [employer] Medicare plan. Pt would be self-pay. Drug would be billed to the part D and supplies will be self-pay. Based on Ceftriaxone 2g q24h, total cost is $36.69 for drug and supplies per day.     For nursing, patient should have coverage if homebound, however [facility] is not contracted with Medicare and an outside nursing agency would be utilized instead. If patient is not homebound, there is no coverage and I can see patient if patient agrees to self-pay for $90 per visit.    Patient should have coverage in a TCU or infusion center.    (PEDRO LUIS [facility]) In reference to admission date 09/23/2023.    Please contact Intake with any questions, [phone] or In Basket pool,  Home Infusion ([phone]).

## 2023-09-26 NOTE — PROGRESS NOTES
[facility]    Received request for benefit check should pt require home IV abx. Pt does not have coverage for IV abx in the home with their [employer] Medicare plan. Pt would be self-pay. Drug would be billed to the part D and supplies will be self-pay.     Based on Ceftriaxone 2g q24h, total cost is $36.69 for drug and supplies per day.    For nursing, patient should have coverage if homebound, however [facility] is not contracted with Medicare and an outside nursing agency would be utilized instead. If patient is not homebound, there is no coverage and [facility] can see patient if patient agrees to self-pay for $90 per visit.    Addendum @ 1500h: met with pt to introduce home infusion and review benefits. Pt reports he would like to proceed with the infusion center for home IV abx. Spoke with pt's care coordinator who confirmed pt's preference. [facility] will sign off at this time.     Thank you     Tiffany Chris RN  [facility] Liaison  [phone] (Mon thru Fri 8am - 5pm)  [phone] Office

## 2023-09-26 NOTE — PROGRESS NOTES
[facility]    Medicine Progress Note - Hospitalist Service    Date of Admission:  9/23/2023    Assessment & Plan   Federico Spence is a 73-year-old male with history of diabetes mellitus, hypertension, previous syncopal event, and sepsis with bacteremia due to Streptococcus dysgalactiae cellulitis in January 2023. He presented to the ED on 9/23/23 for evaluation of fever and syncope.  Found to have bacteremia/sepsis    Sepsis (fever, tachycardia, leukocytosis)  Streptococcus agalactiae bacteremia, unclear source    - strep agalactiae is growing from 1 of 2 initial blood cultures    - MRI of thoracic and lumbar spine negative for infectious source    - repeat cultures NTD    - previous provider checked West Nile IGM and parasite smear for ehrlichia and babesia before diagnosis was more clear    - West Nile, Ehrlichia and Babesia testing are negative    - seen by ID: two week total course of antibiotics (ceftriaxone until 10/7)    Acute on chronic back pain  Lumbar spondylosis with severe spinal stenosis  Thoracic spondylosis without high-grade stenosis    - MRI of thoracic spine showed spondylosis without high-grade stenosis    - MRI of the lumbar spine showed spondylosis with severe spinal stenosis    - no discitis or osteomyelitis was noted    - ambulating well    - choosing outpt PT    Episode of shortness of breath with hypoxia in the evening on 9/24/2023    - suspect related to bacteremia and chills    - no hypoxia now     History of Streptococcus dysgalactiae bacteremia felt related to cellulitis January 2023    - no signs of cellulitis at this time     Diabetes mellitus, type 2    - continue PTA metformin    - continue Novolog insulin by sliding scale as needed     Syncopal episode    - suspect related to febrile illness    - echo was unremarkable except for suggestion of atrial flutter (see below)    Question of atrial flutter on echocardiogram    - no atrial fibrillation or atrial  "flutter noted on telemetry monitoring.    - previous provider asked cardiology (Dr. Garcia) to review echo: no atrial fibrillation or flutter seen    - suspected artifact      Incidental thyroid nodule noted on CT    - outpatient ultrasound     Bifascicular block on EKG    - noted on previous EKG from February 2023    - does appear to have a new first-degree AV block with LA interval 214 that was not present from EKG in February     Weakness    - suspect related to concurrent febrile illness    - PT and OT consuls    - has improved    Offered to call family. He declined       Diet: Regular Diet Adult    DVT Prophylaxis: Pneumatic Compression Devices  Alvarez Catheter: Not present  Lines: None     Cardiac Monitoring: None  Code Status: Full Code      Clinically Significant Risk Factors        # Hypokalemia: Lowest K = 3.2 mmol/L in last 2 days, will replace as needed             # Hypertension: Noted on problem list        # Obesity: Estimated body mass index is 35.87 kg/m  as calculated from the following:    Height as of this encounter: 1.981 m (6' 6\").    Weight as of this encounter: 140.8 kg (310 lb 6.5 oz).  , PRESENT ON ADMISSION            Disposition Plan      Expected Discharge Date: 09/26/2023,  3:00 PM    Destination: home with family;home with help/services            Gio Doherty MD  Hospitalist Service  [facility]  Securely message with [employer] (more info)  Text page via [employer] Paging/Directory   ______________________________________________________________________    Interval History   Doing well. Ambulating in halls. No new complaints    Physical Exam   Vital Signs: Temp: 98.6  F (37  C) Temp src: Oral BP: (!) 157/86 Pulse: 57   Resp: 18 SpO2: 97 % O2 Device: None (Room air)    Weight: 310 lbs 6.52 oz    GENERAL:  Comfortable. Cooperative.  PSYCH: pleasant, oriented, No acute distress.  EYES: PERRLA, Normal conjunctiva.  HEART:  Regular rate and rhythm. No JVD. Pulses normal. No " edema.  LUNGS:  Clear to auscultation, normal Respiratory effort.  ABDOMEN:  Soft, no hepatosplenomegaly, normal bowel sounds.  EXTREMETIES: No clubbing, cyanosis or ischemia  SKIN:  Dry to touch, No rash.      Medical Decision Making       60 MINUTES SPENT BY ME on the date of service doing chart review, history, exam, documentation & further activities per the note.      Data     I have personally reviewed the following data over the past 24 hrs:    5.9  \   12.6 (L)   / 140 (L)     139 104 14.0 /  103 (H)   3.6 25 0.76 \       Imaging results reviewed over the past 24 hrs:   No results found for this or any previous visit (from the past 24 hour(s)).    Recent Labs   Lab 09/26/23  1248 09/26/23  1125 09/26/23  0739 09/26/23  0601 09/25/23  1640 09/25/23  1501 09/25/23  0842 09/25/23  0544 09/24/23  0753 09/24/23  0620 09/24/23  0310 09/23/23  2204   WBC  --   --   --  5.9  --   --   --   --   --  12.3*  --  9.9   HGB  --   --   --  12.6*  --   --   --   --   --  13.7  --  14.1   MCV  --   --   --  89  --   --   --   --   --  91  --  90   PLT  --   --   --  140*  --   --   --   --   --  132*  --  154   NA  --   --   --  139  --   --   --   --   --   --   --  135*   POTASSIUM 3.6  --   --  3.4  --  3.6   < >  --    < >  --   --  3.2*   CHLORIDE  --   --   --  104  --   --   --   --   --   --   --  97*   CO2  --   --   --  25  --   --   --   --   --   --   --  28   BUN  --   --   --  14.0  --   --   --   --   --   --   --  16.7   CR  --   --   --  0.76  --   --   --  0.95  --   --   --  0.84   ANIONGAP  --   --   --  10  --   --   --   --   --   --   --  10   LAURA  --   --   --  8.5*  --   --   --   --   --   --   --  8.5*   GLC  --  103* 95 86   < >  --    < >  --    < >  --    < > 120*   ALBUMIN  --   --   --   --   --   --   --   --   --   --   --  4.0   PROTTOTAL  --   --   --   --   --   --   --   --   --   --   --  6.9   BILITOTAL  --   --   --   --   --   --   --   --   --   --   --  1.2   ALKPHOS  --   --   --    --   --   --   --   --   --   --   --  76   ALT  --   --   --   --   --   --   --   --   --   --   --  21   AST  --   --   --   --   --   --   --   --   --   --   --  23   LIPASE  --   --   --   --   --   --   --   --   --   --   --  13    < > = values in this interval not displayed.

## 2023-09-26 NOTE — PROGRESS NOTES
[facility]  Infectious Disease Progress Note          Assessment and Plan:   Date of Admission:  9/23/2023  Date of Consult (When I saw the patient): 09/25/23        Assessment & Plan  Federico Chamberlain is a 73 year old who was admitted on 9/23/2023.      Impression: 1 73-year-old male with acute episode of syncope, encephalopathy and subsequent obvious sepsis and rigors, found to have group B strep bacteremia, likely mild cellulitis even though none is evident currently, no clear other primary or secondary sites.  2 chronic venous stasis prior episode of group C strep bacteremia with obvious leg cellulitis interestingly had syncope with that episode as well.  3 prior episode of rhabdomyolysis with the sepsis not on this occasion     REC 1 plan 2-week course of IV ceftriaxone, convert to that now, OK midline and plan therapy options, social service to see.  2 of note has been seen ID at [facility] last in March where he had a possible mild cellulitis episode, probably follow-up again with them  3 orders in 11 days more ceftriaxone, ? Also compression stockings  Discussed all in detail with pt and with son on phone                 Interval History:     no new complaints and doing well; no cp, sob, n/v/d, or abd pain. Near baseline cxs same T down no clr secondary or primary site              Medications:      acetaminophen  975 mg Oral TID    aspirin  81 mg Oral Daily    atorvastatin  40 mg Oral Daily    cefTRIAXone  2 g Intravenous Q24H    chlorthalidone  25 mg Oral Daily    furosemide  20 mg Oral Daily    gabapentin  800 mg Oral TID    insulin aspart  1-7 Units Subcutaneous TID AC    insulin aspart  1-5 Units Subcutaneous At Bedtime    isosorbide mononitrate  45 mg Oral Daily    lidocaine  1-2 patch Transdermal Q24H    lisinopril  40 mg Oral Daily    magnesium oxide  400 mg Oral Daily    metFORMIN  500 mg Oral Daily with supper    metoprolol succinate ER  100 mg Oral BID    potassium  "chloride ER  20 mEq Oral Daily    pramipexole  3 mg Oral At Bedtime    Vitamin D3  100 mcg Oral Daily                  Physical Exam:   Blood pressure (!) 157/86, pulse 57, temperature 98.6  F (37  C), temperature source Oral, resp. rate 18, height 1.981 m (6' 6\"), weight 140.8 kg (310 lb 6.5 oz), SpO2 97 %.  Wt Readings from Last 2 Encounters:   09/25/23 140.8 kg (310 lb 6.5 oz)   06/28/23 144.6 kg (318 lb 12.8 oz)     Vital Signs with Ranges  Temp:  [97.5  F (36.4  C)-98.6  F (37  C)] 98.6  F (37  C)  Pulse:  [57-85] 57  Resp:  [16-18] 18  BP: (115-158)/(67-92) 157/86  FiO2 (%):  [8 %] 8 %  SpO2:  [95 %-100 %] 97 %    Constitutional: Awake, alert, cooperative, no apparent distress     Lungs: Clear to auscultation bilaterally, no crackles or wheezing   Cardiovascular: Regular rate and rhythm, normal S1 and S2, and no murmur noted   Abdomen: Normal bowel sounds, soft, non-distended, non-tender   Skin: No rashes, no cyanosis, same edema   Other:           Data:   All microbiology laboratory data reviewed.  Recent Labs   Lab Test 09/26/23  0601 09/24/23  0620 09/23/23  2204   WBC 5.9 12.3* 9.9   HGB 12.6* 13.7 14.1   HCT 37.3* 40.9 42.6   MCV 89 91 90   * 132* 154     Recent Labs   Lab Test 09/26/23  0601 09/25/23  0544 09/23/23  2204   CR 0.76 0.95 0.84     No lab results found.  No lab results found.    Invalid input(s):      "

## 2023-09-26 NOTE — PROGRESS NOTES
"Care Management Follow Up    Length of Stay (days): 2    Expected Discharge Date: 09/29/2023     Concerns to be Addressed:  discharge planning      Patient plan of care discussed at interdisciplinary rounds: Yes    Anticipated Discharge Disposition: Home Care     Anticipated Discharge Services:  Home Infusion    Patient/Family in Agreement with the Plan: yes    Additional Information:  CM following for discharge planning, per ID recommendations anticipate 2 weeks of IV Ceftriaxone at discharge. PT and OT recommending home care PT/OT at discharge. Per discussion with  who saw pt on 9/25, pt felt he could be taught how to self administer IV ABX. Referral sent to [facility] to check insurance coverage for IV ABX, awaiting response.     Update 1143: [facility] coverage check \"Pt does not have coverage for iv abx in the home with their [employer] Medicare plan. Pt would be self-pay. Drug would be billed to the part D and supplies will be self-pay. Based on Ceftriaxone 2g q24h, total cost is $36.69 for drug and supplies per day.\". Upon further review, on 9/26 OT recommended Ax1 for IADLs such as driving, meals and medications. Message sent to OT scheduled to see pt today requesting clarify on if they feel pt would be able to safely self administer IV ABX in the home setting, awaiting response.     Update 1400: Spoke with OT who feels pt is safe to self administer IV ABX and medications at home. Updated [facility] Liaison and requested they review coverage with pt and work on outsourcing home RN/PT/OT services. Awaiting further updates from [facility] at this time.     Update 1530: Per discussion with provider and [facility] liaison, pt now prefers to discharge with outpatient infusion appointments and home care PT/OT services. Met with pt at bedside to discuss further, he is aware he will need to travel to different outpatient infusion clinics pending appointment availability. HC referral sent to home care hub. Awaiting midline placement, treatment plan " orders and accepting HC agency. Once these are in place CM can call to arrange the outpatient infusion appointments.     Meenu Martin RN BSN   Inpatient Care Coordination  [facility]   Phone [phone]

## 2023-09-26 NOTE — PLAN OF CARE
Shift [id number]  Problem: Syncope    Alert and oriented  Vital signs q 4 hours  Up independently with a walker  Regular diet, glucose checks ACHS  Reports intermittent left buttocks pain   Right IV site saline locked  Consults: PT, OT, SW, ID, vascular       Patient had a shower this morning. Potassium replacement protocol initiated this morning.

## 2023-09-26 NOTE — PLAN OF CARE
"Shift from [id number]     Inpatient Progress Note:  For complete assessment see flow sheet documentation.     /85 (BP Location: Right arm)   Pulse 85   Temp 97.5  F (36.4  C) (Oral)   Resp 18   Ht 1.981 m (6' 6\")   Wt 140.8 kg (310 lb 6.5 oz)   SpO2 96%   BMI 35.87 kg/m      Orientation: AO x4  Neuros: Intact  Pain status: Pain 3-8/10. Pt had oxy x1 and Voltaren gel  Activity: Up with A1 with walker and gait belt  Resp: WDL  Cardiac: WDL  GI: WDL  : WDL  LDA: PIV R lower forearm  Infusions: SL    Diet: Regular diet  Pertinent Labs: Blood cultures  Safety: Assistive device within arms reach, non slip footwear present, call light within reach, bed adjusted to pt  Consults: PT, OT, SW, ID  Discharge Plan: TBD    "

## 2023-09-27 VITALS
TEMPERATURE: 97.3 F | SYSTOLIC BLOOD PRESSURE: 153 MMHG | WEIGHT: 310.41 LBS | HEIGHT: 78 IN | DIASTOLIC BLOOD PRESSURE: 94 MMHG | HEART RATE: 58 BPM | BODY MASS INDEX: 35.91 KG/M2 | RESPIRATION RATE: 18 BRPM | OXYGEN SATURATION: 95 %

## 2023-09-27 PROBLEM — R78.81 BACTEREMIA: Status: ACTIVE | Noted: 2023-09-27

## 2023-09-27 LAB
BASOPHILS # BLD AUTO: 0 10E3/UL (ref 0–0.2)
BASOPHILS NFR BLD AUTO: 1 %
EOSINOPHIL # BLD AUTO: 0.3 10E3/UL (ref 0–0.7)
EOSINOPHIL NFR BLD AUTO: 5 %
ERYTHROCYTE [DISTWIDTH] IN BLOOD BY AUTOMATED COUNT: 13.2 % (ref 10–15)
GLUCOSE BLDC GLUCOMTR-MCNC: 105 MG/DL (ref 70–99)
GLUCOSE BLDC GLUCOMTR-MCNC: 113 MG/DL (ref 70–99)
HCT VFR BLD AUTO: 37.6 % (ref 40–53)
HGB BLD-MCNC: 13 G/DL (ref 13.3–17.7)
IMM GRANULOCYTES # BLD: 0 10E3/UL
IMM GRANULOCYTES NFR BLD: 0 %
LYMPHOCYTES # BLD AUTO: 1.4 10E3/UL (ref 0.8–5.3)
LYMPHOCYTES NFR BLD AUTO: 25 %
MCH RBC QN AUTO: 30 PG (ref 26.5–33)
MCHC RBC AUTO-ENTMCNC: 34.6 G/DL (ref 31.5–36.5)
MCV RBC AUTO: 87 FL (ref 78–100)
MONOCYTES # BLD AUTO: 0.5 10E3/UL (ref 0–1.3)
MONOCYTES NFR BLD AUTO: 10 %
NEUTROPHILS # BLD AUTO: 3.2 10E3/UL (ref 1.6–8.3)
NEUTROPHILS NFR BLD AUTO: 59 %
NRBC # BLD AUTO: 0 10E3/UL
NRBC BLD AUTO-RTO: 0 /100
PLATELET # BLD AUTO: 153 10E3/UL (ref 150–450)
RBC # BLD AUTO: 4.33 10E6/UL (ref 4.4–5.9)
WBC # BLD AUTO: 5.5 10E3/UL (ref 4–11)

## 2023-09-27 PROCEDURE — 250N000013 HC RX MED GY IP 250 OP 250 PS 637: Performed by: INTERNAL MEDICINE

## 2023-09-27 PROCEDURE — 99232 SBSQ HOSP IP/OBS MODERATE 35: CPT | Performed by: INTERNAL MEDICINE

## 2023-09-27 PROCEDURE — 999N000157 HC STATISTIC RCP TIME EA 10 MIN

## 2023-09-27 PROCEDURE — 99239 HOSP IP/OBS DSCHRG MGMT >30: CPT | Performed by: HOSPITALIST

## 2023-09-27 PROCEDURE — 85025 COMPLETE CBC W/AUTO DIFF WBC: CPT | Performed by: HOSPITALIST

## 2023-09-27 PROCEDURE — 250N000011 HC RX IP 250 OP 636: Mod: JZ | Performed by: INTERNAL MEDICINE

## 2023-09-27 RX ORDER — METHYLPREDNISOLONE SODIUM SUCCINATE 125 MG/2ML
125 INJECTION, POWDER, LYOPHILIZED, FOR SOLUTION INTRAMUSCULAR; INTRAVENOUS
Status: CANCELLED
Start: 2023-09-28

## 2023-09-27 RX ORDER — ALBUTEROL SULFATE 90 UG/1
1-2 AEROSOL, METERED RESPIRATORY (INHALATION)
Status: CANCELLED
Start: 2023-09-28

## 2023-09-27 RX ORDER — EPINEPHRINE 1 MG/ML
0.3 INJECTION, SOLUTION, CONCENTRATE INTRAVENOUS EVERY 5 MIN PRN
Status: CANCELLED | OUTPATIENT
Start: 2023-09-28

## 2023-09-27 RX ORDER — HEPARIN SODIUM (PORCINE) LOCK FLUSH IV SOLN 100 UNIT/ML 100 UNIT/ML
5 SOLUTION INTRAVENOUS
Status: CANCELLED | OUTPATIENT
Start: 2023-09-28

## 2023-09-27 RX ORDER — ALBUTEROL SULFATE 0.83 MG/ML
2.5 SOLUTION RESPIRATORY (INHALATION)
Status: CANCELLED | OUTPATIENT
Start: 2023-09-28

## 2023-09-27 RX ORDER — CEFTRIAXONE SODIUM 2 G
2 VIAL (EA) INJECTION DAILY
Status: CANCELLED
Start: 2023-09-29

## 2023-09-27 RX ORDER — DIPHENHYDRAMINE HYDROCHLORIDE 50 MG/ML
50 INJECTION INTRAMUSCULAR; INTRAVENOUS
Status: CANCELLED
Start: 2023-09-28

## 2023-09-27 RX ORDER — HEPARIN SODIUM,PORCINE 10 UNIT/ML
5-20 VIAL (ML) INTRAVENOUS DAILY PRN
Status: CANCELLED | OUTPATIENT
Start: 2023-09-28

## 2023-09-27 RX ORDER — MEPERIDINE HYDROCHLORIDE 25 MG/ML
25 INJECTION INTRAMUSCULAR; INTRAVENOUS; SUBCUTANEOUS EVERY 30 MIN PRN
Status: CANCELLED | OUTPATIENT
Start: 2023-09-28

## 2023-09-27 RX ADMIN — ACETAMINOPHEN 975 MG: 325 TABLET, FILM COATED ORAL at 09:34

## 2023-09-27 RX ADMIN — ISOSORBIDE MONONITRATE 45 MG: 30 TABLET, EXTENDED RELEASE ORAL at 09:35

## 2023-09-27 RX ADMIN — CHLORTHALIDONE 25 MG: 25 TABLET ORAL at 09:36

## 2023-09-27 RX ADMIN — CEFTRIAXONE 2 G: 2 INJECTION, POWDER, FOR SOLUTION INTRAMUSCULAR; INTRAVENOUS at 11:24

## 2023-09-27 RX ADMIN — FUROSEMIDE 20 MG: 20 TABLET ORAL at 09:36

## 2023-09-27 RX ADMIN — DICLOFENAC SODIUM 2 G: 10 GEL TOPICAL at 06:05

## 2023-09-27 RX ADMIN — MAGNESIUM OXIDE TAB 400 MG (241.3 MG ELEMENTAL MG) 400 MG: 400 (241.3 MG) TAB at 09:36

## 2023-09-27 RX ADMIN — METOPROLOL SUCCINATE 100 MG: 100 TABLET, EXTENDED RELEASE ORAL at 09:37

## 2023-09-27 RX ADMIN — POTASSIUM CHLORIDE 20 MEQ: 1500 TABLET, EXTENDED RELEASE ORAL at 09:36

## 2023-09-27 RX ADMIN — LISINOPRIL 40 MG: 40 TABLET ORAL at 09:34

## 2023-09-27 RX ADMIN — LIDOCAINE 1 PATCH: 4 PATCH TOPICAL at 09:38

## 2023-09-27 RX ADMIN — ASPIRIN 81 MG: 81 TABLET ORAL at 09:36

## 2023-09-27 RX ADMIN — Medication 100 MCG: at 09:34

## 2023-09-27 RX ADMIN — GABAPENTIN 800 MG: 400 CAPSULE ORAL at 09:34

## 2023-09-27 RX ADMIN — ATORVASTATIN CALCIUM 40 MG: 40 TABLET, FILM COATED ORAL at 09:36

## 2023-09-27 RX ADMIN — OXYCODONE HYDROCHLORIDE 5 MG: 5 TABLET ORAL at 01:25

## 2023-09-27 ASSESSMENT — ACTIVITIES OF DAILY LIVING (ADL)
ADLS_ACUITY_SCORE: 20

## 2023-09-27 NOTE — PLAN OF CARE
Occupational Therapy Discharge Summary    Reason for therapy discharge:    Discharged to home with home therapy.    Progress towards therapy goal(s). See goals on Care Plan in [facility] electronic health record for goal details.  Goals partially met.  Barriers to achieving goals:   discharge from facility.    Therapy recommendation(s):    Continued therapy is recommended.  Rationale/Recommendations:  HH OT eval and treat for ongoing strengthening and ADL/IADL training in home environment.

## 2023-09-27 NOTE — PLAN OF CARE
Physical Therapy Discharge Summary    Reason for therapy discharge:    Discharged to home with home therapy.    Progress towards therapy goal(s). See [facility] on Care Plan [facility] for goal details.  Goals not met.  Barriers to achieving goals:   discharge from facility.    Therapy recommendation(s):    Continued therapy is recommended.  Rationale/Recommendations:  Rec HHPT to further progress strength and IND with functional mobility.

## 2023-09-27 NOTE — PROGRESS NOTES
A BiPAP of  10/5@ 21% was applied to the pt via the mask for NOC use.  The bridge of the nose looks good and remains intact. Pt is tolerating it well. Will continue to monitor and assess the pt's current respiratory status and needs.

## 2023-09-27 NOTE — PLAN OF CARE
"Shift from [id number]     Inpatient Progress Note:  For complete assessment see flow sheet documentation.      Orientation: A/O x4  Neuro: WDL  Pain status: C/O buttock pain: scheduled lidocaine patch applied to site.  Activity: Independent with a walker  Peripheral edema: WDL  Resp: WDL  Cardiac: WDL  GI: WDL  : WDL  Skin: WDL  LDA: Midline to LUE, discharging with for short term IV abx tx.  Infusions: Rocephin 2g every day, given prior to discharge.  Pertinent Labs: WBC WDL  Diet: Regular, tolerating.  Consults: ID following  Discharge Plan: Pt will discharge home with short-term IV abx every day, infusions set up.    Vital signs:  Temp: 97.3  F (36.3  C) Temp src: Oral BP: (!) 153/94 Pulse: 58   Resp: 18 SpO2: 95 % O2 Device: None (Room air) Oxygen Delivery: 1/2 LPM Height: 198.1 cm (6' 6\") Weight: 140.8 kg (310 lb 6.5 oz)  Estimated body mass index is 35.87 kg/m  as calculated from the following:    Height as of this encounter: 1.981 m (6' 6\").    Weight as of this encounter: 140.8 kg (310 lb 6.5 oz).       "

## 2023-09-27 NOTE — PROGRESS NOTES
"Patient's After Visit Summary was reviewed with patient.  Patient verbalized understanding of After Visit Summary, recommended follow up and was given an opportunity to ask questions.   Discharge medications sent home with patient/family: Not applicable   Discharged with other: Public transit bus    Vital signs:  Temp: 97.3  F (36.3  C) Temp src: Oral BP: (!) 153/94 Pulse: 58   Resp: 18 SpO2: 95 % O2 Device: None (Room air) Oxygen Delivery: 1/2 LPM Height: 198.1 cm (6' 6\") Weight: 140.8 kg (310 lb 6.5 oz)  Estimated body mass index is 35.87 kg/m  as calculated from the following:    Height as of this encounter: 1.981 m (6' 6\").    Weight as of this encounter: 140.8 kg (310 lb 6.5 oz).           "

## 2023-09-27 NOTE — PROGRESS NOTES
[facility]  Infectious Disease Progress Note          Assessment and Plan:   Date of Admission:  9/23/2023  Date of Consult (When I saw the patient): 09/25/23        Assessment & Plan  Federico Chamberlain is a 73 year old who was admitted on 9/23/2023.      Impression: 1 73-year-old male with acute episode of syncope, encephalopathy and subsequent obvious sepsis and rigors, found to have group B strep bacteremia, likely mild cellulitis even though none is evident currently, no clear other primary or secondary sites.  2 chronic venous stasis prior episode of group C strep bacteremia with obvious leg cellulitis interestingly had syncope with that episode as well.  3 prior episode of rhabdomyolysis with the sepsis not on this occasion     REC 1 plan 2-week course of IV ceftriaxone, convert to that now, OK midline and plan therapy options, social service to see.  2 of note has been seen ID at [facility] last in March where he had a possible mild cellulitis episode, probably follow-up again with them  3 orders in 11 days more ceftriaxone, ? Also compression stockings  Discussed all in detail with pt and with son on phone today, plan is home today, doing well no new issues or problems.  Lying in place and outpatient scheduled no other follow-up with us required unless issues or problems                 Interval History:     no new complaints and doing well; no cp, sob, n/v/d, or abd pain. Near baseline cxs same T down no clr secondary or primary site              Medications:      acetaminophen  975 mg Oral TID    aspirin  81 mg Oral Daily    atorvastatin  40 mg Oral Daily    cefTRIAXone  2 g Intravenous Q24H    chlorthalidone  25 mg Oral Daily    furosemide  20 mg Oral Daily    gabapentin  800 mg Oral TID    insulin aspart  1-7 Units Subcutaneous TID AC    insulin aspart  1-5 Units Subcutaneous At Bedtime    isosorbide mononitrate  45 mg Oral Daily    lidocaine  1-2 patch Transdermal Q24H     "lisinopril  40 mg Oral Daily    magnesium oxide  400 mg Oral Daily    metFORMIN  500 mg Oral Daily with supper    metoprolol succinate ER  100 mg Oral BID    potassium chloride ER  20 mEq Oral Daily    pramipexole  3 mg Oral At Bedtime    sodium chloride (PF)  10 mL Intracatheter Q8H    sodium chloride (PF)  10 mL Intracatheter Q8H    Vitamin D3  100 mcg Oral Daily                  Physical Exam:   Blood pressure (!) 153/94, pulse 58, temperature 97.3  F (36.3  C), temperature source Oral, resp. rate 18, height 1.981 m (6' 6\"), weight 140.8 kg (310 lb 6.5 oz), SpO2 95 %.  Wt Readings from Last 2 Encounters:   09/25/23 140.8 kg (310 lb 6.5 oz)   06/28/23 144.6 kg (318 lb 12.8 oz)     Vital Signs with Ranges  Temp:  [97.3  F (36.3  C)-98.2  F (36.8  C)] 97.3  F (36.3  C)  Pulse:  [58-65] 58  Resp:  [17-20] 18  BP: (143-176)/(69-94) 153/94  SpO2:  [95 %-99 %] 95 %    Constitutional: Awake, alert, cooperative, no apparent distress     Lungs: Clear to auscultation bilaterally, no crackles or wheezing   Cardiovascular: Regular rate and rhythm, normal S1 and S2, and no murmur noted   Abdomen: Normal bowel sounds, soft, non-distended, non-tender   Skin: No rashes, no cyanosis, same edema   Other:           Data:   All microbiology laboratory data reviewed.  Recent Labs   Lab Test 09/27/23  0558 09/26/23  0601 09/24/23  0620   WBC 5.5 5.9 12.3*   HGB 13.0* 12.6* 13.7   HCT 37.6* 37.3* 40.9   MCV 87 89 91    140* 132*     Recent Labs   Lab Test 09/26/23  0601 09/25/23  0544 09/23/23  2204   CR 0.76 0.95 0.84     No lab results found.  No lab results found.    Invalid input(s):      "

## 2023-09-27 NOTE — DISCHARGE SUMMARY
"[facility]  Hospitalist Discharge Summary      Date of Admission:  9/23/2023  Date of Discharge:  9/27/2023  Discharging Provider: Gio Doherty MD  Discharge Service: Hospitalist Service    Discharge Diagnoses   Sepsis (fever, tachycardia, leukocytosis)  Streptococcus agalactiae bacteremia, unclear source  Acute on chronic back pain  Lumbar spondylosis with severe spinal stenosis  Thoracic spondylosis without high-grade stenosis  Clinically Significant Risk Factors     # Obesity: Estimated body mass index is 35.87 kg/m  as calculated from the following:    Height as of this encounter: 1.981 m (6' 6\").    Weight as of this encounter: 140.8 kg (310 lb 6.5 oz).       Follow-ups Needed After Discharge   Follow-up Appointments     Follow-up and recommended labs and tests       Follow up with primary care provider, Luis Alberto Aponte, within 7 days for   hospital follow- up.  No follow up labs or test are needed. Follow-up   blood cultures per ID.  Follow-up with Infectious Disease (ID). Dr. Anderson Obando, [phone].            Unresulted Labs Ordered in the Past 30 Days of this Admission       Date and Time Order Name Status Description    9/26/2023 12:02 AM Blood Culture Arm, Right Preliminary     9/25/2023 12:02 AM Blood Culture Hand, Left Preliminary     9/24/2023  2:40 PM Blood Culture Arm, Left Preliminary     9/23/2023 10:38 PM Blood Culture Arm, Right Preliminary         These results will be followed up by ID/Hospitalist group    Discharge Disposition   Discharged to home  Condition at discharge: Stable    Hospital Course   72 year old male with PMH including type II DM, HTN, HLD, RLS, RBBB, first-degree AVB, MDD, lumbar fusion, gout, TIA, prostate cancer s/p prostatectomy, erectile dysfunction, and previous episode of syncope in December 2022 in the setting of sepsis who presented with a passing out episode in the setting of acute on chronic back pain.  He was noted to have a fever in " the emergency department     Patient reportedly was at [facility] today.  He was seated in the chair.  Per ER provider he reportedly had worsening back pain while trying to get up from the chair and subsequently passed out.  He tells me he does not recall this.  He was brought to the ER for evaluation.  In the emerged department he was noted have a fever     Vital signs in the ER notable for temperature 102 degrees.  Heart rate 90.  Systolic blood pressure near 180.  Saturation 92% on room air     Lab work rather unremarkable in the emergency department.  Urinalysis shows no evidence of infection.  COVID-negative.  Potassium slightly low at 3.2.  Lipase normal.  CBC including white blood cell count normal.  Lactic acid normal.  Blood cultures obtained and pending     Imaging included head CT scan showing no acute findings.  Cervical spine CT scan showed incidental thyroid gland nodule.  Lumbar spine CT scan showed no acute finding.  CT chest abdomen pelvis with contrast shows no acute infectious source.  There was some prominence of the inferior left gluteal muscle relative to the right, soft tissue hematoma not excluded.  Nonobstructing renal calculus noted     I spoke with Dr. Yanes of the emergency department.  Patient will be admitted to observation.  He was not administered any antibiotics in the ER as there was no clear bacterial infection source and plan is to monitor off antibiotics for now     Sepsis (fever, tachycardia, leukocytosis)  Streptococcus agalactiae bacteremia, unclear source    - strep agalactiae is growing from 1 of 2 initial blood cultures    - MRI of thoracic and lumbar spine negative for infectious source    - repeat cultures NTD    - previous provider checked West Nile IGM and parasite smear for ehrlichia and babesia before diagnosis was more clear    - West Nile, Ehrlichia and Babesia testing are negative    - seen by ID: two week total course of antibiotics (ceftriaxone until 10/7)      Acute on chronic back pain  Lumbar spondylosis with severe spinal stenosis  Thoracic spondylosis without high-grade stenosis    - MRI of thoracic spine showed spondylosis without high-grade stenosis    - MRI of the lumbar spine showed spondylosis with severe spinal stenosis    - no discitis or osteomyelitis was noted    - ambulating well    - choosing outpt PT     Episode of shortness of breath with hypoxia in the evening on 9/24/2023    - suspect related to bacteremia and chills    - no hypoxia now     History of Streptococcus dysgalactiae bacteremia felt related to cellulitis January 2023    - no signs of cellulitis at this time     Diabetes mellitus, type 2    - continue PTA metformin    - continue Novolog insulin by sliding scale as needed     Syncopal episode    - suspect related to febrile illness    - echo was unremarkable except for suggestion of atrial flutter (see below)     Question of atrial flutter on echocardiogram    - no atrial fibrillation or atrial flutter noted on telemetry monitoring.    - previous provider asked cardiology (Dr. Garcia) to review echo: no atrial fibrillation or flutter seen    - suspected artifact      Incidental thyroid nodule noted on CT    - outpatient ultrasound     Bifascicular block on EKG    - noted on previous EKG from February 2023    - does appear to have a new first-degree AV block with NJ interval 214 that was not present from EKG in February     Weakness    - suspect related to concurrent febrile illness    - PT and OT consuls    - has improved    Patient has no new complaints. He is ready for discharge. His infusion appointments are being set up.     Consultations This Hospital Stay   PHYSICAL THERAPY ADULT IP CONSULT  OCCUPATIONAL THERAPY ADULT IP CONSULT  PHARMACY TO DOSE VANCO  INFECTIOUS DISEASES IP CONSULT  CARE MANAGEMENT / SOCIAL WORK IP CONSULT  VASCULAR ACCESS ADULT IP CONSULT    Code Status   Full Code    Time Spent on this Encounter   Gio ALTAMIRANO,  MD, personally saw the patient today and spent greater than 30 minutes discharging this patient.       Gio Doherty MD  [facility] OBSERVATION DEPT  [address]  [facility] [address]  Phone: [phone]  ______________________________________________________________________    Physical Exam   Vital Signs: Temp: 97.3  F (36.3  C) Temp src: Oral BP: (!) 153/94 Pulse: 58   Resp: 18 SpO2: 95 % O2 Device: None (Room air)    Weight: 310 lbs 6.52 oz  Constitutional: awake, alert, cooperative, no apparent distress, and appears stated age  Eyes: Lids and lashes normal, pupils equal, round and reactive to light, extra ocular muscles intact, sclera clear, conjunctiva normal  ENT: Normocephalic, without obvious abnormality, atraumatic, sinuses nontender on palpation, external ears without lesions, oral pharynx with moist mucous membranes, tonsils without erythema or exudates, gums normal and good dentition.       Primary Care Physician   Luis Alberto Aponte    Discharge Orders      Home Care Referral      Reason for your hospital stay    Sepsis (fever, tachycardia, leukocytosis)  Streptococcus agalactiae bacteremia, unclear source  Acute on chronic back pain  Lumbar spondylosis with severe spinal stenosis  Thoracic spondylosis without high-grade stenosis     Activity    Your activity upon discharge: activity as tolerated     Follow-up and recommended labs and tests     Follow up with primary care provider, Luis Alberto Aponte, within 7 days for hospital follow- up.  No follow up labs or test are needed. Follow-up blood cultures per ID.  Follow-up with Infectious Disease (ID). Dr. Anderson Obando, [phone].     Diet    Follow this diet upon discharge: Orders Placed This Encounter      Regular Diet Adult       Significant Results and Procedures   Most Recent 3 CBC's:  Recent Labs   Lab Test 09/27/23  0558 09/26/23  0601 09/24/23  0620   WBC 5.5 5.9 12.3*   HGB 13.0* 12.6* 13.7   MCV 87 89 91     140* 132*     Most Recent 3 BMP's:  Recent Labs   Lab Test 09/27/23  0850 09/27/23  0219 09/26/23  2118 09/26/23  1721 09/26/23  1248 09/26/23  0739 09/26/23  0601 09/25/23  1640 09/25/23  1501 09/25/23  0842 09/25/23  0544 09/24/23  0310 09/23/23 2204 05/16/23  1437   NA  --   --   --   --   --   --  139  --   --   --   --   --  135* 144   POTASSIUM  --   --   --   --  3.6  --  3.4  --  3.6   < >  --    < > 3.2* 3.7   CHLORIDE  --   --   --   --   --   --  104  --   --   --   --   --  97* 106   CO2  --   --   --   --   --   --  25  --   --   --   --   --  28 29   BUN  --   --   --   --   --   --  14.0  --   --   --   --   --  16.7 16.9   CR  --   --   --   --   --   --  0.76  --   --   --  0.95  --  0.84 0.90   ANIONGAP  --   --   --   --   --   --  10  --   --   --   --   --  10 9   LAURA  --   --   --   --   --   --  8.5*  --   --   --   --   --  8.5* 9.4   * 113* 107*   < >  --    < > 86   < >  --    < >  --    < > 120* 109*    < > = values in this interval not displayed.     Most Recent 2 LFT's:  Recent Labs   Lab Test 09/23/23  2204 01/10/23  1005 12/31/22  2154   AST 23 27 44   ALT 21  --  35   ALKPHOS 76  --  81   BILITOTAL 1.2  --  1.1   ,   Results for orders placed or performed during the hospital encounter of 09/23/23   CT Head w/o Contrast    Narrative    EXAM: CT HEAD W/O CONTRAST, CT LUMBAR SPINE W/O CONTRAST  LOCATION: [facility]  DATE: 9/23/2023    INDICATION: Head, neck, and back injury  COMPARISON: 01/01/2023  TECHNIQUE:   1) Routine CT Head without IV contrast. Multiplanar reformats. Dose reduction techniques were used.   2) Routine CT Lumbar Spine without IV contrast. Multiplanar reformats. Dose reduction techniques were used.     FINDINGS:   HEAD CT:   INTRACRANIAL CONTENTS: No intracranial hemorrhage, extraaxial collection, or mass effect.  No CT evidence of acute infarct. Mild presumed chronic small vessel ischemic changes. Mild generalized volume loss. No  hydrocephalus.     VISUALIZED ORBITS/SINUSES/MASTOIDS: No intraorbital abnormality. No paranasal sinus mucosal disease. No middle ear or mastoid effusion.    BONES/SOFT TISSUES: No acute abnormality.    LUMBAR SPINE CT:  VERTEBRA: Normal vertebral body heights and alignment. No fracture or posttraumatic subluxation. Bilateral transpedicular screw and jerman fixation L3-L5. Post laminectomies L3-L4 and L4-L5.    CANAL/FORAMINA: Moderate degenerative changes with mild to moderate canal narrowing at T11-T12 and the right, mild at T12-L1, moderate at L1-L2 6, moderate to marked at L2-L3. Marked bilateral foraminal narrowing L1-L2, marked right at T11-T12.   Multilevel mild to moderate neural foraminal narrowing.    PARASPINAL: Large left renal cysts.      Impression    IMPRESSION:  HEAD CT:  No acute intracranial process.    LUMBAR SPINE CT:  No CT evidence for acute fracture or post traumatic subluxation. Postoperative and degenerative changes, as described.     CT Cervical Spine w/o Contrast    Narrative    EXAM: CT CERVICAL SPINE W/O CONTRAST  LOCATION: [facility]  DATE: 9/23/2023    INDICATION: Injury, neck pain  COMPARISON: None.  TECHNIQUE: Routine CT Cervical Spine without IV contrast. Multiplanar reformats. Dose reduction techniques were used.    FINDINGS:  VERTEBRA: Normal normal vertebral body heights. Straightening of cervical lordosis. No fracture or posttraumatic subluxation.     CANAL/FORAMINA: Moderate degenerative changes with moderate canal narrowing at C2-C3, C3-C4, C4-C5, C5-C6, C6-C7. Multilevel prominent neural foraminal narrowing bilaterally.    PARASPINAL: 11 mm nodule left thyroid gland; ultrasound recommended, if not done previously.      Impression    IMPRESSION:  1.  No definite fracture.  2.  Degenerative changes, as described.  3.  11 mm nodule left thyroid gland; ultrasound recommended, if not done previously.     CT Chest/Abdomen/Pelvis w Contrast    Narrative    EXAM:  CT CHEST/ABDOMEN/PELVIS W CONTRAST  LOCATION: [facility]  DATE: 9/23/2023    INDICATION: trauma, abd back pain  COMPARISON: None.  TECHNIQUE: CT scan of the chest, abdomen, and pelvis was performed following injection of IV contrast. Multiplanar reformats were obtained. Dose reduction techniques were used.   CONTRAST: 100mL Isovue 370    FINDINGS:   LUNGS AND PLEURA: Mosaic attenuation. Respiratory motion. No pneumothorax. Mild basilar atelectasis.    MEDIASTINUM/AXILLAE: Atherosclerotic aorta. No adenopathy or pericardial effusion. Small thyroid nodules. Aneurysmal ascending thoracic aorta, 4.5 cm.    CORONARY ARTERY CALCIFICATION: Moderate.    HEPATOBILIARY: Hepatic cysts. Gallbladder is distended.    PANCREAS: Atrophic.    SPLEEN: Upper normal size.    ADRENAL GLANDS: Normal.    KIDNEYS/BLADDER: Bilateral renal cysts. No follow-up needed. 8 mm right renal calculus subcentimeter renal hypodensities too small to characterize.    BOWEL: Normal caliber. Moderate amount of colonic stool.    LYMPH NODES: Prominent left retroperitoneal node series 4 image 2:30 A1 0.5 x 1.2 cm. Prominent left groin nodes. 2.2 x 1.1 cm node image 368 and 2.2 x 1.7 cm node image 393.    VASCULATURE: Atherosclerotic vascular calcification.    PELVIC ORGANS: Absent prostate.    MUSCULOSKELETAL: Prominence of the left gluteal musculature relative to the right, inferior aspect. Postsurgical change lumbar spine. Degenerative change osseous structures.      Impression    IMPRESSION:  1.  No pneumothorax or pleural effusion.  2.  Prominence and heterogeneity of the inferior left gluteal muscle relative to the right, incompletely visualized. Soft tissue hematoma not excluded.   3.  Prominent left retroperitoneal lymph node prominent left groin lymph nodes.  4.  Aneurysmal ascending thoracic aorta.  5.  Nonobstructing right renal calculus.  6.  Small thyroid nodules.   Lumbar spine CT w/o contrast    Narrative    EXAM: CT  HEAD W/O CONTRAST, CT LUMBAR SPINE W/O CONTRAST  LOCATION: [facility]  DATE: 9/23/2023    INDICATION: Head, neck, and back injury  COMPARISON: 01/01/2023  TECHNIQUE:   1) Routine CT Head without IV contrast. Multiplanar reformats. Dose reduction techniques were used.   2) Routine CT Lumbar Spine without IV contrast. Multiplanar reformats. Dose reduction techniques were used.     FINDINGS:   HEAD CT:   INTRACRANIAL CONTENTS: No intracranial hemorrhage, extraaxial collection, or mass effect.  No CT evidence of acute infarct. Mild presumed chronic small vessel ischemic changes. Mild generalized volume loss. No hydrocephalus.     VISUALIZED ORBITS/SINUSES/MASTOIDS: No intraorbital abnormality. No paranasal sinus mucosal disease. No middle ear or mastoid effusion.    BONES/SOFT TISSUES: No acute abnormality.    LUMBAR SPINE CT:  VERTEBRA: Normal vertebral body heights and alignment. No fracture or posttraumatic subluxation. Bilateral transpedicular screw and jerman fixation L3-L5. Post laminectomies L3-L4 and L4-L5.    CANAL/FORAMINA: Moderate degenerative changes with mild to moderate canal narrowing at T11-T12 and the right, mild at T12-L1, moderate at L1-L2 6, moderate to marked at L2-L3. Marked bilateral foraminal narrowing L1-L2, marked right at T11-T12.   Multilevel mild to moderate neural foraminal narrowing.    PARASPINAL: Large left renal cysts.      Impression    IMPRESSION:  HEAD CT:  No acute intracranial process.    LUMBAR SPINE CT:  No CT evidence for acute fracture or post traumatic subluxation. Postoperative and degenerative changes, as described.     MR Thoracic Spine w/o & w Contrast    Narrative    For Patients: As a result of the 21st Century Cures Act, medical imaging exams and procedure reports are released immediately into your electronic medical record. You may view this report before your referring provider. If you have questions, please   contact your health [facility]  provider.    EXAM: MR THORACIC SPINE W/O and W CONTRAST, MR LUMBAR SPINE W/O and W CONTRAST  LOCATION: [facility]  DATE/TIME: 9/24/2023 6:36 PM CDT    INDICATION: Back pain, fever. Eval for discitis.  COMPARISON: None.  CONTRAST: 14mL Gadavist  TECHNIQUE:   1) Routine Thoracic Spine MRI without and with IV contrast.  2) Routine Lumbar Spine MRI without and with IV contrast.    FINDINGS:  THORACIC SPINE:  Alignment: Normal coronal and sagittal alignment.   Vertebral height: No acute fracture. Normal height of vertebral bodies.  Marrow signal: Normal. No pathologic contrast enhancement.    Spinal cord: No abnormal signal. No pathologic contrast enhancement.    Discs: Moderate multilevel interbody degeneration.    Facets: Scattered moderate facet arthropathy  Spinal Canal/Foramina: Mild central stenosis at multiple levels. At T11-T12, moderate to severe right foraminal stenosis.    Extraspinal: No extraspinal abnormality.     LUMBAR SPINE:   Nomenclature based on 5 lumbar type vertebral bodies.   Instrumented posterior decompression and fusion L3-L5.  Minor levocurvature, apex at L2-L3. Minor retrolisthesis L1-L2 and L2-L3, 2 mm.  Normal vertebral body heights.   Prominent Modic type II (fatty) end plate reactive changes at at L2-L3, moderate Modic type I changes inferior endplate L1. No pathologic contrast enhancement.  Normal distal spinal cord and cauda equina with conus medullaris at T12.   Left kidney simple cyst, no additional imaging warranted. Unremarkable visualized bony pelvis.    T12-L1: Normal height of disc. Normal disc signal without herniation. Severe left, mild right facet hypertrophy. No recess stenosis. No central spinal stenosis, no right foramen stenosis, no left foramen stenosis.    L1-L2: Slight loss of disc height. Desiccated disc with prominent disc bulge. Moderate bilateral facet hypertrophy. No recess stenosis. Moderate central spinal stenosis, minimal right foramen  stenosis, minimal left foramen stenosis.    L2-L3: Moderate loss of disc height. Degenerated disc Dorsal disc osteophyte. Severe bilateral facet and ligamentum flavum hypertrophy. Severe bilateral recess stenosis. Severe central spinal stenosis, moderate right foramen stenosis, moderate left   foramen stenosis.    L3-L4: Posterior decompression and instrumented fusion. Moderate loss of disc height. Signal suggestive of ankylosis without osteophyte. Facet ankylosis. No recess stenosis. No central spinal stenosis, no right foramen stenosis, no left foramen stenosis.    L4-L5: Posterior decompression and instrumented fusion. Moderate loss of disc height. Signal suggestive of ankylosis without osteophyte. Facet ankylosis. No recess stenosis. No central spinal stenosis, no right foramen stenosis, mild left foramen   stenosis.    L5-S1: Normal height of disc. Desiccated disc with shallow disc bulge. Moderate bilateral facet hypertrophy. No recess stenosis. No central spinal stenosis, mild right foramen stenosis, mild left foramen stenosis.      Impression    IMPRESSION:  THORACIC SPINE MRI:  1.  No evidence of discitis osteomyelitis.  2.  Thoracic spondylosis without high-grade central stenosis.  3.  At T11-T12, moderate to severe right foraminal stenosis.    LUMBAR SPINE MRI:  1.  No evidence of discitis osteomyelitis.  2.  Lumbar spondylosis is severe central spinal stenosis at L2-L3.   MR Lumbar Spine w/o & w Contrast    Narrative    For Patients: As a result of the 21st Century Cures Act, medical imaging exams and procedure reports are released immediately into your electronic medical record. You may view this report before your referring provider. If you have questions, please   contact your health care provider.    EXAM: MR THORACIC SPINE W/O and W CONTRAST, MR LUMBAR SPINE W/O and W CONTRAST  LOCATION: [facility]  DATE/TIME: 9/24/2023 6:36 PM CDT    INDICATION: Back pain, fever. Eval for  discitis.  COMPARISON: None.  CONTRAST: 14mL Gadavist  TECHNIQUE:   1) Routine Thoracic Spine MRI without and with IV contrast.  2) Routine Lumbar Spine MRI without and with IV contrast.    FINDINGS:  THORACIC SPINE:  Alignment: Normal coronal and sagittal alignment.   Vertebral height: No acute fracture. Normal height of vertebral bodies.  Marrow signal: Normal. No pathologic contrast enhancement.    Spinal cord: No abnormal signal. No pathologic contrast enhancement.    Discs: Moderate multilevel interbody degeneration.    Facets: Scattered moderate facet arthropathy  Spinal Canal/Foramina: Mild central stenosis at multiple levels. At T11-T12, moderate to severe right foraminal stenosis.    Extraspinal: No extraspinal abnormality.     LUMBAR SPINE:   Nomenclature based on 5 lumbar type vertebral bodies.   Instrumented posterior decompression and fusion L3-L5.  Minor levocurvature, apex at L2-L3. Minor retrolisthesis L1-L2 and L2-L3, 2 mm.  Normal vertebral body heights.   Prominent Modic type II (fatty) end plate reactive changes at at L2-L3, moderate Modic type I changes inferior endplate L1. No pathologic contrast enhancement.  Normal distal spinal cord and cauda equina with conus medullaris at T12.   Left kidney simple cyst, no additional imaging warranted. Unremarkable visualized bony pelvis.    T12-L1: Normal height of disc. Normal disc signal without herniation. Severe left, mild right facet hypertrophy. No recess stenosis. No central spinal stenosis, no right foramen stenosis, no left foramen stenosis.    L1-L2: Slight loss of disc height. Desiccated disc with prominent disc bulge. Moderate bilateral facet hypertrophy. No recess stenosis. Moderate central spinal stenosis, minimal right foramen stenosis, minimal left foramen stenosis.    L2-L3: Moderate loss of disc height. Degenerated disc Dorsal disc osteophyte. Severe bilateral facet and ligamentum flavum hypertrophy. Severe bilateral recess stenosis.  Severe central spinal stenosis, moderate right foramen stenosis, moderate left   foramen stenosis.    L3-L4: Posterior decompression and instrumented fusion. Moderate loss of disc height. Signal suggestive of ankylosis without osteophyte. Facet ankylosis. No recess stenosis. No central spinal stenosis, no right foramen stenosis, no left foramen stenosis.    L4-L5: Posterior decompression and instrumented fusion. Moderate loss of disc height. Signal suggestive of ankylosis without osteophyte. Facet ankylosis. No recess stenosis. No central spinal stenosis, no right foramen stenosis, mild left foramen   stenosis.    L5-S1: Normal height of disc. Desiccated disc with shallow disc bulge. Moderate bilateral facet hypertrophy. No recess stenosis. No central spinal stenosis, mild right foramen stenosis, mild left foramen stenosis.      Impression    IMPRESSION:  THORACIC SPINE MRI:  1.  No evidence of discitis osteomyelitis.  2.  Thoracic spondylosis without high-grade central stenosis.  3.  At T11-T12, moderate to severe right foraminal stenosis.    LUMBAR SPINE MRI:  1.  No evidence of discitis osteomyelitis.  2.  Lumbar spondylosis is severe central spinal stenosis at L2-L3.   Echocardiogram Complete     Value    LVEF  50-55%    [facility]    [id number]  [id number]  [id number]  [id number]^[id number]^RAF^GABBY     [facility]  Echocardiography Laboratory  [address] [place] [address]     Name: AMADEO HAYNES  MRN: [id number]  : 1950  Study Date: 2023 08:59 AM  Age: 73 yrs  Gender: Male  Patient Location: [facility]  Reason For Study: Syncope  Ordering Physician: RAF RUSS  Performed By: Tess Gentile     BSA: 2.7 m2  Height: 78 in  Weight: 318 lb  HR: 71  BP: 160/90 mmHg  ______________________________________________________________________________  Procedure  Complete Portable Echo  Adult.  ______________________________________________________________________________  Interpretation Summary     The left ventricle is borderline dilated.  The visual ejection fraction is 50-55%.  There is borderline global hypokinesia of the left ventricle.  The right ventricle is normal in structure, function and size.  The left atrium is borderline dilated.  There is trace to mild mitral regurgitation.  Mild aortic root dilatation.  The ascending aorta is Mildly dilated.  The rhythm was atrial flutter.  Compared to prior study, changes are noted.  ______________________________________________________________________________  Left Ventricle  The left ventricle is borderline dilated. There is normal left ventricular  wall thickness. The visual ejection fraction is 50-55%. There is borderline  global hypokinesia of the left ventricle.     Right Ventricle  The right ventricle is normal in structure, function and size.     Atria  The left atrium is borderline dilated. Right atrial size is normal.     Mitral Valve  The mitral valve leaflets are mildly thickened. There is trace to mild mitral  regurgitation.     Tricuspid Valve  The tricuspid valve is normal in structure and function. There is trace  tricuspid regurgitation.     Aortic Valve  There is moderate trileaflet aortic sclerosis. No aortic regurgitation is  present.     Pulmonic Valve  The pulmonic valve is not well visualized. There is no pulmonic valvular  regurgitation.     Vessels  Mild aortic root dilatation. The ascending aorta is Mildly dilated.     Pericardium  There is no pericardial effusion.     Rhythm  The rhythm was atrial flutter.  ______________________________________________________________________________  MMode/2D Measurements & Calculations     IVSd: 1.1 cm  LVIDd: 5.7 cm  LVIDs: 5.1 cm  LVPWd: 1.1 cm  IVC diam: 2.6 cm  FS: 9.0 %  LV mass(C)d: 247.6 grams  LV mass(C)dI: 90.1 grams/m2  Ao root diam: 4.1 cm  asc Aorta Diam: 4.2 cm  LVOT  diam: 2.5 cm  LVOT area: 4.7 cm2  Ao root diam index Ht(cm/m): 2.1  Ao root diam index BSA (cm/m2): 1.5  Asc Ao diam index BSA (cm/m2): 1.5  Asc Ao diam index Ht(cm/m): 2.1  LA Volume (BP): 84.6 ml     LA Volume Index (BP): 30.8 ml/m2  RV Base: 4.0 cm  RWT: 0.39  TAPSE: 2.5 cm     Doppler Measurements & Calculations  MV E max wayne: 53.4 cm/sec  MV A max wayne: 80.4 cm/sec  MV E/A: 0.66  MV max PG: 3.8 mmHg  MV mean P.7 mmHg  MV V2 VTI: 21.8 cm  MV dec time: 0.23 sec  PA acc time: 0.13 sec  E/E' av.6  Lateral E/e': 5.1  Medial E/e': 8.0  RV S Wayne: 13.2 cm/sec     ______________________________________________________________________________  Report approved by: Roland France 2023 09:58 AM             Discharge Medications   Current Discharge Medication List        START taking these medications    Details   cefTRIAXone (ROCEPHIN) 1 GM vial Inject 2 g (2,000 mg) into the vein daily for 11 days CBC with differential, creatinine, SGOT weekly while on this medication to be faxed to Dr. Obando office.  Qty: 600 mL, Refills: 0    Associated Diagnoses: Gram-positive bacteremia      diclofenac (VOLTAREN) 1 % topical gel Apply 2 g topically every 6 hours as needed for moderate pain Relabel for home  ise  Qty: 50 g, Refills: 0    Associated Diagnoses: Chronic midline low back pain without sciatica           CONTINUE these medications which have NOT CHANGED    Details   acetaminophen (TYLENOL) 325 MG tablet Take 325-650 mg by mouth every 6 hours as needed for mild pain      aspirin 81 MG EC tablet Take 81 mg by mouth daily      atorvastatin (LIPITOR) 40 MG tablet Take 40 mg by mouth daily      chlorthalidone (HYGROTON) 25 MG tablet Take 1 tablet (25 mg) by mouth daily  Qty: 90 tablet, Refills: 2    Associated Diagnoses: Essential hypertension; Coronary artery disease involving native coronary artery of native heart without angina pectoris      ferrous sulfate (FEROSUL) 325 (65 Fe) MG tablet Take 325 mg by  mouth daily (with breakfast)      furosemide (LASIX) 20 MG tablet Take 1 tablet (20 mg) by mouth daily  Qty: 90 tablet, Refills: 1    Associated Diagnoses: Essential hypertension      gabapentin (NEURONTIN) 800 MG tablet Take 800 mg by mouth 3 times daily      isosorbide mononitrate (IMDUR) 30 MG 24 hr tablet Take 1.5 tablets (45 mg) by mouth daily  Qty: 135 tablet, Refills: 1    Associated Diagnoses: Essential hypertension      lisinopril (ZESTRIL) 40 MG tablet Take 1 tablet (40 mg) by mouth daily  Qty: 90 tablet, Refills: 3    Associated Diagnoses: Essential hypertension      magnesium oxide (MAG-OX) 400 MG tablet Take 1 tablet (400 mg) by mouth daily  Qty: 90 tablet, Refills: 1    Associated Diagnoses: Demand ischemia      metFORMIN (GLUCOPHAGE) 500 MG tablet Take 500 mg by mouth daily      metoprolol succinate ER (TOPROL XL) 100 MG 24 hr tablet Take 1 tablet (100 mg) by mouth 2 times daily  Qty: 180 tablet, Refills: 3    Associated Diagnoses: Essential hypertension      nitroGLYcerin (NITROSTAT) 0.4 MG sublingual tablet For chest pain place 1 tablet under the tongue every 5 minutes for 3 doses. If symptoms persist 5 minutes after 1st dose call 911.  Qty: 25 tablet, Refills: 3    Associated Diagnoses: Abnormal cardiovascular stress test; Chronic HFrEF (heart failure with reduced ejection fraction) (H)      potassium chloride ER (KLOR-CON M) 10 MEQ CR tablet Take 20 mEq by mouth daily      pramipexole (MIRAPEX) 1 MG tablet Take 3 mg by mouth At Bedtime      semaglutide (OZEMPIC, 1 MG/DOSE,) 2 MG/1.5ML pen Inject 0.5 mg Subcutaneous every 7 days On Fridays      VITAMIN D, CHOLECALCIFEROL, PO Take 4,000 Units by mouth daily           Allergies   No Known Allergies

## 2023-09-27 NOTE — PROGRESS NOTES
Care Management Discharge Note    Discharge Date: 09/27/2023     Discharge Disposition: Home Care    Discharge Transportation: car, drives self    Patient/Family in Agreement with the Plan: yes    Additional Information:  Pt discharging to home with home care PT/OT through [facility] and outpatient infusion services for IV Rocephin. Orders completed and sent to [facility]. OP infusion appointments arranged through 10/7 and reviewed with pt. Bedside RN updated. Pt plans to arrange his own ride. No further needs anticipated.     Meenu Martin RN BSN   Inpatient Care Coordination  [facility]   Phone [phone]

## 2023-09-27 NOTE — PLAN OF CARE
"Shift from [id number]     Inpatient Progress Note:  For complete assessment see flow sheet documentation.     BP (!) 155/88 (BP Location: Right arm)   Pulse 60   Temp 98.2  F (36.8  C) (Oral)   Resp 18   Ht 1.981 m (6' 6\")   Wt 140.8 kg (310 lb 6.5 oz)   SpO2 96%   BMI 35.87 kg/m      Orientation: AO x4  Neuros: Intact  Pain status: Pain 9/10. Pt had oxy x1.  Activity: Up independently  Resp: WDL  Cardiac: WDL  GI: WDL  : WDL  LDA: Midline in L upper arm  Infusions: SL    Diet: Regular diet  Safety: Assistive device within arms reach, non slip footwear present, call light within reach, bed adjusted to pt  Consults: PT, OT, SW, ID  Discharge Plan: Discharge home with home care PT/OT and infusions at center  "

## 2023-09-28 ENCOUNTER — ALLIED HEALTH/NURSE VISIT (OUTPATIENT)
Dept: INFUSION THERAPY | Facility: CLINIC | Age: 73
End: 2023-09-28
Attending: HOSPITALIST
Payer: COMMERCIAL

## 2023-09-28 VITALS
HEART RATE: 67 BPM | SYSTOLIC BLOOD PRESSURE: 98 MMHG | DIASTOLIC BLOOD PRESSURE: 61 MMHG | TEMPERATURE: 97.7 F | RESPIRATION RATE: 16 BRPM | OXYGEN SATURATION: 98 %

## 2023-09-28 DIAGNOSIS — I10 ESSENTIAL HYPERTENSION: ICD-10-CM

## 2023-09-28 DIAGNOSIS — R78.81 BACTEREMIA: Primary | ICD-10-CM

## 2023-09-28 PROCEDURE — 250N000009 HC RX 250: Performed by: HOSPITALIST

## 2023-09-28 PROCEDURE — 96374 THER/PROPH/DIAG INJ IV PUSH: CPT

## 2023-09-28 PROCEDURE — 250N000011 HC RX IP 250 OP 636: Mod: JZ | Performed by: HOSPITALIST

## 2023-09-28 RX ORDER — METHYLPREDNISOLONE SODIUM SUCCINATE 125 MG/2ML
125 INJECTION, POWDER, LYOPHILIZED, FOR SOLUTION INTRAMUSCULAR; INTRAVENOUS
Status: CANCELLED
Start: 2023-09-29

## 2023-09-28 RX ORDER — CEFTRIAXONE SODIUM 2 G
2 VIAL (EA) INJECTION ONCE
Status: COMPLETED | OUTPATIENT
Start: 2023-09-29 | End: 2023-09-28

## 2023-09-28 RX ORDER — HEPARIN SODIUM (PORCINE) LOCK FLUSH IV SOLN 100 UNIT/ML 100 UNIT/ML
5 SOLUTION INTRAVENOUS
Status: CANCELLED | OUTPATIENT
Start: 2023-09-29

## 2023-09-28 RX ORDER — EPINEPHRINE 1 MG/ML
0.3 INJECTION, SOLUTION INTRAMUSCULAR; SUBCUTANEOUS EVERY 5 MIN PRN
Status: CANCELLED | OUTPATIENT
Start: 2023-09-29

## 2023-09-28 RX ORDER — ALBUTEROL SULFATE 90 UG/1
1-2 AEROSOL, METERED RESPIRATORY (INHALATION)
Status: CANCELLED
Start: 2023-09-29

## 2023-09-28 RX ORDER — CEFTRIAXONE SODIUM 2 G
2 VIAL (EA) INJECTION DAILY
Status: CANCELLED
Start: 2023-09-29

## 2023-09-28 RX ORDER — MEPERIDINE HYDROCHLORIDE 25 MG/ML
25 INJECTION INTRAMUSCULAR; INTRAVENOUS; SUBCUTANEOUS EVERY 30 MIN PRN
Status: CANCELLED | OUTPATIENT
Start: 2023-09-29

## 2023-09-28 RX ORDER — ALBUTEROL SULFATE 0.83 MG/ML
2.5 SOLUTION RESPIRATORY (INHALATION)
Status: CANCELLED | OUTPATIENT
Start: 2023-09-29

## 2023-09-28 RX ORDER — DIPHENHYDRAMINE HYDROCHLORIDE 50 MG/ML
50 INJECTION INTRAMUSCULAR; INTRAVENOUS
Status: CANCELLED
Start: 2023-09-29

## 2023-09-28 RX ORDER — CEFTRIAXONE SODIUM 2 G
2 VIAL (EA) INJECTION ONCE
Status: CANCELLED
Start: 2023-09-29 | End: 2023-09-29

## 2023-09-28 RX ORDER — CEFTRIAXONE SODIUM 2 G
2 VIAL (EA) INJECTION ONCE
Status: CANCELLED
Start: 2023-09-30 | End: 2023-09-30

## 2023-09-28 RX ORDER — HEPARIN SODIUM,PORCINE 10 UNIT/ML
5-20 VIAL (ML) INTRAVENOUS DAILY PRN
Status: CANCELLED | OUTPATIENT
Start: 2023-09-29

## 2023-09-28 RX ADMIN — CEFTRIAXONE SODIUM 2 G: 2 INJECTION, POWDER, FOR SOLUTION INTRAMUSCULAR; INTRAVENOUS at 14:33

## 2023-09-28 ASSESSMENT — PAIN SCALES - GENERAL: PAINLEVEL: SEVERE PAIN (7)

## 2023-09-28 NOTE — PROGRESS NOTES
Infusion Nursing Note:  Federico ISSAC Bulmaro presents today for Rocephin.    Patient seen by provider today: No   present during visit today: Not Applicable.    Note: Received IV Rocephin in the hospital. Denies SE.  Denies recent fevers/chilling.  Does have some L buttocks pain that he thinks is from the fall.  Rated the pain at a 7 out of 10 when he is walking.      Intravenous Access:  Midline.    Treatment Conditions:  Not Applicable.      Post Infusion Assessment:  Patient tolerated infusion without incident.  Site patent and intact, free from redness, edema or discomfort.  No evidence of extravasations.       Discharge Plan:   Discharge instructions reviewed with: Patient.  Patient discharged in stable condition accompanied by: self.  Departure Mode: Ambulatory.  Next infusion is scheduled for 9/29/23.    ULICES GARZA RN

## 2023-09-29 ENCOUNTER — INFUSION THERAPY VISIT (OUTPATIENT)
Dept: INFUSION THERAPY | Facility: CLINIC | Age: 73
End: 2023-09-29
Attending: HOSPITALIST
Payer: COMMERCIAL

## 2023-09-29 VITALS
RESPIRATION RATE: 16 BRPM | HEART RATE: 69 BPM | SYSTOLIC BLOOD PRESSURE: 132 MMHG | TEMPERATURE: 97 F | OXYGEN SATURATION: 98 % | DIASTOLIC BLOOD PRESSURE: 75 MMHG

## 2023-09-29 DIAGNOSIS — R78.81 BACTEREMIA: Primary | ICD-10-CM

## 2023-09-29 LAB
BACTERIA BLD CULT: NO GROWTH
BACTERIA BLD CULT: NO GROWTH

## 2023-09-29 PROCEDURE — 250N000009 HC RX 250: Performed by: HOSPITALIST

## 2023-09-29 PROCEDURE — 96374 THER/PROPH/DIAG INJ IV PUSH: CPT

## 2023-09-29 PROCEDURE — 250N000011 HC RX IP 250 OP 636: Mod: JZ | Performed by: HOSPITALIST

## 2023-09-29 RX ORDER — HEPARIN SODIUM,PORCINE 10 UNIT/ML
5-20 VIAL (ML) INTRAVENOUS DAILY PRN
Status: CANCELLED | OUTPATIENT
Start: 2023-09-30

## 2023-09-29 RX ORDER — CEFTRIAXONE SODIUM 2 G
2 VIAL (EA) INJECTION ONCE
Status: COMPLETED | OUTPATIENT
Start: 2023-09-30 | End: 2023-09-29

## 2023-09-29 RX ORDER — HEPARIN SODIUM (PORCINE) LOCK FLUSH IV SOLN 100 UNIT/ML 100 UNIT/ML
5 SOLUTION INTRAVENOUS
Status: CANCELLED | OUTPATIENT
Start: 2023-09-30

## 2023-09-29 RX ORDER — ALBUTEROL SULFATE 0.83 MG/ML
2.5 SOLUTION RESPIRATORY (INHALATION)
Status: CANCELLED | OUTPATIENT
Start: 2023-09-30

## 2023-09-29 RX ORDER — MEPERIDINE HYDROCHLORIDE 25 MG/ML
25 INJECTION INTRAMUSCULAR; INTRAVENOUS; SUBCUTANEOUS EVERY 30 MIN PRN
Status: CANCELLED | OUTPATIENT
Start: 2023-09-30

## 2023-09-29 RX ORDER — ALBUTEROL SULFATE 90 UG/1
1-2 AEROSOL, METERED RESPIRATORY (INHALATION)
Status: CANCELLED
Start: 2023-09-30

## 2023-09-29 RX ORDER — CEFTRIAXONE SODIUM 2 G
2 VIAL (EA) INJECTION ONCE
Status: CANCELLED
Start: 2023-10-01 | End: 2023-10-01

## 2023-09-29 RX ORDER — DIPHENHYDRAMINE HYDROCHLORIDE 50 MG/ML
50 INJECTION INTRAMUSCULAR; INTRAVENOUS
Status: CANCELLED
Start: 2023-09-30

## 2023-09-29 RX ORDER — METHYLPREDNISOLONE SODIUM SUCCINATE 125 MG/2ML
125 INJECTION, POWDER, LYOPHILIZED, FOR SOLUTION INTRAMUSCULAR; INTRAVENOUS
Status: CANCELLED
Start: 2023-09-30

## 2023-09-29 RX ORDER — EPINEPHRINE 1 MG/ML
0.3 INJECTION, SOLUTION INTRAMUSCULAR; SUBCUTANEOUS EVERY 5 MIN PRN
Status: CANCELLED | OUTPATIENT
Start: 2023-09-30

## 2023-09-29 RX ADMIN — CEFTRIAXONE SODIUM 2 G: 2 INJECTION, POWDER, FOR SOLUTION INTRAMUSCULAR; INTRAVENOUS at 14:32

## 2023-09-29 NOTE — PROGRESS NOTES
Infusion Nursing Note:  Federico Chamberlain presents today for Rocephin.    Patient seen by provider today: No   present during visit today: Not Applicable.    Note: Patient reports feeling tired today. Otherwise is feeling fair.      Intravenous Access:  Midline. No blood return noted.    Treatment Conditions:  Not Applicable.      Post Infusion Assessment:  Patient tolerated infusion without incident.  Site patent and intact, free from redness, edema or discomfort.  No evidence of extravasations.  Access discontinued per protocol.       Discharge Plan:   AVS to patient via MYCHART.  Patient will return to [facility] 9/30 for next appointment.   Patient discharged in stable condition accompanied by: self.  Departure Mode: Ambulatory.      Hossein Fox RN

## 2023-09-30 ENCOUNTER — INFUSION THERAPY VISIT (OUTPATIENT)
Dept: INFUSION THERAPY | Facility: CLINIC | Age: 73
End: 2023-09-30
Attending: HOSPITALIST
Payer: COMMERCIAL

## 2023-09-30 VITALS
OXYGEN SATURATION: 100 % | RESPIRATION RATE: 18 BRPM | HEART RATE: 66 BPM | TEMPERATURE: 97.7 F | SYSTOLIC BLOOD PRESSURE: 162 MMHG | DIASTOLIC BLOOD PRESSURE: 93 MMHG

## 2023-09-30 DIAGNOSIS — R78.81 BACTEREMIA: Primary | ICD-10-CM

## 2023-09-30 LAB — BACTERIA BLD CULT: NO GROWTH

## 2023-09-30 PROCEDURE — 96374 THER/PROPH/DIAG INJ IV PUSH: CPT

## 2023-09-30 PROCEDURE — 250N000009 HC RX 250: Performed by: HOSPITALIST

## 2023-09-30 PROCEDURE — 250N000011 HC RX IP 250 OP 636: Mod: JZ | Performed by: HOSPITALIST

## 2023-09-30 RX ORDER — MEPERIDINE HYDROCHLORIDE 25 MG/ML
25 INJECTION INTRAMUSCULAR; INTRAVENOUS; SUBCUTANEOUS EVERY 30 MIN PRN
Status: CANCELLED | OUTPATIENT
Start: 2023-10-01

## 2023-09-30 RX ORDER — ALBUTEROL SULFATE 0.83 MG/ML
2.5 SOLUTION RESPIRATORY (INHALATION)
Status: CANCELLED | OUTPATIENT
Start: 2023-10-01

## 2023-09-30 RX ORDER — DIPHENHYDRAMINE HYDROCHLORIDE 50 MG/ML
50 INJECTION INTRAMUSCULAR; INTRAVENOUS
Status: CANCELLED
Start: 2023-10-01

## 2023-09-30 RX ORDER — EPINEPHRINE 1 MG/ML
0.3 INJECTION, SOLUTION INTRAMUSCULAR; SUBCUTANEOUS EVERY 5 MIN PRN
Status: CANCELLED | OUTPATIENT
Start: 2023-10-01

## 2023-09-30 RX ORDER — HEPARIN SODIUM (PORCINE) LOCK FLUSH IV SOLN 100 UNIT/ML 100 UNIT/ML
5 SOLUTION INTRAVENOUS
Status: DISCONTINUED | OUTPATIENT
Start: 2023-09-30 | End: 2023-09-30 | Stop reason: HOSPADM

## 2023-09-30 RX ORDER — HEPARIN SODIUM,PORCINE 10 UNIT/ML
5-20 VIAL (ML) INTRAVENOUS DAILY PRN
Status: CANCELLED | OUTPATIENT
Start: 2023-10-01

## 2023-09-30 RX ORDER — ALBUTEROL SULFATE 90 UG/1
1-2 AEROSOL, METERED RESPIRATORY (INHALATION)
Status: CANCELLED
Start: 2023-10-01

## 2023-09-30 RX ORDER — CEFTRIAXONE SODIUM 2 G
2 VIAL (EA) INJECTION ONCE
Status: COMPLETED | OUTPATIENT
Start: 2023-10-01 | End: 2023-09-30

## 2023-09-30 RX ORDER — CEFTRIAXONE SODIUM 2 G
2 VIAL (EA) INJECTION ONCE
Status: CANCELLED
Start: 2023-10-02 | End: 2023-10-02

## 2023-09-30 RX ORDER — METHYLPREDNISOLONE SODIUM SUCCINATE 125 MG/2ML
125 INJECTION, POWDER, LYOPHILIZED, FOR SOLUTION INTRAMUSCULAR; INTRAVENOUS
Status: CANCELLED
Start: 2023-10-01

## 2023-09-30 RX ORDER — HEPARIN SODIUM (PORCINE) LOCK FLUSH IV SOLN 100 UNIT/ML 100 UNIT/ML
5 SOLUTION INTRAVENOUS
Status: CANCELLED | OUTPATIENT
Start: 2023-10-01

## 2023-09-30 RX ADMIN — WATER 2 G: 1 INJECTION INTRAMUSCULAR; INTRAVENOUS; SUBCUTANEOUS at 07:37

## 2023-09-30 NOTE — PROGRESS NOTES
Infusion Nursing Note:  Federico Chamberlain presents today for Rocephin.    Patient seen by provider today: No   present during visit today: Not Applicable.    Note: N/A.      Intravenous Access:  Midline.    Treatment Conditions:  Not Applicable.      Post Infusion Assessment:  Patient tolerated infusion without incident.  Blood return noted pre and post infusion.  Site patent and intact, free from redness, edema or discomfort.  No evidence of extravasations.       Discharge Plan:   Patient declined prescription refills.  Discharge instructions reviewed with: Patient.  Patient and/or family verbalized understanding of discharge instructions and all questions answered.  AVS to patient via [employer].  Patient will return 10/1 for next appointment.   Patient discharged in stable condition accompanied by: self.  Departure Mode: Ambulatory.      Leonel Orellana RN

## 2023-10-01 ENCOUNTER — INFUSION THERAPY VISIT (OUTPATIENT)
Dept: INFUSION THERAPY | Facility: CLINIC | Age: 73
End: 2023-10-01
Attending: HOSPITALIST
Payer: COMMERCIAL

## 2023-10-01 VITALS
HEART RATE: 66 BPM | RESPIRATION RATE: 18 BRPM | TEMPERATURE: 97.8 F | SYSTOLIC BLOOD PRESSURE: 132 MMHG | DIASTOLIC BLOOD PRESSURE: 69 MMHG

## 2023-10-01 DIAGNOSIS — R78.81 BACTEREMIA: Primary | ICD-10-CM

## 2023-10-01 LAB — BACTERIA BLD CULT: NO GROWTH

## 2023-10-01 PROCEDURE — 250N000011 HC RX IP 250 OP 636: Mod: JZ | Performed by: HOSPITALIST

## 2023-10-01 PROCEDURE — 250N000009 HC RX 250: Performed by: HOSPITALIST

## 2023-10-01 PROCEDURE — 96374 THER/PROPH/DIAG INJ IV PUSH: CPT

## 2023-10-01 RX ORDER — ALBUTEROL SULFATE 0.83 MG/ML
2.5 SOLUTION RESPIRATORY (INHALATION)
Status: CANCELLED | OUTPATIENT
Start: 2023-10-02

## 2023-10-01 RX ORDER — METHYLPREDNISOLONE SODIUM SUCCINATE 125 MG/2ML
125 INJECTION, POWDER, LYOPHILIZED, FOR SOLUTION INTRAMUSCULAR; INTRAVENOUS
Status: CANCELLED
Start: 2023-10-02

## 2023-10-01 RX ORDER — DIPHENHYDRAMINE HYDROCHLORIDE 50 MG/ML
50 INJECTION INTRAMUSCULAR; INTRAVENOUS
Status: CANCELLED
Start: 2023-10-02

## 2023-10-01 RX ORDER — MEPERIDINE HYDROCHLORIDE 25 MG/ML
25 INJECTION INTRAMUSCULAR; INTRAVENOUS; SUBCUTANEOUS EVERY 30 MIN PRN
Status: CANCELLED | OUTPATIENT
Start: 2023-10-02

## 2023-10-01 RX ORDER — HEPARIN SODIUM,PORCINE 10 UNIT/ML
5-20 VIAL (ML) INTRAVENOUS DAILY PRN
Status: CANCELLED | OUTPATIENT
Start: 2023-10-02

## 2023-10-01 RX ORDER — ALBUTEROL SULFATE 90 UG/1
1-2 AEROSOL, METERED RESPIRATORY (INHALATION)
Status: CANCELLED
Start: 2023-10-02

## 2023-10-01 RX ORDER — EPINEPHRINE 1 MG/ML
0.3 INJECTION, SOLUTION INTRAMUSCULAR; SUBCUTANEOUS EVERY 5 MIN PRN
Status: CANCELLED | OUTPATIENT
Start: 2023-10-02

## 2023-10-01 RX ORDER — CEFTRIAXONE SODIUM 2 G
2 VIAL (EA) INJECTION ONCE
Status: COMPLETED | OUTPATIENT
Start: 2023-10-02 | End: 2023-10-01

## 2023-10-01 RX ORDER — HEPARIN SODIUM (PORCINE) LOCK FLUSH IV SOLN 100 UNIT/ML 100 UNIT/ML
5 SOLUTION INTRAVENOUS
Status: CANCELLED | OUTPATIENT
Start: 2023-10-02

## 2023-10-01 RX ORDER — CEFTRIAXONE SODIUM 2 G
2 VIAL (EA) INJECTION ONCE
Status: CANCELLED
Start: 2023-10-03 | End: 2023-10-03

## 2023-10-01 RX ADMIN — CEFTRIAXONE SODIUM 2 G: 2 INJECTION, POWDER, FOR SOLUTION INTRAMUSCULAR; INTRAVENOUS at 12:48

## 2023-10-01 NOTE — PROGRESS NOTES
Infusion Nursing Note:  Federico Chamberlain presents today for Rocephin.    Patient seen by provider today: No   present during visit today: Not Applicable.    Note: N/A.      Intravenous Access:  Midline.    Treatment Conditions:  Not Applicable.      Post Infusion Assessment:  Patient tolerated infusion without incident.  Blood return noted pre and post infusion.  Site patent and intact, free from redness, edema or discomfort.  No evidence of extravasations.       Discharge Plan:   Patient declined prescription refills.  Discharge instructions reviewed with: Patient.  Patient and/or family verbalized understanding of discharge instructions and all questions answered.  AVS to patient via [employer].  Patient will return 10/6 for next appointment.   Patient discharged in stable condition accompanied by: self.  Departure Mode: Ambulatory.      Petrona Rodriguez RN

## 2023-10-02 ENCOUNTER — ALLIED HEALTH/NURSE VISIT (OUTPATIENT)
Dept: INFUSION THERAPY | Facility: CLINIC | Age: 73
End: 2023-10-02
Attending: HOSPITALIST
Payer: COMMERCIAL

## 2023-10-02 VITALS
DIASTOLIC BLOOD PRESSURE: 70 MMHG | RESPIRATION RATE: 20 BRPM | TEMPERATURE: 98.1 F | OXYGEN SATURATION: 96 % | SYSTOLIC BLOOD PRESSURE: 118 MMHG | HEART RATE: 71 BPM

## 2023-10-02 DIAGNOSIS — R78.81 BACTEREMIA: Primary | ICD-10-CM

## 2023-10-02 PROCEDURE — 250N000009 HC RX 250: Performed by: HOSPITALIST

## 2023-10-02 PROCEDURE — 96374 THER/PROPH/DIAG INJ IV PUSH: CPT

## 2023-10-02 PROCEDURE — 250N000011 HC RX IP 250 OP 636: Mod: JZ | Performed by: HOSPITALIST

## 2023-10-02 RX ORDER — DIPHENHYDRAMINE HYDROCHLORIDE 50 MG/ML
50 INJECTION INTRAMUSCULAR; INTRAVENOUS
Status: CANCELLED
Start: 2023-10-03

## 2023-10-02 RX ORDER — HEPARIN SODIUM,PORCINE 10 UNIT/ML
5-20 VIAL (ML) INTRAVENOUS DAILY PRN
Status: CANCELLED | OUTPATIENT
Start: 2023-10-03

## 2023-10-02 RX ORDER — CEFTRIAXONE SODIUM 2 G
2 VIAL (EA) INJECTION ONCE
Status: CANCELLED
Start: 2023-10-04 | End: 2023-10-04

## 2023-10-02 RX ORDER — MEPERIDINE HYDROCHLORIDE 25 MG/ML
25 INJECTION INTRAMUSCULAR; INTRAVENOUS; SUBCUTANEOUS EVERY 30 MIN PRN
Status: CANCELLED | OUTPATIENT
Start: 2023-10-03

## 2023-10-02 RX ORDER — ALBUTEROL SULFATE 0.83 MG/ML
2.5 SOLUTION RESPIRATORY (INHALATION)
Status: CANCELLED | OUTPATIENT
Start: 2023-10-03

## 2023-10-02 RX ORDER — ALBUTEROL SULFATE 90 UG/1
1-2 AEROSOL, METERED RESPIRATORY (INHALATION)
Status: CANCELLED
Start: 2023-10-03

## 2023-10-02 RX ORDER — HEPARIN SODIUM (PORCINE) LOCK FLUSH IV SOLN 100 UNIT/ML 100 UNIT/ML
5 SOLUTION INTRAVENOUS
Status: CANCELLED | OUTPATIENT
Start: 2023-10-03

## 2023-10-02 RX ORDER — EPINEPHRINE 1 MG/ML
0.3 INJECTION, SOLUTION INTRAMUSCULAR; SUBCUTANEOUS EVERY 5 MIN PRN
Status: CANCELLED | OUTPATIENT
Start: 2023-10-03

## 2023-10-02 RX ORDER — METHYLPREDNISOLONE SODIUM SUCCINATE 125 MG/2ML
125 INJECTION, POWDER, LYOPHILIZED, FOR SOLUTION INTRAMUSCULAR; INTRAVENOUS
Status: CANCELLED
Start: 2023-10-03

## 2023-10-02 RX ORDER — CEFTRIAXONE SODIUM 2 G
2 VIAL (EA) INJECTION ONCE
Status: COMPLETED | OUTPATIENT
Start: 2023-10-03 | End: 2023-10-02

## 2023-10-02 RX ADMIN — CEFTRIAXONE SODIUM 2 G: 2 INJECTION, POWDER, FOR SOLUTION INTRAMUSCULAR; INTRAVENOUS at 12:57

## 2023-10-02 NOTE — PROGRESS NOTES
Infusion Nursing Note:  Federico Chamberlain presents today for rocephin.     present during visit today: Not Applicable.    Note: N/A.    Intravenous Access:  PICC.    Treatment Conditions:  NA    Post Lab Assessment:  Patient tolerated infusion   Blood return noted pre and post infusion.  Site patent and intact, free from redness, edema or discomfort.  No evidence of extravasations.  Access (remains for infusion use     Discharge Plan:   Patient and/or family verbalized understanding of  instructions and all questions answered.  Patient  to lobby in stable condition accompanied by: self.  Patient to see provider today: No  Departure Mode: Ambulatory.  Marysol Kate RN

## 2023-10-03 ENCOUNTER — ALLIED HEALTH/NURSE VISIT (OUTPATIENT)
Dept: INFUSION THERAPY | Facility: CLINIC | Age: 73
End: 2023-10-03
Attending: HOSPITALIST
Payer: COMMERCIAL

## 2023-10-03 VITALS
TEMPERATURE: 96.5 F | HEART RATE: 68 BPM | SYSTOLIC BLOOD PRESSURE: 125 MMHG | DIASTOLIC BLOOD PRESSURE: 77 MMHG | OXYGEN SATURATION: 97 %

## 2023-10-03 DIAGNOSIS — R78.81 BACTEREMIA: Primary | ICD-10-CM

## 2023-10-03 PROCEDURE — 96374 THER/PROPH/DIAG INJ IV PUSH: CPT

## 2023-10-03 PROCEDURE — 250N000009 HC RX 250: Performed by: HOSPITALIST

## 2023-10-03 PROCEDURE — 250N000011 HC RX IP 250 OP 636: Mod: JZ | Performed by: HOSPITALIST

## 2023-10-03 RX ORDER — ALBUTEROL SULFATE 0.83 MG/ML
2.5 SOLUTION RESPIRATORY (INHALATION)
Status: CANCELLED | OUTPATIENT
Start: 2023-10-04

## 2023-10-03 RX ORDER — CEFTRIAXONE SODIUM 2 G
2 VIAL (EA) INJECTION ONCE
Status: CANCELLED
Start: 2023-10-05 | End: 2023-10-05

## 2023-10-03 RX ORDER — HEPARIN SODIUM,PORCINE 10 UNIT/ML
5-20 VIAL (ML) INTRAVENOUS DAILY PRN
Status: CANCELLED | OUTPATIENT
Start: 2023-10-04

## 2023-10-03 RX ORDER — ALBUTEROL SULFATE 90 UG/1
1-2 AEROSOL, METERED RESPIRATORY (INHALATION)
Status: CANCELLED
Start: 2023-10-04

## 2023-10-03 RX ORDER — MEPERIDINE HYDROCHLORIDE 25 MG/ML
25 INJECTION INTRAMUSCULAR; INTRAVENOUS; SUBCUTANEOUS EVERY 30 MIN PRN
Status: CANCELLED | OUTPATIENT
Start: 2023-10-04

## 2023-10-03 RX ORDER — CEFTRIAXONE SODIUM 2 G
2 VIAL (EA) INJECTION ONCE
Status: COMPLETED | OUTPATIENT
Start: 2023-10-04 | End: 2023-10-03

## 2023-10-03 RX ORDER — HEPARIN SODIUM (PORCINE) LOCK FLUSH IV SOLN 100 UNIT/ML 100 UNIT/ML
5 SOLUTION INTRAVENOUS
Status: CANCELLED | OUTPATIENT
Start: 2023-10-04

## 2023-10-03 RX ORDER — EPINEPHRINE 1 MG/ML
0.3 INJECTION, SOLUTION INTRAMUSCULAR; SUBCUTANEOUS EVERY 5 MIN PRN
Status: CANCELLED | OUTPATIENT
Start: 2023-10-04

## 2023-10-03 RX ORDER — DIPHENHYDRAMINE HYDROCHLORIDE 50 MG/ML
50 INJECTION INTRAMUSCULAR; INTRAVENOUS
Status: CANCELLED
Start: 2023-10-04

## 2023-10-03 RX ORDER — METHYLPREDNISOLONE SODIUM SUCCINATE 125 MG/2ML
125 INJECTION, POWDER, LYOPHILIZED, FOR SOLUTION INTRAMUSCULAR; INTRAVENOUS
Status: CANCELLED
Start: 2023-10-04

## 2023-10-03 RX ADMIN — CEFTRIAXONE SODIUM 2 G: 2 INJECTION, POWDER, FOR SOLUTION INTRAMUSCULAR; INTRAVENOUS at 13:16

## 2023-10-03 NOTE — PROGRESS NOTES
Infusion Nursing Note:  Federico Chamberlain presents today for Rocephin/midline dressing change.    Patient seen by provider today: No   present during visit today: Not Applicable.    Note: N/A.      Intravenous Access:  Midline - flushes well; no blood return noted; dressing/cap changed per protocol    Treatment Conditions:  Not Applicable.      Post Infusion Assessment:  Patient tolerated infusion without incident.  Site patent and intact, free from redness, edema or discomfort.  No evidence of extravasations.       Discharge Plan:   AVS to patient via MYCHART.  Patient will return 10/4/23 for next dose of Rocephin/labs for next appointment.   Patient discharged in stable condition accompanied by: self.  Departure Mode: Ambulatory with ariel Gordon RN

## 2023-10-04 ENCOUNTER — ALLIED HEALTH/NURSE VISIT (OUTPATIENT)
Dept: INFUSION THERAPY | Facility: CLINIC | Age: 73
End: 2023-10-04
Attending: HOSPITALIST
Payer: COMMERCIAL

## 2023-10-04 VITALS
OXYGEN SATURATION: 98 % | DIASTOLIC BLOOD PRESSURE: 71 MMHG | TEMPERATURE: 97.8 F | HEART RATE: 66 BPM | RESPIRATION RATE: 16 BRPM | SYSTOLIC BLOOD PRESSURE: 104 MMHG

## 2023-10-04 DIAGNOSIS — R78.81 BACTEREMIA: Primary | ICD-10-CM

## 2023-10-04 LAB
AST SERPL W P-5'-P-CCNC: 17 U/L (ref 0–45)
BASO+EOS+MONOS # BLD AUTO: NORMAL 10*3/UL
BASO+EOS+MONOS NFR BLD AUTO: NORMAL %
BASOPHILS # BLD AUTO: 0.1 10E3/UL (ref 0–0.2)
BASOPHILS NFR BLD AUTO: 1 %
CREAT SERPL-MCNC: 0.77 MG/DL (ref 0.67–1.17)
EGFRCR SERPLBLD CKD-EPI 2021: >90 ML/MIN/1.73M2
EOSINOPHIL # BLD AUTO: 0.3 10E3/UL (ref 0–0.7)
EOSINOPHIL NFR BLD AUTO: 4 %
ERYTHROCYTE [DISTWIDTH] IN BLOOD BY AUTOMATED COUNT: 13.6 % (ref 10–15)
HCT VFR BLD AUTO: 40.5 % (ref 40–53)
HGB BLD-MCNC: 13.8 G/DL (ref 13.3–17.7)
IMM GRANULOCYTES # BLD: 0 10E3/UL
IMM GRANULOCYTES NFR BLD: 0 %
LYMPHOCYTES # BLD AUTO: 1.6 10E3/UL (ref 0.8–5.3)
LYMPHOCYTES NFR BLD AUTO: 20 %
MCH RBC QN AUTO: 30.3 PG (ref 26.5–33)
MCHC RBC AUTO-ENTMCNC: 34.1 G/DL (ref 31.5–36.5)
MCV RBC AUTO: 89 FL (ref 78–100)
MONOCYTES # BLD AUTO: 0.6 10E3/UL (ref 0–1.3)
MONOCYTES NFR BLD AUTO: 8 %
NEUTROPHILS # BLD AUTO: 5.4 10E3/UL (ref 1.6–8.3)
NEUTROPHILS NFR BLD AUTO: 67 %
NRBC # BLD AUTO: 0 10E3/UL
NRBC BLD AUTO-RTO: 0 /100
PLATELET # BLD AUTO: 279 10E3/UL (ref 150–450)
RBC # BLD AUTO: 4.55 10E6/UL (ref 4.4–5.9)
WBC # BLD AUTO: 8.1 10E3/UL (ref 4–11)

## 2023-10-04 PROCEDURE — 82565 ASSAY OF CREATININE: CPT | Performed by: INTERNAL MEDICINE

## 2023-10-04 PROCEDURE — 250N000009 HC RX 250: Performed by: HOSPITALIST

## 2023-10-04 PROCEDURE — 96374 THER/PROPH/DIAG INJ IV PUSH: CPT

## 2023-10-04 PROCEDURE — 250N000011 HC RX IP 250 OP 636: Mod: JZ | Performed by: HOSPITALIST

## 2023-10-04 PROCEDURE — 36415 COLL VENOUS BLD VENIPUNCTURE: CPT | Performed by: INTERNAL MEDICINE

## 2023-10-04 PROCEDURE — 84450 TRANSFERASE (AST) (SGOT): CPT | Performed by: INTERNAL MEDICINE

## 2023-10-04 PROCEDURE — 85025 COMPLETE CBC W/AUTO DIFF WBC: CPT | Performed by: INTERNAL MEDICINE

## 2023-10-04 RX ORDER — METHYLPREDNISOLONE SODIUM SUCCINATE 125 MG/2ML
125 INJECTION, POWDER, LYOPHILIZED, FOR SOLUTION INTRAMUSCULAR; INTRAVENOUS
Status: CANCELLED
Start: 2023-10-05

## 2023-10-04 RX ORDER — CEFTRIAXONE SODIUM 2 G
2 VIAL (EA) INJECTION ONCE
Status: CANCELLED
Start: 2023-10-06 | End: 2023-10-06

## 2023-10-04 RX ORDER — HEPARIN SODIUM,PORCINE 10 UNIT/ML
5-20 VIAL (ML) INTRAVENOUS DAILY PRN
Status: CANCELLED | OUTPATIENT
Start: 2023-10-05

## 2023-10-04 RX ORDER — EPINEPHRINE 1 MG/ML
0.3 INJECTION, SOLUTION INTRAMUSCULAR; SUBCUTANEOUS EVERY 5 MIN PRN
Status: CANCELLED | OUTPATIENT
Start: 2023-10-05

## 2023-10-04 RX ORDER — DIPHENHYDRAMINE HYDROCHLORIDE 50 MG/ML
50 INJECTION INTRAMUSCULAR; INTRAVENOUS
Status: CANCELLED
Start: 2023-10-05

## 2023-10-04 RX ORDER — MEPERIDINE HYDROCHLORIDE 25 MG/ML
25 INJECTION INTRAMUSCULAR; INTRAVENOUS; SUBCUTANEOUS EVERY 30 MIN PRN
Status: CANCELLED | OUTPATIENT
Start: 2023-10-05

## 2023-10-04 RX ORDER — ALBUTEROL SULFATE 90 UG/1
1-2 AEROSOL, METERED RESPIRATORY (INHALATION)
Status: CANCELLED
Start: 2023-10-05

## 2023-10-04 RX ORDER — HEPARIN SODIUM (PORCINE) LOCK FLUSH IV SOLN 100 UNIT/ML 100 UNIT/ML
5 SOLUTION INTRAVENOUS
Status: CANCELLED | OUTPATIENT
Start: 2023-10-05

## 2023-10-04 RX ORDER — ALBUTEROL SULFATE 0.83 MG/ML
2.5 SOLUTION RESPIRATORY (INHALATION)
Status: CANCELLED | OUTPATIENT
Start: 2023-10-05

## 2023-10-04 RX ORDER — CEFTRIAXONE SODIUM 2 G
2 VIAL (EA) INJECTION ONCE
Status: COMPLETED | OUTPATIENT
Start: 2023-10-05 | End: 2023-10-04

## 2023-10-04 RX ADMIN — CEFTRIAXONE SODIUM 2 G: 2 INJECTION, POWDER, FOR SOLUTION INTRAMUSCULAR; INTRAVENOUS at 13:37

## 2023-10-04 ASSESSMENT — PAIN SCALES - GENERAL: PAINLEVEL: MILD PAIN (3)

## 2023-10-04 NOTE — PROGRESS NOTES
Infusion Nursing Note:  Federico Chamberlain presents today for Rocephin and labs.    Patient seen by provider today: No   present during visit today: Not Applicable.    Note: Tolerating Rocephin.  Denies SE.      Intravenous Access:  Lab draw site LAC, Needle type butterfly, Gauge 23.  Labs drawn without difficulty.  Midline.    Treatment Conditions:  Not Applicable.      Post Infusion Assessment:  Patient tolerated infusion without incident.  Site patent and intact, free from redness, edema or discomfort.  No evidence of extravasations.       Discharge Plan:   Discharge instructions reviewed with: Patient.  Patient discharged in stable condition accompanied by: self.  Departure Mode: Ambulatory.  Next infusion is scheduled for 10/5/23.      ULICES GARZA RN

## 2023-10-05 ENCOUNTER — ALLIED HEALTH/NURSE VISIT (OUTPATIENT)
Dept: INFUSION THERAPY | Facility: CLINIC | Age: 73
End: 2023-10-05
Attending: HOSPITALIST
Payer: COMMERCIAL

## 2023-10-05 VITALS
OXYGEN SATURATION: 98 % | RESPIRATION RATE: 16 BRPM | DIASTOLIC BLOOD PRESSURE: 60 MMHG | TEMPERATURE: 96.9 F | HEART RATE: 81 BPM | SYSTOLIC BLOOD PRESSURE: 105 MMHG

## 2023-10-05 DIAGNOSIS — R78.81 BACTEREMIA: Primary | ICD-10-CM

## 2023-10-05 PROCEDURE — 96374 THER/PROPH/DIAG INJ IV PUSH: CPT

## 2023-10-05 PROCEDURE — 250N000011 HC RX IP 250 OP 636: Mod: JZ | Performed by: HOSPITALIST

## 2023-10-05 PROCEDURE — 250N000009 HC RX 250: Performed by: HOSPITALIST

## 2023-10-05 RX ORDER — ALBUTEROL SULFATE 90 UG/1
1-2 AEROSOL, METERED RESPIRATORY (INHALATION)
Status: CANCELLED
Start: 2023-10-06

## 2023-10-05 RX ORDER — EPINEPHRINE 1 MG/ML
0.3 INJECTION, SOLUTION INTRAMUSCULAR; SUBCUTANEOUS EVERY 5 MIN PRN
Status: CANCELLED | OUTPATIENT
Start: 2023-10-06

## 2023-10-05 RX ORDER — MEPERIDINE HYDROCHLORIDE 25 MG/ML
25 INJECTION INTRAMUSCULAR; INTRAVENOUS; SUBCUTANEOUS EVERY 30 MIN PRN
Status: CANCELLED | OUTPATIENT
Start: 2023-10-06

## 2023-10-05 RX ORDER — DIPHENHYDRAMINE HYDROCHLORIDE 50 MG/ML
50 INJECTION INTRAMUSCULAR; INTRAVENOUS
Status: CANCELLED
Start: 2023-10-06

## 2023-10-05 RX ORDER — CEFTRIAXONE SODIUM 2 G
2 VIAL (EA) INJECTION ONCE
Status: CANCELLED
Start: 2023-10-07 | End: 2023-10-07

## 2023-10-05 RX ORDER — METHYLPREDNISOLONE SODIUM SUCCINATE 125 MG/2ML
125 INJECTION, POWDER, LYOPHILIZED, FOR SOLUTION INTRAMUSCULAR; INTRAVENOUS
Status: CANCELLED
Start: 2023-10-06

## 2023-10-05 RX ORDER — CEFTRIAXONE SODIUM 2 G
2 VIAL (EA) INJECTION ONCE
Status: COMPLETED | OUTPATIENT
Start: 2023-10-06 | End: 2023-10-05

## 2023-10-05 RX ORDER — ALBUTEROL SULFATE 0.83 MG/ML
2.5 SOLUTION RESPIRATORY (INHALATION)
Status: CANCELLED | OUTPATIENT
Start: 2023-10-06

## 2023-10-05 RX ORDER — HEPARIN SODIUM,PORCINE 10 UNIT/ML
5-20 VIAL (ML) INTRAVENOUS DAILY PRN
Status: CANCELLED | OUTPATIENT
Start: 2023-10-06

## 2023-10-05 RX ORDER — HEPARIN SODIUM (PORCINE) LOCK FLUSH IV SOLN 100 UNIT/ML 100 UNIT/ML
5 SOLUTION INTRAVENOUS
Status: CANCELLED | OUTPATIENT
Start: 2023-10-06

## 2023-10-05 RX ADMIN — CEFTRIAXONE SODIUM 2 G: 2 INJECTION, POWDER, FOR SOLUTION INTRAMUSCULAR; INTRAVENOUS at 13:17

## 2023-10-05 ASSESSMENT — PAIN SCALES - GENERAL: PAINLEVEL: MILD PAIN (3)

## 2023-10-05 NOTE — PROGRESS NOTES
Infusion Nursing Note:  Federico Chamberlain presents today for Rocephin.    Patient seen by provider today: No   present during visit today: Not Applicable.    Note: Denies change in assessment from yesterday.      Intravenous Access:  Midline.    Treatment Conditions:  Not Applicable.      Post Infusion Assessment:  Patient tolerated infusion without incident.  Blood return noted pre and post infusion.  Site patent and intact, free from redness, edema or discomfort.  No evidence of extravasations.       Discharge Plan:   Discharge instructions reviewed with: Patient.  Patient discharged in stable condition accompanied by: self.  Departure Mode: Ambulatory.      ULICES GARZA RN

## 2023-10-06 ENCOUNTER — ALLIED HEALTH/NURSE VISIT (OUTPATIENT)
Dept: INFUSION THERAPY | Facility: CLINIC | Age: 73
End: 2023-10-06
Attending: HOSPITALIST
Payer: COMMERCIAL

## 2023-10-06 VITALS
SYSTOLIC BLOOD PRESSURE: 105 MMHG | DIASTOLIC BLOOD PRESSURE: 62 MMHG | HEART RATE: 81 BPM | TEMPERATURE: 97.6 F | OXYGEN SATURATION: 98 % | RESPIRATION RATE: 16 BRPM

## 2023-10-06 DIAGNOSIS — R78.81 BACTEREMIA: Primary | ICD-10-CM

## 2023-10-06 PROCEDURE — 250N000009 HC RX 250: Performed by: HOSPITALIST

## 2023-10-06 PROCEDURE — 250N000011 HC RX IP 250 OP 636: Mod: JZ | Performed by: HOSPITALIST

## 2023-10-06 PROCEDURE — 96374 THER/PROPH/DIAG INJ IV PUSH: CPT

## 2023-10-06 RX ORDER — METHYLPREDNISOLONE SODIUM SUCCINATE 125 MG/2ML
125 INJECTION, POWDER, LYOPHILIZED, FOR SOLUTION INTRAMUSCULAR; INTRAVENOUS
Status: CANCELLED
Start: 2023-10-07

## 2023-10-06 RX ORDER — ALBUTEROL SULFATE 90 UG/1
1-2 AEROSOL, METERED RESPIRATORY (INHALATION)
Status: CANCELLED
Start: 2023-10-07

## 2023-10-06 RX ORDER — EPINEPHRINE 1 MG/ML
0.3 INJECTION, SOLUTION INTRAMUSCULAR; SUBCUTANEOUS EVERY 5 MIN PRN
Status: CANCELLED | OUTPATIENT
Start: 2023-10-07

## 2023-10-06 RX ORDER — CEFTRIAXONE SODIUM 2 G
2 VIAL (EA) INJECTION ONCE
Status: CANCELLED
Start: 2023-10-08 | End: 2023-10-08

## 2023-10-06 RX ORDER — HEPARIN SODIUM,PORCINE 10 UNIT/ML
5-20 VIAL (ML) INTRAVENOUS DAILY PRN
Status: CANCELLED | OUTPATIENT
Start: 2023-10-07

## 2023-10-06 RX ORDER — CEFTRIAXONE SODIUM 2 G
2 VIAL (EA) INJECTION ONCE
Status: COMPLETED | OUTPATIENT
Start: 2023-10-07 | End: 2023-10-06

## 2023-10-06 RX ORDER — MEPERIDINE HYDROCHLORIDE 25 MG/ML
25 INJECTION INTRAMUSCULAR; INTRAVENOUS; SUBCUTANEOUS EVERY 30 MIN PRN
Status: CANCELLED | OUTPATIENT
Start: 2023-10-07

## 2023-10-06 RX ORDER — DIPHENHYDRAMINE HYDROCHLORIDE 50 MG/ML
50 INJECTION INTRAMUSCULAR; INTRAVENOUS
Status: CANCELLED
Start: 2023-10-07

## 2023-10-06 RX ORDER — HEPARIN SODIUM (PORCINE) LOCK FLUSH IV SOLN 100 UNIT/ML 100 UNIT/ML
5 SOLUTION INTRAVENOUS
Status: CANCELLED | OUTPATIENT
Start: 2023-10-07

## 2023-10-06 RX ORDER — ALBUTEROL SULFATE 0.83 MG/ML
2.5 SOLUTION RESPIRATORY (INHALATION)
Status: CANCELLED | OUTPATIENT
Start: 2023-10-07

## 2023-10-06 RX ADMIN — CEFTRIAXONE SODIUM 2 G: 2 INJECTION, POWDER, FOR SOLUTION INTRAMUSCULAR; INTRAVENOUS at 13:17

## 2023-10-06 NOTE — PROGRESS NOTES
Infusion Nursing Note:  Federico Chamberlain presents today for rocephin.     present during visit today: Not Applicable.    Note: charge nurse made 2nd call to [facility] for order to discontinue PICC tomorrow.  Awaiting orders..    Intravenous Access:  PICC.    Treatment Conditions:  NA    Post Lab Assessment:  Patient tolerated infusion   Blood return noted pre and post infusion.  Site patent and intact, free from redness, edema or discomfort.  No evidence of extravasations.  Access (remains for infusion use today)     Discharge Plan:   Patient and/or family verbalized understanding of  instructions and all questions answered.  Patient  to lobby in stable condition accompanied by: self.  Patient to see provider today: No  Departure Mode: Ambulatory.  Marysol Kate RN

## 2023-10-07 ENCOUNTER — INFUSION THERAPY VISIT (OUTPATIENT)
Dept: INFUSION THERAPY | Facility: CLINIC | Age: 73
End: 2023-10-07
Attending: NURSE PRACTITIONER
Payer: COMMERCIAL

## 2023-10-07 VITALS
RESPIRATION RATE: 20 BRPM | DIASTOLIC BLOOD PRESSURE: 78 MMHG | OXYGEN SATURATION: 97 % | TEMPERATURE: 97.6 F | HEART RATE: 76 BPM | SYSTOLIC BLOOD PRESSURE: 129 MMHG

## 2023-10-07 DIAGNOSIS — R78.81 BACTEREMIA: Primary | ICD-10-CM

## 2023-10-07 PROCEDURE — 250N000011 HC RX IP 250 OP 636: Mod: JZ | Performed by: HOSPITALIST

## 2023-10-07 PROCEDURE — 96374 THER/PROPH/DIAG INJ IV PUSH: CPT

## 2023-10-07 PROCEDURE — 250N000009 HC RX 250: Performed by: HOSPITALIST

## 2023-10-07 RX ORDER — MEPERIDINE HYDROCHLORIDE 25 MG/ML
25 INJECTION INTRAMUSCULAR; INTRAVENOUS; SUBCUTANEOUS EVERY 30 MIN PRN
Status: CANCELLED | OUTPATIENT
Start: 2023-10-11

## 2023-10-07 RX ORDER — ALBUTEROL SULFATE 0.83 MG/ML
2.5 SOLUTION RESPIRATORY (INHALATION)
Status: CANCELLED | OUTPATIENT
Start: 2023-10-11

## 2023-10-07 RX ORDER — EPINEPHRINE 1 MG/ML
0.3 INJECTION, SOLUTION INTRAMUSCULAR; SUBCUTANEOUS EVERY 5 MIN PRN
Status: CANCELLED | OUTPATIENT
Start: 2023-10-11

## 2023-10-07 RX ORDER — HEPARIN SODIUM,PORCINE 10 UNIT/ML
5-20 VIAL (ML) INTRAVENOUS DAILY PRN
Status: DISCONTINUED | OUTPATIENT
Start: 2023-10-07 | End: 2023-10-07 | Stop reason: HOSPADM

## 2023-10-07 RX ORDER — ALBUTEROL SULFATE 90 UG/1
1-2 AEROSOL, METERED RESPIRATORY (INHALATION)
Status: CANCELLED
Start: 2023-10-11

## 2023-10-07 RX ORDER — HEPARIN SODIUM,PORCINE 10 UNIT/ML
5-20 VIAL (ML) INTRAVENOUS DAILY PRN
Status: CANCELLED | OUTPATIENT
Start: 2023-10-11

## 2023-10-07 RX ORDER — DIPHENHYDRAMINE HYDROCHLORIDE 50 MG/ML
50 INJECTION INTRAMUSCULAR; INTRAVENOUS
Status: CANCELLED
Start: 2023-10-11

## 2023-10-07 RX ORDER — CEFTRIAXONE SODIUM 2 G
2 VIAL (EA) INJECTION ONCE
Status: CANCELLED
Start: 2023-10-12 | End: 2023-10-12

## 2023-10-07 RX ORDER — HEPARIN SODIUM (PORCINE) LOCK FLUSH IV SOLN 100 UNIT/ML 100 UNIT/ML
5 SOLUTION INTRAVENOUS
Status: CANCELLED | OUTPATIENT
Start: 2023-10-11

## 2023-10-07 RX ORDER — METHYLPREDNISOLONE SODIUM SUCCINATE 125 MG/2ML
125 INJECTION, POWDER, LYOPHILIZED, FOR SOLUTION INTRAMUSCULAR; INTRAVENOUS
Status: CANCELLED
Start: 2023-10-11

## 2023-10-07 RX ORDER — CEFTRIAXONE SODIUM 2 G
2 VIAL (EA) INJECTION ONCE
Status: COMPLETED | OUTPATIENT
Start: 2023-10-08 | End: 2023-10-07

## 2023-10-07 RX ADMIN — CEFTRIAXONE SODIUM 2 G: 2 INJECTION, POWDER, FOR SOLUTION INTRAMUSCULAR; INTRAVENOUS at 09:59

## 2023-10-07 ASSESSMENT — PAIN SCALES - GENERAL: PAINLEVEL: NO PAIN (0)

## 2023-10-07 NOTE — PATIENT INSTRUCTIONS
Your PICC/Midline IV line was removed today.  We placed bacitracin ointment at the site with a sterile gauze and a clear tegaderm dressing.  Please leave this dressing on for 48 hours.  If it should come off before then, it is ok to cover the site with a bandaid.  The outer tan coban wrap may be removed in 1 hour.  Please keep the dressing dry for 48 hours.  Report to your doctor any fever, redness, pain, shortness of breath, swelling, or bleeding that will not stop with pressure.

## 2023-10-07 NOTE — PROGRESS NOTES
Infusion Nursing Note:  Federico Chamberlain presents today for Rocephin and Midline.    Patient seen by provider today: No   present during visit today: Not Applicable.    Note: N/A.      Intravenous Access:  Midline.    Treatment Conditions:  Not Applicable.      Post Infusion Assessment:  Patient tolerated infusion without incident.  Blood return noted pre and post infusion.  Site patent and intact, free from redness, edema or discomfort.  No evidence of extravasations.  Access discontinued per protocol.       Discharge Plan:   Patient declined prescription refills.  Discharge instructions reviewed with: Patient.  Patient and/or family verbalized understanding of discharge instructions and all questions answered.  AVS to patient via [employer].  Patient will return as scheduled for next appointment.   Patient discharged in stable condition accompanied by: self.  Departure Mode: Ambulatory.      Rebeca Pa RN

## 2023-10-28 ENCOUNTER — HOSPITAL ENCOUNTER (EMERGENCY)
Facility: CLINIC | Age: 73
Discharge: HOME | End: 2023-10-28
Attending: EMERGENCY MEDICINE | Admitting: EMERGENCY MEDICINE
Payer: COMMERCIAL

## 2023-10-28 ENCOUNTER — APPOINTMENT (OUTPATIENT)
Dept: GENERAL RADIOLOGY | Facility: CLINIC | Age: 73
End: 2023-10-28
Attending: EMERGENCY MEDICINE
Payer: COMMERCIAL

## 2023-10-28 VITALS
DIASTOLIC BLOOD PRESSURE: 91 MMHG | RESPIRATION RATE: 16 BRPM | WEIGHT: 299 LBS | SYSTOLIC BLOOD PRESSURE: 152 MMHG | HEIGHT: 78 IN | OXYGEN SATURATION: 99 % | BODY MASS INDEX: 34.59 KG/M2 | TEMPERATURE: 97.2 F | HEART RATE: 71 BPM

## 2023-10-28 DIAGNOSIS — L03.90 CELLULITIS, UNSPECIFIED CELLULITIS SITE: ICD-10-CM

## 2023-10-28 LAB
ANION GAP SERPL CALCULATED.3IONS-SCNC: 10 MMOL/L (ref 7–15)
BASOPHILS # BLD AUTO: 0.1 10E3/UL (ref 0–0.2)
BASOPHILS NFR BLD AUTO: 1 %
BUN SERPL-MCNC: 21.2 MG/DL (ref 8–23)
CALCIUM SERPL-MCNC: 9 MG/DL (ref 8.8–10.2)
CHLORIDE SERPL-SCNC: 105 MMOL/L (ref 98–107)
CREAT SERPL-MCNC: 0.87 MG/DL (ref 0.67–1.17)
CRP SERPL-MCNC: <3 MG/L
DEPRECATED HCO3 PLAS-SCNC: 28 MMOL/L (ref 22–29)
EGFRCR SERPLBLD CKD-EPI 2021: >90 ML/MIN/1.73M2
EOSINOPHIL # BLD AUTO: 0.3 10E3/UL (ref 0–0.7)
EOSINOPHIL NFR BLD AUTO: 4 %
ERYTHROCYTE [DISTWIDTH] IN BLOOD BY AUTOMATED COUNT: 13.6 % (ref 10–15)
ERYTHROCYTE [SEDIMENTATION RATE] IN BLOOD BY WESTERGREN METHOD: 11 MM/HR (ref 0–20)
GLUCOSE SERPL-MCNC: 100 MG/DL (ref 70–99)
HCT VFR BLD AUTO: 40.5 % (ref 40–53)
HGB BLD-MCNC: 13.6 G/DL (ref 13.3–17.7)
IMM GRANULOCYTES # BLD: 0 10E3/UL
IMM GRANULOCYTES NFR BLD: 0 %
LACTATE SERPL-SCNC: 1.5 MMOL/L (ref 0.7–2)
LYMPHOCYTES # BLD AUTO: 1.6 10E3/UL (ref 0.8–5.3)
LYMPHOCYTES NFR BLD AUTO: 25 %
MAGNESIUM SERPL-MCNC: 1.9 MG/DL (ref 1.7–2.3)
MCH RBC QN AUTO: 29.8 PG (ref 26.5–33)
MCHC RBC AUTO-ENTMCNC: 33.6 G/DL (ref 31.5–36.5)
MCV RBC AUTO: 89 FL (ref 78–100)
MONOCYTES # BLD AUTO: 0.5 10E3/UL (ref 0–1.3)
MONOCYTES NFR BLD AUTO: 8 %
NEUTROPHILS # BLD AUTO: 4 10E3/UL (ref 1.6–8.3)
NEUTROPHILS NFR BLD AUTO: 62 %
NRBC # BLD AUTO: 0 10E3/UL
NRBC BLD AUTO-RTO: 0 /100
PLATELET # BLD AUTO: 190 10E3/UL (ref 150–450)
POTASSIUM SERPL-SCNC: 3.2 MMOL/L (ref 3.4–5.3)
RBC # BLD AUTO: 4.56 10E6/UL (ref 4.4–5.9)
SODIUM SERPL-SCNC: 143 MMOL/L (ref 135–145)
WBC # BLD AUTO: 6.5 10E3/UL (ref 4–11)

## 2023-10-28 PROCEDURE — 85025 COMPLETE CBC W/AUTO DIFF WBC: CPT | Performed by: EMERGENCY MEDICINE

## 2023-10-28 PROCEDURE — 36415 COLL VENOUS BLD VENIPUNCTURE: CPT | Performed by: EMERGENCY MEDICINE

## 2023-10-28 PROCEDURE — 83735 ASSAY OF MAGNESIUM: CPT | Performed by: EMERGENCY MEDICINE

## 2023-10-28 PROCEDURE — 73630 X-RAY EXAM OF FOOT: CPT | Mod: LT

## 2023-10-28 PROCEDURE — 96365 THER/PROPH/DIAG IV INF INIT: CPT

## 2023-10-28 PROCEDURE — 80048 BASIC METABOLIC PNL TOTAL CA: CPT | Performed by: EMERGENCY MEDICINE

## 2023-10-28 PROCEDURE — 87040 BLOOD CULTURE FOR BACTERIA: CPT | Performed by: EMERGENCY MEDICINE

## 2023-10-28 PROCEDURE — 99285 EMERGENCY DEPT VISIT HI MDM: CPT | Mod: 25

## 2023-10-28 PROCEDURE — 83605 ASSAY OF LACTIC ACID: CPT | Performed by: EMERGENCY MEDICINE

## 2023-10-28 PROCEDURE — 250N000011 HC RX IP 250 OP 636: Mod: JZ | Performed by: EMERGENCY MEDICINE

## 2023-10-28 PROCEDURE — 86140 C-REACTIVE PROTEIN: CPT | Performed by: EMERGENCY MEDICINE

## 2023-10-28 PROCEDURE — 85652 RBC SED RATE AUTOMATED: CPT | Performed by: EMERGENCY MEDICINE

## 2023-10-28 RX ORDER — CEPHALEXIN 500 MG/1
500 CAPSULE ORAL 4 TIMES DAILY
Qty: 40 CAPSULE | Refills: 0 | Status: SHIPPED | OUTPATIENT
Start: 2023-10-28

## 2023-10-28 RX ORDER — CEPHALEXIN 500 MG/1
500 CAPSULE ORAL 4 TIMES DAILY
Qty: 40 CAPSULE | Refills: 0 | Status: SHIPPED | OUTPATIENT
Start: 2023-10-28 | End: 2023-10-28

## 2023-10-28 RX ORDER — CEFTRIAXONE 2 G/1
2 INJECTION, POWDER, FOR SOLUTION INTRAMUSCULAR; INTRAVENOUS ONCE
Status: COMPLETED | OUTPATIENT
Start: 2023-10-28 | End: 2023-10-28

## 2023-10-28 RX ADMIN — CEFTRIAXONE 2 G: 2 INJECTION, POWDER, FOR SOLUTION INTRAMUSCULAR; INTRAVENOUS at 18:44

## 2023-10-28 ASSESSMENT — ACTIVITIES OF DAILY LIVING (ADL)
ADLS_ACUITY_SCORE: 35
ADLS_ACUITY_SCORE: 35

## 2023-10-28 NOTE — DISCHARGE INSTRUCTIONS
Take antibiotics as directed.      Follow-up with your podiatrist and primary care doctor.      Return to the ER for any fevers, increased heart rate, worsening infection or any concerning symptoms.      Discharge Instructions  Cellulitis    Cellulitis is an infection of the skin that occurs when bacteria enter the skin.   Symptoms are generally redness, swelling, warmth and pain.  Your infection appeared to be appropriate to treat at home with antibiotics.  However, sometimes your infection may be worse than it seemed at first, or may worsen with time. If you have new or worse symptoms, you may need to be seen again in the Emergency Department or by your primary provider.    Generally, every Emergency Department visit should have a follow-up clinic visit with either a primary or a specialty clinic/provider. Please follow-up as instructed by your emergency provider today.    Return to the Emergency Department if:  The redness, pain, or swelling gets a lot worse.  If the red area was marked, return if it is red significantly beyond the marked area.  You are unable to get your antibiotics, or are vomiting (throwing up) these pills, or you cannot take them.  You are feeling more ill, weak or lightheaded.  You start to run a new fever (temperature >101 F).  Anything else about the infection worries or concerns you.  Treatment:  Start your antibiotics right away, and take them as prescribed. Be sure to finish the whole prescription, even if you are better.  Apply a heating pad, warm packs, or warm water soaks to the infected area for 15 minutes at a time, at least 3 times a day. Do not use a heating pad on your feet or legs if you have diabetes. Do not sleep with a heating pad on, since this can cause burns or skin injury.  Rest your injured area for at least 1-2 days. After that you may start using your extremity again as long as there is not too much pain.   Raise the injured area above the level of your heart as much  as possible in the first 1-2 days.  Tylenol  (acetaminophen), Motrin  (ibuprofen), or Advil  (ibuprofen) may help may help reduce pain and fever and may help you feel more comfortable. Be sure to read and follow the package directions, and ask your provider if you have questions.    If you were given a prescription for medicine here today, be sure to read all of the information (including the package insert) that comes with your prescription.  This will include important information about the medicine, its side effects, and any warnings that you need to know about.  The pharmacist who fills the prescription can provide more information and answer questions you may have about the medicine.  If you have questions or concerns that the pharmacist cannot address, please call or return to the Emergency Department.     Remember that you can always come back to the Emergency Department if you are not able to see your regular provider in the amount of time listed above, if you get any new symptoms, or if there is anything that worries you.

## 2023-10-28 NOTE — ED TRIAGE NOTES
Wears an AFO. Diabetic. Skin scraped off from bottom of toes from a ridge of tape. Great toe left foot is red, draining (yellow), swollen and warm to the touch. Pt states his type II diabetes is in very good control.

## 2023-11-02 LAB
BACTERIA BLD CULT: NO GROWTH
BACTERIA BLD CULT: NO GROWTH

## 2024-01-04 ENCOUNTER — OFFICE VISIT (OUTPATIENT)
Dept: CARDIOLOGY | Facility: CLINIC | Age: 74
End: 2024-01-04
Attending: NURSE PRACTITIONER
Payer: COMMERCIAL

## 2024-01-04 ENCOUNTER — LAB (OUTPATIENT)
Dept: LAB | Facility: CLINIC | Age: 74
End: 2024-01-04
Payer: COMMERCIAL

## 2024-01-04 VITALS
DIASTOLIC BLOOD PRESSURE: 76 MMHG | HEART RATE: 70 BPM | BODY MASS INDEX: 33.51 KG/M2 | SYSTOLIC BLOOD PRESSURE: 120 MMHG | OXYGEN SATURATION: 97 % | WEIGHT: 289.6 LBS | HEIGHT: 78 IN

## 2024-01-04 DIAGNOSIS — I10 ESSENTIAL HYPERTENSION: ICD-10-CM

## 2024-01-04 DIAGNOSIS — I25.10 CORONARY ARTERY DISEASE INVOLVING NATIVE CORONARY ARTERY OF NATIVE HEART WITHOUT ANGINA PECTORIS: ICD-10-CM

## 2024-01-04 LAB
ANION GAP SERPL CALCULATED.3IONS-SCNC: 8 MMOL/L (ref 7–15)
BUN SERPL-MCNC: 14.7 MG/DL (ref 8–23)
CALCIUM SERPL-MCNC: 9 MG/DL (ref 8.8–10.2)
CHLORIDE SERPL-SCNC: 102 MMOL/L (ref 98–107)
CREAT SERPL-MCNC: 0.79 MG/DL (ref 0.67–1.17)
DEPRECATED HCO3 PLAS-SCNC: 31 MMOL/L (ref 22–29)
EGFRCR SERPLBLD CKD-EPI 2021: >90 ML/MIN/1.73M2
GLUCOSE SERPL-MCNC: 108 MG/DL (ref 70–99)
POTASSIUM SERPL-SCNC: 3.6 MMOL/L (ref 3.4–5.3)
SODIUM SERPL-SCNC: 141 MMOL/L (ref 135–145)

## 2024-01-04 PROCEDURE — 36415 COLL VENOUS BLD VENIPUNCTURE: CPT | Performed by: INTERNAL MEDICINE

## 2024-01-04 PROCEDURE — 99214 OFFICE O/P EST MOD 30 MIN: CPT | Performed by: INTERNAL MEDICINE

## 2024-01-04 PROCEDURE — 80048 BASIC METABOLIC PNL TOTAL CA: CPT | Performed by: INTERNAL MEDICINE

## 2024-01-04 PROCEDURE — 93000 ELECTROCARDIOGRAM COMPLETE: CPT | Performed by: INTERNAL MEDICINE

## 2024-01-04 RX ORDER — METOPROLOL SUCCINATE 100 MG/1
100 TABLET, EXTENDED RELEASE ORAL DAILY
Qty: 90 TABLET | Refills: 3 | Status: SHIPPED | OUTPATIENT
Start: 2024-01-04

## 2024-01-04 RX ORDER — ISOSORBIDE MONONITRATE 30 MG/1
30 TABLET, EXTENDED RELEASE ORAL DAILY
Qty: 135 TABLET | Refills: 3 | Status: SHIPPED | OUTPATIENT
Start: 2024-01-04

## 2024-01-04 NOTE — LETTER
1/4/2024    Luis Alberto Aponte MD  [facility] [address]   [place] [address]    RE: Federico Schneiderman       Dear Colleague,     I had the pleasure of seeing Federico Chamberlain in the [facility].      Cardiology Clinic Progress Note:    January 4, 2024   Patient Name: Federico Chamberlain  Patient MRN: [id number]     Consult indication: CAD    HPI:    I had the opportunity to see patient Federico Chamberlain in cardiology clinic for a follow up visit. Patient is followed by our colleague Luis Alberto Aponte MD with Primary Care.     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    Patient was hospitalized 12/2022 with severe sepsis and bacteremia, rhabdomyolysis secondary to frostbite, TTE demonstrated LVEF 50% with regional wall motion abnormalities.  Troponin was mildly elevated.  Initial ischemic evaluation was deferred in favor of management of acute medical problems, in the absence of chest pain.  After recovery, subsequent nuclear stress test demonstrated mild ischemia in the inferior and inferolateral wall segments.  Coronary angiogram 2/17/2023 demonstrated moderate stenosis of the mid RCA 50% lesion, not hemodynamically significant by FFR, mid left circumflex lesion 50% OM1 IFR 1.0.  TTE 9/24/2023 demonstrated LVEF 50 to 55%, normal RV function, no significant valvular abnormalities.  Rhythm was noted to be atrial flutter, on personal review it does not appear to be atrial flutter.  ECG 9/23/2023 demonstrated sinus rhythm with first-degree AV block, right bundle branch block, left anterior fascicular block.    Overall patient reports that he has been doing well.  Denies any exertional chest pain, chest pressure.   Denies any significant  dizziness/lightheadedness, no presyncope/syncope.  ECG today in clinic 1/4/2023 demonstrates stable findings of normal sinus rhythm at 63 bpm with first-degree AV block, right bundle branch block, left anterior fascicular block, QTc 501 ms.  /76 mmHg.    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 No angina.   # First degree AFB, RBBB, LAFB, chronic, No high-grade AV block on cardiac event monitor.  Denies symptoms concerning for symptomatic bradycardia  # Prolonged QTc. Stable    -Overall patient is in stable cardiac health without symptoms concerning for angina or decompensated heart failure  - To minimize pill burden, reasonable to decrease Imdur 45 mg daily to 30 mg daily per patient preference, this is only increased prior to the coronary angiogram which ultimately demonstrated nonobstructive coronary artery disease  - Recommend decreasing metoprolol to succinate 100 mg twice daily to 100 mg daily given baseline conduction abnormalities  - Continue aspirin, lisinopril, atorvastatin, chlorthalidone  - Will check a BMP today since potassium is low a few months ago  - Can follow up with cardiology as needed in the future, will need regular follow up with PCP, recommend regular monitoring of electrolytes and avoidance of QT prolonging medications given prolonged QTc      Thank you for allowing our team to participate in the care of Federico Chamberlain.  Please do not hesitate to call or page me with any questions or concerns.    Sincerely,     Kevin Garcia MD, [facility]  Cardiology  Text Page   January 4, 2024      Alana Cabello NP  [address] [address]    Voice recognition software utilized.     Total time spent on this encounter today: Greater than 30 minutes, providing care in this encounter including, but not limited to, reviewing prior medical records, laboratory data, imaging studies, diagnostic studies,  procedure notes, formulating an assessment and plan, recommendations, discussion and counseling with patient face to face, dictation.    Past Medical History:   The ASCVD Risk score (Maria Antonia DK, et al., 2019) failed to calculate for the following reasons:    The patient has a prior MI or stroke diagnosis  Past Medical History:   Diagnosis Date    Hypercholesterolemia     Hypertension         Past Surgical History:   Past Surgical History:   Procedure Laterality Date    APPENDECTOMY      BACK SURGERY      CV CORONARY ANGIOGRAM N/A 2/17/2023    Procedure: Coronary Angiogram RADIAL ACCESS;  Surgeon: Malou Mazariegos MD;  Location: [facility]    CV FRACTIONAL FLOW RATIO WIRE N/A 2/17/2023    Procedure: Fractional Flow Ratio Wire;  Surgeon: Malou Mazariegos MD;  Location: [facility]    CV INSTANTANEOUS WAVE-FREE RATIO N/A 2/17/2023    Procedure: Instantaneous Wave-Free Ratio;  Surgeon: Malou Mazariegos MD;  Location:  [facility] CATH LAB    CV LEFT HEART CATH N/A 2/17/2023    Procedure: Left Heart Catheterization;  Surgeon: Malou Mazariegos MD;  Location: [facility]    ORTHOPEDIC SURGERY         Medications (outpatient):  Current Outpatient Medications   Medication Sig Dispense Refill    aspirin 81 MG EC tablet Take 81 mg by mouth daily      atorvastatin (LIPITOR) 40 MG tablet Take 40 mg by mouth daily      cephALEXin (KEFLEX) 500 MG capsule Take 1 capsule (500 mg) by mouth 4 times daily 40 capsule 0    chlorthalidone (HYGROTON) 25 MG tablet Take 1 tablet (25 mg) by mouth daily 90 tablet 2    diclofenac (VOLTAREN) 1 % topical gel Apply 2 g topically every 6 hours as needed for moderate pain Relabel for home  ise 50 g 0    ferrous sulfate (FEROSUL) 325 (65 Fe) MG tablet Take 325 mg by mouth daily (with breakfast)      furosemide (LASIX) 20 MG tablet Take 1 tablet (20 mg) by mouth daily 90 tablet 1    gabapentin (NEURONTIN) 800 MG tablet Take 800 mg by mouth 3 times  "daily      isosorbide mononitrate (IMDUR) 30 MG 24 hr tablet Take 1.5 tablets (45 mg) by mouth daily 135 tablet 1    lisinopril (ZESTRIL) 40 MG tablet Take 1 tablet (40 mg) by mouth daily 90 tablet 3    magnesium oxide (MAG-OX) 400 MG tablet Take 1 tablet (400 mg) by mouth daily 90 tablet 1    metFORMIN (GLUCOPHAGE) 500 MG tablet Take 500 mg by mouth daily      metoprolol succinate ER (TOPROL XL) 100 MG 24 hr tablet Take 1 tablet (100 mg) by mouth 2 times daily 180 tablet 3    nitroGLYcerin (NITROSTAT) 0.4 MG sublingual tablet For chest pain place 1 tablet under the tongue every 5 minutes for 3 doses. If symptoms persist 5 minutes after 1st dose call 911. 25 tablet 3    potassium chloride ER (KLOR-CON M) 10 MEQ CR tablet Take 20 mEq by mouth daily      pramipexole (MIRAPEX) 1 MG tablet Take 3 mg by mouth At Bedtime      semaglutide (OZEMPIC, 1 MG/DOSE,) 2 MG/1.5ML pen Inject 0.5 mg Subcutaneous every 7 days On Fridays      VITAMIN D, CHOLECALCIFEROL, PO Take 4,000 Units by mouth daily      acetaminophen (TYLENOL) 325 MG tablet Take 325-650 mg by mouth every 6 hours as needed for mild pain (Patient not taking: Reported on 1/4/2024)         Allergies:  No Known Allergies    Social History:   History   Drug Use No      History   Smoking Status    Former    Types: Cigarettes    Quit date: 2/1/1992   Smokeless Tobacco    Never     Social History    Substance and Sexual Activity      Alcohol use: Yes        Comment: Occ, 1-2 beers a month       Family History:  No family history on file.    Review of Systems:   A complete review of systems was negative except as mentioned in the History of Present Illness.     Objective & Physical Exam:  /76 (BP Location: Right arm, Patient Position: Sitting, Cuff Size: Adult Large)   Pulse 70   Ht 1.981 m (6' 6\")   Wt 131.4 kg (289 lb 9.6 oz)   SpO2 97%   BMI 33.47 kg/m    Wt Readings from Last 2 Encounters:   01/04/24 131.4 kg (289 lb 9.6 oz)   10/28/23 135.6 kg (299 lb) "     Body mass index is 33.47 kg/m .   Body surface area is 2.69 meters squared.    Constitutional: appears stated age, in no apparent distress, appears to be well nourished  Head: normocephalic, atraumatic  Neck: supple, trachea midline   Pulmonary: clear to auscultation bilaterally, no wheezes, no rales, no increased work of breathing  Cardiovascular: JVP normal, regular rate, regular rhythm, normal S1 and S2, no S3, S4, no murmur appreciated, no lower extremity edema  Gastrointestinal: no guarding, non-rigid   Neurologic: awake, alert, moves all extremities  Skin: no jaundice, warm on limited exam  Psychiatric: affect is normal, answers questions appropriately, oriented to self and place    Data reviewed:  Lab Results   Component Value Date    WBC 6.5 10/28/2023    WBC 8.1 08/04/2020    RBC 4.56 10/28/2023    RBC 5.04 08/04/2020    HGB 13.6 10/28/2023    HGB 14.6 08/04/2020    HCT 40.5 10/28/2023    HCT 44.8 08/04/2020    MCV 89 10/28/2023    MCV 89 08/04/2020    MCH 29.8 10/28/2023    MCH 29.0 08/04/2020    MCHC 33.6 10/28/2023    MCHC 32.6 08/04/2020    RDW 13.6 10/28/2023    RDW 13.8 08/04/2020     10/28/2023     08/04/2020     Sodium   Date Value Ref Range Status   10/28/2023 143 135 - 145 mmol/L Final     Comment:     Reference intervals for this test were updated on 09/26/2023 to more accurately reflect our healthy population. There may be differences in the flagging of prior results with similar values performed with this method. Interpretation of those prior results can be made in the context of the updated reference intervals.    08/04/2020 143 133 - 144 mmol/L Final     Potassium   Date Value Ref Range Status   10/28/2023 3.2 (L) 3.4 - 5.3 mmol/L Final   01/16/2023 4.3 3.4 - 5.3 mmol/L Final   08/04/2020 3.2 (L) 3.4 - 5.3 mmol/L Final     Chloride   Date Value Ref Range Status   10/28/2023 105 98 - 107 mmol/L Final   01/16/2023 106 94 - 109 mmol/L Final   08/04/2020 108 94 - 109 mmol/L  Final     Carbon Dioxide   Date Value Ref Range Status   08/04/2020 28 20 - 32 mmol/L Final     Carbon Dioxide (CO2)   Date Value Ref Range Status   10/28/2023 28 22 - 29 mmol/L Final   01/16/2023 30 20 - 32 mmol/L Final     Anion Gap   Date Value Ref Range Status   10/28/2023 10 7 - 15 mmol/L Final   01/16/2023 6 3 - 14 mmol/L Final   08/04/2020 7 3 - 14 mmol/L Final     Glucose   Date Value Ref Range Status   10/28/2023 100 (H) 70 - 99 mg/dL Final   01/16/2023 89 70 - 99 mg/dL Final   08/04/2020 106 (H) 70 - 99 mg/dL Final     GLUCOSE BY METER POCT   Date Value Ref Range Status   09/27/2023 105 (H) 70 - 99 mg/dL Final     Urea Nitrogen   Date Value Ref Range Status   10/28/2023 21.2 8.0 - 23.0 mg/dL Final   01/16/2023 13 7 - 30 mg/dL Final   08/04/2020 18 7 - 30 mg/dL Final     Creatinine   Date Value Ref Range Status   10/28/2023 0.87 0.67 - 1.17 mg/dL Final   08/04/2020 0.92 0.66 - 1.25 mg/dL Final     GFR Estimate   Date Value Ref Range Status   10/28/2023 >90 >60 mL/min/1.73m2 Final   08/04/2020 85 >60 mL/min/[1.73_m2] Final     Comment:     Non  GFR Calc  Starting 12/18/2018, serum creatinine based estimated GFR (eGFR) will be   calculated using the Chronic Kidney Disease Epidemiology Collaboration   (CKD-EPI) equation.       GFR, ESTIMATED POCT   Date Value Ref Range Status   12/31/2022 >60 >60 mL/min/1.73m2 Final     Calcium   Date Value Ref Range Status   10/28/2023 9.0 8.8 - 10.2 mg/dL Final   08/04/2020 8.6 8.5 - 10.1 mg/dL Final     Bilirubin Total   Date Value Ref Range Status   09/23/2023 1.2 <=1.2 mg/dL Final   07/02/2018 0.6 0.2 - 1.3 mg/dL Final     Alkaline Phosphatase   Date Value Ref Range Status   09/23/2023 76 40 - 129 U/L Final   07/02/2018 66 40 - 150 U/L Final     ALT   Date Value Ref Range Status   09/23/2023 21 0 - 70 U/L Final     Comment:     Reference intervals for this test were updated on 6/12/2023 to more accurately reflect our healthy population. There may be  differences in the flagging of prior results with similar values performed with this method. Interpretation of those prior results can be made in the context of the updated reference intervals.     2018 23 0 - 70 U/L Final     AST   Date Value Ref Range Status   10/04/2023 17 0 - 45 U/L Final     Comment:     Reference intervals for this test were updated on 2023 to more accurately reflect our healthy population. There may be differences in the flagging of prior results with similar values performed with this method. Interpretation of those prior results can be made in the context of the updated reference intervals.   2018 15 0 - 45 U/L Final     Recent Labs   Lab Test 18  0636   CHOL 92   HDL 32*   LDL <1   TRIG 297*      Lab Results   Component Value Date    A1C 5.9 2023    A1C 6.0 2022    A1C 6.0 2018    A1C 6.1 2018        Recent Results (from the past 4320 hour(s))   Echocardiogram Complete   Result Value    LVEF  50-55%    Narrative    [id number]  [id number]  [id number]  [id number]^PETRONA^RAF^GABBY     [facility]  Echocardiography Laboratory  [address] [place] [address]     Name: AMADEO HAYNES  MRN: [id number]  : 1950  Study Date: 2023 08:59 AM  Age: 73 yrs  Gender: Male  Patient Location: [facility]  Reason For Study: Syncope  Ordering Physician: RAF RUSS  Performed By: Tess Gentile     BSA: 2.7 m2  Height: 78 in  Weight: 318 lb  HR: 71  BP: 160/90 mmHg  ______________________________________________________________________________  Procedure  Complete Portable Echo Adult.  ______________________________________________________________________________  Interpretation Summary     The left ventricle is borderline dilated.  The visual ejection fraction is 50-55%.  There is borderline global hypokinesia of the left ventricle.  The right ventricle is normal in structure, function and size.  The left atrium is  borderline dilated.  There is trace to mild mitral regurgitation.  Mild aortic root dilatation.  The ascending aorta is Mildly dilated.  The rhythm was atrial flutter.  Compared to prior study, changes are noted.  ______________________________________________________________________________  Left Ventricle  The left ventricle is borderline dilated. There is normal left ventricular  wall thickness. The visual ejection fraction is 50-55%. There is borderline  global hypokinesia of the left ventricle.     Right Ventricle  The right ventricle is normal in structure, function and size.     Atria  The left atrium is borderline dilated. Right atrial size is normal.     Mitral Valve  The mitral valve leaflets are mildly thickened. There is trace to mild mitral  regurgitation.     Tricuspid Valve  The tricuspid valve is normal in structure and function. There is trace  tricuspid regurgitation.     Aortic Valve  There is moderate trileaflet aortic sclerosis. No aortic regurgitation is  present.     Pulmonic Valve  The pulmonic valve is not well visualized. There is no pulmonic valvular  regurgitation.     Vessels  Mild aortic root dilatation. The ascending aorta is Mildly dilated.     Pericardium  There is no pericardial effusion.     Rhythm  The rhythm was atrial flutter.  ______________________________________________________________________________  MMode/2D Measurements & Calculations     IVSd: 1.1 cm  LVIDd: 5.7 cm  LVIDs: 5.1 cm  LVPWd: 1.1 cm  IVC diam: 2.6 cm  FS: 9.0 %  LV mass(C)d: 247.6 grams  LV mass(C)dI: 90.1 grams/m2  Ao root diam: 4.1 cm  asc Aorta Diam: 4.2 cm  LVOT diam: 2.5 cm  LVOT area: 4.7 cm2  Ao root diam index Ht(cm/m): 2.1  Ao root diam index BSA (cm/m2): 1.5  Asc Ao diam index BSA (cm/m2): 1.5  Asc Ao diam index Ht(cm/m): 2.1  LA Volume (BP): 84.6 ml     LA Volume Index (BP): 30.8 ml/m2  RV Base: 4.0 cm  RWT: 0.39  TAPSE: 2.5 cm     Doppler Measurements & Calculations  MV E max kvng: 53.4 cm/sec  MV  A max wayne: 80.4 cm/sec  MV E/A: 0.66  MV max PG: 3.8 mmHg  MV mean P.7 mmHg  MV V2 VTI: 21.8 cm  MV dec time: 0.23 sec  PA acc time: 0.13 sec  E/E' av.6  Lateral E/e': 5.1  Medial E/e': 8.0  RV S Wayne: 13.2 cm/sec     ______________________________________________________________________________  Report approved by: Roland France 2023 09:58 AM                Thank you for allowing me to participate in the care of your patient.      Sincerely,     Kevin Garcia MD     [facility]  cc:   Alana Cabello, NP  [address] [address]

## 2024-01-04 NOTE — PROGRESS NOTES
Cardiology Clinic Progress Note:    January 4, 2024   Patient Name: Federico Chamberlain  Patient MRN: [id number]     Consult indication: CAD    HPI:    I had the opportunity to see patient Federico Chamberlain in cardiology clinic for a follow up visit. Patient is followed by our colleague Luis Alberto Aponte MD with Primary Care.     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    Patient was hospitalized 12/2022 with severe sepsis and bacteremia, rhabdomyolysis secondary to frostbite, TTE demonstrated LVEF 50% with regional wall motion abnormalities.  Troponin was mildly elevated.  Initial ischemic evaluation was deferred in favor of management of acute medical problems, in the absence of chest pain.  After recovery, subsequent nuclear stress test demonstrated mild ischemia in the inferior and inferolateral wall segments.  Coronary angiogram 2/17/2023 demonstrated moderate stenosis of the mid RCA 50% lesion, not hemodynamically significant by FFR, mid left circumflex lesion 50% OM1 IFR 1.0.  TTE 9/24/2023 demonstrated LVEF 50 to 55%, normal RV function, no significant valvular abnormalities.  Rhythm was noted to be atrial flutter, on personal review it does not appear to be atrial flutter.  ECG 9/23/2023 demonstrated sinus rhythm with first-degree AV block, right bundle branch block, left anterior fascicular block.    Overall patient reports that he has been doing well.  Denies any exertional chest pain, chest pressure.   Denies any significant dizziness/lightheadedness, no presyncope/syncope.  ECG today in clinic 1/4/2023 demonstrates stable findings of normal sinus rhythm at 63 bpm with first-degree AV block, right bundle branch block, left anterior fascicular  block, QTc 501 ms.  /76 mmHg.    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 No angina.   # First degree AFB, RBBB, LAFB, chronic, No high-grade AV block on cardiac event monitor.  Denies symptoms concerning for symptomatic bradycardia  # Prolonged QTc. Stable    -Overall patient is in stable cardiac health without symptoms concerning for angina or decompensated heart failure  - To minimize pill burden, reasonable to decrease Imdur 45 mg daily to 30 mg daily per patient preference, this is only increased prior to the coronary angiogram which ultimately demonstrated nonobstructive coronary artery disease  - Recommend decreasing metoprolol to succinate 100 mg twice daily to 100 mg daily given baseline conduction abnormalities  - Continue aspirin, lisinopril, atorvastatin, chlorthalidone  - Will check a BMP today since potassium is low a few months ago  - Can follow up with cardiology as needed in the future, will need regular follow up with PCP, recommend regular monitoring of electrolytes and avoidance of QT prolonging medications given prolonged QTc      Thank you for allowing our team to participate in the care of Federico Chamberlain.  Please do not hesitate to call or page me with any questions or concerns.    Sincerely,     Kevin Garcia MD, [facility]  Cardiology  Text Page   January 4, 2024      Alana Cabello NP  [address] [place] [address]    Voice recognition software utilized.     Total time spent on this encounter today: Greater than 30 minutes, providing care in this encounter including, but not limited to, reviewing prior medical records, laboratory data, imaging studies, diagnostic studies, procedure notes, formulating an assessment and plan, recommendations, discussion and counseling with patient face to face, dictation.    Past Medical History:   The ASCVD Risk score ([place] DK, et al., 2019) failed to  calculate for the following reasons:    The patient has a prior MI or stroke diagnosis  Past Medical History:   Diagnosis Date    Hypercholesterolemia     Hypertension         Past Surgical History:   Past Surgical History:   Procedure Laterality Date    APPENDECTOMY      BACK SURGERY      CV CORONARY ANGIOGRAM N/A 2/17/2023    Procedure: Coronary Angiogram RADIAL ACCESS;  Surgeon: Malou Mazariegos MD;  Location: [facility]    CV FRACTIONAL FLOW RATIO WIRE N/A 2/17/2023    Procedure: Fractional Flow Ratio Wire;  Surgeon: Malou Mazariegos MD;  Location:  [facility]ART CARDIAC CATH LAB    CV INSTANTANEOUS WAVE-FREE RATIO N/A 2/17/2023    Procedure: Instantaneous Wave-Free Ratio;  Surgeon: Malou Mazariegos MD;  Location:  HEART CARDIAC CATH LAB    CV LEFT HEART CATH N/A 2/17/2023    Procedure: Left Heart Catheterization;  Surgeon: Malou Mazariegos MD;  Location: [facility]    ORTHOPEDIC SURGERY         Medications (outpatient):  Current Outpatient Medications   Medication Sig Dispense Refill    aspirin 81 MG EC tablet Take 81 mg by mouth daily      atorvastatin (LIPITOR) 40 MG tablet Take 40 mg by mouth daily      cephALEXin (KEFLEX) 500 MG capsule Take 1 capsule (500 mg) by mouth 4 times daily 40 capsule 0    chlorthalidone (HYGROTON) 25 MG tablet Take 1 tablet (25 mg) by mouth daily 90 tablet 2    diclofenac (VOLTAREN) 1 % topical gel Apply 2 g topically every 6 hours as needed for moderate pain Relabel for home  ise 50 g 0    ferrous sulfate (FEROSUL) 325 (65 Fe) MG tablet Take 325 mg by mouth daily (with breakfast)      furosemide (LASIX) 20 MG tablet Take 1 tablet (20 mg) by mouth daily 90 tablet 1    gabapentin (NEURONTIN) 800 MG tablet Take 800 mg by mouth 3 times daily      isosorbide mononitrate (IMDUR) 30 MG 24 hr tablet Take 1.5 tablets (45 mg) by mouth daily 135 tablet 1    lisinopril (ZESTRIL) 40 MG tablet Take 1 tablet (40 mg) by mouth daily 90 tablet 3    magnesium oxide  "(MAG-OX) 400 MG tablet Take 1 tablet (400 mg) by mouth daily 90 tablet 1    metFORMIN (GLUCOPHAGE) 500 MG tablet Take 500 mg by mouth daily      metoprolol succinate ER (TOPROL XL) 100 MG 24 hr tablet Take 1 tablet (100 mg) by mouth 2 times daily 180 tablet 3    nitroGLYcerin (NITROSTAT) 0.4 MG sublingual tablet For chest pain place 1 tablet under the tongue every 5 minutes for 3 doses. If symptoms persist 5 minutes after 1st dose call 911. 25 tablet 3    potassium chloride ER (KLOR-CON M) 10 MEQ CR tablet Take 20 mEq by mouth daily      pramipexole (MIRAPEX) 1 MG tablet Take 3 mg by mouth At Bedtime      semaglutide (OZEMPIC, 1 MG/DOSE,) 2 MG/1.5ML pen Inject 0.5 mg Subcutaneous every 7 days On Fridays      VITAMIN D, CHOLECALCIFEROL, PO Take 4,000 Units by mouth daily      acetaminophen (TYLENOL) 325 MG tablet Take 325-650 mg by mouth every 6 hours as needed for mild pain (Patient not taking: Reported on 1/4/2024)         Allergies:  No Known Allergies    Social History:   History   Drug Use No      History   Smoking Status    Former    Types: Cigarettes    Quit date: 2/1/1992   Smokeless Tobacco    Never     Social History    Substance and Sexual Activity      Alcohol use: Yes        Comment: Occ, 1-2 beers a month       Family History:  No family history on file.    Review of Systems:   A complete review of systems was negative except as mentioned in the History of Present Illness.     Objective & Physical Exam:  /76 (BP Location: Right arm, Patient Position: Sitting, Cuff Size: Adult Large)   Pulse 70   Ht 1.981 m (6' 6\")   Wt 131.4 kg (289 lb 9.6 oz)   SpO2 97%   BMI 33.47 kg/m    Wt Readings from Last 2 Encounters:   01/04/24 131.4 kg (289 lb 9.6 oz)   10/28/23 135.6 kg (299 lb)     Body mass index is 33.47 kg/m .   Body surface area is 2.69 meters squared.    Constitutional: appears stated age, in no apparent distress, appears to be well nourished  Head: normocephalic, atraumatic  Neck: supple, " trachea midline   Pulmonary: clear to auscultation bilaterally, no wheezes, no rales, no increased work of breathing  Cardiovascular: JVP normal, regular rate, regular rhythm, normal S1 and S2, no S3, S4, no murmur appreciated, no lower extremity edema  Gastrointestinal: no guarding, non-rigid   Neurologic: awake, alert, moves all extremities  Skin: no jaundice, warm on limited exam  Psychiatric: affect is normal, answers questions appropriately, oriented to self and place    Data reviewed:  Lab Results   Component Value Date    WBC 6.5 10/28/2023    WBC 8.1 08/04/2020    RBC 4.56 10/28/2023    RBC 5.04 08/04/2020    HGB 13.6 10/28/2023    HGB 14.6 08/04/2020    HCT 40.5 10/28/2023    HCT 44.8 08/04/2020    MCV 89 10/28/2023    MCV 89 08/04/2020    MCH 29.8 10/28/2023    MCH 29.0 08/04/2020    MCHC 33.6 10/28/2023    MCHC 32.6 08/04/2020    RDW 13.6 10/28/2023    RDW 13.8 08/04/2020     10/28/2023     08/04/2020     Sodium   Date Value Ref Range Status   10/28/2023 143 135 - 145 mmol/L Final     Comment:     Reference intervals for this test were updated on 09/26/2023 to more accurately reflect our healthy population. There may be differences in the flagging of prior results with similar values performed with this method. Interpretation of those prior results can be made in the context of the updated reference intervals.    08/04/2020 143 133 - 144 mmol/L Final     Potassium   Date Value Ref Range Status   10/28/2023 3.2 (L) 3.4 - 5.3 mmol/L Final   01/16/2023 4.3 3.4 - 5.3 mmol/L Final   08/04/2020 3.2 (L) 3.4 - 5.3 mmol/L Final     Chloride   Date Value Ref Range Status   10/28/2023 105 98 - 107 mmol/L Final   01/16/2023 106 94 - 109 mmol/L Final   08/04/2020 108 94 - 109 mmol/L Final     Carbon Dioxide   Date Value Ref Range Status   08/04/2020 28 20 - 32 mmol/L Final     Carbon Dioxide (CO2)   Date Value Ref Range Status   10/28/2023 28 22 - 29 mmol/L Final   01/16/2023 30 20 - 32 mmol/L Final      Anion Gap   Date Value Ref Range Status   10/28/2023 10 7 - 15 mmol/L Final   01/16/2023 6 3 - 14 mmol/L Final   08/04/2020 7 3 - 14 mmol/L Final     Glucose   Date Value Ref Range Status   10/28/2023 100 (H) 70 - 99 mg/dL Final   01/16/2023 89 70 - 99 mg/dL Final   08/04/2020 106 (H) 70 - 99 mg/dL Final     GLUCOSE BY METER POCT   Date Value Ref Range Status   09/27/2023 105 (H) 70 - 99 mg/dL Final     Urea Nitrogen   Date Value Ref Range Status   10/28/2023 21.2 8.0 - 23.0 mg/dL Final   01/16/2023 13 7 - 30 mg/dL Final   08/04/2020 18 7 - 30 mg/dL Final     Creatinine   Date Value Ref Range Status   10/28/2023 0.87 0.67 - 1.17 mg/dL Final   08/04/2020 0.92 0.66 - 1.25 mg/dL Final     GFR Estimate   Date Value Ref Range Status   10/28/2023 >90 >60 mL/min/1.73m2 Final   08/04/2020 85 >60 mL/min/[1.73_m2] Final     Comment:     Non  GFR Calc  Starting 12/18/2018, serum creatinine based estimated GFR (eGFR) will be   calculated using the Chronic Kidney Disease Epidemiology Collaboration   (CKD-EPI) equation.       GFR, ESTIMATED POCT   Date Value Ref Range Status   12/31/2022 >60 >60 mL/min/1.73m2 Final     Calcium   Date Value Ref Range Status   10/28/2023 9.0 8.8 - 10.2 mg/dL Final   08/04/2020 8.6 8.5 - 10.1 mg/dL Final     Bilirubin Total   Date Value Ref Range Status   09/23/2023 1.2 <=1.2 mg/dL Final   07/02/2018 0.6 0.2 - 1.3 mg/dL Final     Alkaline Phosphatase   Date Value Ref Range Status   09/23/2023 76 40 - 129 U/L Final   07/02/2018 66 40 - 150 U/L Final     ALT   Date Value Ref Range Status   09/23/2023 21 0 - 70 U/L Final     Comment:     Reference intervals for this test were updated on 6/12/2023 to more accurately reflect our healthy population. There may be differences in the flagging of prior results with similar values performed with this method. Interpretation of those prior results can be made in the context of the updated reference intervals.     07/02/2018 23 0 - 70 U/L  Final     AST   Date Value Ref Range Status   10/04/2023 17 0 - 45 U/L Final     Comment:     Reference intervals for this test were updated on 2023 to more accurately reflect our healthy population. There may be differences in the flagging of prior results with similar values performed with this method. Interpretation of those prior results can be made in the context of the updated reference intervals.   2018 15 0 - 45 U/L Final     Recent Labs   Lab Test 18  0636   CHOL 92   HDL 32*   LDL <1   TRIG 297*      Lab Results   Component Value Date    A1C 5.9 2023    A1C 6.0 2022    A1C 6.0 2018    A1C 6.1 2018        Recent Results (from the past 4320 hour(s))   Echocardiogram Complete   Result Value    LVEF  50-55%    Narrative    [id number]  [id number]  [id number]  [id number]^PETRONA^RAF^GABBY     [facility]  Echocardiography Laboratory  [address] [place] [address]     Name: AMADEO HAYNES  MRN: [id number]  : 1950  Study Date: 2023 08:59 AM  Age: 73 yrs  Gender: Male  Patient [facility]: [email or web address]  Reason For Study: Syncope  Ordering Physician: RAF RUSS  Performed By: Tess Gentile     BSA: 2.7 m2  Height: 78 in  Weight: 318 lb  HR: 71  BP: 160/90 mmHg  ______________________________________________________________________________  Procedure  Complete Portable Echo Adult.  ______________________________________________________________________________  Interpretation Summary     The left ventricle is borderline dilated.  The visual ejection fraction is 50-55%.  There is borderline global hypokinesia of the left ventricle.  The right ventricle is normal in structure, function and size.  The left atrium is borderline dilated.  There is trace to mild mitral regurgitation.  Mild aortic root dilatation.  The ascending aorta is Mildly dilated.  The rhythm was atrial flutter.  Compared to prior study, changes are  noted.  ______________________________________________________________________________  Left Ventricle  The left ventricle is borderline dilated. There is normal left ventricular  wall thickness. The visual ejection fraction is 50-55%. There is borderline  global hypokinesia of the left ventricle.     Right Ventricle  The right ventricle is normal in structure, function and size.     Atria  The left atrium is borderline dilated. Right atrial size is normal.     Mitral Valve  The mitral valve leaflets are mildly thickened. There is trace to mild mitral  regurgitation.     Tricuspid Valve  The tricuspid valve is normal in structure and function. There is trace  tricuspid regurgitation.     Aortic Valve  There is moderate trileaflet aortic sclerosis. No aortic regurgitation is  present.     Pulmonic Valve  The pulmonic valve is not well visualized. There is no pulmonic valvular  regurgitation.     Vessels  Mild aortic root dilatation. The ascending aorta is Mildly dilated.     Pericardium  There is no pericardial effusion.     Rhythm  The rhythm was atrial flutter.  ______________________________________________________________________________  MMode/2D Measurements & Calculations     IVSd: 1.1 cm  LVIDd: 5.7 cm  LVIDs: 5.1 cm  LVPWd: 1.1 cm  IVC diam: 2.6 cm  FS: 9.0 %  LV mass(C)d: 247.6 grams  LV mass(C)dI: 90.1 grams/m2  Ao root diam: 4.1 cm  asc Aorta Diam: 4.2 cm  LVOT diam: 2.5 cm  LVOT area: 4.7 cm2  Ao root diam index Ht(cm/m): 2.1  Ao root diam index BSA (cm/m2): 1.5  Asc Ao diam index BSA (cm/m2): 1.5  Asc Ao diam index Ht(cm/m): 2.1  LA Volume (BP): 84.6 ml     LA Volume Index (BP): 30.8 ml/m2  RV Base: 4.0 cm  RWT: 0.39  TAPSE: 2.5 cm     Doppler Measurements & Calculations  MV E max kvng: 53.4 cm/sec  MV A max kvng: 80.4 cm/sec  MV E/A: 0.66  MV max PG: 3.8 mmHg  MV mean P.7 mmHg  MV V2 VTI: 21.8 cm  MV dec time: 0.23 sec  PA acc time: 0.13 sec  E/E' av.6  Lateral E/e': 5.1  Medial E/e': 8.0  RV S  Wayne: 13.2 cm/sec     ______________________________________________________________________________  Report approved by: Roland France 09/24/2023 09:58 AM

## 2024-01-04 NOTE — PATIENT INSTRUCTIONS
January 4, 2024    Thank you for allowing our Cardiology team to participate in your care.     Please note the following changes to your heart treatment plan:     Medication changes:   - decrease Imdur 45mg daily to 30mg daily  - decrease metoprolol succinate 100mg BID to 100mg daily     Tests to be done:  - labs today    Follow up:  - Follow up as needed      For scheduling, please call [phone].      Please contact our team through [place] or our Nurse Team Voicemail service [phone], or the General Clinic [phone] for any questions or concerns.     If you are having a medical emergency, please call [phone].     Sincerely,    Kevin Garcia MD, [facility]  Cardiology    [facility] - [facility] [place]

## 2024-01-05 NOTE — RESULT ENCOUNTER NOTE
Results reviewed, please let the patient know that overall findings are reassuring, potassium level has normalized after appropriate adjustment of medication/supplementation by PCP previously.

## 2024-01-28 ENCOUNTER — HEALTH MAINTENANCE LETTER (OUTPATIENT)
Age: 74
End: 2024-01-28

## 2024-04-18 ENCOUNTER — TELEPHONE (OUTPATIENT)
Dept: CARDIOLOGY | Facility: CLINIC | Age: 74
End: 2024-04-18
Payer: COMMERCIAL

## 2024-04-18 NOTE — TELEPHONE ENCOUNTER
I returned a fax via [facility] from [facility] requesting a refill of Magnesium. Patient will only see Cardiology on an as-needed basis. Please send all refill requests to his primary provider.

## 2024-04-23 ENCOUNTER — MYC MEDICAL ADVICE (OUTPATIENT)
Dept: CARDIOLOGY | Facility: CLINIC | Age: 74
End: 2024-04-23
Payer: COMMERCIAL

## 2024-04-23 DIAGNOSIS — I24.89 DEMAND ISCHEMIA (H): ICD-10-CM

## 2024-04-23 RX ORDER — MAGNESIUM OXIDE 400 MG/1
400 TABLET ORAL DAILY
Qty: 90 TABLET | Refills: 3 | Status: SHIPPED | OUTPATIENT
Start: 2024-04-23

## 2024-06-16 ENCOUNTER — HEALTH MAINTENANCE LETTER (OUTPATIENT)
Age: 74
End: 2024-06-16

## 2024-06-24 DIAGNOSIS — I10 ESSENTIAL HYPERTENSION: ICD-10-CM

## 2024-06-24 RX ORDER — FUROSEMIDE 20 MG
20 TABLET ORAL DAILY
Qty: 90 TABLET | Refills: 2 | Status: SHIPPED | OUTPATIENT
Start: 2024-06-24

## 2024-07-09 ENCOUNTER — MYC MEDICAL ADVICE (OUTPATIENT)
Dept: CARDIOLOGY | Facility: CLINIC | Age: 74
End: 2024-07-09
Payer: COMMERCIAL

## 2024-07-09 DIAGNOSIS — I71.20 ANEURYSM OF THORACIC AORTA (H): Primary | ICD-10-CM

## 2024-07-10 NOTE — TELEPHONE ENCOUNTER
Patient sent a mychart with a copy of his echocardiogram done through [employer] showing increased size of ascending aorta from 4.3cm to 4.7cm. Dr Garcia's recommendation routed to patient-      Kevin Garcia MD  P [facility] Heart Team 2  Phone Number: [phone]     Recommend CTA aorta study chest/abd/pelvis, associated diagnosis thoracic aortic aneurysm without rupture, and follow up with SONNY thanks  
Reply from patient:  Yes. Plz schedule the tests. FYI I will be on vacation in [place] July 23-30.     Order placed for CTA chest/abd/pelvis for aorta review and for SONNY visit for follow up.      My chart to patient:    Sholakalpana, Mr. Chamberlain,    We have set up the orders for the CTA to measure your aorta and for a visit with SONNY Bobbi Cabello to discuss the findings.    Please call scheduling at [phone] to schedule these 2 visits.    Thank you  Team 2 R.N.s  [phone]      
normal...

## 2024-08-01 ENCOUNTER — HOSPITAL ENCOUNTER (OUTPATIENT)
Dept: CT IMAGING | Facility: CLINIC | Age: 74
Discharge: HOME | End: 2024-08-01
Attending: INTERNAL MEDICINE | Admitting: INTERNAL MEDICINE
Payer: COMMERCIAL

## 2024-08-01 ENCOUNTER — APPOINTMENT (OUTPATIENT)
Dept: MRI IMAGING | Facility: CLINIC | Age: 74
End: 2024-08-01
Attending: EMERGENCY MEDICINE
Payer: COMMERCIAL

## 2024-08-01 ENCOUNTER — TELEPHONE (OUTPATIENT)
Dept: CARDIOLOGY | Facility: CLINIC | Age: 74
End: 2024-08-01

## 2024-08-01 ENCOUNTER — HOSPITAL ENCOUNTER (EMERGENCY)
Facility: CLINIC | Age: 74
Discharge: HOME | End: 2024-08-01
Attending: EMERGENCY MEDICINE | Admitting: EMERGENCY MEDICINE
Payer: COMMERCIAL

## 2024-08-01 VITALS
WEIGHT: 289.46 LBS | BODY MASS INDEX: 34.18 KG/M2 | TEMPERATURE: 97.3 F | OXYGEN SATURATION: 99 % | HEART RATE: 62 BPM | RESPIRATION RATE: 18 BRPM | HEIGHT: 77 IN | DIASTOLIC BLOOD PRESSURE: 86 MMHG | SYSTOLIC BLOOD PRESSURE: 132 MMHG

## 2024-08-01 DIAGNOSIS — I66.19: ICD-10-CM

## 2024-08-01 DIAGNOSIS — R42 NONSPECIFIC DIZZINESS: ICD-10-CM

## 2024-08-01 DIAGNOSIS — I71.20 ANEURYSM OF THORACIC AORTA (H): ICD-10-CM

## 2024-08-01 DIAGNOSIS — R93.89 ABNORMAL CT OF THE CHEST: ICD-10-CM

## 2024-08-01 LAB
ALBUMIN SERPL BCG-MCNC: 4.1 G/DL (ref 3.5–5.2)
ALBUMIN UR-MCNC: NEGATIVE MG/DL
ALP SERPL-CCNC: 79 U/L (ref 40–150)
ALT SERPL W P-5'-P-CCNC: 23 U/L (ref 0–70)
ANION GAP SERPL CALCULATED.3IONS-SCNC: 15 MMOL/L (ref 7–15)
APPEARANCE UR: CLEAR
AST SERPL W P-5'-P-CCNC: 26 U/L (ref 0–45)
BASOPHILS # BLD AUTO: 0.1 10E3/UL (ref 0–0.2)
BASOPHILS NFR BLD AUTO: 1 %
BILIRUB SERPL-MCNC: 0.7 MG/DL
BILIRUB UR QL STRIP: NEGATIVE
BUN SERPL-MCNC: 20.7 MG/DL (ref 8–23)
CALCIUM SERPL-MCNC: 9 MG/DL (ref 8.8–10.4)
CHLORIDE SERPL-SCNC: 99 MMOL/L (ref 98–107)
COLOR UR AUTO: ABNORMAL
CREAT BLD-MCNC: 0.9 MG/DL (ref 0.7–1.3)
CREAT SERPL-MCNC: 0.92 MG/DL (ref 0.67–1.17)
EGFRCR SERPLBLD CKD-EPI 2021: 88 ML/MIN/1.73M2
EGFRCR SERPLBLD CKD-EPI 2021: >60 ML/MIN/1.73M2
EOSINOPHIL # BLD AUTO: 0.5 10E3/UL (ref 0–0.7)
EOSINOPHIL NFR BLD AUTO: 6 %
ERYTHROCYTE [DISTWIDTH] IN BLOOD BY AUTOMATED COUNT: 13.6 % (ref 10–15)
GLUCOSE SERPL-MCNC: 109 MG/DL (ref 70–99)
GLUCOSE UR STRIP-MCNC: NEGATIVE MG/DL
HCO3 SERPL-SCNC: 25 MMOL/L (ref 22–29)
HCT VFR BLD AUTO: 42.8 % (ref 40–53)
HGB BLD-MCNC: 14.2 G/DL (ref 13.3–17.7)
HGB UR QL STRIP: NEGATIVE
HOLD SPECIMEN: NORMAL
HYALINE CASTS: 1 /LPF
IMM GRANULOCYTES # BLD: 0 10E3/UL
IMM GRANULOCYTES NFR BLD: 0 %
KETONES UR STRIP-MCNC: NEGATIVE MG/DL
LEUKOCYTE ESTERASE UR QL STRIP: NEGATIVE
LYMPHOCYTES # BLD AUTO: 1.6 10E3/UL (ref 0.8–5.3)
LYMPHOCYTES NFR BLD AUTO: 19 %
MAGNESIUM SERPL-MCNC: 2.2 MG/DL (ref 1.7–2.3)
MCH RBC QN AUTO: 28.9 PG (ref 26.5–33)
MCHC RBC AUTO-ENTMCNC: 33.2 G/DL (ref 31.5–36.5)
MCV RBC AUTO: 87 FL (ref 78–100)
MONOCYTES # BLD AUTO: 0.6 10E3/UL (ref 0–1.3)
MONOCYTES NFR BLD AUTO: 7 %
MUCOUS THREADS #/AREA URNS LPF: PRESENT /LPF
NEUTROPHILS # BLD AUTO: 5.4 10E3/UL (ref 1.6–8.3)
NEUTROPHILS NFR BLD AUTO: 67 %
NITRATE UR QL: NEGATIVE
NRBC # BLD AUTO: 0 10E3/UL
NRBC BLD AUTO-RTO: 0 /100
PH UR STRIP: 7.5 [PH] (ref 5–7)
PLATELET # BLD AUTO: 207 10E3/UL (ref 150–450)
POTASSIUM SERPL-SCNC: 4 MMOL/L (ref 3.4–5.3)
PROT SERPL-MCNC: 6.5 G/DL (ref 6.4–8.3)
RBC # BLD AUTO: 4.91 10E6/UL (ref 4.4–5.9)
RBC URINE: 1 /HPF
SODIUM SERPL-SCNC: 139 MMOL/L (ref 135–145)
SP GR UR STRIP: 1 (ref 1–1.03)
TROPONIN T SERPL HS-MCNC: 29 NG/L
TROPONIN T SERPL HS-MCNC: 33 NG/L
UROBILINOGEN UR STRIP-MCNC: NORMAL MG/DL
WBC # BLD AUTO: 8.1 10E3/UL (ref 4–11)
WBC URINE: <1 /HPF

## 2024-08-01 PROCEDURE — A9585 GADOBUTROL INJECTION: HCPCS | Performed by: EMERGENCY MEDICINE

## 2024-08-01 PROCEDURE — 250N000011 HC RX IP 250 OP 636: Performed by: INTERNAL MEDICINE

## 2024-08-01 PROCEDURE — 70549 MR ANGIOGRAPH NECK W/O&W/DYE: CPT

## 2024-08-01 PROCEDURE — 99285 EMERGENCY DEPT VISIT HI MDM: CPT | Mod: 25

## 2024-08-01 PROCEDURE — 93005 ELECTROCARDIOGRAM TRACING: CPT

## 2024-08-01 PROCEDURE — 96361 HYDRATE IV INFUSION ADD-ON: CPT

## 2024-08-01 PROCEDURE — 70553 MRI BRAIN STEM W/O & W/DYE: CPT

## 2024-08-01 PROCEDURE — 74174 CTA ABD&PLVS W/CONTRAST: CPT

## 2024-08-01 PROCEDURE — 255N000002 HC RX 255 OP 636: Performed by: EMERGENCY MEDICINE

## 2024-08-01 PROCEDURE — 82565 ASSAY OF CREATININE: CPT

## 2024-08-01 PROCEDURE — 250N000011 HC RX IP 250 OP 636: Performed by: EMERGENCY MEDICINE

## 2024-08-01 PROCEDURE — 85049 AUTOMATED PLATELET COUNT: CPT | Performed by: EMERGENCY MEDICINE

## 2024-08-01 PROCEDURE — 83735 ASSAY OF MAGNESIUM: CPT | Performed by: EMERGENCY MEDICINE

## 2024-08-01 PROCEDURE — 96374 THER/PROPH/DIAG INJ IV PUSH: CPT | Mod: 59

## 2024-08-01 PROCEDURE — 258N000003 HC RX IP 258 OP 636: Performed by: EMERGENCY MEDICINE

## 2024-08-01 PROCEDURE — 70544 MR ANGIOGRAPHY HEAD W/O DYE: CPT

## 2024-08-01 PROCEDURE — 84484 ASSAY OF TROPONIN QUANT: CPT | Performed by: EMERGENCY MEDICINE

## 2024-08-01 PROCEDURE — 81001 URINALYSIS AUTO W/SCOPE: CPT | Performed by: EMERGENCY MEDICINE

## 2024-08-01 PROCEDURE — 36415 COLL VENOUS BLD VENIPUNCTURE: CPT | Performed by: EMERGENCY MEDICINE

## 2024-08-01 PROCEDURE — 80053 COMPREHEN METABOLIC PANEL: CPT | Performed by: EMERGENCY MEDICINE

## 2024-08-01 PROCEDURE — 250N000009 HC RX 250: Performed by: INTERNAL MEDICINE

## 2024-08-01 RX ORDER — LORAZEPAM 2 MG/ML
0.5 INJECTION INTRAMUSCULAR ONCE
Status: COMPLETED | OUTPATIENT
Start: 2024-08-01 | End: 2024-08-01

## 2024-08-01 RX ORDER — IOPAMIDOL 755 MG/ML
500 INJECTION, SOLUTION INTRAVASCULAR ONCE
Status: COMPLETED | OUTPATIENT
Start: 2024-08-01 | End: 2024-08-01

## 2024-08-01 RX ORDER — GADOBUTROL 604.72 MG/ML
10 INJECTION INTRAVENOUS ONCE
Status: COMPLETED | OUTPATIENT
Start: 2024-08-01 | End: 2024-08-01

## 2024-08-01 RX ADMIN — LORAZEPAM 0.5 MG: 2 INJECTION INTRAMUSCULAR; INTRAVENOUS at 18:00

## 2024-08-01 RX ADMIN — SODIUM CHLORIDE 1000 ML: 9 INJECTION, SOLUTION INTRAVENOUS at 16:11

## 2024-08-01 RX ADMIN — GADOBUTROL 10 ML: 604.72 INJECTION INTRAVENOUS at 17:44

## 2024-08-01 RX ADMIN — SODIUM CHLORIDE 60 ML: 9 INJECTION, SOLUTION INTRAVENOUS at 10:22

## 2024-08-01 RX ADMIN — IOPAMIDOL 72 ML: 755 INJECTION, SOLUTION INTRAVENOUS at 10:22

## 2024-08-01 ASSESSMENT — ACTIVITIES OF DAILY LIVING (ADL)
ADLS_ACUITY_SCORE: 37

## 2024-08-01 ASSESSMENT — COLUMBIA-SUICIDE SEVERITY RATING SCALE - C-SSRS
1. IN THE PAST MONTH, HAVE YOU WISHED YOU WERE DEAD OR WISHED YOU COULD GO TO SLEEP AND NOT WAKE UP?: NO
2. HAVE YOU ACTUALLY HAD ANY THOUGHTS OF KILLING YOURSELF IN THE PAST MONTH?: NO
6. HAVE YOU EVER DONE ANYTHING, STARTED TO DO ANYTHING, OR PREPARED TO DO ANYTHING TO END YOUR LIFE?: NO

## 2024-08-01 NOTE — ED NOTES
"Went to MRI to give patient ativan for MRI. Pt immediately hostile with this writer stating, \"I want a different nurse. You never came back to collect my urine sample. I want a different nurse. I also want to talk to the nurse in charge.\" This writer attempted to explain that as the nature of the ER, more critical things come up that require immediate attention and the urine sample can be collected without him present.\" Patient stated, \"I don't care. You are not going to sit here and belittle me. I have cancer, you should treat me better. I'm leaving. I'm not going to sit here and deal with this. And get me the charge nurse.\" RN asked for clarification if he would like to speak to the charge nurse regarding current nurse care or if he would prefer to leave and no longer  care from this RN. Patient stated, \"I am leaving.\" RN acknowledged patient's wish to leave and gathered supplies to remove IV for patient to leave. Patient then decided he would like to stay to have the MRI done. RN asked if the patient would like the ativan for the MRI as is the original reason for this writer to come to MRI. Patient accepted with no hesitation. MRI in process.  "

## 2024-08-01 NOTE — ED PROVIDER NOTES
Emergency Department Note      History of Present Illness     Chief Complaint   Syncope      HPI   Federico Chamberlain is a 74 year old male with a past medical history significant for type 2 diabetes, hyperlipidemia, hypertension, myocardial infarction, prostate cancer, TIA here for evaluation of syncope. Patient states that he had a CT scan of his aorta this morning, ordered by Dr. Garcia due to previous echocardiogram showing increased size of ascending aorta. CT findings show new masses in the chest and abdomen. Since his CT scan this morning, he has felt lightheaded, off balance and reports shakiness when walking. He is suspicious of possible reaction to the IV contrast dye from CT scan, although he has had IV contrast in the past. He also reports brief syncopal episode just prior to arrival lasting for about one second. Episode occurred when he was sitting at the kitchen table. Here in the ER he still feels lightheaded when laying down. When standing up he needs a few seconds to get oriented before walking. Patient uses a cane. Denies headache, chest pain, abdominal pain, back pain, vision changes, numbness, unilteral weakness, fever, cough, nausea, vomiting, diarrhea, melena, hematocehiza, urinary symptoms, rash, throat swelling, decreased appetite or fluid intake. No neurological problems. No recent medication changes. No history of lymphoma. Patient recently got back from [place]. Patient lives independently.     Independent Historian   None    Review of External Notes   I reviewed cardiology office visit from 1/4/2024.    Past Medical History     Medical History and Problem List   Hypercholesterolemia   Hypertension   RILEY  Herpes zoster   TIA  Aortic dilation   Bilateral carpal tunnel syndrome   Brow ptosis, bilateral   ED  Lumbosacral radiculitis   Prostate cancer   Osteoarthritis   RLS  Type 2 diabetes   Major depressive disorder    Hyperlipidemia   Traumatic rhabdomyolysis   Morbid obesity   Bacteremia  "  Chronic midline low back pain   TOBI   Gout   Acute hyponatremia   Acute hypokalemia   Foot drop   Rotator cuff disorder   Adenomatous polyp of colon  Radiculopathy lumbar  CHF  MI   Foot ulcer     Medications   Hygroton   Lasix   Imdur   Zestril   Magnesium oxide   Metoprolol succinate   Nitrostat   Aspirin 81 mg   Lipitor   Gabapentin   Metformin   Potassium chloride     Surgical History   Carpal tunnel surgery, right wrist   Appendectomy   Lumbar laminectomy   Tonsillectomy and adenoidectomy   Lumbar fusion  Prostate surgery  Spinal fusion with disk removal  Vasectomy       Physical Exam     Patient Vitals for the past 24 hrs:   BP Temp Temp src Pulse Resp SpO2 Height Weight   08/01/24 2139 132/86 -- -- 62 -- 99 % -- --   08/01/24 1442 105/71 97.3  F (36.3  C) Temporal 73 18 100 % 1.956 m (6' 5\") 131.3 kg (289 lb 7.4 oz)     Physical Exam  Vitals and nursing note reviewed.   Constitutional:       General: He is not in acute distress.     Appearance: He is not ill-appearing.   HENT:      Head: Normocephalic and atraumatic.      Right Ear: External ear normal.      Left Ear: External ear normal.      Nose: Nose normal.   Eyes:      Extraocular Movements: Extraocular movements intact.      Conjunctiva/sclera: Conjunctivae normal.      Pupils: Pupils are equal, round, and reactive to light.   Cardiovascular:      Rate and Rhythm: Normal rate and regular rhythm.      Heart sounds: No murmur heard.  Pulmonary:      Effort: Pulmonary effort is normal. No respiratory distress.      Breath sounds: No wheezing, rhonchi or rales.   Abdominal:      General: Abdomen is flat. There is no distension.      Palpations: Abdomen is soft.      Tenderness: There is no abdominal tenderness. There is no guarding or rebound.   Musculoskeletal:         General: No swelling or deformity.      Cervical back: Normal range of motion and neck supple.   Skin:     General: Skin is warm and dry.      Findings: No rash.   Neurological:      " Mental Status: He is alert and oriented to person, place, and time.      Cranial Nerves: No cranial nerve deficit.      Sensory: No sensory deficit.      Motor: No weakness.      Gait: Gait abnormal (slightly unsteady and wobbly but able to ambulate).   Psychiatric:         Behavior: Behavior normal.           Diagnostics     Lab Results   Labs Ordered and Resulted from Time of ED Arrival to Time of ED Departure   COMPREHENSIVE METABOLIC PANEL - Abnormal       Result Value    Sodium 139      Potassium 4.0      Carbon Dioxide (CO2) 25      Anion Gap 15      Urea Nitrogen 20.7      Creatinine 0.92      GFR Estimate 88      Calcium 9.0      Chloride 99      Glucose 109 (*)     Alkaline Phosphatase 79      AST 26      ALT 23      Protein Total 6.5      Albumin 4.1      Bilirubin Total 0.7     TROPONIN T, HIGH SENSITIVITY - Abnormal    Troponin T, High Sensitivity 33 (*)    ROUTINE UA WITH MICROSCOPIC REFLEX TO CULTURE - Abnormal    Color Urine Light Yellow      Appearance Urine Clear      Glucose Urine Negative      Bilirubin Urine Negative      Ketones Urine Negative      Specific Gravity Urine 1.005      Blood Urine Negative      pH Urine 7.5 (*)     Protein Albumin Urine Negative      Urobilinogen Urine Normal      Nitrite Urine Negative      Leukocyte Esterase Urine Negative      Mucus Urine Present (*)     RBC Urine 1      WBC Urine <1      Hyaline Casts Urine 1     TROPONIN T, HIGH SENSITIVITY - Abnormal    Troponin T, High Sensitivity 29 (*)    MAGNESIUM - Normal    Magnesium 2.2     CBC WITH PLATELETS AND DIFFERENTIAL    WBC Count 8.1      RBC Count 4.91      Hemoglobin 14.2      Hematocrit 42.8      MCV 87      MCH 28.9      MCHC 33.2      RDW 13.6      Platelet Count 207      % Neutrophils 67      % Lymphocytes 19      % Monocytes 7      % Eosinophils 6      % Basophils 1      % Immature Granulocytes 0      NRBCs per 100 WBC 0      Absolute Neutrophils 5.4      Absolute Lymphocytes 1.6      Absolute Monocytes  0.6      Absolute Eosinophils 0.5      Absolute Basophils 0.1      Absolute Immature Granulocytes 0.0      Absolute NRBCs 0.0         Imaging   MR Brain w/o & w Contrast   Final Result   IMPRESSION:   HEAD MRI:    1.  No acute or subacute ischemic change.   2.  No acute intracranial process or abnormal enhancement.      HEAD MRA:    1.  Focal severe stenosis right A3 segment.   2.  No aneurysm, high flow AVM or large vessel occlusion identified.   3.  Variant [place] anatomy as above.      NECK MRA:   1.  No hemodynamically significant stenosis within the vessels of the neck      MRA Angiogram Head w/o Contrast   Final Result   IMPRESSION:   HEAD MRI:    1.  No acute or subacute ischemic change.   2.  No acute intracranial process or abnormal enhancement.      HEAD MRA:    1.  Focal severe stenosis right A3 segment.   2.  No aneurysm, high flow AVM or large vessel occlusion identified.   3.  Variant [place] anatomy as above.      NECK MRA:   1.  No hemodynamically significant stenosis within the vessels of the neck      MRA Angiogram Neck w/o & w Contrast   Final Result   IMPRESSION:   HEAD MRI:    1.  No acute or subacute ischemic change.   2.  No acute intracranial process or abnormal enhancement.      HEAD MRA:    1.  Focal severe stenosis right A3 segment.   2.  No aneurysm, high flow AVM or large vessel occlusion identified.   3.  Variant [place] anatomy as above.      NECK MRA:   1.  No hemodynamically significant stenosis within the vessels of the neck          EKG   ECG taken at 1517, ECG read at 1522  Sinus rhythm with 1st degree AV block   Right bundle branch block   Left anterior fascicular block  Bifascicular block   Minimal voltage criteria for LVH, may lauryn normal variant (R in aVL)  No significant changes as compared to prior, dated 1/4/24.  Rate 65 bpm. PA interval 214 ms. QRS duration 190 ms. QT/QTc 488/507 ms. P-R-T axes 83 -65 28.    Independent Interpretation    None    ED Course      Medications Administered   Medications   sodium chloride 0.9% BOLUS 1,000 mL (0 mLs Intravenous Stopped 8/1/24 1913)   gadobutrol (GADAVIST) injection 10 mL (10 mLs Intravenous $Given 8/1/24 1744)   sodium chloride (PF) 0.9% PF flush 60 mL (100 mLs Intravenous $Given 8/1/24 1744)   LORazepam (ATIVAN) injection 0.5 mg (0.5 mg Intravenous $Given 8/1/24 1800)       Procedures   Procedures     Discussion of Management   None    ED Course   ED Course as of 08/01/24 2259   Thu Aug 01, 2024   9709 I obtained history and examined the patient as noted above.    2014 I rechecked and updated the patient.        Additional Documentation  None    Medical Decision Making / Diagnosis     CMS Diagnoses: None    MIPS       None    [facility]   Federico Chamberlain is a 74 year old male who presents with dizziness and difficulty ambulating as well as a brief 1 to 2-second syncopal episode.  Symptoms all started after he received IV contrast for CTA of his aorta this morning.  Is unclear what the etiology of his symptoms are at this point.  It could be a reaction to the contrast there is no other signs of allergic reaction.  He is vitally stable.  Could be CNS etiology such as a stroke though he does not have any focal deficits.  He is fairly unstable with ambulation however.  I reviewed the CT and there is no signs of aortic dissection or other acute pathology.  He did incidentally have masses in his mediastinum, chest, abdomen which are worrisome for lymphoma.  I updated him on these results and we called the oncology referral line to help arrange for outpatient follow-up for him.  His lab work here showed a mildly elevated troponin which remained stable on recheck.  MRI of his brain was performed and showed no signs of stroke.  However he did have severe stenosis of the A3 segment on the right.  I do not think that this is responsible for his symptoms given the lack of findings of stroke and the anatomical location  would not fit with his symptoms.  I did update the patient on this finding though and recommend that he follow-up closely with his primary care physician.  He is currently on baby aspirin so I encouraged him to continue this.  He felt improved during his time in the ED and was able to ambulate with steady gait after some IV fluids.  He felt comfortable with going home and will follow-up closely with his primary care physician and oncology as recommended.  We discussed return precautions.    Disposition   The patient was discharged.     Diagnosis     ICD-10-CM    1. Nonspecific dizziness  R42       2. Asymptomatic stenosis of anterior cerebral artery  I66.19       3. Abnormal CT of the chest  R93.89            Discharge Medications   Discharge Medication List as of 8/1/2024  9:44 PM            Scribe Disclosure:  I, Shari Estevez, am serving as a scribe at 3:55 PM on 8/1/2024 to document services personally performed by Matt Schwartz MD based on my observations and the provider's statements to me.        Matt Schwartz MD  08/01/24 2264

## 2024-08-01 NOTE — ED TRIAGE NOTES
"Pt feels \"off\" unbalanced, dizzy and bumping into walls, and SOB since having CT scan for aneurism of heart earlier today. States did have syncopal episode just PTA. Did not hit head. Denies CP. No HA, negative BEFAST, no CP. ABC intact.         "

## 2024-08-01 NOTE — TELEPHONE ENCOUNTER
Patient's PCP clinic - Dr. Luis Alberto Aponte called with below findings on CTA chest pelvis and abdomen. Test ordered on 7/10/24 by Dr Garcia to assess aorta. Patient had previous echocardiogram done through [facility] showing increased size of ascending aorta from 4.3cm to 4.7cm     Nurse informed of findings and report faxed to PCP Office at [phone]. Informed that patient has not been informed of these findings. Ordering MD out this week, returning next. Routed to Dr. Garcia for review.                                                        IMPRESSION:  1. There are new masses in the chest and abdomen as above.  Differential diagnosis would favor lymphoma. These would be amenable  to percutaneous image guided biopsy.  2. Ascending thoracic aortic dilatation measuring 4.4 cm in maximum  dimension. This is not significantly changed.

## 2024-08-02 LAB
ATRIAL RATE - MUSE: 65 BPM
DIASTOLIC BLOOD PRESSURE - MUSE: NORMAL MMHG
INTERPRETATION ECG - MUSE: NORMAL
P AXIS - MUSE: 83 DEGREES
PR INTERVAL - MUSE: 214 MS
QRS DURATION - MUSE: 190 MS
QT - MUSE: 488 MS
QTC - MUSE: 507 MS
R AXIS - MUSE: -65 DEGREES
SYSTOLIC BLOOD PRESSURE - MUSE: NORMAL MMHG
T AXIS - MUSE: 28 DEGREES
VENTRICULAR RATE- MUSE: 65 BPM

## 2024-08-06 ENCOUNTER — TELEPHONE (OUTPATIENT)
Dept: CARDIOLOGY | Facility: CLINIC | Age: 74
End: 2024-08-06
Payer: COMMERCIAL

## 2024-08-06 NOTE — RESULT ENCOUNTER NOTE
Results reviewed, please let the patient know that the aorta size is stable, however there are new masses in the chest that are concerning, and he should follow up with his PCP ASAP (I saw your note that this report was called faxed to Dr. Aponte already thanks)

## 2024-08-06 NOTE — TELEPHONE ENCOUNTER
Message from Dr. Garcia re: CTA chest  Kevin Garcia MD [facility] Heart Team 2  Results reviewed, please let the patient know that the aorta size is stable, however there are new masses in the chest that are concerning, and he should follow up with his PCP ASAP (I saw your note that this report was called faxed to Dr. Aponte already thanks)    1000 attempted to contact patient to review that his aorta findings are stable. Left a detailed message that there are incidental findings outside cardiology's management that needs to be followed. Left the update that Dr. Aponte's office was called and the reports faxed there. Requested a call back to be sure all parties are aware of the findings that that he has been in touch with his PCP clinic.    NOTE: Per ED notes 8/1/2024 the ED provider reviewed the CT findings with the patient and recommended Oncology follow up.    Per [facility] - patient called to set up a visit with PCP, unclear if this has been scheduled yet.      1030 spoke with patient; he is seeing [facility] today. Reviewed that the aorta is unchanged.

## 2024-08-06 NOTE — TELEPHONE ENCOUNTER
Kevin Garcia MD  You; Noreen [facility] Heart Team 23 hours ago (8:16 AM)     Thanks needs to close loop with PCP for follow up on this   -------------------------------    Patient spoke team 2 nurse Clark today, 8/6/24. PCP is aware and records have been faxed.

## 2024-09-24 ENCOUNTER — OFFICE VISIT (OUTPATIENT)
Dept: CARDIOLOGY | Facility: CLINIC | Age: 74
End: 2024-09-24
Payer: COMMERCIAL

## 2024-09-24 ENCOUNTER — APPOINTMENT (OUTPATIENT)
Dept: GENERAL RADIOLOGY | Facility: CLINIC | Age: 74
End: 2024-09-24
Attending: FAMILY MEDICINE
Payer: COMMERCIAL

## 2024-09-24 ENCOUNTER — HOSPITAL ENCOUNTER (EMERGENCY)
Facility: CLINIC | Age: 74
Discharge: HOME | End: 2024-09-24
Attending: FAMILY MEDICINE | Admitting: FAMILY MEDICINE
Payer: COMMERCIAL

## 2024-09-24 ENCOUNTER — APPOINTMENT (OUTPATIENT)
Dept: CARDIOLOGY | Facility: CLINIC | Age: 74
End: 2024-09-24
Attending: FAMILY MEDICINE
Payer: COMMERCIAL

## 2024-09-24 VITALS
SYSTOLIC BLOOD PRESSURE: 130 MMHG | BODY MASS INDEX: 32.71 KG/M2 | HEART RATE: 60 BPM | HEIGHT: 78 IN | DIASTOLIC BLOOD PRESSURE: 76 MMHG | WEIGHT: 282.7 LBS | OXYGEN SATURATION: 97 %

## 2024-09-24 VITALS
OXYGEN SATURATION: 97 % | HEART RATE: 71 BPM | RESPIRATION RATE: 18 BRPM | TEMPERATURE: 98.1 F | DIASTOLIC BLOOD PRESSURE: 66 MMHG | SYSTOLIC BLOOD PRESSURE: 101 MMHG

## 2024-09-24 DIAGNOSIS — I77.810 DILATATION OF THORACIC AORTA (H): ICD-10-CM

## 2024-09-24 DIAGNOSIS — E86.0 DEHYDRATION: ICD-10-CM

## 2024-09-24 DIAGNOSIS — C82.90 FOLLICULAR LYMPHOMA, UNSPECIFIED FOLLICULAR LYMPHOMA TYPE, UNSPECIFIED BODY REGION (H): ICD-10-CM

## 2024-09-24 DIAGNOSIS — D70.1 CHEMOTHERAPY-INDUCED NEUTROPENIA (H): Primary | ICD-10-CM

## 2024-09-24 DIAGNOSIS — R55 NEAR SYNCOPE: ICD-10-CM

## 2024-09-24 DIAGNOSIS — T45.1X5A CHEMOTHERAPY-INDUCED NEUTROPENIA (H): Primary | ICD-10-CM

## 2024-09-24 DIAGNOSIS — Z92.21 STATUS POST ADMINISTRATION OF CARDIOTOXIC CHEMOTHERAPY: ICD-10-CM

## 2024-09-24 LAB
ALBUMIN SERPL BCG-MCNC: 3.8 G/DL (ref 3.5–5.2)
ALP SERPL-CCNC: 111 U/L (ref 40–150)
ALT SERPL W P-5'-P-CCNC: 22 U/L (ref 0–70)
ANION GAP SERPL CALCULATED.3IONS-SCNC: 12 MMOL/L (ref 7–15)
APTT PPP: 30 SECONDS (ref 22–38)
AST SERPL W P-5'-P-CCNC: 21 U/L (ref 0–45)
ATRIAL RATE - MUSE: 70 BPM
BASOPHILS # BLD AUTO: 0.1 10E3/UL (ref 0–0.2)
BASOPHILS NFR BLD AUTO: 1 %
BI-PLANE LVEF ECHO: NORMAL
BILIRUB SERPL-MCNC: 0.7 MG/DL
BUN SERPL-MCNC: 21.1 MG/DL (ref 8–23)
CALCIUM SERPL-MCNC: 8.9 MG/DL (ref 8.8–10.4)
CHLORIDE SERPL-SCNC: 104 MMOL/L (ref 98–107)
CREAT SERPL-MCNC: 0.94 MG/DL (ref 0.67–1.17)
DIASTOLIC BLOOD PRESSURE - MUSE: NORMAL MMHG
EGFRCR SERPLBLD CKD-EPI 2021: 86 ML/MIN/1.73M2
EOSINOPHIL # BLD AUTO: 0.5 10E3/UL (ref 0–0.7)
EOSINOPHIL NFR BLD AUTO: 4 %
ERYTHROCYTE [DISTWIDTH] IN BLOOD BY AUTOMATED COUNT: 13.5 % (ref 10–15)
GLUCOSE SERPL-MCNC: 126 MG/DL (ref 70–99)
HCO3 SERPL-SCNC: 26 MMOL/L (ref 22–29)
HCT VFR BLD AUTO: 40.9 % (ref 40–53)
HGB BLD-MCNC: 13.5 G/DL (ref 13.3–17.7)
IMM GRANULOCYTES # BLD: 0.1 10E3/UL
IMM GRANULOCYTES NFR BLD: 1 %
INR PPP: 1.07 (ref 0.85–1.15)
INTERPRETATION ECG - MUSE: NORMAL
LACTATE SERPL-SCNC: 1.9 MMOL/L (ref 0.7–2)
LIPASE SERPL-CCNC: 21 U/L (ref 13–60)
LVEF ECHO: NORMAL
LYMPHOCYTES # BLD AUTO: 1.2 10E3/UL (ref 0.8–5.3)
LYMPHOCYTES NFR BLD AUTO: 11 %
MAGNESIUM SERPL-MCNC: 2 MG/DL (ref 1.7–2.3)
MCH RBC QN AUTO: 29.5 PG (ref 26.5–33)
MCHC RBC AUTO-ENTMCNC: 33 G/DL (ref 31.5–36.5)
MCV RBC AUTO: 89 FL (ref 78–100)
MONOCYTES # BLD AUTO: 0.5 10E3/UL (ref 0–1.3)
MONOCYTES NFR BLD AUTO: 5 %
NEUTROPHILS # BLD AUTO: 8.6 10E3/UL (ref 1.6–8.3)
NEUTROPHILS NFR BLD AUTO: 79 %
NRBC # BLD AUTO: 0 10E3/UL
NRBC BLD AUTO-RTO: 0 /100
NT-PROBNP SERPL-MCNC: 98 PG/ML (ref 0–900)
P AXIS - MUSE: 80 DEGREES
PLATELET # BLD AUTO: 152 10E3/UL (ref 150–450)
POTASSIUM SERPL-SCNC: 3.5 MMOL/L (ref 3.4–5.3)
PR INTERVAL - MUSE: 200 MS
PROT SERPL-MCNC: 6.2 G/DL (ref 6.4–8.3)
QRS DURATION - MUSE: 186 MS
QT - MUSE: 468 MS
QTC - MUSE: 505 MS
R AXIS - MUSE: -65 DEGREES
RBC # BLD AUTO: 4.58 10E6/UL (ref 4.4–5.9)
SODIUM SERPL-SCNC: 142 MMOL/L (ref 135–145)
SYSTOLIC BLOOD PRESSURE - MUSE: NORMAL MMHG
T AXIS - MUSE: 53 DEGREES
TROPONIN T SERPL HS-MCNC: 26 NG/L
TROPONIN T SERPL HS-MCNC: 31 NG/L
TSH SERPL DL<=0.005 MIU/L-ACNC: 1.96 UIU/ML (ref 0.3–4.2)
VENTRICULAR RATE- MUSE: 70 BPM
WBC # BLD AUTO: 10.9 10E3/UL (ref 4–11)

## 2024-09-24 PROCEDURE — 85025 COMPLETE CBC W/AUTO DIFF WBC: CPT | Performed by: FAMILY MEDICINE

## 2024-09-24 PROCEDURE — 83735 ASSAY OF MAGNESIUM: CPT | Performed by: FAMILY MEDICINE

## 2024-09-24 PROCEDURE — 96361 HYDRATE IV INFUSION ADD-ON: CPT

## 2024-09-24 PROCEDURE — 83605 ASSAY OF LACTIC ACID: CPT | Performed by: FAMILY MEDICINE

## 2024-09-24 PROCEDURE — 84484 ASSAY OF TROPONIN QUANT: CPT | Performed by: FAMILY MEDICINE

## 2024-09-24 PROCEDURE — 83690 ASSAY OF LIPASE: CPT | Performed by: FAMILY MEDICINE

## 2024-09-24 PROCEDURE — 84443 ASSAY THYROID STIM HORMONE: CPT | Performed by: FAMILY MEDICINE

## 2024-09-24 PROCEDURE — 80053 COMPREHEN METABOLIC PANEL: CPT | Performed by: FAMILY MEDICINE

## 2024-09-24 PROCEDURE — 93010 ELECTROCARDIOGRAM REPORT: CPT | Performed by: FAMILY MEDICINE

## 2024-09-24 PROCEDURE — 93306 TTE W/DOPPLER COMPLETE: CPT | Mod: 26 | Performed by: STUDENT IN AN ORGANIZED HEALTH CARE EDUCATION/TRAINING PROGRAM

## 2024-09-24 PROCEDURE — 99285 EMERGENCY DEPT VISIT HI MDM: CPT | Performed by: FAMILY MEDICINE

## 2024-09-24 PROCEDURE — 85610 PROTHROMBIN TIME: CPT | Performed by: FAMILY MEDICINE

## 2024-09-24 PROCEDURE — 93005 ELECTROCARDIOGRAM TRACING: CPT | Performed by: FAMILY MEDICINE

## 2024-09-24 PROCEDURE — 71046 X-RAY EXAM CHEST 2 VIEWS: CPT | Mod: 26 | Performed by: RADIOLOGY

## 2024-09-24 PROCEDURE — 93306 TTE W/DOPPLER COMPLETE: CPT

## 2024-09-24 PROCEDURE — 99215 OFFICE O/P EST HI 40 MIN: CPT | Mod: 25 | Performed by: INTERNAL MEDICINE

## 2024-09-24 PROCEDURE — 258N000003 HC RX IP 258 OP 636: Performed by: FAMILY MEDICINE

## 2024-09-24 PROCEDURE — 83880 ASSAY OF NATRIURETIC PEPTIDE: CPT | Performed by: FAMILY MEDICINE

## 2024-09-24 PROCEDURE — 71046 X-RAY EXAM CHEST 2 VIEWS: CPT

## 2024-09-24 PROCEDURE — 96360 HYDRATION IV INFUSION INIT: CPT

## 2024-09-24 PROCEDURE — 85730 THROMBOPLASTIN TIME PARTIAL: CPT | Performed by: FAMILY MEDICINE

## 2024-09-24 PROCEDURE — 36415 COLL VENOUS BLD VENIPUNCTURE: CPT | Performed by: FAMILY MEDICINE

## 2024-09-24 RX ORDER — LIDOCAINE 40 MG/G
CREAM TOPICAL
Status: DISCONTINUED | OUTPATIENT
Start: 2024-09-24 | End: 2024-09-24 | Stop reason: HOSPADM

## 2024-09-24 RX ADMIN — SODIUM CHLORIDE 1000 ML: 9 INJECTION, SOLUTION INTRAVENOUS at 14:40

## 2024-09-24 RX ADMIN — SODIUM CHLORIDE 1000 ML: 9 INJECTION, SOLUTION INTRAVENOUS at 18:11

## 2024-09-24 ASSESSMENT — ACTIVITIES OF DAILY LIVING (ADL)
ADLS_ACUITY_SCORE: 37

## 2024-09-24 ASSESSMENT — COLUMBIA-SUICIDE SEVERITY RATING SCALE - C-SSRS
6. HAVE YOU EVER DONE ANYTHING, STARTED TO DO ANYTHING, OR PREPARED TO DO ANYTHING TO END YOUR LIFE?: NO
2. HAVE YOU ACTUALLY HAD ANY THOUGHTS OF KILLING YOURSELF IN THE PAST MONTH?: NO
1. IN THE PAST MONTH, HAVE YOU WISHED YOU WERE DEAD OR WISHED YOU COULD GO TO SLEEP AND NOT WAKE UP?: NO

## 2024-09-24 NOTE — ED PROVIDER NOTES
[place] EMERGENCY DEPARTMENT ([facility])    9/24/24       ED PROVIDER NOTE     History     Chief Complaint   Patient presents with    Dizziness    Shortness of Breath     HPI  Federico Chamberlain is a 74 year old male with history of type 2 diabetes, morbid obesity, NSTEMI, dilated aorta, stage IV follicular lymphoma on chemotherapy who presents from dental clinic for further evaluation of new onset lightheadedness and shortness of breath.   Patient states he has been undergoing chemotherapy for lymphoma started September 9.  Patient has had some diarrhea some nausea questional decreased p.o. intake.  Patient had gone to dental appointment today had an impression down for a bridge that they are working on he had some upper dental issues that need to be addressed that he was asked to come back later today.  Patient had a half tuna sandwich on campus here walking around and was going by dental clinic on the way up the escalator patient had near syncopal symptoms.  He gradually made his way up to the dental clinic.  And then a rapid response was called.  Patient now presents for ER still feeling a little lightheaded slightly short of breath no chest pain with this.  Patient blood pressure normally 140s.  No abdominal pain but has had some diarrhea no reports of bleeding no fevers no coughing etc.  No history of blood clots.      Past Medical History  Past Medical History:   Diagnosis Date    Hypercholesterolemia     Hypertension      Past Surgical History:   Procedure Laterality Date    APPENDECTOMY      BACK SURGERY      CV CORONARY ANGIOGRAM N/A 2/17/2023    Procedure: Coronary Angiogram RADIAL ACCESS;  Surgeon: Malou Mazariegos MD;  Location:  HEART CARDIAC CATH LAB    CV FRACTIONAL FLOW RATIO WIRE N/A 2/17/2023    Procedure: Fractional Flow Ratio Wire;  Surgeon: Malou Mazariegos MD;  Location:  HEART CARDIAC CATH LAB    CV INSTANTANEOUS WAVE-FREE RATIO N/A 2/17/2023    Procedure: Instantaneous  Wave-Free Ratio;  Surgeon: Malou Mazariegos MD;  Location:  HEART CARDIAC CATH LAB    CV LEFT HEART CATH N/A 2023    Procedure: Left Heart Catheterization;  Surgeon: Malou Mazariegos MD;  Location:  HEART CARDIAC CATH LAB    IR LUMBAR PUNCTURE  2019    IR LUMBAR PUNCTURE  10/25/2019    IR LUMBAR PUNCTURE  2022    IR LUMBAR PUNCTURE  11/3/2022    ORTHOPEDIC SURGERY       acetaminophen (TYLENOL) 325 MG tablet  aspirin 81 MG EC tablet  atorvastatin (LIPITOR) 40 MG tablet  cephALEXin (KEFLEX) 500 MG capsule  chlorthalidone (HYGROTON) 25 MG tablet  diclofenac (VOLTAREN) 1 % topical gel  ferrous sulfate (FEROSUL) 325 (65 Fe) MG tablet  furosemide (LASIX) 20 MG tablet  gabapentin (NEURONTIN) 800 MG tablet  isosorbide mononitrate (IMDUR) 30 MG 24 hr tablet  lisinopril (ZESTRIL) 40 MG tablet  magnesium oxide (MAG-OX) 400 MG tablet  metFORMIN (GLUCOPHAGE) 500 MG tablet  metoprolol succinate ER (TOPROL XL) 100 MG 24 hr tablet  nitroGLYcerin (NITROSTAT) 0.4 MG sublingual tablet  potassium chloride ER (KLOR-CON M) 10 MEQ CR tablet  pramipexole (MIRAPEX) 1 MG tablet  semaglutide (OZEMPIC, 1 MG/DOSE,) 2 MG/1.5ML pen  VITAMIN D, CHOLECALCIFEROL, PO      No Known Allergies  Family History  No family history on file.  Social History   Social History     Tobacco Use    Smoking status: Former     Current packs/day: 0.00     Types: Cigarettes     Quit date: 1992     Years since quittin.6    Smokeless tobacco: Never   Substance Use Topics    Alcohol use: Yes     Comment: Occ, 1-2 beers a month    Drug use: No      A medically appropriate review of systems was performed with pertinent positives and negatives noted in the HPI, and all other systems negative.    Physical Exam   BP: 101/66  Pulse: 71  Temp: 98.1  F (36.7  C)  Resp: 18  SpO2: 97 %  Physical Exam  Vitals and nursing note reviewed.   Constitutional:       General: He is in acute distress.      Appearance: He is well-developed. He is not  toxic-appearing or diaphoretic.      Comments: Alert and oriented x 3.   HENT:      Head: Normocephalic and atraumatic.      Nose: Nose normal. No congestion.      Mouth/Throat:      Mouth: Mucous membranes are moist.      Pharynx: Oropharynx is clear.   Eyes:      General: No scleral icterus.     Extraocular Movements: Extraocular movements intact.      Conjunctiva/sclera: Conjunctivae normal.      Pupils: Pupils are equal, round, and reactive to light.   Cardiovascular:      Rate and Rhythm: Normal rate and regular rhythm.   Pulmonary:      Effort: Pulmonary effort is normal. No respiratory distress.      Breath sounds: No stridor. No wheezing.   Abdominal:      General: Abdomen is flat.      Tenderness: There is no abdominal tenderness. There is no guarding.   Musculoskeletal:         General: No swelling, tenderness or deformity. Normal range of motion.      Cervical back: Normal range of motion and neck supple.      Comments: Negative Homans' sign   Lymphadenopathy:      Cervical: No cervical adenopathy.   Skin:     General: Skin is warm and dry.      Capillary Refill: Capillary refill takes less than 2 seconds.      Findings: No rash.   Neurological:      General: No focal deficit present.      Mental Status: He is alert and oriented to person, place, and time.      Cranial Nerves: No cranial nerve deficit.      Sensory: No sensory deficit.      Coordination: Coordination normal.   Psychiatric:      Comments: Appropriate           ED Course, Procedures, & Data     ED Course as of 09/24/24 2249   Tue Sep 24, 2024   1550 Reviewed lab progress, all labs are done with exception of UA which has not been collected yet.     Records are in [facility].  Patient with follicular lymphoma current treatment for.  Patient also history of some chemotherapy-induced neutropenia.  Patient seen in cardiology clinic today also by Dr. Garcia with recommendations to follow-up in cardiology clinic.  Patient states blood pressure was slightly  lower may be medication adjustment needed otherwise stable recommended getting an echo but it was not done today.  Patient review of previous CT done in August this year shows concern for lymphoma in the chest but also stable 4.4 cm thoracic aortic dilatation at its maximum dimension unchanged.    Patient is not having back pain otherwise or chest pain.    In the ER will establish IV give IV fluids also will evaluate for cardiac causes etc.  Patient had EKG done revealing no significant change from previous at this point.  PA lateral chest x-rays done were negative interpreted by myself also no acute infiltrates pneumothorax or effusion seen.  Patient's troponin were done initial 30 1 repeat was 26.  Lipase 21.  Magnesium is 2 BNP is within normal limits TSH 1.96.  Sodium 142 potassium 3.5.  Bicarb 26 gap is 12  LFTs normal limits.  Glucose 126 white count 10.9 hemoglobin 13.5.  Lactic acid initially 1.9.    In the ER patient did receive a liter of normal saline patient is feeling better after this.  States that he has had decreased oral intake along with the nausea vomiting and diarrhea since he started this chemo.    A formal echo was done also I discussed with cardiology also.  Comparing it to previous a year ago it appears the same patient does have a dilated ascending aorta approximately 4.3 cm which was noted last year and similar today.  Ejection fraction is 50 to 55% which is the same as a year ago also here.  I reviewed also the echo that was done a couple months ago at [facility] where EF was 60% and they measured the aorta at 4.7 cm.  Discussed with cardiology staff at this point feel especially patient being more volume down cancer patient is typical and with low volume dehydration it can alter your ejection fraction and make it appear less then that.  At this point I talked the patient reassured at this point patient received a second liter of fluid feels much better at this point he would like to  go home and will follow-up with his clinic.  At this point patient I think at this point reassured his near syncopal symptoms most likely will refer selective of more dehydration with his current chemo treatment with having decreased p.o. intake and GI losses from diarrhea also.      Procedures            EKG Interpretation:      Interpreted by Yung Ignacio MD  Time reviewed: 1438  Symptoms at time of EKG: near syncope    Rhythm: normal sinus   Rate: normal  Axis: normal  Ectopy: none  Conduction: rbbb lafb  bifasicular block  ST Segments/ T Waves: No ST-T wave changes  Q Waves: none  Comparison to prior: Unchanged    Clinical Impression: Normal sinus rhythm with bifascicular block unchanged from previous.                Results for orders placed or performed during the hospital encounter of 09/24/24   XR Chest 2 Views     Status: None    Narrative    Exam: XR CHEST 2 VIEWS, 9/24/2024 5:54 PM    Comparison: CTA 8/1/2024, radiograph 1/3/2023    History: sob near syncope    Findings:  2 views of the chest. Heart is normal size. No focal airspace  opacities. No pleural effusion or pneumothorax. No acute osseous  abnormality.       Impression    Impression:   No acute airspace opacities.    I have personally reviewed the examination and initial interpretation  and I agree with the findings.    LESLEE LEDESMA MD         SYSTEM ID:  [id number]   Partial thromboplastin time     Status: Normal   Result Value Ref Range    aPTT 30 22 - 38 Seconds   INR     Status: Normal   Result Value Ref Range    INR 1.07 0.85 - 1.15   Comprehensive metabolic panel     Status: Abnormal   Result Value Ref Range    Sodium 142 135 - 145 mmol/L    Potassium 3.5 3.4 - 5.3 mmol/L    Carbon Dioxide (CO2) 26 22 - 29 mmol/L    Anion Gap 12 7 - 15 mmol/L    Urea Nitrogen 21.1 8.0 - 23.0 mg/dL    Creatinine 0.94 0.67 - 1.17 mg/dL    GFR Estimate 86 >60 mL/min/1.73m2    Calcium 8.9 8.8 - 10.4 mg/dL    Chloride 104 98 - 107 mmol/L    Glucose 126 (H)  70 - 99 mg/dL    Alkaline Phosphatase 111 40 - 150 U/L    AST 21 0 - 45 U/L    ALT 22 0 - 70 U/L    Protein Total 6.2 (L) 6.4 - 8.3 g/dL    Albumin 3.8 3.5 - 5.2 g/dL    Bilirubin Total 0.7 <=1.2 mg/dL   Magnesium     Status: Normal   Result Value Ref Range    Magnesium 2.0 1.7 - 2.3 mg/dL   Troponin T, High Sensitivity     Status: Abnormal   Result Value Ref Range    Troponin T, High Sensitivity 31 (H) <=22 ng/L   Nt probnp inpatient (BNP)     Status: Normal   Result Value Ref Range    N terminal Pro BNP Inpatient 98 0 - 900 pg/mL   Lactic Acid Whole Blood with 1X Repeat in 2 HR when >2     Status: Normal   Result Value Ref Range    Lactic Acid, Initial 1.9 0.7 - 2.0 mmol/L   Lipase     Status: Normal   Result Value Ref Range    Lipase 21 13 - 60 U/L   TSH     Status: Normal   Result Value Ref Range    TSH 1.96 0.30 - 4.20 uIU/mL   CBC with platelets and differential     Status: Abnormal   Result Value Ref Range    WBC Count 10.9 4.0 - 11.0 10e3/uL    RBC Count 4.58 4.40 - 5.90 10e6/uL    Hemoglobin 13.5 13.3 - 17.7 g/dL    Hematocrit 40.9 40.0 - 53.0 %    MCV 89 78 - 100 fL    MCH 29.5 26.5 - 33.0 pg    MCHC 33.0 31.5 - 36.5 g/dL    RDW 13.5 10.0 - 15.0 %    Platelet Count 152 150 - 450 10e3/uL    % Neutrophils 79 %    % Lymphocytes 11 %    % Monocytes 5 %    % Eosinophils 4 %    % Basophils 1 %    % Immature Granulocytes 1 %    NRBCs per 100 WBC 0 <1 /100    Absolute Neutrophils 8.6 (H) 1.6 - 8.3 10e3/uL    Absolute Lymphocytes 1.2 0.8 - 5.3 10e3/uL    Absolute Monocytes 0.5 0.0 - 1.3 10e3/uL    Absolute Eosinophils 0.5 0.0 - 0.7 10e3/uL    Absolute Basophils 0.1 0.0 - 0.2 10e3/uL    Absolute Immature Granulocytes 0.1 <=0.4 10e3/uL    Absolute NRBCs 0.0 10e3/uL   Troponin T, High Sensitivity     Status: Abnormal   Result Value Ref Range    Troponin T, High Sensitivity 26 (H) <=22 ng/L   EKG 12-lead, tracing only     Status: None   Result Value Ref Range    Systolic Blood Pressure  mmHg    Diastolic Blood Pressure   mmHg    Ventricular Rate 70 BPM    Atrial Rate 70 BPM    WY Interval 200 ms    QRS Duration 186 ms     ms    QTc 505 ms    P Axis 80 degrees    R AXIS -65 degrees    T Axis 53 degrees    Interpretation ECG       Sinus rhythm  Right bundle branch block  Left anterior fascicular block  ** Bifascicular block **  Abnormal ECG  Unconfirmed report - interpretation of this ECG is computer generated - see medical record for final interpretation  Confirmed by - EMERGENCY ROOM, PHYSICIAN ([id number]),  KAYLA DEGROOT ([id number]) on 2024 2:41:04 PM     Echo Complete     Status: None   Result Value Ref Range    Biplane LVEF 52%     LVEF  50-55% (borderline)     Narrative    [id number]  [id number]  [id number]  [id number]^LEONARDO^JORY^RAF     [facility]  Echocardiography Laboratory  [address] [place] [id number]     Name: AMADEO HAYNES  MRN: [id number]  : 1950  Study Date: 2024 04:06 PM  Age: 74 yrs  Gender: Male  Patient Location: [facility]  Reason For Study: Syncope, SOB  Ordering Physician: JORY PATTERSON  Performed By: Georgette Zuleta     BSA: 2.6 m2  Height: 78 in  Weight: 282 lb  HR: 55  BP: 101/66 mmHg  ______________________________________________________________________________  Procedure  Complete Portable Echo Adult.  ______________________________________________________________________________  Interpretation Summary  Left ventricular function is decreased. The ejection fraction is 50-55%  (borderline).  Right ventricular function, chamber size, wall motion, and thickness are  normal.  No significant valvular abnormalities present.  Ascending Aorta dilatation is present. Ascending aorta measures 4.3 cm.  Dilation of the inferior vena cava is present with normal respiratory  variation in diameter. IVC diameter and respiratory changes fall into an  intermediate range suggesting an RA pressure of 8 mmHg.  No pericardial effusion is present.     This study was  compared with the study from 9/24/2023. No significant changes.  ______________________________________________________________________________  Left Ventricle  Left ventricular wall thickness is normal. Biplane LVEF is 52%. Left  ventricular diastolic function is normal. Left ventricular function is  decreased. The ejection fraction is 50-55% (borderline). No regional wall  motion abnormalities are seen.     Right Ventricle  Right ventricular function, chamber size, wall motion, and thickness are  normal.     Atria  Both atria appear normal.     Mitral Valve  The mitral valve is normal. On Doppler interrogation, there is no significant  stenosis or regurgitation.     Aortic Valve  The aortic valve is tricuspid. On Doppler interrogation, there is no  significant stenosis or regurgitation.     Tricuspid Valve  The tricuspid valve is normal. The peak velocity of the tricuspid regurgitant  jet is not obtainable. Pulmonary artery systolic pressure cannot be assessed.     Vessels  The aorta root is normal. Ascending Aorta dilatation is present. Ascending  aorta 4.3 cm. Dilation of the inferior vena cava is present with normal  respiratory variation in diameter. IVC diameter and respiratory changes fall  into an intermediate range suggesting an RA pressure of 8 mmHg.     Pericardium  No pericardial effusion is present.     Miscellaneous  No significant valvular abnormalities present.     Compared to Previous Study  This study was compared with the study from 9/24/2023 .  ______________________________________________________________________________  MMode/2D Measurements & Calculations     IVSd: 1.1 cm  LVIDd: 5.4 cm  LVIDs: 3.8 cm  LVPWd: 1.0 cm  FS: 29.0 %  LV mass(C)d: 226.9 grams  LV mass(C)dI: 86.9 grams/m2  Ao root diam: 3.2 cm  asc Aorta Diam: 4.3 cm  LVOT diam: 2.3 cm  LVOT area: 4.2 cm2  Ao root diam index Ht(cm/m): 1.6  Ao root diam index BSA (cm/m2): 1.2  Asc Ao diam index BSA (cm/m2): 1.6  Asc Ao diam index  Ht(cm/m): 2.2  EF Biplane: 52.0 %  LA Volume (BP): 67.1 ml     LA Volume Index (BP): 25.7 ml/m2  RV Base: 4.0 cm  RWT: 0.37  TAPSE: 2.4 cm     Doppler Measurements & Calculations  MV E max wayne: 53.9 cm/sec  MV A max wayne: 69.8 cm/sec  MV E/A: 0.77  MV dec slope: 195.0 cm/sec2  MV dec time: 0.28 sec  Ao V2 max: 164.6 cm/sec  Ao max PG: 10.8 mmHg  Ao V2 mean: 95.3 cm/sec  Ao mean P.7 mmHg  Ao V2 VTI: 33.7 cm  PA acc time: 0.10 sec  E/E' av.8  Lateral E/e': 7.3  Medial E/e': 8.4     RV S Wayne: 11.2 cm/sec     ______________________________________________________________________________  Report approved by: Dr Mg Luong 2024 05:02 PM         CBC with platelets differential     Status: Abnormal    Narrative    The following orders were created for panel order CBC with platelets differential.  Procedure                               Abnormality         Status                     ---------                               -----------         ------                     CBC with platelets and d...[[id number]]  Abnormal            Final result                 Please view results for these tests on the individual orders.     Medications   sodium chloride 0.9% BOLUS 1,000 mL (0 mLs Intravenous Stopped 24 1810)   sodium chloride 0.9% BOLUS 1,000 mL (0 mLs Intravenous Stopped 24 1858)     Labs Ordered and Resulted from Time of ED Arrival to Time of ED Departure   COMPREHENSIVE METABOLIC PANEL - Abnormal       Result Value    Sodium 142      Potassium 3.5      Carbon Dioxide (CO2) 26      Anion Gap 12      Urea Nitrogen 21.1      Creatinine 0.94      GFR Estimate 86      Calcium 8.9      Chloride 104      Glucose 126 (*)     Alkaline Phosphatase 111      AST 21      ALT 22      Protein Total 6.2 (*)     Albumin 3.8      Bilirubin Total 0.7     TROPONIN T, HIGH SENSITIVITY - Abnormal    Troponin T, High Sensitivity 31 (*)    CBC WITH PLATELETS AND DIFFERENTIAL - Abnormal    WBC Count 10.9      RBC Count  4.58      Hemoglobin 13.5      Hematocrit 40.9      MCV 89      MCH 29.5      MCHC 33.0      RDW 13.5      Platelet Count 152      % Neutrophils 79      % Lymphocytes 11      % Monocytes 5      % Eosinophils 4      % Basophils 1      % Immature Granulocytes 1      NRBCs per 100 WBC 0      Absolute Neutrophils 8.6 (*)     Absolute Lymphocytes 1.2      Absolute Monocytes 0.5      Absolute Eosinophils 0.5      Absolute Basophils 0.1      Absolute Immature Granulocytes 0.1      Absolute NRBCs 0.0     TROPONIN T, HIGH SENSITIVITY - Abnormal    Troponin T, High Sensitivity 26 (*)    PARTIAL THROMBOPLASTIN TIME - Normal    aPTT 30     INR - Normal    INR 1.07     MAGNESIUM - Normal    Magnesium 2.0     NT PROBNP INPATIENT - Normal    N terminal Pro BNP Inpatient 98     LACTIC ACID WHOLE BLOOD WITH 1X REPEAT IN 2 HR WHEN >2 - Normal    Lactic Acid, Initial 1.9     LIPASE - Normal    Lipase 21     TSH - Normal    TSH 1.96       XR Chest 2 Views   Final Result   Impression:    No acute airspace opacities.      I have personally reviewed the examination and initial interpretation   and I agree with the findings.      LESLEE LEDESMA MD            SYSTEM ID:  [id number]      Echo Complete   Final Result             Critical care was not performed.     Medical Decision Making  The patient's presentation was of high complexity (an acute health issue posing potential threat to life or bodily function).    The patient's evaluation involved:  review of external note(s) from 3+ sources (see separate area of note for details)  review of 3+ test result(s) ordered prior to this encounter (see separate area of note for details)  ordering and/or review of 3+ test(s) in this encounter (see separate area of note for details)  discussion of management or test interpretation with another health professional (see separate area of note for details)    The patient's management necessitated moderate risk (prescription drug management including  medications given in the ED).    Assessment & Plan   74-year-old male with history of lymphoma currently undergoing treatment for this.  Also his history of a couple non-STEMI is presented the ER after seen by cardiology clinic this morning and had no concerns at that point.  Patient had gone to the dental clinic and was going up the stairs and had a near syncopal episode.  Patient had a rapid response called to the ER for evaluation patient here felt lightheaded given 2 L of fluid feels much better vitally stable echo changes are stable discussed with cardiology most likely related to some mild hypovolemia patient feels much better other labs normal etc.  Chest x-ray negative otherwise.  Most likely related to hypovolemia with his ongoing decreased p.o. intake and diarrhea with his chemo.  Patient is comfortable would like to go home he feels normal now at this point.  He will encourage fluids follow-up with MD and return if worsening symptoms.       I have reviewed the nursing notes. I have reviewed the findings, diagnosis, plan and need for follow up with the patient.    Discharge Medication List as of 9/24/2024  7:00 PM          Final diagnoses:   Near syncope   Dehydration   Follicular lymphoma, unspecified follicular lymphoma type, unspecified body region (H)         [facility] EMERGENCY DEPARTMENT  9/24/2024    This note was created at least in part by the use of dragon voice dictation system. Inadvertent typographical errors may still exist.  Yung Ignacio MD.  Patient evaluated in the emergency department during the COVID-19 pandemic period. Careful attention to patients safety was addressed throughout the evaluation. Evaluation and treatment management was initiated with disposition made efficiently and appropriate as possible to minimize any risk of potential exposure to patient during this evaluation.       Yung Ignacio MD  09/24/24 [id number]

## 2024-09-24 NOTE — DISCHARGE INSTRUCTIONS
Home.  Your EKG appeared unchanged your 2 heart enzyme test were in the normal range and downtrending which is reassuring.  You had an echocardiogram done also here comparing and talking to cardiology the findings appear stable there may be some subtle difference 1 that that you had at [facility] but most likely reflective of dehydration can cause this.  Make sure to encourage fluids other labs stable.  Follow-up with MD for a recheck as noted.  If any concerns return to the ER.    Results for orders placed or performed during the hospital encounter of 09/24/24   XR Chest 2 Views     Status: None (Preliminary result)    Impression    RESIDENT PRELIMINARY INTERPRETATION  Impression:   No acute airspace opacities.   Partial thromboplastin time     Status: Normal   Result Value Ref Range    aPTT 30 22 - 38 Seconds   INR     Status: Normal   Result Value Ref Range    INR 1.07 0.85 - 1.15   Comprehensive metabolic panel     Status: Abnormal   Result Value Ref Range    Sodium 142 135 - 145 mmol/L    Potassium 3.5 3.4 - 5.3 mmol/L    Carbon Dioxide (CO2) 26 22 - 29 mmol/L    Anion Gap 12 7 - 15 mmol/L    Urea Nitrogen 21.1 8.0 - 23.0 mg/dL    Creatinine 0.94 0.67 - 1.17 mg/dL    GFR Estimate 86 >60 mL/min/1.73m2    Calcium 8.9 8.8 - 10.4 mg/dL    Chloride 104 98 - 107 mmol/L    Glucose 126 (H) 70 - 99 mg/dL    Alkaline Phosphatase 111 40 - 150 U/L    AST 21 0 - 45 U/L    ALT 22 0 - 70 U/L    Protein Total 6.2 (L) 6.4 - 8.3 g/dL    Albumin 3.8 3.5 - 5.2 g/dL    Bilirubin Total 0.7 <=1.2 mg/dL   Magnesium     Status: Normal   Result Value Ref Range    Magnesium 2.0 1.7 - 2.3 mg/dL   Troponin T, High Sensitivity     Status: Abnormal   Result Value Ref Range    Troponin T, High Sensitivity 31 (H) <=22 ng/L   Nt probnp inpatient (BNP)     Status: Normal   Result Value Ref Range    N terminal Pro BNP Inpatient 98 0 - 900 pg/mL   Lactic Acid Whole Blood with 1X Repeat in 2 HR when >2     Status: Normal   Result Value Ref  Range    Lactic Acid, Initial 1.9 0.7 - 2.0 mmol/L   Lipase     Status: Normal   Result Value Ref Range    Lipase 21 13 - 60 U/L   TSH     Status: Normal   Result Value Ref Range    TSH 1.96 0.30 - 4.20 uIU/mL   CBC with platelets and differential     Status: Abnormal   Result Value Ref Range    WBC Count 10.9 4.0 - 11.0 10e3/uL    RBC Count 4.58 4.40 - 5.90 10e6/uL    Hemoglobin 13.5 13.3 - 17.7 g/dL    Hematocrit 40.9 40.0 - 53.0 %    MCV 89 78 - 100 fL    MCH 29.5 26.5 - 33.0 pg    MCHC 33.0 31.5 - 36.5 g/dL    RDW 13.5 10.0 - 15.0 %    Platelet Count 152 150 - 450 10e3/uL    % Neutrophils 79 %    % Lymphocytes 11 %    % Monocytes 5 %    % Eosinophils 4 %    % Basophils 1 %    % Immature Granulocytes 1 %    NRBCs per 100 WBC 0 <1 /100    Absolute Neutrophils 8.6 (H) 1.6 - 8.3 10e3/uL    Absolute Lymphocytes 1.2 0.8 - 5.3 10e3/uL    Absolute Monocytes 0.5 0.0 - 1.3 10e3/uL    Absolute Eosinophils 0.5 0.0 - 0.7 10e3/uL    Absolute Basophils 0.1 0.0 - 0.2 10e3/uL    Absolute Immature Granulocytes 0.1 <=0.4 10e3/uL    Absolute NRBCs 0.0 10e3/uL   Troponin T, High Sensitivity     Status: Abnormal   Result Value Ref Range    Troponin T, High Sensitivity 26 (H) <=22 ng/L   EKG 12-lead, tracing only     Status: None   Result Value Ref Range    Systolic Blood Pressure  mmHg    Diastolic Blood Pressure  mmHg    Ventricular Rate 70 BPM    Atrial Rate 70 BPM    ME Interval 200 ms    QRS Duration 186 ms     ms    QTc 505 ms    P Axis 80 degrees    R AXIS -65 degrees    T Axis 53 degrees    Interpretation ECG       Sinus rhythm  Right bundle branch block  Left anterior fascicular block  ** Bifascicular block **  Abnormal ECG  Unconfirmed report - interpretation of this ECG is computer generated - see medical record for final interpretation  Confirmed by - EMERGENCY ROOM, PHYSICIAN ([id number]),  KAYLA DEGROOT ([id number]) on 9/24/2024 2:41:04 PM     Echo Complete     Status: None   Result Value Ref Range    Biplane LVEF 52%      LVEF  50-55% (borderline)     [facility]    [id number]  [id number]  [id number]  [id number]^LEONARDO^JORY^RAF     [facility] [place]  Echocardiography Laboratory  [address] [place] [address]     Name: AMADEO HAYNES  MRN: [id number]  : 1950  Study Date: 2024 04:06 PM  Age: 74 yrs  Gender: Male  Patient Location: [facility], [place]  Reason For Study: Syncope, SOB  Ordering Physician: JORY PATTERSON  Performed By: Georgette Zuleta     BSA: 2.6 m2  Height: 78 in  Weight: 282 lb  HR: 55  BP: 101/66 mmHg  ______________________________________________________________________________  Procedure  Complete Portable Echo Adult.  ______________________________________________________________________________  Interpretation Summary  Left ventricular function is decreased. The ejection fraction is 50-55%  (borderline).  Right ventricular function, chamber size, wall motion, and thickness are  normal.  No significant valvular abnormalities present.  Ascending Aorta dilatation is present. Ascending aorta measures 4.3 cm.  Dilation of the inferior vena cava is present with normal respiratory  variation in diameter. IVC diameter and respiratory changes fall into an  intermediate range suggesting an RA pressure of 8 mmHg.  No pericardial effusion is present.     This study was compared with the study from 2023. No significant changes.  ______________________________________________________________________________  Left Ventricle  Left ventricular wall thickness is normal. Biplane LVEF is 52%. Left  ventricular diastolic function is normal. Left ventricular function is  decreased. The ejection fraction is 50-55% (borderline). No regional wall  motion abnormalities are seen.     Right Ventricle  Right ventricular function, chamber size, wall motion, and thickness are  normal.     Atria  Both atria appear normal.     Mitral Valve  The mitral valve is normal. On Doppler interrogation, there is no  significant  stenosis or regurgitation.     Aortic Valve  The aortic valve is tricuspid. On Doppler interrogation, there is no  significant stenosis or regurgitation.     Tricuspid Valve  The tricuspid valve is normal. The peak velocity of the tricuspid regurgitant  jet is not obtainable. Pulmonary artery systolic pressure cannot be assessed.     Vessels  The aorta root is normal. Ascending Aorta dilatation is present. Ascending  aorta 4.3 cm. Dilation of the inferior vena cava is present with normal  respiratory variation in diameter. IVC diameter and respiratory changes fall  into an intermediate range suggesting an RA pressure of 8 mmHg.     Pericardium  No pericardial effusion is present.     Miscellaneous  No significant valvular abnormalities present.     Compared to Previous Study  This study was compared with the study from 2023 .  ______________________________________________________________________________  MMode/2D Measurements & Calculations     IVSd: 1.1 cm  LVIDd: 5.4 cm  LVIDs: 3.8 cm  LVPWd: 1.0 cm  FS: 29.0 %  LV mass(C)d: 226.9 grams  LV mass(C)dI: 86.9 grams/m2  Ao root diam: 3.2 cm  asc Aorta Diam: 4.3 cm  LVOT diam: 2.3 cm  LVOT area: 4.2 cm2  Ao root diam index Ht(cm/m): 1.6  Ao root diam index BSA (cm/m2): 1.2  Asc Ao diam index BSA (cm/m2): 1.6  Asc Ao diam index Ht(cm/m): 2.2  EF Biplane: 52.0 %  LA Volume (BP): 67.1 ml     LA Volume Index (BP): 25.7 ml/m2  RV Base: 4.0 cm  RWT: 0.37  TAPSE: 2.4 cm     Doppler Measurements & Calculations  MV E max wayne: 53.9 cm/sec  MV A max wayne: 69.8 cm/sec  MV E/A: 0.77  MV dec slope: 195.0 cm/sec2  MV dec time: 0.28 sec  Ao V2 max: 164.6 cm/sec  Ao max PG: 10.8 mmHg  Ao V2 mean: 95.3 cm/sec  Ao mean P.7 mmHg  Ao V2 VTI: 33.7 cm  PA acc time: 0.10 sec  E/E' av.8  Lateral E/e': 7.3  Medial E/e': 8.4     RV S Wayne: 11.2 cm/sec     ______________________________________________________________________________  Report approved by: Dr Holliday  Ag 09/24/2024 05:02 PM         CBC with platelets differential     Status: Abnormal    Narrative    The following orders were created for panel order CBC with platelets differential.  Procedure                               Abnormality         Status                     ---------                               -----------         ------                     CBC with platelets and d...[[id number]]  Abnormal            Final result                 Please view results for these tests on the individual orders.

## 2024-09-24 NOTE — ED TRIAGE NOTES
To triage after RRT at dental clinic  Acute onset dizziness this afternoon while riding the escalator, no fall     Triage Assessment (Adult)       Row Name 09/24/24 1413          Triage Assessment    Airway WDL WDL        Respiratory WDL    Respiratory WDL WDL        Skin Circulation/Temperature WDL    Skin Circulation/Temperature WDL WDL        Cardiac WDL    Cardiac WDL WDL        Peripheral/Neurovascular WDL    Peripheral Neurovascular WDL WDL        Cognitive/Neuro/Behavioral WDL    Cognitive/Neuro/Behavioral WDL WDL

## 2024-09-24 NOTE — PATIENT INSTRUCTIONS
September 24, 2024    Thank you for allowing our Cardiology team to participate in your care.     Please note the following changes to your heart treatment plan:     Medication changes:   - none    Tests to be done:  - TTE (heart ultrasound)    Follow up:  - Follow up as needed      For scheduling, please call [phone].      Please contact our team through iTOK or our Nurse Team Voicemail service [phone], or the General Clinic [phone] for any questions or concerns.     If you are having a medical emergency, please call [phone].     Sincerely,    Kevin Garcia MD, FACC  Cardiology    [facility] [place]

## 2024-09-24 NOTE — PROGRESS NOTES
Cardiology Clinic Progress Note:    September 24, 2024   Patient Name: Federico Chamberlain  Patient MRN: [id number]     Consult indication: dilated aorta, CAD    HPI:    I had the opportunity to see patient Federico Chamberlain in cardiology clinic for a follow up visit. Patient is followed by our colleague Luis Alberto Aponte MD with Primary Care.     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     Patient was hospitalized 12/2022 with severe sepsis and bacteremia, rhabdomyolysis secondary to frostbite, TTE demonstrated LVEF 50% with regional wall motion abnormalities.  Troponin was mildly elevated.  Initial ischemic evaluation was deferred in favor of management of acute medical problems, in the absence of chest pain.  After recovery, subsequent nuclear stress test demonstrated mild ischemia in the inferior and inferolateral wall segments.      Coronary angiogram 2/17/2023 demonstrated moderate stenosis of the mid RCA 50% lesion, not hemodynamically significant by FFR, mid left circumflex lesion 50% OM1 IFR 1.0.      TTE 9/24/2023 demonstrated LVEF 50 to 55%, normal RV function, no significant valvular abnormalities.  Rhythm was noted to be atrial flutter, on personal review it does not appear to be atrial flutter.  ECG 9/23/2023 demonstrated sinus rhythm with first-degree AV block, right bundle branch block, left anterior fascicular block.    Since then, patient was assessed with a TTE through [facility] that reported ascending aorta 4.7 cm.    Patient is then assessed with a CTA chest, abdomen, pelvis that demonstrated ascending aorta is 4.4 cm, similar to prior CT 9/2023, however it did demonstrate new masses in the chest and abdomen.  Patient then  was seen in the ED later that day with syncope, troponin was borderline elevated and flat in trajectory, MRI negative for evidence of stroke however he did have severe stenosis of the A3 segment on the right.  He was administered IV fluids with improvement of his symptoms.  Suspect this elevated troponin was from demand ischemia.  He denies symptoms concerning for angina, and recent coronary angiogram demonstrated nonobstructive CAD.      Patient was then diagnosed with lymphoma for which he has started chemotherapy with Bendamustine (28D:1,2) Rituximab (28D:1) with Pegfilgrastim (28D:3).     Patient reports that overall he feels well.  He denies any exertional chest pain, chest pressure, abnormal shortness of breath.  No abnormal lower extremity swelling.    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 No angina.   # First degree AFB, RBBB, LAFB, chronic, No high-grade AV block on cardiac event monitor.  Denies symptoms concerning for symptomatic bradycardia  # Prolonged QTc. Stable. At upper limits of normal when corrected for RBBB.   # Borderline ascending aorta 4.4cm, near upper limits of normal for size (BSA 2.7m2)  # Lymphoma, on Bendamustine (28D:1,2) Rituximab (28D:1) with Pegfilgrastim (28D:3)    -Overall patient is stable from a cardiac perspective without symptoms concerning for decompensated heart failure, angina, symptomatic bradycardia/arrhythmia  - Reviewed cardiac diagnostic studies in detail, ascending aorta near upper limits of normal at 4.4 cm on CT  - Will recheck a TTE now that he started chemotherapy  - Recommend repeat TTE every 2 to 3 years through PCP for surveillance of the aorta  - Continue current cardiac regimen  - Reasonable to continue avoiding QT prolonging medications as able, though QTc interval is at the upper limits of normal when corrected for right bundle branch block  - Follow-up in cardiology clinic as  needed      Thank you for allowing our team to participate in the care of Federico Chamberlain.  Please do not hesitate to call or page me with any questions or concerns.    Sincerely,     Kevin Garcia MD, [facility]  Cardiology  Text Page   September 24, 2024    Voice recognition software utilized.     Total time spent on this encounter today: Greater than 40 minutes, providing care in this encounter including, but not limited to, reviewing prior medical records, laboratory data, imaging studies, diagnostic studies, procedure notes, formulating an assessment and plan, recommendations, discussion and counseling with patient face to face, dictation.    Past Medical History:   The ASCVD Risk score (Maria Antonia DK, et al., 2019) failed to calculate for the following reasons:    The patient has a prior MI or stroke diagnosis  Past Medical History:   Diagnosis Date    Hypercholesterolemia     Hypertension         Past Surgical History:   Past Surgical History:   Procedure Laterality Date    APPENDECTOMY      BACK SURGERY      CV CORONARY ANGIOGRAM N/A 2/17/2023    Procedure: Coronary Angiogram RADIAL ACCESS;  Surgeon: Malou Mazariegos MD;  Location:  HEART CARDIAC CATH LAB    CV FRACTIONAL FLOW RATIO WIRE N/A 2/17/2023    Procedure: Fractional Flow Ratio Wire;  Surgeon: Malou Mazariegos MD;  Location:  HEART CARDIAC CATH LAB    CV INSTANTANEOUS WAVE-FREE RATIO N/A 2/17/2023    Procedure: Instantaneous Wave-Free Ratio;  Surgeon: Malou Mazariegos MD;  Location:  HEART CARDIAC CATH LAB    CV LEFT HEART CATH N/A 2/17/2023    Procedure: Left Heart Catheterization;  Surgeon: Malou Mazariegos MD;  Location:  HEART CARDIAC CATH LAB    IR LUMBAR PUNCTURE  9/13/2019    IR LUMBAR PUNCTURE  10/25/2019    IR LUMBAR PUNCTURE  9/22/2022    IR LUMBAR PUNCTURE  11/3/2022    ORTHOPEDIC SURGERY         Medications (outpatient):  Current Outpatient Medications   Medication Sig Dispense Refill    acetaminophen (TYLENOL) 325 MG  tablet Take 325-650 mg by mouth every 6 hours as needed for mild pain.      aspirin 81 MG EC tablet Take 81 mg by mouth daily      atorvastatin (LIPITOR) 40 MG tablet Take 40 mg by mouth daily      chlorthalidone (HYGROTON) 25 MG tablet Take 1 tablet (25 mg) by mouth daily 90 tablet 2    furosemide (LASIX) 20 MG tablet Take 1 tablet (20 mg) by mouth daily 90 tablet 2    gabapentin (NEURONTIN) 800 MG tablet Take 800 mg by mouth 3 times daily      isosorbide mononitrate (IMDUR) 30 MG 24 hr tablet Take 1 tablet (30 mg) by mouth daily 135 tablet 3    lisinopril (ZESTRIL) 40 MG tablet Take 1 tablet (40 mg) by mouth daily 90 tablet 3    magnesium oxide (MAG-OX) 400 MG tablet Take 1 tablet (400 mg) by mouth daily 90 tablet 3    metFORMIN (GLUCOPHAGE) 500 MG tablet Take 500 mg by mouth daily      metoprolol succinate ER (TOPROL XL) 100 MG 24 hr tablet Take 1 tablet (100 mg) by mouth daily 90 tablet 3    nitroGLYcerin (NITROSTAT) 0.4 MG sublingual tablet For chest pain place 1 tablet under the tongue every 5 minutes for 3 doses. If symptoms persist 5 minutes after 1st dose call 911. 25 tablet 3    potassium chloride ER (KLOR-CON M) 10 MEQ CR tablet Take 20 mEq by mouth daily      pramipexole (MIRAPEX) 1 MG tablet Take 3 mg by mouth At Bedtime      semaglutide (OZEMPIC, 1 MG/DOSE,) 2 MG/1.5ML pen Inject 0.5 mg Subcutaneous every 7 days On Fridays      VITAMIN D, CHOLECALCIFEROL, PO Take 4,000 Units by mouth daily      cephALEXin (KEFLEX) 500 MG capsule Take 1 capsule (500 mg) by mouth 4 times daily 40 capsule 0    diclofenac (VOLTAREN) 1 % topical gel Apply 2 g topically every 6 hours as needed for moderate pain Relabel for home  ise 50 g 0    ferrous sulfate (FEROSUL) 325 (65 Fe) MG tablet Take 325 mg by mouth daily (with breakfast)         Allergies:  No Known Allergies    Social History:   History   Drug Use No      History   Smoking Status    Former    Types: Cigarettes   Smokeless Tobacco    Never     Social History     "Substance and Sexual Activity      Alcohol use: Yes        Comment: Occ, 1-2 beers a month       Family History:  History reviewed. No pertinent family history.    Review of Systems:   A complete review of systems was negative except as mentioned in the History of Present Illness.     Objective & Physical Exam:  /76 (BP Location: Right arm, Patient Position: Sitting, Cuff Size: Adult Regular)   Pulse 60   Ht 1.981 m (6' 6\")   Wt 128.2 kg (282 lb 11.2 oz)   SpO2 97%   BMI 32.67 kg/m    Wt Readings from Last 2 Encounters:   09/24/24 128.2 kg (282 lb 11.2 oz)   08/01/24 131.3 kg (289 lb 7.4 oz)     Body mass index is 32.67 kg/m .   Body surface area is 2.66 meters squared.    Constitutional: appears stated age, in no apparent distress, appears to be well nourished  Pulmonary: clear to auscultation bilaterally, no wheezes, no rales, no increased work of breathing  Cardiovascular: JVP normal, regular rate, regular rhythm, normal S1 and S2, no S3, S4, no murmur appreciated, no lower extremity edema      Data reviewed:  Lab Results   Component Value Date    WBC 8.1 08/01/2024    WBC 8.1 08/04/2020    RBC 4.91 08/01/2024    RBC 5.04 08/04/2020    HGB 14.2 08/01/2024    HGB 14.6 08/04/2020    HCT 42.8 08/01/2024    HCT 44.8 08/04/2020    MCV 87 08/01/2024    MCV 89 08/04/2020    MCH 28.9 08/01/2024    MCH 29.0 08/04/2020    MCHC 33.2 08/01/2024    MCHC 32.6 08/04/2020    RDW 13.6 08/01/2024    RDW 13.8 08/04/2020     08/01/2024     08/04/2020     Sodium   Date Value Ref Range Status   08/01/2024 139 135 - 145 mmol/L Final   08/04/2020 143 133 - 144 mmol/L Final     Potassium   Date Value Ref Range Status   08/01/2024 4.0 3.4 - 5.3 mmol/L Final   01/16/2023 4.3 3.4 - 5.3 mmol/L Final   08/04/2020 3.2 (L) 3.4 - 5.3 mmol/L Final     Chloride   Date Value Ref Range Status   08/01/2024 99 98 - 107 mmol/L Final   01/16/2023 106 94 - 109 mmol/L Final   08/04/2020 108 94 - 109 mmol/L Final     Carbon Dioxide "   Date Value Ref Range Status   08/04/2020 28 20 - 32 mmol/L Final     Carbon Dioxide (CO2)   Date Value Ref Range Status   08/01/2024 25 22 - 29 mmol/L Final   01/16/2023 30 20 - 32 mmol/L Final     Anion Gap   Date Value Ref Range Status   08/01/2024 15 7 - 15 mmol/L Final   01/16/2023 6 3 - 14 mmol/L Final   08/04/2020 7 3 - 14 mmol/L Final     Glucose   Date Value Ref Range Status   08/01/2024 109 (H) 70 - 99 mg/dL Final   01/16/2023 89 70 - 99 mg/dL Final   08/04/2020 106 (H) 70 - 99 mg/dL Final     GLUCOSE BY METER POCT   Date Value Ref Range Status   09/27/2023 105 (H) 70 - 99 mg/dL Final     Urea Nitrogen   Date Value Ref Range Status   08/01/2024 20.7 8.0 - 23.0 mg/dL Final   01/16/2023 13 7 - 30 mg/dL Final   08/04/2020 18 7 - 30 mg/dL Final     Creatinine   Date Value Ref Range Status   08/01/2024 0.92 0.67 - 1.17 mg/dL Final   08/04/2020 0.92 0.66 - 1.25 mg/dL Final     Creatinine POCT   Date Value Ref Range Status   08/01/2024 0.9 0.7 - 1.3 mg/dL Final     GFR Estimate   Date Value Ref Range Status   08/01/2024 88 >60 mL/min/1.73m2 Final     Comment:     eGFR calculated using 2021 CKD-EPI equation.   08/04/2020 85 >60 mL/min/[1.73_m2] Final     Comment:     Non  GFR Calc  Starting 12/18/2018, serum creatinine based estimated GFR (eGFR) will be   calculated using the Chronic Kidney Disease Epidemiology Collaboration   (CKD-EPI) equation.       GFR, ESTIMATED POCT   Date Value Ref Range Status   08/01/2024 >60 >60 mL/min/1.73m2 Final     Calcium   Date Value Ref Range Status   08/01/2024 9.0 8.8 - 10.4 mg/dL Final     Comment:     Reference intervals for this test were updated on 7/16/2024 to reflect our healthy population more accurately. There may be differences in the flagging of prior results with similar values performed with this method. Those prior results can be interpreted in the context of the updated reference intervals.   08/04/2020 8.6 8.5 - 10.1 mg/dL Final     Bilirubin  Total   Date Value Ref Range Status   08/01/2024 0.7 <=1.2 mg/dL Final   07/02/2018 0.6 0.2 - 1.3 mg/dL Final     Alkaline Phosphatase   Date Value Ref Range Status   08/01/2024 79 40 - 150 U/L Final   07/02/2018 66 40 - 150 U/L Final     ALT   Date Value Ref Range Status   08/01/2024 23 0 - 70 U/L Final   07/02/2018 23 0 - 70 U/L Final     AST   Date Value Ref Range Status   08/01/2024 26 0 - 45 U/L Final   07/02/2018 15 0 - 45 U/L Final     Recent Labs   Lab Test 07/02/18  0636   CHOL 92   HDL 32*   LDL <1   TRIG 297*      Lab Results   Component Value Date    A1C 5.9 09/24/2023    A1C 6.0 12/31/2022    A1C 6.0 07/08/2018    A1C 6.1 07/01/2018

## 2024-09-24 NOTE — LETTER
9/24/2024    Luis Alberto Aponte MD  [facility] [address]   [place] [address]    RE: Federico Schneiderman       Dear Colleague,     I had the pleasure of seeing [facility]lain in the [facility].      Cardiology Clinic Progress Note:    September 24, 2024   Patient Name: Federico Chamberlain  Patient MRN: [id number]     Consult indication: dilated aorta, CAD    HPI:    I had the opportunity to see patient Federico Chamberlain in cardiology clinic for a follow up visit. Patient is followed by our colleague Luis Alberto Aponte MD with Primary Care.     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     Patient was hospitalized 12/2022 with severe sepsis and bacteremia, rhabdomyolysis secondary to frostbite, TTE demonstrated LVEF 50% with regional wall motion abnormalities.  Troponin was mildly elevated.  Initial ischemic evaluation was deferred in favor of management of acute medical problems, in the absence of chest pain.  After recovery, subsequent nuclear stress test demonstrated mild ischemia in the inferior and inferolateral wall segments.      Coronary angiogram 2/17/2023 demonstrated moderate stenosis of the mid RCA 50% lesion, not hemodynamically significant by FFR, mid left circumflex lesion 50% OM1 IFR 1.0.      TTE 9/24/2023 demonstrated LVEF 50 to 55%, normal RV function, no significant valvular abnormalities.  Rhythm was noted to be atrial flutter, on personal review it does not appear to be atrial flutter.  ECG 9/23/2023 demonstrated sinus rhythm with first-degree AV block, right bundle branch block, left anterior fascicular block.    Since then, [facility]  a[facility] 4.7 cm.    Patient is then assessed with a CTA chest, abdomen, pelvis that demonstrated ascending aorta is 4.4 cm, similar to prior CT 9/2023, however it did demonstrate new masses in the chest and abdomen.  Patient then was seen in the ED later that day with syncope, troponin was borderline elevated and flat in trajectory, MRI negative for evidence of stroke however he did have severe stenosis of the A3 segment on the right.  He was administered IV fluids with improvement of his symptoms.  Suspect this elevated troponin was from demand ischemia.  He denies symptoms concerning for angina, and recent coronary angiogram demonstrated nonobstructive CAD.      Patient was then diagnosed with lymphoma for which he has started chemotherapy with Bendamustine (28D:1,2) Rituximab (28D:1) with Pegfilgrastim (28D:3).     Patient reports that overall he feels well.  He denies any exertional chest pain, chest pressure, abnormal shortness of breath.  No abnormal lower extremity swelling.    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 No angina.   # First degree AFB, RBBB, LAFB, chronic, No high-grade AV block on cardiac event monitor.  Denies symptoms concerning for symptomatic bradycardia  # Prolonged QTc. Stable. At upper limits of normal when corrected for RBBB.   # Borderline ascending aorta 4.4cm, near upper limits of normal for size (BSA 2.7m2)  # Lymphoma, on Bendamustine (28D:1,2) Rituximab (28D:1) with Pegfilgrastim (28D:3)    -Overall patient is stable from a cardiac perspective without symptoms concerning for decompensated heart failure, angina, symptomatic bradycardia/arrhythmia  - Reviewed cardiac diagnostic studies in detail, ascending aorta near upper limits of normal at 4.4 cm on CT  - Will recheck a TTE now that he started chemotherapy  - Recommend repeat TTE every 2 to 3 years through PCP for surveillance of the aorta  - Continue  current cardiac regimen  - Reasonable to continue avoiding QT prolonging medications as able, though QTc interval is at the upper limits of normal when corrected for right bundle branch block  - Follow-up in cardiology clinic as needed      Thank you for allowing our team to participate in the care of Federico Chamberlain.  Please do not hesitate to call or page me with any questions or concerns.    Sincerely,     Kevin Garcia MD, [facility]  Cardiology  Text Page   September 24, 2024    Voice recognition software utilized.     Total time spent on this encounter today: Greater than 40 minutes, providing care in this encounter including, but not limited to, reviewing prior medical records, laboratory data, imaging studies, diagnostic studies, procedure notes, formulating an assessment and plan, recommendations, discussion and counseling with patient face to face, dictation.    Past Medical History:   The ASCVD Risk score (Maria Antonia YAÑEZ, et al., 2019) failed to calculate for the following reasons:    The patient has a prior MI or stroke diagnosis  Past Medical History:   Diagnosis Date     Hypercholesterolemia      Hypertension         Past Surgical History:   Past Surgical History:   Procedure Laterality Date     APPENDECTOMY       BACK SURGERY       CV CORONARY ANGIOGRAM N/A 2/17/2023    Procedure: Coronary Angiogram RADIAL ACCESS;  Surgeon: Malou Mazariegos MD;  Location:  HEART CARDIAC CATH LAB     CV FRACTIONAL FLOW RATIO WIRE N/A 2/17/2023    Procedure: Fractional Flow Ratio Wire;  Surgeon: Malou Mazariegos MD;  Location: [facility] CARDIAC CATH LAB     CV INSTANTANEOUS WAVE-FREE RATIO N/A 2/17/2023    Procedure: Instantaneous Wave-Free Ratio;  Surgeon: Malou Mazariegos MD;  Location: [facility] CARDIAC CATH LAB     CV LEFT HEART CATH N/A 2/17/2023    Procedure: Left Heart Catheterization;  Surgeon: Malou Mazariegos MD;  Location:  HEART CARDIAC CATH LAB     IR LUMBAR PUNCTURE  9/13/2019     IR LUMBAR  PUNCTURE  10/25/2019     IR LUMBAR PUNCTURE  9/22/2022     IR LUMBAR PUNCTURE  11/3/2022     ORTHOPEDIC SURGERY         Medications (outpatient):  Current Outpatient Medications   Medication Sig Dispense Refill     acetaminophen (TYLENOL) 325 MG tablet Take 325-650 mg by mouth every 6 hours as needed for mild pain.       aspirin 81 MG EC tablet Take 81 mg by mouth daily       atorvastatin (LIPITOR) 40 MG tablet Take 40 mg by mouth daily       chlorthalidone (HYGROTON) 25 MG tablet Take 1 tablet (25 mg) by mouth daily 90 tablet 2     furosemide (LASIX) 20 MG tablet Take 1 tablet (20 mg) by mouth daily 90 tablet 2     gabapentin (NEURONTIN) 800 MG tablet Take 800 mg by mouth 3 times daily       isosorbide mononitrate (IMDUR) 30 MG 24 hr tablet Take 1 tablet (30 mg) by mouth daily 135 tablet 3     lisinopril (ZESTRIL) 40 MG tablet Take 1 tablet (40 mg) by mouth daily 90 tablet 3     magnesium oxide (MAG-OX) 400 MG tablet Take 1 tablet (400 mg) by mouth daily 90 tablet 3     metFORMIN (GLUCOPHAGE) 500 MG tablet Take 500 mg by mouth daily       metoprolol succinate ER (TOPROL XL) 100 MG 24 hr tablet Take 1 tablet (100 mg) by mouth daily 90 tablet 3     nitroGLYcerin (NITROSTAT) 0.4 MG sublingual tablet For chest pain place 1 tablet under the tongue every 5 minutes for 3 doses. If symptoms persist 5 minutes after 1st dose call 911. 25 tablet 3     potassium chloride ER (KLOR-CON M) 10 MEQ CR tablet Take 20 mEq by mouth daily       pramipexole (MIRAPEX) 1 MG tablet Take 3 mg by mouth At Bedtime       semaglutide (OZEMPIC, 1 MG/DOSE,) 2 MG/1.5ML pen Inject 0.5 mg Subcutaneous every 7 days On Fridays       VITAMIN D, CHOLECALCIFEROL, PO Take 4,000 Units by mouth daily       cephALEXin (KEFLEX) 500 MG capsule Take 1 capsule (500 mg) by mouth 4 times daily 40 capsule 0     diclofenac (VOLTAREN) 1 % topical gel Apply 2 g topically every 6 hours as needed for moderate pain Relabel for home  ise 50 g 0     ferrous sulfate  "(FEROSUL) 325 (65 Fe) MG tablet Take 325 mg by mouth daily (with breakfast)         Allergies:  No Known Allergies    Social History:   History   Drug Use No      History   Smoking Status     Former     Types: Cigarettes   Smokeless Tobacco     Never     Social History    Substance and Sexual Activity      Alcohol use: Yes        Comment: Occ, 1-2 beers a month       Family History:  History reviewed. No pertinent family history.    Review of Systems:   A complete review of systems was negative except as mentioned in the History of Present Illness.     Objective & Physical Exam:  /76 (BP Location: Right arm, Patient Position: Sitting, Cuff Size: Adult Regular)   Pulse 60   Ht 1.981 m (6' 6\")   Wt 128.2 kg (282 lb 11.2 oz)   SpO2 97%   BMI 32.67 kg/m    Wt Readings from Last 2 Encounters:   09/24/24 128.2 kg (282 lb 11.2 oz)   08/01/24 131.3 kg (289 lb 7.4 oz)     Body mass index is 32.67 kg/m .   Body surface area is 2.66 meters squared.    Constitutional: appears stated age, in no apparent distress, appears to be well nourished  Pulmonary: clear to auscultation bilaterally, no wheezes, no rales, no increased work of breathing  Cardiovascular: JVP normal, regular rate, regular rhythm, normal S1 and S2, no S3, S4, no murmur appreciated, no lower extremity edema      Data reviewed:  Lab Results   Component Value Date    WBC 8.1 08/01/2024    WBC 8.1 08/04/2020    RBC 4.91 08/01/2024    RBC 5.04 08/04/2020    HGB 14.2 08/01/2024    HGB 14.6 08/04/2020    HCT 42.8 08/01/2024    HCT 44.8 08/04/2020    MCV 87 08/01/2024    MCV 89 08/04/2020    MCH 28.9 08/01/2024    MCH 29.0 08/04/2020    MCHC 33.2 08/01/2024    MCHC 32.6 08/04/2020    RDW 13.6 08/01/2024    RDW 13.8 08/04/2020     08/01/2024     08/04/2020     Sodium   Date Value Ref Range Status   08/01/2024 139 135 - 145 mmol/L Final   08/04/2020 143 133 - 144 mmol/L Final     Potassium   Date Value Ref Range Status   08/01/2024 4.0 3.4 - 5.3 " mmol/L Final   01/16/2023 4.3 3.4 - 5.3 mmol/L Final   08/04/2020 3.2 (L) 3.4 - 5.3 mmol/L Final     Chloride   Date Value Ref Range Status   08/01/2024 99 98 - 107 mmol/L Final   01/16/2023 106 94 - 109 mmol/L Final   08/04/2020 108 94 - 109 mmol/L Final     Carbon Dioxide   Date Value Ref Range Status   08/04/2020 28 20 - 32 mmol/L Final     Carbon Dioxide (CO2)   Date Value Ref Range Status   08/01/2024 25 22 - 29 mmol/L Final   01/16/2023 30 20 - 32 mmol/L Final     Anion Gap   Date Value Ref Range Status   08/01/2024 15 7 - 15 mmol/L Final   01/16/2023 6 3 - 14 mmol/L Final   08/04/2020 7 3 - 14 mmol/L Final     Glucose   Date Value Ref Range Status   08/01/2024 109 (H) 70 - 99 mg/dL Final   01/16/2023 89 70 - 99 mg/dL Final   08/04/2020 106 (H) 70 - 99 mg/dL Final     GLUCOSE BY METER POCT   Date Value Ref Range Status   09/27/2023 105 (H) 70 - 99 mg/dL Final     Urea Nitrogen   Date Value Ref Range Status   08/01/2024 20.7 8.0 - 23.0 mg/dL Final   01/16/2023 13 7 - 30 mg/dL Final   08/04/2020 18 7 - 30 mg/dL Final     Creatinine   Date Value Ref Range Status   08/01/2024 0.92 0.67 - 1.17 mg/dL Final   08/04/2020 0.92 0.66 - 1.25 mg/dL Final     Creatinine POCT   Date Value Ref Range Status   08/01/2024 0.9 0.7 - 1.3 mg/dL Final     GFR Estimate   Date Value Ref Range Status   08/01/2024 88 >60 mL/min/1.73m2 Final     Comment:     eGFR calculated using 2021 CKD-EPI equation.   08/04/2020 85 >60 mL/min/[1.73_m2] Final     Comment:     Non  GFR Calc  Starting 12/18/2018, serum creatinine based estimated GFR (eGFR) will be   calculated using the Chronic Kidney Disease Epidemiology Collaboration   (CKD-EPI) equation.       GFR, ESTIMATED POCT   Date Value Ref Range Status   08/01/2024 >60 >60 mL/min/1.73m2 Final     Calcium   Date Value Ref Range Status   08/01/2024 9.0 8.8 - 10.4 mg/dL Final     Comment:     Reference intervals for this test were updated on 7/16/2024 to reflect our healthy  population more accurately. There may be differences in the flagging of prior results with similar values performed with this method. Those prior results can be interpreted in the context of the updated reference intervals.   08/04/2020 8.6 8.5 - 10.1 mg/dL Final     Bilirubin Total   Date Value Ref Range Status   08/01/2024 0.7 <=1.2 mg/dL Final   07/02/2018 0.6 0.2 - 1.3 mg/dL Final     Alkaline Phosphatase   Date Value Ref Range Status   08/01/2024 79 40 - 150 U/L Final   07/02/2018 66 40 - 150 U/L Final     ALT   Date Value Ref Range Status   08/01/2024 23 0 - 70 U/L Final   07/02/2018 23 0 - 70 U/L Final     AST   Date Value Ref Range Status   08/01/2024 26 0 - 45 U/L Final   07/02/2018 15 0 - 45 U/L Final     Recent Labs   Lab Test 07/02/18  0636   CHOL 92   HDL 32*   LDL <1   TRIG 297*      Lab Results   Component Value Date    A1C 5.9 09/24/2023    A1C 6.0 12/31/2022    A1C 6.0 07/08/2018    A1C 6.1 07/01/2018          Thank you for allowing me to participate in the care of your patient.      Sincerely,     Kevin Garcia MD     [facility]  cc:   Kevin Garcia MD  [address], [address] [address]  [place] [id number]

## 2024-10-29 ENCOUNTER — TELEPHONE (OUTPATIENT)
Dept: CARDIOLOGY | Facility: CLINIC | Age: 74
End: 2024-10-29
Payer: COMMERCIAL

## 2024-10-29 NOTE — TELEPHONE ENCOUNTER
Message from patient. He saw Dr. Garcia on 9/24/2024 with a plan to check an echo due to chemo therapy. That is scheduled 10/30/2024.    Later that same day he ended up in the ED for an episode of dizziness and SOB at the dental office. He was treated for dehydration.  Echo was done that day.    Echo  Left ventricular function is decreased. The ejection fraction is 50-55% (borderline).  Right ventricular function, chamber size, wall motion, and thickness are normal.  No significant valvular abnormalities present.  Ascending Aorta dilatation is present. Ascending aorta measures 4.3 cm.  Dilation of the inferior vena cava is present with normal respiratory variation in diameter. IVC diameter and respiratory changes fall into an intermediate range suggesting an RA pressure of 8 mmHg.  No pericardial effusion is present.  This study was compared with the study from 9/24/2023. No significant changes.    1150 attempted to contact the patient to discuss canceling/postponing the echo planned for tomorrow. Asked if he was done with chemotherapy or if there are more treatments. Asked patient to call back to consider canceling tomorrow's echo.   Left message for patient to call back to Team 2 R.N.s @ [phone] 1330 spoke with patient. He would like to cancel tomorrow's echo.  Patient has completed 2 sessions of 6 for his chemotherapy. He goes in every 28 days. His last session will be 2/23/2025.    Message sent to scheduling to cancel for tomorrow  Will message Dr. Garcia to review

## 2024-10-30 NOTE — TELEPHONE ENCOUNTER
Kevin Garcia MD Anderson, Barbara E, RN; [facility] Heart Team 21 hour ago (9:28 AM)     Thanks for the heads up

## 2024-11-03 ENCOUNTER — HEALTH MAINTENANCE LETTER (OUTPATIENT)
Age: 74
End: 2024-11-03

## 2025-02-05 DIAGNOSIS — I10 ESSENTIAL HYPERTENSION: ICD-10-CM

## 2025-02-05 RX ORDER — METOPROLOL SUCCINATE 100 MG/1
100 TABLET, EXTENDED RELEASE ORAL DAILY
Qty: 90 TABLET | Refills: 1 | Status: SHIPPED | OUTPATIENT
Start: 2025-02-05

## 2025-02-13 ENCOUNTER — OFFICE VISIT (OUTPATIENT)
Dept: CARDIOLOGY | Facility: CLINIC | Age: 75
End: 2025-02-13
Payer: COMMERCIAL

## 2025-02-13 ENCOUNTER — LAB (OUTPATIENT)
Dept: LAB | Facility: CLINIC | Age: 75
End: 2025-02-13
Payer: COMMERCIAL

## 2025-02-13 VITALS
HEART RATE: 60 BPM | SYSTOLIC BLOOD PRESSURE: 101 MMHG | OXYGEN SATURATION: 98 % | DIASTOLIC BLOOD PRESSURE: 65 MMHG | HEIGHT: 78 IN | BODY MASS INDEX: 32.67 KG/M2

## 2025-02-13 DIAGNOSIS — I10 ESSENTIAL HYPERTENSION: ICD-10-CM

## 2025-02-13 DIAGNOSIS — I95.1 ORTHOSTATIC HYPOTENSION: Primary | ICD-10-CM

## 2025-02-13 DIAGNOSIS — I77.810 DILATATION OF THORACIC AORTA: ICD-10-CM

## 2025-02-13 DIAGNOSIS — R60.0 BILATERAL LOWER EXTREMITY EDEMA: ICD-10-CM

## 2025-02-13 RX ORDER — CHLORTHALIDONE 50 MG/1
1 TABLET ORAL
COMMUNITY
Start: 2024-08-13

## 2025-02-13 RX ORDER — PROCHLORPERAZINE MALEATE 5 MG/1
TABLET ORAL PRN
COMMUNITY
Start: 2024-11-17

## 2025-02-13 RX ORDER — SULFAMETHOXAZOLE AND TRIMETHOPRIM 800; 160 MG/1; MG/1
1 TABLET ORAL
COMMUNITY
Start: 2025-02-02

## 2025-02-13 RX ORDER — FUROSEMIDE 20 MG/1
20 TABLET ORAL DAILY
Qty: 90 TABLET | Refills: 2 | Status: SHIPPED | OUTPATIENT
Start: 2025-02-13

## 2025-02-13 RX ORDER — TRIAMCINOLONE ACETONIDE 1 MG/G
CREAM TOPICAL PRN
COMMUNITY
Start: 2024-12-19

## 2025-02-13 RX ORDER — ACYCLOVIR 400 MG/1
1 TABLET ORAL
COMMUNITY
Start: 2025-01-05
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
## 2025-02-13 NOTE — PATIENT INSTRUCTIONS
Today's Recommendations    Metoprolol XL 100mg is to be taken once a day   I agree with discontinuing the imdur   Continue to furosemide for now, I will check your labs today   For now we will chlorthalidone at 50mg daily in the afternoon, DO NOT TAKE THIS TODAY  Keep an eye on your blood pressure at home, bring it with you to your next visit  Continue all other medications without changes.  Please follow up with me in 1 month.    Please send a [employer] message or call [phone] to the RN team with questions or concerns.     Scheduling number [phone]  JENI Spain, CNP

## 2025-03-01 ENCOUNTER — HEALTH MAINTENANCE LETTER (OUTPATIENT)
Age: 75
End: 2025-03-01

## 2025-03-13 ENCOUNTER — OFFICE VISIT (OUTPATIENT)
Dept: CARDIOLOGY | Facility: CLINIC | Age: 75
End: 2025-03-13
Payer: COMMERCIAL

## 2025-03-13 VITALS
HEIGHT: 78 IN | HEART RATE: 62 BPM | WEIGHT: 267 LBS | BODY MASS INDEX: 30.89 KG/M2 | OXYGEN SATURATION: 100 % | DIASTOLIC BLOOD PRESSURE: 74 MMHG | SYSTOLIC BLOOD PRESSURE: 110 MMHG

## 2025-03-13 DIAGNOSIS — I25.10 CORONARY ARTERY DISEASE INVOLVING NATIVE CORONARY ARTERY OF NATIVE HEART WITHOUT ANGINA PECTORIS: ICD-10-CM

## 2025-03-13 DIAGNOSIS — I10 ESSENTIAL HYPERTENSION: ICD-10-CM

## 2025-03-13 DIAGNOSIS — Z92.21 STATUS POST ADMINISTRATION OF CARDIOTOXIC CHEMOTHERAPY: ICD-10-CM

## 2025-03-13 DIAGNOSIS — R60.9 EDEMA, UNSPECIFIED TYPE: ICD-10-CM

## 2025-03-13 DIAGNOSIS — I95.1 ORTHOSTATIC HYPOTENSION: Primary | Chronic | ICD-10-CM

## 2025-03-13 RX ORDER — CHLORTHALIDONE 50 MG/1
50 TABLET ORAL
Qty: 90 TABLET | Refills: 3 | Status: SHIPPED | OUTPATIENT
Start: 2025-03-13

## 2025-03-13 RX ORDER — METOPROLOL SUCCINATE 50 MG/1
50 TABLET, EXTENDED RELEASE ORAL 2 TIMES DAILY
Qty: 180 TABLET | Refills: 3 | Status: SHIPPED | OUTPATIENT
Start: 2025-03-13

## 2025-03-13 RX ORDER — POTASSIUM CHLORIDE 750 MG/1
10 TABLET, EXTENDED RELEASE ORAL
Qty: 36 TABLET | Refills: 3 | Status: SHIPPED | OUTPATIENT
Start: 2025-03-14

## 2025-03-13 NOTE — PATIENT INSTRUCTIONS
Today's Recommendations    Ok to split your metoprolol and take 50mg twice a day, this may help your dizziness  Add potassium 10mEQ, 1 tablet, three times a week-on the days you take your furosemide   We will check your potassium level in 1 month  Continue all other medications without changes.  Please follow up with Dr. Garcia in 6 months.    Please send a [employer] message or call [phone] to the RN team with questions or concerns.     Scheduling number [phone]  JENI Spain, CNP

## 2025-03-13 NOTE — LETTER
3/13/2025    Luis Alberto Aponte MD  [facility] [address]   [place] [address]    RE: Federico DAVENPORT Bulmaro       Dear Colleague,     I had the pleasure of seeing Federico Chamberlain in the [facility].  Cardiology Clinic Progress Note  Federico Chamberlain MRN# [id number]   YOB: 1950 Age: 74 year old   Primary Cardiologist: Dr. Garcia Reason for visit: Follow up medications             Assessment and Plan:     1.  Hypotension, resolved  -Likely 2/2 to inadvertent extra dosing of metoprolol XL and multidrug regimen with significant weight losses contributing to better blood pressure   -2/2025 Imdur discontinued    2.  Hx of NICM with recovered EF  -9/2024 TTE with LVEF 50-55%    3. First degree AFB, RBBB, LAFB, chronic  -No high grade AV block on edema monitor  -No symptoms concerning for symptomatic bradycardia    4. Coronary artery disease (Nonobstructive moderate proximal RCA and first marginal disease)  -2/2023 Lexiscan nuclear stress test ordered secondary to troponin elevation and hypokinesis during hospitalization 12/2022, result was abnormal with mild ischemia in the basal to mid inferior and inferolateral segments.  -2/2023 Coronary angiogram with no flow limiting stenosis   -No symptoms concerning for angina    5. Hypertension, controlled    5. Lymphoma->in remission   -Completed chemotherapy with Bendamustine, Rituximab with Pegfilgrastim     6. QT prolongation  -Avoid QT prolonging agents    7. TOBI, unable to tolerate CPAP  -Continues to have poor night time sleep, 3 hours    Federico's dizziness and orthostasis has improved following taking the reduced dose of metoprolol, furosemide, and discontinuation of his isosorbide.  His blood pressures have remained stable.  I reviewed his labs from care everywhere drawn 3/11/2025 which showed stable renal function with mild hypokalemia, which is chronic.  He is no longer taking his potassium supplement.  He is taking his furosemide 3 times  per week and I am adding back a 10 mEq potassium supplement on most days.  I will check his potassium level again in a month.  He is quite worried about his potassium dropping too low.  Will also change his metoprolol to twice per day, this should help with some of his dizziness by avoiding hypotension.  He does have some bilateral lower extremity edema and hemosiderin staining that has been present for several years.  He would like to be seen in the vein clinic to see if there are options other than compression and diuretics for management of this.  I replaced a referral today.  Will see him back in 6 months or sooner if needed.      Plan:  Change metoprolol XL 50mg BID  2.  Continue lisinopril 40 mg daily  3.  Continue furosemide 20 mg M, W, F   4.  Resume potassium 10mEQ three times weekly with furosemide  5.  Repeat transthoracic echo next year for reassessment of aortic dimensions through primary care provider  6.  Potassium level in 1 month  7.  Referral for vein clinic placed today  8.  Continue chlorthalidone 50mg daily     Follow up plan: Follow up with Dr. Garcia in 6 months        History of Presenting Illness:    Fedeirco Chamberlain is a very pleasant 74 year old male with a history of syncope, type II MI/NSTEMI, orthostasis, severe sepsis and right lower extremity cellulitis secondary to frostbite of the right great toe, hypertension, aortic dilation, QT prolongation, type 2 diabetes, hyperlipidemia, history of prostate cancer, obesity, and lymphoma (in complete remission per 3/2025 PET scan-on bactrim and acyclovir until 9/2025).    Patient was admitted with syncope 1/1/2023 in setting of severe sepsis and bacteremia, found to have rhabdomyolysis, mildly elevated troponin, and prolonged QT interval.  Mild elevated troponins most consistent with a type II MI, however TTE 12/20/2022 showed LVEF 50% with septal lateral hypokinesis.  He had a baseline abnormal conduction with bifascicular block and first-degree AV  block with an event monitor in place.  During his hospital stay he had frequent PVCs, short runs of paroxysmal SVT.  His metoprolol was increased to 50 mg twice daily.  Isordil, furosemide, and hydralazine were added.  His lisinopril, chlorthalidone, and spironolactone were all held due to rhabdomyolysis. His 30 day event monitor demonstrated sinus rhythm with IVCD, intermittent first-degree block, PVCs with no high-grade AV block    I first met Federico in January 2023 following his hospitalization. I ordered a Lexiscan nuclear stress test, given the hypokinesis seen on his hospital echocardiogram.     Lexiscan nuclear stress test was abnormal demonstrating a mild ischemia in the basal to mid inferior and inferolateral segments. He underwent a subsequent coronary angiogram was completed 2/17/2023 and showed moderate stenosis in the mid RCA and OM1, neither were flow-limiting by assessment with IFR.    When he saw Dr. Garcia in September 2024 he was feeling well from a cardiovascular standpoint.  He repeated a transthoracic echo as patient was on chemotherapy for lymphoma which revealed stable low normal ejection fraction of 50 to 50% with normal right ventricular function, size, no significant valvular abnormalities, ascending aorta measuring 4.3 cm.    When I saw Federico earlier this month to follow up on his edema, blood pressure and medication changes, he was having issues with unsteadiness and orthostasis. He was inadvertently taking his metoprolol twice per day. He was also on chlorthalidone 50mg and was taking as needed lasix. We reduced his metoprolol back to daily, discontinued his imdur, and held his chlorthalidone that day.     Patient is here today for a follow up pf his leg swelling, blood pressure, and medication changes.    Since we reduced the metoprolol the dizziness and orthostatic symptoms are better. Blood pressure is higher. He is currently taking the lasix 3 times a week. He has gained 5 lbs in a month,  "however he feels his diet is the main reason for weight gain. He feels bloated overall and his leg feels bigger, but doesn't necessarily look bigger.     He is on ozempic and has lost 65 lbs in the last 15 months.    Patient denies chest pain or chest tightness. Denies tachycardia or palpitations. Denies shortness of breath, orthopnea, or PND.     Labs from 3/11/25 sodium 143, potassium 3.4, BUN 20, creatinine 0.87, GFR greater than 60, hemoglobin 13.7, platelets 131, LFTs normal.    Blood pressure 110/74 and HR 62 in clinic today.         Recent Hospitalizations   As above        Social History      Social History     Socioeconomic History     Marital status:      Spouse name: Not on file     Number of children: Not on file     Years of education: Not on file     Highest education level: Not on file   Occupational History     Not on file   Tobacco Use     Smoking status: Former     Current packs/day: 0.00     Types: Cigarettes     Quit date: 1992     Years since quittin.1     Smokeless tobacco: Never   Substance and Sexual Activity     Alcohol use: Yes     Comment: Occ, 1-2 beers a month     Drug use: No     Sexual activity: Not on file   Other Topics Concern     Not on file   Social History Narrative     Not on file     Social Drivers of Health     Financial Resource Strain: Not on file   Food Insecurity: Not on file   Transportation Needs: Not on file   Physical Activity: Not on file   Stress: Not on file   Social Connections: Not on file   Interpersonal Safety: Not on file   Housing Stability: Not on file            Review of Systems:   Skin:        Eyes:       ENT:       Respiratory:  Negative    Cardiovascular:    Positive for, fatigue  Gastroenterology:      Genitourinary:       Musculoskeletal:       Neurologic:       Psychiatric:       Heme/Lymph/Imm:       Endocrine:              Physical Exam:   Vitals: /74   Pulse 62   Ht 1.981 m (6' 6\")   Wt 121.1 kg (267 lb)   SpO2 100%   " BMI 30.85 kg/m     Wt Readings from Last 4 Encounters:   03/13/25 121.1 kg (267 lb)   09/24/24 128.2 kg (282 lb 11.2 oz)   08/01/24 131.3 kg (289 lb 7.4 oz)   01/04/24 131.4 kg (289 lb 9.6 oz)     GEN: well nourished, in no acute distress.  HEENT:  Pupils equal, round. Sclerae nonicteric.   NECK: Supple, no masses appreciated. No JVD with patient supine  C/V:  Regular rate and rhythm, no murmur, rub or gallop.    RESP: Respirations are unlabored. Clear to auscultation bilaterally without wheezing, rales, or rhonchi.  GI: Abdomen soft, nontender.  EXTREM: trace Bilateral LE edema to just above the ankle, bilateral hemosiderin staining, left leg brace in place  NEURO: Alert and oriented, cooperative.  SKIN: Warm and dry.       Data:     LIPID RESULTS:  Lab Results   Component Value Date    CHOL 92 07/02/2018    HDL 32 (L) 07/02/2018    LDL <1 07/02/2018    TRIG 297 (H) 07/02/2018     LIVER ENZYME RESULTS:  Lab Results   Component Value Date    AST 21 09/24/2024    AST 15 07/02/2018    ALT 22 09/24/2024    ALT 23 07/02/2018     CBC RESULTS:  Lab Results   Component Value Date    WBC 10.9 09/24/2024    WBC 8.1 08/04/2020    RBC 4.58 09/24/2024    RBC 5.04 08/04/2020    HGB 13.5 09/24/2024    HGB 14.6 08/04/2020    HCT 40.9 09/24/2024    HCT 44.8 08/04/2020    MCV 89 09/24/2024    MCV 89 08/04/2020    MCH 29.5 09/24/2024    MCH 29.0 08/04/2020    MCHC 33.0 09/24/2024    MCHC 32.6 08/04/2020    RDW 13.5 09/24/2024    RDW 13.8 08/04/2020     09/24/2024     08/04/2020     BMP RESULTS:  Lab Results   Component Value Date     02/13/2025     08/04/2020    POTASSIUM 3.5 02/13/2025    POTASSIUM 4.3 01/16/2023    POTASSIUM 3.2 (L) 08/04/2020    CHLORIDE 104 02/13/2025    CHLORIDE 106 01/16/2023    CHLORIDE 108 08/04/2020    CO2 29 02/13/2025    CO2 30 01/16/2023    CO2 28 08/04/2020    ANIONGAP 11 02/13/2025    ANIONGAP 6 01/16/2023    ANIONGAP 7 08/04/2020    GLC 91 02/13/2025     (H) 09/27/2023     GLC 89 01/16/2023     (H) 08/04/2020    BUN 22.9 02/13/2025    BUN 13 01/16/2023    BUN 18 08/04/2020    CR 1.16 02/13/2025    CR 0.92 08/04/2020    GFRESTIMATED 67 02/13/2025    GFRESTIMATED >60 08/01/2024    GFRESTIMATED 85 08/04/2020    GFRESTBLACK >90 08/04/2020    LAURA 9.4 02/13/2025    LAURA 8.6 08/04/2020      A1C RESULTS:  Lab Results   Component Value Date    A1C 5.9 (H) 09/24/2023    A1C 6.0 (H) 07/08/2018     INR RESULTS:  Lab Results   Component Value Date    INR 1.07 09/24/2024    INR 0.97 02/17/2023    INR 0.97 07/08/2018    INR 0.98 11/09/2007            Medications     Current Outpatient Medications   Medication Sig Dispense Refill     acetaminophen (TYLENOL) 325 MG tablet Take 325-650 mg by mouth every 6 hours as needed for mild pain.       acyclovir (ZOVIRAX) 400 MG tablet Take 1 tablet by mouth 2 times daily.       aspirin 81 MG EC tablet Take 81 mg by mouth daily       atorvastatin (LIPITOR) 40 MG tablet Take 40 mg by mouth daily       chlorthalidone (HYGROTON) 50 MG tablet Take 1 tablet (50 mg) by mouth daily at 2 pm. 90 tablet 3     diclofenac (VOLTAREN) 1 % topical gel Apply 2 g topically every 6 hours as needed for moderate pain Relabel for home  ise 50 g 0     furosemide (LASIX) 20 MG tablet Take 1 tablet (20 mg) by mouth three times a week. 40 tablet 3     gabapentin (NEURONTIN) 800 MG tablet Take 800 mg by mouth 3 times daily       lisinopril (ZESTRIL) 40 MG tablet Take 1 tablet (40 mg) by mouth daily 90 tablet 3     magnesium oxide (MAG-OX) 400 MG tablet Take 1 tablet (400 mg) by mouth daily 90 tablet 3     metFORMIN (GLUCOPHAGE) 500 MG tablet Take 500 mg by mouth daily       metoprolol succinate ER (TOPROL XL) 50 MG 24 hr tablet Take 1 tablet (50 mg) by mouth 2 times daily. 180 tablet 3     [START ON 3/14/2025] potassium chloride ER (K-TAB/KLOR-CON) 10 MEQ CR tablet Take 1 tablet (10 mEq) by mouth three times a week. Take one tablet, 3 times per week with furosemide 36 tablet 3      pramipexole (MIRAPEX) 1 MG tablet Take 3 mg by mouth At Bedtime       semaglutide (OZEMPIC, 1 MG/DOSE,) 2 MG/1.5ML pen Inject 0.5 mg Subcutaneous every 7 days On Fridays       sulfamethoxazole-trimethoprim (BACTRIM DS) 800-160 MG tablet Take 1 tablet by mouth Every Mon, Wed, Fri Morning.       triamcinolone (KENALOG) 0.1 % external cream Apply topically as needed.       VITAMIN D, CHOLECALCIFEROL, PO Take 4,000 Units by mouth daily       nitroGLYcerin (NITROSTAT) 0.4 MG sublingual tablet For chest pain place 1 tablet under the tongue every 5 minutes for 3 doses. If symptoms persist 5 minutes after 1st dose call 911. (Patient not taking: Reported on 3/13/2025) 25 tablet 3     prochlorperazine (COMPAZINE) 5 MG tablet as needed. (Patient not taking: Reported on 3/13/2025)            Past Medical History     Past Medical History:   Diagnosis Date     Hypercholesterolemia      Hypertension      Past Surgical History:   Procedure Laterality Date     APPENDECTOMY       BACK SURGERY       CV CORONARY ANGIOGRAM N/A 2/17/2023    Procedure: Coronary Angiogram RADIAL ACCESS;  Surgeon: Malou Mazariegos MD;  Location:  [facility]     CV FRACTIONAL FLOW RATIO WIRE N/A 2/17/2023    Procedure: Fractional Flow Ratio Wire;  Surgeon: Malou Mazariegos MD;  Location:  [facility]     CV INSTANTANEOUS WAVE-FREE RATIO N/A 2/17/2023    Procedure: Instantaneous Wave-Free Ratio;  Surgeon: Malou Mazariegos MD;  Location:  [facility]     CV LEFT HEART CATH N/A 2/17/2023    Procedure: Left Heart Catheterization;  Surgeon: Malou Mazariegos MD;  Location:  [facility]     IR LUMBAR PUNCTURE  9/13/2019     IR LUMBAR PUNCTURE  10/25/2019     IR LUMBAR PUNCTURE  9/22/2022     IR LUMBAR PUNCTURE  11/3/2022     ORTHOPEDIC SURGERY       History reviewed. No pertinent family history.         Allergies   Patient has no known allergies.        Alana Cabello NP  [facility]  HEART CARE  Pager: [phone]      Thank you for allowing me to participate in the care of your patient.      Sincerely,     Alana Cabello NP     [facility]  cc:   Alana Cabello NP  [address]  [place] [address]

## 2025-04-03 ENCOUNTER — OFFICE VISIT (OUTPATIENT)
Dept: OTHER | Facility: CLINIC | Age: 75
End: 2025-04-03
Attending: INTERNAL MEDICINE
Payer: COMMERCIAL

## 2025-04-03 VITALS
DIASTOLIC BLOOD PRESSURE: 75 MMHG | SYSTOLIC BLOOD PRESSURE: 117 MMHG | HEART RATE: 60 BPM | BODY MASS INDEX: 30.28 KG/M2 | OXYGEN SATURATION: 97 % | WEIGHT: 262 LBS

## 2025-04-03 DIAGNOSIS — L97.529 ULCER OF TOE OF LEFT FOOT, UNSPECIFIED ULCER STAGE (H): ICD-10-CM

## 2025-04-03 DIAGNOSIS — E78.5 HYPERLIPIDEMIA LDL GOAL <70: ICD-10-CM

## 2025-04-03 DIAGNOSIS — R60.9 EDEMA, UNSPECIFIED TYPE: Primary | ICD-10-CM

## 2025-04-03 DIAGNOSIS — E11.9 TYPE 2 DIABETES, HBA1C GOAL < 7% (H): ICD-10-CM

## 2025-04-03 PROCEDURE — G0463 HOSPITAL OUTPT CLINIC VISIT: HCPCS | Performed by: INTERNAL MEDICINE

## 2025-04-03 NOTE — PROGRESS NOTES
INITIAL VASCULAR MEDICAL ASSESSMENT  REFERRAL SOURCE: Alana Cabello NP  REASON FOR CONSULT:  for edema.       S/P:      (R60.9) Edema, unspecified type  (primary encounter diagnosis)  Comment: This is most likely due to venous insufficiency. He has not worn compression hosiery. He would like surgical intervention.  Plan: US Venous Competency Bilateral        I explained surgical intervention is reserved for failure to control sxs after wearing compression hosiery for six months. Obtain the above, RTC one week later, Rx Sigvaris 20-30 mm Hg compression to the appropriate height at that time.     (L97.529) History of slowly healing ulcer of toe of left foot, unspecified ulcer stage (H)  Comment: He has a h/o DM2, non obstructive CAD. He has diminished LLE pulses and could easily have undxd PAD. Check the below with TBIS.   Plan: US CORNELL Doppler with Exercise Bilateral            (E11.9) Type 2 diabetes, HbA1c goal < 7% (H)  Comment: check the below.  Plan: Comprehensive metabolic panel, Hemoglobin A1c           (E78.5) Hyperlipidemia LDL goal <70  Comment: Check the below.   Plan: C-Reactive Protein, High Sensitivity, LipoFit         by NMR, Lipoprotein (a)               The longitudinal care of plan for Federico was addressed during this visit. Due to added complexity of care, we will continue to support Federico Chamberlain  and the subsequent management of these conditions and with ongoing continuity of care for these conditions.       90 minutes total medical care on today's date. This is the first time I am seeing the patietn. Extended time secondary to need for complete chart review, patietn assessment and explanation regarding pathophysiology of above disease states and plan to treat accordingly.         HPI:    Federico Chamberlain is an obese ( Body mass index is 30.28 kg/m . ) 74 year old male with a history of syncope,  MI/NSTEMI, orthostasis, severe sepsis and right lower extremity cellulitis secondary to frostbite of  the right great toe and slow to heal Lt great toe ulceration, hypertension, aortic dilation, QT prolongation, type 2 diabetes, hyperlipidemia, history of prostate cancer,  and lymphoma (in complete remission per 3/2025 PET scan-on bactrim and acyclovir until 9/2025).     Patient was admitted with syncope 1/1/2023 in setting of severe sepsis and bacteremia, found to have rhabdomyolysis, mildly elevated troponin, and prolonged QT interval.  Mild elevated troponins most consistent with a type II MI, however TTE 12/20/2022 showed LVEF 50% with septal lateral hypokinesis.  He had a baseline abnormal conduction with bifascicular block and first-degree AV block with an event monitor in place.  During his hospital stay he had frequent PVCs, short runs of paroxysmal SVT.  His metoprolol was increased to 50 mg twice daily.  Isordil, furosemide, and hydralazine were added.  His lisinopril, chlorthalidone, and spironolactone were all held due to rhabdomyolysis. His 30 day event monitor demonstrated sinus rhythm with IVCD, intermittent first-degree block, PVCs with no high-grade AV block     Lexiscan nuclear stress test was abnormal demonstrating a mild ischemia in the basal to mid inferior and inferolateral segments. He underwent a subsequent coronary angiogram was completed 2/17/2023 and showed moderate stenosis in the mid RCA and OM1, neither were flow-limiting by assessment with IFR.     When he saw Dr. Garcia in September 2024 he was feeling well from a cardiovascular standpoint.  He repeated a transthoracic echo as patient was on chemotherapy for lymphoma which revealed stable low normal ejection fraction of 50 to 50% with normal right ventricular function, size, no significant valvular abnormalities, ascending aorta measuring 4.3 cm.    He feels bloated overall and his left leg feels bigger, but doesn't necessarily look bigger.      He was referred to address venous insufficiency.     Review Of Systems  Skin: as above  Eyes:  negative  Ears/Nose/Throat: negative  Respiratory: No shortness of breath, dyspnea on exertion, cough, or hemoptysis  Cardiovascular: negative  Gastrointestinal: negative  Genitourinary: negative  Musculoskeletal: as above  Neurologic: negative  Psychiatric: negative  Hematologic/Lymphatic/Immunologic: negative  Endocrine: negative      PAST MEDICAL HISTORY:                  Past Medical History:   Diagnosis Date    Hypercholesterolemia     Hypertension        PAST SURGICAL HISTORY:                  Past Surgical History:   Procedure Laterality Date    APPENDECTOMY      BACK SURGERY      CV CORONARY ANGIOGRAM N/A 2/17/2023    Procedure: Coronary Angiogram RADIAL ACCESS;  Surgeon: Malou Mazariegos MD;  Location: [facility]    CV FRACTIONAL FLOW RATIO WIRE N/A 2/17/2023    Procedure: Fractional Flow Ratio Wire;  Surgeon: Malou Mazariegos MD;  Location:  [facility]ARD[facility] CATH LAB    CV INSTANTANEOUS WAVE-FREE RATIO N/A 2/17/2023    Procedure: Instantaneous Wave-Free Ratio;  Surgeon: Malou Mazariegos MD;  Location:  [facility]ARD[facility] CATH LAB    CV LEFT HEART CATH N/A 2/17/2023    Procedure: Left Heart Catheterization;  Surgeon: Malou Mazariegos MD;  Location:  [facility]ARD[facility] CATH LAB    IR LUMBAR PUNCTURE  9/13/2019    IR LUMBAR PUNCTURE  10/25/2019    IR LUMBAR PUNCTURE  9/22/2022    IR LUMBAR PUNCTURE  11/3/2022    ORTHOPEDIC SURGERY         CURRENT MEDICATIONS:                  Current Outpatient Medications   Medication Sig Dispense Refill    acetaminophen (TYLENOL) 325 MG tablet Take 325-650 mg by mouth every 6 hours as needed for mild pain.      acyclovir (ZOVIRAX) 400 MG tablet Take 1 tablet by mouth 2 times daily.      aspirin 81 MG EC tablet Take 81 mg by mouth daily      atorvastatin (LIPITOR) 40 MG tablet Take 40 mg by mouth daily      chlorthalidone (HYGROTON) 50 MG tablet Take 1 tablet (50 mg) by mouth daily at 2 pm. 90 tablet 3    diclofenac (VOLTAREN) 1 % topical gel Apply 2 g topically  every 6 hours as needed for moderate pain Relabel for home  ise 50 g 0    furosemide (LASIX) 20 MG tablet Take 1 tablet (20 mg) by mouth three times a week. 40 tablet 3    gabapentin (NEURONTIN) 800 MG tablet Take 800 mg by mouth 3 times daily      lisinopril (ZESTRIL) 40 MG tablet Take 1 tablet (40 mg) by mouth daily 90 tablet 3    magnesium oxide (MAG-OX) 400 MG tablet Take 1 tablet (400 mg) by mouth daily 90 tablet 3    metFORMIN (GLUCOPHAGE) 500 MG tablet Take 500 mg by mouth daily      metoprolol succinate ER (TOPROL XL) 50 MG 24 hr tablet Take 1 tablet (50 mg) by mouth 2 times daily. 180 tablet 3    nitroGLYcerin (NITROSTAT) 0.4 MG sublingual tablet For chest pain place 1 tablet under the tongue every 5 minutes for 3 doses. If symptoms persist 5 minutes after 1st dose call 911. (Patient not taking: Reported on 3/13/2025) 25 tablet 3    potassium chloride ER (K-TAB/KLOR-CON) 10 MEQ CR tablet Take 1 tablet (10 mEq) by mouth three times a week. Take one tablet, 3 times per week with furosemide 36 tablet 3    pramipexole (MIRAPEX) 1 MG tablet Take 3 mg by mouth At Bedtime      prochlorperazine (COMPAZINE) 5 MG tablet as needed. (Patient not taking: Reported on 3/13/2025)      semaglutide (OZEMPIC, 1 MG/DOSE,) 2 MG/1.5ML pen Inject 0.5 mg Subcutaneous every 7 days On Fridays      sulfamethoxazole-trimethoprim (BACTRIM DS) 800-160 MG tablet Take 1 tablet by mouth Every Mon, Wed, Fri Morning.      triamcinolone (KENALOG) 0.1 % external cream Apply topically as needed.      VITAMIN D, CHOLECALCIFEROL, PO Take 4,000 Units by mouth daily         ALLERGIES:                No Known Allergies    SOCIAL HISTORY:                  Social History     Socioeconomic History    Marital status:      Spouse name: Not on file    Number of children: Not on file    Years of education: Not on file    Highest education level: Not on file   Occupational History    Not on file   Tobacco Use    Smoking status: Former     Current  packs/day: 0.00     Types: Cigarettes     Quit date: 1992     Years since quittin.1    Smokeless tobacco: Never   Substance and Sexual Activity    Alcohol use: Yes     Comment: Occ, 1-2 beers a month    Drug use: No    Sexual activity: Not on file   Other Topics Concern    Not on file   Social History Narrative    Not on file     Social Drivers of Health     Financial Resource Strain: Not on file   Food Insecurity: Not on file   Transportation Needs: Not on file   Physical Activity: Not on file   Stress: Not on file   Social Connections: Not on file   Interpersonal Safety: Not on file   Housing Stability: Not on file       FAMILY HISTORY:                 No family history on file.      Physical exam Reveals:    O/P: WNL  HEENT: WNL  NECK: No JVD, thyromegaly, or lymphadenopathy  HEART: RRR, no murmurs, gallops, or rubs  LUNGS: CTA bilaterally without rales, wheezes, or rhonchi  GI: NABS, nondistended, nontender, soft  EXT:without cyanosis, clubbing; one plus LLE edema, CEAP C4 venous insufficiency  NEURO: nonfocal  : no flank tenderness    Lt DP:  1 plus palpable   Lt PT:   1 plus palpable          All relevant labs and imaging reviewed by myself on today's date.

## 2025-04-03 NOTE — PROGRESS NOTES
[facility]        Patient is here for a consult to discuss edema    Pt is currently taking Aspirin and Statin.    /79 (BP Location: Right arm, Patient Position: Sitting, Cuff Size: Adult Large)   Pulse 60   Wt 262 lb (118.8 kg)   SpO2 97%   BMI 30.28 kg/m      The provider has been notified that the patient has no concerns.     Questions patient would like addressed today are: N/A.    Refills are needed: N/A    Has homecare services and agency name:  No     Patient has been identified as a fall risk.    Interventions were completed as noted below:  [x]Exam tables/chairs remained in the lowest position.   []Patient remained in wheelchair.    []Provider requested patient in exam chair.  Patient required assist and use of transfer belt.  [x]Exam room door remained open unless with a provider.  []A bell provided to patient to notify staff if assistance was needed.  [x]Provider notified patient was identified as a fall risk.      Georgette Alfonso MA

## 2025-04-07 ENCOUNTER — TELEPHONE (OUTPATIENT)
Dept: OTHER | Facility: CLINIC | Age: 75
End: 2025-04-07
Payer: COMMERCIAL

## 2025-04-07 DIAGNOSIS — L97.529 ULCER OF TOE OF LEFT FOOT, UNSPECIFIED ULCER STAGE (H): ICD-10-CM

## 2025-04-07 DIAGNOSIS — R60.9 EDEMA, UNSPECIFIED TYPE: Primary | ICD-10-CM

## 2025-04-07 DIAGNOSIS — E11.9 TYPE 2 DIABETES, HBA1C GOAL < 7% (H): ICD-10-CM

## 2025-04-07 DIAGNOSIS — E78.5 HYPERLIPIDEMIA LDL GOAL <70: ICD-10-CM

## 2025-04-07 NOTE — TELEPHONE ENCOUNTER
Routing to scheduling to coordinate the following:    BLE venous competency US  CORNELL US  Fasting labs  IN person follow up 2 weeks after tests  Please schedule this next available     Appt note: Follow up to 4/3/25    Florinda LEMONS, RN    [facility]  [facility]ascular [facility]  Office: [phone]  Fax: [phone]

## 2025-04-07 NOTE — TELEPHONE ENCOUNTER
Left voicemail for patient to call back to schedule appointment(s), provided telephone number for patient to call back to schedule.      Georgette Alfonso MA

## 2025-04-11 ENCOUNTER — ANCILLARY PROCEDURE (OUTPATIENT)
Dept: ULTRASOUND IMAGING | Facility: CLINIC | Age: 75
End: 2025-04-11
Attending: INTERNAL MEDICINE
Payer: COMMERCIAL

## 2025-04-11 DIAGNOSIS — R60.9 EDEMA, UNSPECIFIED TYPE: ICD-10-CM

## 2025-04-11 PROCEDURE — 93970 EXTREMITY STUDY: CPT | Performed by: STUDENT IN AN ORGANIZED HEALTH CARE EDUCATION/TRAINING PROGRAM

## 2025-04-14 ENCOUNTER — LAB (OUTPATIENT)
Dept: LAB | Facility: CLINIC | Age: 75
End: 2025-04-14
Payer: COMMERCIAL

## 2025-04-14 DIAGNOSIS — E11.9 TYPE 2 DIABETES, HBA1C GOAL < 7% (H): ICD-10-CM

## 2025-04-14 DIAGNOSIS — E78.5 HYPERLIPIDEMIA LDL GOAL <70: ICD-10-CM

## 2025-04-14 DIAGNOSIS — I10 ESSENTIAL HYPERTENSION: ICD-10-CM

## 2025-04-14 LAB
ALBUMIN SERPL BCG-MCNC: 4.2 G/DL (ref 3.5–5.2)
ALP SERPL-CCNC: 90 U/L (ref 40–150)
ALT SERPL W P-5'-P-CCNC: 26 U/L (ref 0–70)
ANION GAP SERPL CALCULATED.3IONS-SCNC: 12 MMOL/L (ref 7–15)
APO A-I SERPL-MCNC: 22 MG/DL
AST SERPL W P-5'-P-CCNC: 31 U/L (ref 0–45)
BILIRUB SERPL-MCNC: 0.8 MG/DL
BUN SERPL-MCNC: 15.9 MG/DL (ref 8–23)
CALCIUM SERPL-MCNC: 9.3 MG/DL (ref 8.8–10.4)
CHLORIDE SERPL-SCNC: 106 MMOL/L (ref 98–107)
CREAT SERPL-MCNC: 0.9 MG/DL (ref 0.67–1.17)
CRP SERPL HS-MCNC: 0.27 MG/L
EGFRCR SERPLBLD CKD-EPI 2021: >90 ML/MIN/1.73M2
EST. AVERAGE GLUCOSE BLD GHB EST-MCNC: 100 MG/DL
GLUCOSE SERPL-MCNC: 78 MG/DL (ref 70–99)
HBA1C MFR BLD: 5.1 % (ref 0–5.6)
HCO3 SERPL-SCNC: 28 MMOL/L (ref 22–29)
POTASSIUM SERPL-SCNC: 3.3 MMOL/L (ref 3.4–5.3)
PROT SERPL-MCNC: 6.7 G/DL (ref 6.4–8.3)
SODIUM SERPL-SCNC: 146 MMOL/L (ref 135–145)

## 2025-04-14 PROCEDURE — 36415 COLL VENOUS BLD VENIPUNCTURE: CPT

## 2025-04-14 PROCEDURE — 83695 ASSAY OF LIPOPROTEIN(A): CPT

## 2025-04-14 PROCEDURE — 83704 LIPOPROTEIN BLD QUAN PART: CPT | Mod: 90

## 2025-04-14 PROCEDURE — 83036 HEMOGLOBIN GLYCOSYLATED A1C: CPT

## 2025-04-14 PROCEDURE — 80053 COMPREHEN METABOLIC PANEL: CPT

## 2025-04-14 PROCEDURE — 86141 C-REACTIVE PROTEIN HS: CPT

## 2025-04-14 PROCEDURE — 99000 SPECIMEN HANDLING OFFICE-LAB: CPT

## 2025-04-14 PROCEDURE — 80061 LIPID PANEL: CPT | Mod: 59

## 2025-04-16 ENCOUNTER — HOSPITAL ENCOUNTER (OUTPATIENT)
Dept: ULTRASOUND IMAGING | Facility: CLINIC | Age: 75
Discharge: HOME | End: 2025-04-16
Attending: INTERNAL MEDICINE
Payer: COMMERCIAL

## 2025-04-16 DIAGNOSIS — L97.529 ULCER OF TOE OF LEFT FOOT, UNSPECIFIED ULCER STAGE (H): ICD-10-CM

## 2025-04-16 PROCEDURE — 93924 LWR XTR VASC STDY BILAT: CPT

## 2025-04-17 ENCOUNTER — CARE COORDINATION (OUTPATIENT)
Dept: CARDIOLOGY | Facility: CLINIC | Age: 75
End: 2025-04-17
Payer: COMMERCIAL

## 2025-04-17 DIAGNOSIS — E87.6 HYPOKALEMIA: Primary | ICD-10-CM

## 2025-04-17 DIAGNOSIS — I10 ESSENTIAL HYPERTENSION: ICD-10-CM

## 2025-04-17 RX ORDER — POTASSIUM CHLORIDE 750 MG/1
TABLET, EXTENDED RELEASE ORAL
Qty: 72 TABLET | Refills: 3 | Status: SHIPPED | OUTPATIENT
Start: 2025-04-18

## 2025-04-19 LAB
CHOLEST SERPL-MCNC: 123 MG/DL
HDL SERPL QN: 8.7 NM
HDL SERPL-SCNC: 25.7 UMOL/L
HDLC SERPL-MCNC: 40 MG/DL
HLD.LARGE SERPL-SCNC: <2.8 UMOL/L
LDL SERPL QN: 20.6 NM
LDL SERPL-SCNC: 718 NMOL/L
LDL SMALL SERPL-SCNC: 478 NMOL/L
LDLC SERPL CALC-MCNC: 52 MG/DL
PATHOLOGY STUDY: ABNORMAL
TRIGL SERPL-MCNC: 154 MG/DL
VLDL LARGE SERPL-SCNC: 7.4 NMOL/L
VLDL SERPL QN: 54.1 NM

## 2025-04-28 ENCOUNTER — OFFICE VISIT (OUTPATIENT)
Dept: OTHER | Facility: CLINIC | Age: 75
End: 2025-04-28
Attending: INTERNAL MEDICINE
Payer: COMMERCIAL

## 2025-04-28 ENCOUNTER — TELEPHONE (OUTPATIENT)
Dept: OTHER | Facility: CLINIC | Age: 75
End: 2025-04-28

## 2025-04-28 VITALS
DIASTOLIC BLOOD PRESSURE: 77 MMHG | HEART RATE: 62 BPM | SYSTOLIC BLOOD PRESSURE: 119 MMHG | BODY MASS INDEX: 30.74 KG/M2 | OXYGEN SATURATION: 98 % | WEIGHT: 266 LBS

## 2025-04-28 DIAGNOSIS — I87.2 VENOUS (PERIPHERAL) INSUFFICIENCY: ICD-10-CM

## 2025-04-28 DIAGNOSIS — E78.5 HYPERLIPIDEMIA LDL GOAL <70: ICD-10-CM

## 2025-04-28 DIAGNOSIS — L97.529 ULCER OF TOE OF LEFT FOOT, UNSPECIFIED ULCER STAGE (H): ICD-10-CM

## 2025-04-28 DIAGNOSIS — I71.21 ANEURYSM OF ASCENDING AORTA WITHOUT RUPTURE: Primary | ICD-10-CM

## 2025-04-28 DIAGNOSIS — I10 ESSENTIAL HYPERTENSION: ICD-10-CM

## 2025-04-28 DIAGNOSIS — E11.9 TYPE 2 DIABETES, HBA1C GOAL < 7% (H): ICD-10-CM

## 2025-04-28 DIAGNOSIS — R60.9 EDEMA, UNSPECIFIED TYPE: ICD-10-CM

## 2025-04-28 PROCEDURE — G2211 COMPLEX E/M VISIT ADD ON: HCPCS | Performed by: INTERNAL MEDICINE

## 2025-04-28 PROCEDURE — G0463 HOSPITAL OUTPT CLINIC VISIT: HCPCS | Performed by: INTERNAL MEDICINE

## 2025-04-28 PROCEDURE — 3078F DIAST BP <80 MM HG: CPT | Performed by: INTERNAL MEDICINE

## 2025-04-28 PROCEDURE — 99215 OFFICE O/P EST HI 40 MIN: CPT | Performed by: INTERNAL MEDICINE

## 2025-04-28 PROCEDURE — 3074F SYST BP LT 130 MM HG: CPT | Performed by: INTERNAL MEDICINE

## 2025-04-28 RX ORDER — POTASSIUM CHLORIDE 750 MG/1
20 TABLET, EXTENDED RELEASE ORAL DAILY
Qty: 180 TABLET | Refills: 3 | Status: SHIPPED | OUTPATIENT
Start: 2025-04-28

## 2025-04-28 NOTE — TELEPHONE ENCOUNTER
Returned call to patient and he states he went to [facility] and out of pocket the compression stockings are $84. Insurance will not cover them without Lymphedema diagnosis which he does not have. Writer also recommended calling [facility] meidcal to see what they would cost and he was provided [facility] contact information.     [facility] - [facility] [place]  [address] [place] [address], [address]  [place] [address]  Phone [phone]    Florinda LEMONS, RN    [facility]  [facility]  Office: [phone]  Fax: [phone]

## 2025-04-28 NOTE — TELEPHONE ENCOUNTER
[facility]    Who is the name of the provider?:  DOMENICO MARI   What is the location you see this provider at/preferred location?: [place]  Person calling / Facility: Federico Chamberlain  Phone number:  [phone] (home)   Nurse call back needed:  My Chart Message patient please.     Reason for call:  Patient called, had an appt with Dr Mari this morning then went to  compression stockings.  Patient was told the only diagnosis that Medicare covers for compression stockings is lymphedema. Can the diagnosis be changed on the prescription?  Please message pt via [employer]    Outside Imaging: n/a   Can we leave a detailed message on this number?  YES     4/28/2025, 10:28 AM

## 2025-04-28 NOTE — PROGRESS NOTES
[facility]        Patient is here for a follow up.    Pt is currently taking Aspirin and Statin.    /77 (BP Location: Right arm, Patient Position: Sitting, Cuff Size: Adult Large)   Pulse 62   Wt 266 lb (120.7 kg)   SpO2 98%   BMI 30.74 kg/m      The provider has been notified that the patient has no concerns.     Questions patient would like addressed today are: N/A.    Refills are needed: No    Has homecare services and agency name:  No     Patient has been identified as a fall risk.    Interventions were completed as noted below:  [x]Exam tables/chairs remained in the lowest position.   []Patient remained in wheelchair.    []Provider requested patient in exam chair.  Patient required assist and use of transfer belt.  [x]Exam room door remained open unless with a provider.  []A bell provided to patient to notify staff if assistance was needed.  [x]Provider notified patient was identified as a fall risk.      Georgette Alfonso MA

## 2025-04-28 NOTE — PROGRESS NOTES
VASCULAR MEDICAL ASSESSMENT  REFERRAL SOURCE: Alana Cabello NP  REASON FOR CONSULT:  for edema.           A/P:        (R60.9) Edema, unspecified type  (primary encounter diagnosis)  Comment: This is due to venous insufficiency bilaterally in the thighs and calves. He has not worn compression hosiery. He would like surgical intervention. I also explained to the patietn that diuretics are not to be used for chronic treatment of venous insufficiency. He is on both chlorthalidone and furosemide as prescribed by Bobbi Cabello CNP in cardiology. He has hypokalemia. See below. I advised he discuss this     Plan: I explained surgical intervention is reserved for failure to control sxs after wearing compression hosiery for six months. Rx Sigvaris 20-30 mm Hg compression to the thigh height .  RTC 10/28/25 if dissatisfied with compression hosiery.       (I71.21) Aneurysm of ascending aorta without rupture  (primary encounter diagnosis)  Comment: 43 mm, asx, no AAA. No bounding popliteal or femoral pulses.   Plan: annual surveillance TTE warranted. I advised he obtain this through cardiology. Goal BP < 120/70 due to ATAA.    Hypokalemia  Comment: Increase K supplementation to daily from three days per week. Discuss need for two diuretics with Bobbi Cabello when he sees her next.   Plan: potassium chloride ER (K-TAB/KLOR-CON) 10 MEQ         CR tablet            (I87.2) Venous (peripheral) insufficiency  Comment: as above.  Plan: Compression Sleeve/Stocking Order for DME -         ONLY FOR DME, CANCELED: Compression         Sleeve/Stocking Order for DME - ONLY FOR DME                 (L97.109) History of slowly healing ulcer of toe of left foot, unspecified ulcer stage (H)  Comment: He has a h/o DM2, non obstructive CAD. He has diminished LLE pulses and could easily have undxd PAD but does not. ABIs are normal with triphasic waveforms. TBIs are normal. Wound has healed.   Plan:  F/U prn     (E11.9) Type 2 diabetes, HbA1c goal < 7%  (H)  Comment: At goal.  Plan: F/U per PCP.            (E78.5) Hyperlipidemia LDL goal <70  Comment: At goal.  Plan:  F/U per PCP.                  The longitudinal care of plan for Federico was addressed during this visit. Due to added complexity of care, we will continue to support Federico Chamberlain  and the subsequent management of these conditions and with ongoing continuity of care for these conditions.         43 minutes total medical care on today's date. This is the first time I am seeing the patietn. Extended time secondary to need for complete chart review, patietn assessment and explanation regarding pathophysiology of above disease states and plan to treat accordingly.            HPI:     Federico Chamberlain is an obese ( Body mass index is 30.28 kg/m . ) 74 year old male with a history of syncope,  MI/NSTEMI, orthostasis, severe sepsis and right lower extremity cellulitis secondary to frostbite of the right great toe and slow to heal Lt great toe ulceration, hypertension, aortic dilation, QT prolongation, type 2 diabetes, hyperlipidemia, history of prostate cancer,  and lymphoma (in complete remission per 3/2025 PET scan-on bactrim and acyclovir until 9/2025).     Patient was admitted with syncope 1/1/2023 in setting of severe sepsis and bacteremia, found to have rhabdomyolysis, mildly elevated troponin, and prolonged QT interval.  Mild elevated troponins most consistent with a type II MI, however TTE 12/20/2022 showed LVEF 50% with septal lateral hypokinesis.  He had a baseline abnormal conduction with bifascicular block and first-degree AV block with an event monitor in place.  During his hospital stay he had frequent PVCs, short runs of paroxysmal SVT.  His metoprolol was increased to 50 mg twice daily.  Isordil, furosemide, and hydralazine were added.  His lisinopril, chlorthalidone, and spironolactone were all held due to rhabdomyolysis. His 30 day event monitor demonstrated sinus rhythm with IVCD, intermittent  first-degree block, PVCs with no high-grade AV block     Lexiscan nuclear stress test was abnormal demonstrating a mild ischemia in the basal to mid inferior and inferolateral segments. He underwent a subsequent coronary angiogram was completed 2/17/2023 and showed moderate stenosis in the mid RCA and OM1, neither were flow-limiting by assessment with IFR.     When he saw Dr. Garcia in September 2024 he was feeling well from a cardiovascular standpoint.  He repeated a transthoracic echo as patient was on chemotherapy for lymphoma which revealed stable low normal ejection fraction of 50 to 55% with normal right ventricular function, size, no significant valvular abnormalities, ascending aorta measuring 4.3 cm.     He feels bloated overall and his left leg feels bigger, but doesn't necessarily look bigger.      He was referred to address venous insufficiency.          Review Of Systems  Skin: negative  Eyes: negative  Ears/Nose/Throat: negative  Respiratory: No shortness of breath, dyspnea on exertion, cough, or hemoptysis  Cardiovascular: negative  Gastrointestinal: negative  Genitourinary: negative  Musculoskeletal: as above  Neurologic: negative  Psychiatric: negative  Hematologic/Lymphatic/Immunologic: negative  Endocrine: negative        PAST MEDICAL HISTORY:                  Past Medical History:   Diagnosis Date    Hypercholesterolemia     Hypertension        PAST SURGICAL HISTORY:                  Past Surgical History:   Procedure Laterality Date    APPENDECTOMY      BACK SURGERY      CV CORONARY ANGIOGRAM N/A 2/17/2023    Procedure: Coronary Angiogram RADIAL ACCESS;  Surgeon: Malou Mazariegos MD;  Location:  HEART CARDIAC CATH LAB    CV FRACTIONAL FLOW RATIO WIRE N/A 2/17/2023    Procedure: Fractional Flow Ratio Wire;  Surgeon: Malou Mazariegos MD;  Location:  HEART CARDIAC CATH LAB    CV INSTANTANEOUS WAVE-FREE RATIO N/A 2/17/2023    Procedure: Instantaneous Wave-Free Ratio;  Surgeon: Malou Mazariegos  MD;  Location:  HEART CARDIAC CATH LAB    CV LEFT HEART CATH N/A 2/17/2023    Procedure: Left Heart Catheterization;  Surgeon: Malou Mazariegos MD;  Location:  HEART CARDIAC CATH LAB    IR LUMBAR PUNCTURE  9/13/2019    IR LUMBAR PUNCTURE  10/25/2019    IR LUMBAR PUNCTURE  9/22/2022    IR LUMBAR PUNCTURE  11/3/2022    ORTHOPEDIC SURGERY         CURRENT MEDICATIONS:                  Current Outpatient Medications   Medication Sig Dispense Refill    acetaminophen (TYLENOL) 325 MG tablet Take 325-650 mg by mouth every 6 hours as needed for mild pain.      acyclovir (ZOVIRAX) 400 MG tablet Take 1 tablet by mouth 2 times daily.      aspirin 81 MG EC tablet Take 81 mg by mouth daily      atorvastatin (LIPITOR) 40 MG tablet Take 40 mg by mouth daily      chlorthalidone (HYGROTON) 50 MG tablet Take 1 tablet (50 mg) by mouth daily at 2 pm. 90 tablet 3    diclofenac (VOLTAREN) 1 % topical gel Apply 2 g topically every 6 hours as needed for moderate pain Relabel for home  ise 50 g 0    furosemide (LASIX) 20 MG tablet Take 1 tablet (20 mg) by mouth three times a week. 40 tablet 3    gabapentin (NEURONTIN) 800 MG tablet Take 800 mg by mouth 3 times daily      lisinopril (ZESTRIL) 40 MG tablet Take 1 tablet (40 mg) by mouth daily 90 tablet 3    magnesium oxide (MAG-OX) 400 MG tablet Take 1 tablet (400 mg) by mouth daily 90 tablet 3    metFORMIN (GLUCOPHAGE) 500 MG tablet Take 500 mg by mouth daily      metoprolol succinate ER (TOPROL XL) 50 MG 24 hr tablet Take 1 tablet (50 mg) by mouth 2 times daily. 180 tablet 3    nitroGLYcerin (NITROSTAT) 0.4 MG sublingual tablet For chest pain place 1 tablet under the tongue every 5 minutes for 3 doses. If symptoms persist 5 minutes after 1st dose call 911. 25 tablet 3    potassium chloride ER (K-TAB/KLOR-CON) 10 MEQ CR tablet Take two tablets (20 mEq), 3 times per week with furosemide 72 tablet 3    pramipexole (MIRAPEX) 1 MG tablet Take 3 mg by mouth At Bedtime      prochlorperazine  (COMPAZINE) 5 MG tablet as needed.      semaglutide (OZEMPIC, 1 MG/DOSE,) 2 MG/1.5ML pen Inject 0.5 mg Subcutaneous every 7 days On       sulfamethoxazole-trimethoprim (BACTRIM DS) 800-160 MG tablet Take 1 tablet by mouth Every Mon, Wed, Fri Morning.      triamcinolone (KENALOG) 0.1 % external cream Apply topically as needed.      VITAMIN D, CHOLECALCIFEROL, PO Take 4,000 Units by mouth daily         ALLERGIES:                No Known Allergies    SOCIAL HISTORY:                  Social History     Socioeconomic History    Marital status:      Spouse name: Not on file    Number of children: Not on file    Years of education: Not on file    Highest education level: Not on file   Occupational History    Not on file   Tobacco Use    Smoking status: Former     Current packs/day: 0.00     Types: Cigarettes     Quit date: 1992     Years since quittin.2    Smokeless tobacco: Never   Substance and Sexual Activity    Alcohol use: Yes     Comment: Occ, 1-2 beers a month    Drug use: No    Sexual activity: Not on file   Other Topics Concern    Not on file   Social History Narrative    Not on file     Social Drivers of Health     Financial Resource Strain: Not on file   Food Insecurity: Not on file   Transportation Needs: Not on file   Physical Activity: Not on file   Stress: Not on file   Social Connections: Not on file   Interpersonal Safety: Not on file   Housing Stability: Not on file       FAMILY HISTORY:                 No family history on file.        Physical exam Reveals:    O/P: WNL  HEENT: WNL  NECK: No JVD, thyromegaly, or lymphadenopathy  HEART: RRR, no murmurs, gallops, or rubs  LUNGS: CTA bilaterally without rales, wheezes, or rhonchi  GI: NABS, nondistended, nontender, soft  EXT:without cyanosis, clubbing, ; one plus LLE edema, CEAP C4   NEURO: nonfocal  : no flank tenderness           All relevant labs and imaging reviewed by myself on today's date.

## 2025-05-01 ENCOUNTER — LAB (OUTPATIENT)
Dept: LAB | Facility: CLINIC | Age: 75
End: 2025-05-01
Payer: COMMERCIAL

## 2025-05-01 DIAGNOSIS — E87.6 HYPOKALEMIA: ICD-10-CM

## 2025-05-01 LAB — POTASSIUM SERPL-SCNC: 4.3 MMOL/L (ref 3.4–5.3)

## 2025-06-03 ENCOUNTER — LAB (OUTPATIENT)
Dept: LAB | Facility: CLINIC | Age: 75
End: 2025-06-03
Payer: COMMERCIAL

## 2025-06-03 DIAGNOSIS — I10 ESSENTIAL HYPERTENSION: ICD-10-CM

## 2025-06-03 DIAGNOSIS — E87.6 HYPOKALEMIA: ICD-10-CM

## 2025-06-03 LAB
ANION GAP SERPL CALCULATED.3IONS-SCNC: 11 MMOL/L (ref 7–15)
BUN SERPL-MCNC: 26 MG/DL (ref 8–23)
CALCIUM SERPL-MCNC: 9.5 MG/DL (ref 8.8–10.4)
CHLORIDE SERPL-SCNC: 106 MMOL/L (ref 98–107)
CREAT SERPL-MCNC: 0.99 MG/DL (ref 0.67–1.17)
EGFRCR SERPLBLD CKD-EPI 2021: 81 ML/MIN/1.73M2
GLUCOSE SERPL-MCNC: 105 MG/DL (ref 70–99)
HCO3 SERPL-SCNC: 28 MMOL/L (ref 22–29)
POTASSIUM SERPL-SCNC: 3.7 MMOL/L (ref 3.4–5.3)
SODIUM SERPL-SCNC: 145 MMOL/L (ref 135–145)

## 2025-06-03 PROCEDURE — 80048 BASIC METABOLIC PNL TOTAL CA: CPT

## 2025-06-03 PROCEDURE — 36415 COLL VENOUS BLD VENIPUNCTURE: CPT

## 2025-06-04 ENCOUNTER — HOSPITAL ENCOUNTER (EMERGENCY)
Facility: CLINIC | Age: 75
Discharge: HOME | End: 2025-06-04
Attending: EMERGENCY MEDICINE | Admitting: EMERGENCY MEDICINE
Payer: COMMERCIAL

## 2025-06-04 ENCOUNTER — RESULTS FOLLOW-UP (OUTPATIENT)
Dept: CARDIOLOGY | Facility: CLINIC | Age: 75
End: 2025-06-04

## 2025-06-04 VITALS
OXYGEN SATURATION: 100 % | HEART RATE: 63 BPM | WEIGHT: 272 LBS | SYSTOLIC BLOOD PRESSURE: 127 MMHG | RESPIRATION RATE: 20 BRPM | HEIGHT: 77 IN | DIASTOLIC BLOOD PRESSURE: 61 MMHG | BODY MASS INDEX: 32.12 KG/M2 | TEMPERATURE: 97.7 F
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
## 2025-06-04 DIAGNOSIS — R42 DIZZINESS: ICD-10-CM

## 2025-06-04 DIAGNOSIS — G47.33 OBSTRUCTIVE SLEEP APNEA SYNDROME: ICD-10-CM

## 2025-06-04 LAB
ANION GAP SERPL CALCULATED.3IONS-SCNC: 11 MMOL/L (ref 7–15)
BASOPHILS # BLD AUTO: 0.1 10E3/UL (ref 0–0.2)
BASOPHILS NFR BLD AUTO: 1 %
BUN SERPL-MCNC: 28.6 MG/DL (ref 8–23)
CALCIUM SERPL-MCNC: 9.4 MG/DL (ref 8.8–10.4)
CHLORIDE SERPL-SCNC: 101 MMOL/L (ref 98–107)
CREAT SERPL-MCNC: 1.06 MG/DL (ref 0.67–1.17)
EGFRCR SERPLBLD CKD-EPI 2021: 75 ML/MIN/1.73M2
EOSINOPHIL # BLD AUTO: 0.3 10E3/UL (ref 0–0.7)
EOSINOPHIL NFR BLD AUTO: 5 %
ERYTHROCYTE [DISTWIDTH] IN BLOOD BY AUTOMATED COUNT: 13.4 % (ref 10–15)
GLUCOSE SERPL-MCNC: 94 MG/DL (ref 70–99)
HCO3 SERPL-SCNC: 29 MMOL/L (ref 22–29)
HCT VFR BLD AUTO: 38.4 % (ref 40–53)
HGB BLD-MCNC: 12.7 G/DL (ref 13.3–17.7)
HOLD SPECIMEN: NORMAL
HOLD SPECIMEN: NORMAL
IMM GRANULOCYTES # BLD: 0.2 10E3/UL
IMM GRANULOCYTES NFR BLD: 5 %
LYMPHOCYTES # BLD AUTO: 0.4 10E3/UL (ref 0.8–5.3)
LYMPHOCYTES NFR BLD AUTO: 7 %
MCH RBC QN AUTO: 28.6 PG (ref 26.5–33)
MCHC RBC AUTO-ENTMCNC: 33.1 G/DL (ref 31.5–36.5)
MCV RBC AUTO: 87 FL (ref 78–100)
MONOCYTES # BLD AUTO: 0.6 10E3/UL (ref 0–1.3)
MONOCYTES NFR BLD AUTO: 11 %
NEUTROPHILS # BLD AUTO: 3.6 10E3/UL (ref 1.6–8.3)
NEUTROPHILS NFR BLD AUTO: 71 %
NRBC # BLD AUTO: 0 10E3/UL
NRBC BLD AUTO-RTO: 0 /100
PLATELET # BLD AUTO: 224 10E3/UL (ref 150–450)
POTASSIUM SERPL-SCNC: 3.8 MMOL/L (ref 3.4–5.3)
RBC # BLD AUTO: 4.44 10E6/UL (ref 4.4–5.9)
SODIUM SERPL-SCNC: 141 MMOL/L (ref 135–145)
WBC # BLD AUTO: 5 10E3/UL (ref 4–11)

## 2025-06-04 PROCEDURE — 82374 ASSAY BLOOD CARBON DIOXIDE: CPT | Performed by: EMERGENCY MEDICINE

## 2025-06-04 PROCEDURE — 36415 COLL VENOUS BLD VENIPUNCTURE: CPT | Performed by: EMERGENCY MEDICINE

## 2025-06-04 PROCEDURE — 96361 HYDRATE IV INFUSION ADD-ON: CPT

## 2025-06-04 PROCEDURE — 258N000003 HC RX IP 258 OP 636: Performed by: EMERGENCY MEDICINE

## 2025-06-04 PROCEDURE — 85014 HEMATOCRIT: CPT | Performed by: EMERGENCY MEDICINE

## 2025-06-04 PROCEDURE — 99285 EMERGENCY DEPT VISIT HI MDM: CPT | Mod: 25

## 2025-06-04 PROCEDURE — 96360 HYDRATION IV INFUSION INIT: CPT

## 2025-06-04 PROCEDURE — 93005 ELECTROCARDIOGRAM TRACING: CPT

## 2025-06-04 RX ADMIN — SODIUM CHLORIDE, SODIUM LACTATE, POTASSIUM CHLORIDE, AND CALCIUM CHLORIDE 1000 ML: .6; .31; .03; .02 INJECTION, SOLUTION INTRAVENOUS at 19:42

## 2025-06-04 ASSESSMENT — COLUMBIA-SUICIDE SEVERITY RATING SCALE - C-SSRS
6. HAVE YOU EVER DONE ANYTHING, STARTED TO DO ANYTHING, OR PREPARED TO DO ANYTHING TO END YOUR LIFE?: NO
1. IN THE PAST MONTH, HAVE YOU WISHED YOU WERE DEAD OR WISHED YOU COULD GO TO SLEEP AND NOT WAKE UP?: NO
2. HAVE YOU ACTUALLY HAD ANY THOUGHTS OF KILLING YOURSELF IN THE PAST MONTH?: NO

## 2025-06-04 ASSESSMENT — ACTIVITIES OF DAILY LIVING (ADL)
ADLS_ACUITY_SCORE: 58

## 2025-06-04 NOTE — ED TRIAGE NOTES
"Fell last night hitting head. Today has periods of dizziness and \"passing out\". Home health visit noticed low BP per patient.     Triage Assessment (Adult)       Row Name 06/04/25 1760          Triage Assessment    Airway WDL WDL        Respiratory WDL    Respiratory WDL WDL        Skin Circulation/Temperature WDL    Skin Circulation/Temperature WDL WDL        Peripheral/Neurovascular WDL    Peripheral Neurovascular WDL WDL                     "

## 2025-06-05 LAB
ATRIAL RATE - MUSE: 70 BPM
DIASTOLIC BLOOD PRESSURE - MUSE: NORMAL MMHG
INTERPRETATION ECG - MUSE: NORMAL
P AXIS - MUSE: 68 DEGREES
PR INTERVAL - MUSE: 180 MS
QRS DURATION - MUSE: 174 MS
QT - MUSE: 482 MS
QTC - MUSE: 520 MS
R AXIS - MUSE: -61 DEGREES
SYSTOLIC BLOOD PRESSURE - MUSE: NORMAL MMHG
T AXIS - MUSE: 63 DEGREES
VENTRICULAR RATE- MUSE: 70 BPM

## 2025-06-05 NOTE — DISCHARGE INSTRUCTIONS
Ask your primary doctor about inspire (implant for sleep apnea).    Stay well-hydrated and eat regular meals.

## 2025-06-05 NOTE — ED PROVIDER NOTES
"  Emergency Department Note      History of Present Illness     Chief Complaint   Dizziness and Fall      HPI   Federico Chamberlain is a 74 year old male     Few weeks from right knee replacement surgery complicated by developing an infection causing him to stay in the hospital for IV antibiotics for a couple of days.  He has been struggling with sleep apnea and cannot tolerate a CPAP mask and so has excessive daytime tiredness and will frequently \"nod off\".  Usually he can catch himself the last night he did not he fell hitting his head.  Denies any other significant areas of injury.  Today when the home health care nurse was they are changing his bandage he had 2 episodes of vomiting and recommended he come in to make sure he did not have a head injury.  He has no fever.  He has no abdominal pain back pain ongoing nausea dysuria diarrhea etc.  He was just seen by his primary physician and taking from 3 days a week furosemide to 7 days a week furosemide.        Independent Historian       Review of External Notes    See ed course     Past Medical History     Medical History and Problem List   Past Medical History:   Diagnosis Date    Hypercholesterolemia     Hypertension        Medications   acetaminophen (TYLENOL) 325 MG tablet  acyclovir (ZOVIRAX) 400 MG tablet  aspirin 81 MG EC tablet  atorvastatin (LIPITOR) 40 MG tablet  chlorthalidone (HYGROTON) 50 MG tablet  diclofenac (VOLTAREN) 1 % topical gel  furosemide (LASIX) 20 MG tablet  gabapentin (NEURONTIN) 800 MG tablet  lisinopril (ZESTRIL) 40 MG tablet  magnesium oxide (MAG-OX) 400 MG tablet  metFORMIN (GLUCOPHAGE) 500 MG tablet  metoprolol succinate ER (TOPROL XL) 50 MG 24 hr tablet  nitroGLYcerin (NITROSTAT) 0.4 MG sublingual tablet  potassium chloride ER (K-TAB/KLOR-CON) 10 MEQ CR tablet  pramipexole (MIRAPEX) 1 MG tablet  prochlorperazine (COMPAZINE) 5 MG tablet  semaglutide (OZEMPIC, 1 MG/DOSE,) 2 MG/1.5ML pen  sulfamethoxazole-trimethoprim (BACTRIM DS) 800-160 " "MG tablet  triamcinolone (KENALOG) 0.1 % external cream  VITAMIN D, CHOLECALCIFEROL, PO        Surgical History   Past Surgical History:   Procedure Laterality Date    APPENDECTOMY      BACK SURGERY      CV CORONARY ANGIOGRAM N/A 2/17/2023    Procedure: Coronary Angiogram RADIAL ACCESS;  Surgeon: Malou Mazariegos MD;  Location:  HEART CARDIAC CATH LAB    CV FRACTIONAL FLOW RATIO WIRE N/A 2/17/2023    Procedure: Fractional Flow Ratio Wire;  Surgeon: Malou Mazariegos MD;  Location:  HEART CARDIAC CATH LAB    CV INSTANTANEOUS WAVE-FREE RATIO N/A 2/17/2023    Procedure: Instantaneous Wave-Free Ratio;  Surgeon: Malou Mazariegos MD;  Location:  HEART CARDIAC CATH LAB    CV LEFT HEART CATH N/A 2/17/2023    Procedure: Left Heart Catheterization;  Surgeon: Malou Mazariegos MD;  Location:  HEART CARDIAC CATH LAB    IR LUMBAR PUNCTURE  9/13/2019    IR LUMBAR PUNCTURE  10/25/2019    IR LUMBAR PUNCTURE  9/22/2022    IR LUMBAR PUNCTURE  11/3/2022    ORTHOPEDIC SURGERY         Physical Exam     Patient Vitals for the past 24 hrs:   BP Temp Temp src Pulse Resp SpO2 Height Weight   06/04/25 2148 127/61 -- -- 63 20 100 % -- --   06/04/25 2054 114/67 97.7  F (36.5  C) Oral 86 20 100 % -- --   06/04/25 1809 107/57 98.1  F (36.7  C) Oral 87 -- 99 % 1.956 m (6' 5\") 123.4 kg (272 lb)       HEENT: Pupils symmetric, no nystagmus, no significant scalp swelling or lesions or wounds.  With range of motion, no midline tenderness  Ext: Expected scar on the right knee from recent knee replacement surgery no surrounding redness or areas of dehiscence.  Has a bandage on the right second toe has a known chronic sore in that area just changed by his home health care nurse today.  2+ pitting edema in the right lower leg.  Left lower leg is in a foot drop orthotic.  Foot is warm.  PT DP pulse palpable.  CV: ppi, regular   Resp: speaking in full sentences without any resp distress  Skin: warm dry well perfused  Neuro: Alert, GCS 15, speech " clear, oriented, motor and sensation intact in extremities.  Gait stable            Diagnostics     Lab Results   Labs Ordered and Resulted from Time of ED Arrival to Time of ED Departure   BASIC METABOLIC PANEL - Abnormal       Result Value    Sodium 141      Potassium 3.8      Chloride 101      Carbon Dioxide (CO2) 29      Anion Gap 11      Urea Nitrogen 28.6 (*)     Creatinine 1.06      GFR Estimate 75      Calcium 9.4      Glucose 94     CBC WITH PLATELETS AND DIFFERENTIAL - Abnormal    WBC Count 5.0      RBC Count 4.44      Hemoglobin 12.7 (*)     Hematocrit 38.4 (*)     MCV 87      MCH 28.6      MCHC 33.1      RDW 13.4      Platelet Count 224      % Neutrophils 71      % Lymphocytes 7      % Monocytes 11      % Eosinophils 5      % Basophils 1      % Immature Granulocytes 5      NRBCs per 100 WBC 0      Absolute Neutrophils 3.6      Absolute Lymphocytes 0.4 (*)     Absolute Monocytes 0.6      Absolute Eosinophils 0.3      Absolute Basophils 0.1      Absolute Immature Granulocytes 0.2      Absolute NRBCs 0.0         Imaging   Head CT w/o contrast   Final Result   IMPRESSION:   1.  No CT evidence for acute intracranial process.   2.  Brain atrophy and presumed chronic microvascular ischemic changes as above.             EKG   ECG results from 06/04/25   EKG 12-lead, tracing only     Value    Systolic Blood Pressure     Diastolic Blood Pressure     Ventricular Rate 70    Atrial Rate 70    MS Interval 180    QRS Duration 174        QTc 520    P Axis 68    R AXIS -61    T Axis 63    Interpretation ECG      Sinus rhythm  Right bundle branch block  Left anterior fascicular block  ** Bifascicular block **  Abnormal ECG  When compared with ECG of 24-Sep-2024 14:38,  No significant change was found           Independent Interpretation   CT Head: No intracranial hemorrhage.    ED Course      Medications Administered   Medications   lactated ringers BOLUS 1,000 mL (0 mLs Intravenous Stopped 6/4/25 9167)        Procedures   Procedures     Discussion of Management       ED Course   ED Course as of 06/05/25 0125   Wed Jun 04, 2025   1910 Care Everywhere reviewed including past medical history, past surgical history, medications.  Current medications include 2 diuretics (chlorthalidone, furosemide)   1910 I reviewed a cardiology follow-up note from today covered his recent BMP that had normal potassium.   1939 739: Sinus rhythm, right bundle branch block left anterior fascicular block, no significant ST segment changes suggesting acute ischemia.  QT slightly prolonged at 520 ms.   1940 ECG unchanged from last in our system, 9/24/2024       Additional Documentation      Medical Decision Making / Diagnosis     CMS Diagnoses:         [facility]   Federico Chamberlain is a 74 year old male     Presenting with episodes of dizziness which he describes as nodding off which certainly could be excessive daytime sleepiness related to his untreated sleep apnea.  Would also to consider orthostatic hypotension given he is on multiple diuretics with recent increased dose frequency and poor p.o. intake.  EKG shows no concerning acute cardiac findings.  Basic blood test unremarkable.  Blood pressure stable here in the emergency department.  After intravenous fluid ambulatory challenge without recurrent dizziness.  No localizing neurologic findings concerning for space-occupying lesion hemorrhage/stroke.  CT scan of the head done was negative.  I do not think he needs an MRI at this time.  Remainder of his skeletal survey is unremarkable.  I do not think he needs any other imaging.  Discussed with him and his son workup and impression at this time.  I think he is risk stratified low enough for discharge home.  Understands what his follow-up plan should be and that he is always welcome to return here to the emergency room for any further concerns.    Disposition   The patient was discharged.     Diagnosis     ICD-10-CM    1. Dizziness   R42       2. Obstructive sleep apnea syndrome  G47.33       3. Fall, initial encounter  W19.XXXA            Discharge Medications   Discharge Medication List as of 6/4/2025 10:08 PM            MD Yimi Walton Jerome Richard, MD  06/05/25 0127

## 2025-06-19 ENCOUNTER — OFFICE VISIT (OUTPATIENT)
Dept: CARDIOLOGY | Facility: CLINIC | Age: 75
End: 2025-06-19
Payer: COMMERCIAL

## 2025-06-19 VITALS
SYSTOLIC BLOOD PRESSURE: 88 MMHG | HEART RATE: 62 BPM | BODY MASS INDEX: 31.12 KG/M2 | DIASTOLIC BLOOD PRESSURE: 62 MMHG | WEIGHT: 269 LBS | HEIGHT: 78 IN

## 2025-06-19 DIAGNOSIS — I10 ESSENTIAL HYPERTENSION: ICD-10-CM

## 2025-06-19 DIAGNOSIS — I49.3 PVC'S (PREMATURE VENTRICULAR CONTRACTIONS): Primary | ICD-10-CM

## 2025-06-19 RX ORDER — CHLORTHALIDONE 25 MG/1
25 TABLET ORAL DAILY
Qty: 90 TABLET | Refills: 3 | Status: SHIPPED | OUTPATIENT
Start: 2025-06-19

## 2025-06-19 RX ORDER — POTASSIUM CHLORIDE 750 MG/1
20 TABLET, EXTENDED RELEASE ORAL DAILY
Qty: 180 TABLET | Refills: 3 | Status: SHIPPED | OUTPATIENT
Start: 2025-06-19 | End: 2025-06-19

## 2025-06-19 RX ORDER — IBUPROFEN 800 MG/1
800 TABLET, FILM COATED ORAL EVERY 8 HOURS PRN
COMMUNITY

## 2025-06-19 RX ORDER — POTASSIUM CHLORIDE 750 MG/1
10 TABLET, EXTENDED RELEASE ORAL DAILY
Qty: 90 TABLET | Refills: 3 | Status: SHIPPED | OUTPATIENT
Start: 2025-06-19

## 2025-06-19 RX ORDER — FUROSEMIDE 20 MG/1
20 TABLET ORAL DAILY
Qty: 40 TABLET | Refills: 3 | Status: SHIPPED | OUTPATIENT
Start: 2025-06-19 | End: 2025-06-19

## 2025-06-19 NOTE — PROGRESS NOTES
Cardiology Clinic Progress Note:    June 19, 2025   Patient Name: Federico Chamberlain  Patient MRN: [id number]     Consult indication: HTN    HPI:    As you know, patient is a pleasant 74-year-old male with a past medical history significant for hypertension, dyslipidemia, diabetes, first-degree block with right bundle branch block and left anterior fascicular block, nonobstructive CAD, lymphoma, who presents for follow-up.    Patient recently underwent right TKA.  Since then has had issues with variable blood pressures.  He does note orthostatic dizziness/lightheadedness.    Patient is following with our colleague Dr. Alvarez with Vascular medicine for lower extremity venous insufficiency.    Last TTE reviewed 9/20/2024 LVEF 50 to 55%, normal RV function, no significant valvular abnormalities.  Ascending aorta 4.3 cm, at upper limits of normal for age, sex, BSA of 2.6 m .    BP today in clinic 88/62 mmHg.      Assessment and Plan/Recommendations:    # Hypotension, orthostatic symptoms  # Baseline conduction disease, prior cardiac monitor no high grade AV block  # Non obstructive CAD  # HL  # DM    - Will decrease chlorthalidone 50 mg daily to 25 mg daily, stop furosemide, decrease potassium supplement to 10 mEq daily  - For now, continue lisinopril 40 mg daily  - Advised patient to monitor blood pressures at home, contact our team if having issues with hypertension or hypotension  - RN BP check with BMP in 2 weeks  - Metoprolol succinate was discontinued by PCP due to concern for bradycardia, will reassess with a Zio patch monitor since patient did have frequent PVCs noted on telemetry while hospitalized in the past  - Continue aspirin, atorvastatin  - Recommend follow-up TTE every 2 to 3 years for assessment of the ascending aorta through PCP  - Follow-up in cardiology clinic as needed    Thank you for allowing our team to participate in the care of Federico Chamberlain.  Please do not hesitate to call or page me with any  questions or concerns.    Sincerely,     Kevin Garcia MD, [facility]  Cardiology  June 19, 2025      Voice recognition software utilized.     Total time spent on this encounter today: Greater than 40 minutes, providing care in this encounter including, but not limited to, reviewing prior medical records, laboratory data, imaging studies, diagnostic studies, procedure notes, formulating an assessment and plan, recommendations, discussion and counseling with patient face to face, dictation.    Past Medical History:     Past Medical History:   Diagnosis Date    Hypercholesterolemia     Hypertension         Past Surgical History:   Past Surgical History:   Procedure Laterality Date    APPENDECTOMY      BACK SURGERY      CV CORONARY ANGIOGRAM N/A 2/17/2023    Procedure: Coronary Angiogram RADIAL ACCESS;  Surgeon: Malou Mazariegos MD;  Location:  HEART CARDIAC CATH LAB    CV FRACTIONAL FLOW RATIO WIRE N/A 2/17/2023    Procedure: Fractional Flow Ratio Wire;  Surgeon: Malou Mazariegos MD;  Location:  HEART CARDIAC CATH LAB    CV INSTANTANEOUS WAVE-FREE RATIO N/A 2/17/2023    Procedure: Instantaneous Wave-Free Ratio;  Surgeon: Malou Mazariegos MD;  Location:  HEART CARDIAC CATH LAB    CV LEFT HEART CATH N/A 2/17/2023    Procedure: Left Heart Catheterization;  Surgeon: Malou Mazariegos MD;  Location: [facility] HEART CARDIAC CATH LAB    IR LUMBAR PUNCTURE  9/13/2019    IR LUMBAR PUNCTURE  10/25/2019    IR LUMBAR PUNCTURE  9/22/2022    IR LUMBAR PUNCTURE  11/3/2022    ORTHOPEDIC SURGERY         Medications (outpatient):  Current Outpatient Medications   Medication Sig Dispense Refill    ibuprofen (ADVIL/MOTRIN) 800 MG tablet Take 800 mg by mouth every 8 hours as needed for moderate pain.      acetaminophen (TYLENOL) 325 MG tablet Take 325-650 mg by mouth every 6 hours as needed for mild pain.      acyclovir (ZOVIRAX) 400 MG tablet Take 1 tablet by mouth 2 times daily.      aspirin 81 MG EC tablet Take 81 mg by  mouth daily      atorvastatin (LIPITOR) 40 MG tablet Take 40 mg by mouth daily      chlorthalidone (HYGROTON) 50 MG tablet Take 1 tablet (50 mg) by mouth daily at 2 pm. 90 tablet 3    diclofenac (VOLTAREN) 1 % topical gel Apply 2 g topically every 6 hours as needed for moderate pain Relabel for home  ise 50 g 0    furosemide (LASIX) 20 MG tablet Take 1 tablet (20 mg) by mouth three times a week. 40 tablet 3    gabapentin (NEURONTIN) 800 MG tablet Take 800 mg by mouth 3 times daily      lisinopril (ZESTRIL) 40 MG tablet Take 1 tablet (40 mg) by mouth daily 90 tablet 3    magnesium oxide (MAG-OX) 400 MG tablet Take 1 tablet (400 mg) by mouth daily 90 tablet 3    metFORMIN (GLUCOPHAGE) 500 MG tablet Take 500 mg by mouth daily      metoprolol succinate ER (TOPROL XL) 50 MG 24 hr tablet Take 1 tablet (50 mg) by mouth 2 times daily. (Patient not taking: Reported on 6/19/2025) 180 tablet 3    nitroGLYcerin (NITROSTAT) 0.4 MG sublingual tablet For chest pain place 1 tablet under the tongue every 5 minutes for 3 doses. If symptoms persist 5 minutes after 1st dose call 911. 25 tablet 3    potassium chloride ER (K-TAB/KLOR-CON) 10 MEQ CR tablet Take 2 tablets (20 mEq) by mouth daily. (Patient taking differently: Take 20 mEq by mouth. 3 times a week) 180 tablet 3    pramipexole (MIRAPEX) 1 MG tablet Take 3 mg by mouth At Bedtime      prochlorperazine (COMPAZINE) 5 MG tablet as needed.      semaglutide (OZEMPIC, 1 MG/DOSE,) 2 MG/1.5ML pen Inject 0.5 mg Subcutaneous every 7 days On Fridays      sulfamethoxazole-trimethoprim (BACTRIM DS) 800-160 MG tablet Take 1 tablet by mouth Every Mon, Wed, Fri Morning.      triamcinolone (KENALOG) 0.1 % external cream Apply topically as needed.      VITAMIN D, CHOLECALCIFEROL, PO Take 4,000 Units by mouth daily         Allergies:  No Known Allergies    Social History:   History   Drug Use No      History   Smoking Status    Former    Types: Cigarettes   Smokeless Tobacco    Never     Social  History    Substance and Sexual Activity      Alcohol use: Yes        Comment: Occ, 1-2 beers a month       Family History:  No family history on file.      Objective & Physical Exam:  There were no vitals taken for this visit.  Wt Readings from Last 2 Encounters:   06/04/25 123.4 kg (272 lb)   04/28/25 120.7 kg (266 lb)     There is no height or weight on file to calculate BMI.   There is no height or weight on file to calculate BSA.    Constitutional: appears stated age, in no apparent distress, appears to be well nourished  Pulmonary: clear to auscultation bilaterally, no wheezes, no rales, no increased work of breathing  Cardiovascular: regular rate, regular rhythm, no murmur appreciated, no lower extremity edema    Data reviewed:  Lab Results   Component Value Date    WBC 5.0 06/04/2025    WBC 8.1 08/04/2020    RBC 4.44 06/04/2025    RBC 5.04 08/04/2020    HGB 12.7 (L) 06/04/2025    HGB 14.6 08/04/2020    HCT 38.4 (L) 06/04/2025    HCT 44.8 08/04/2020    MCV 87 06/04/2025    MCV 89 08/04/2020    MCH 28.6 06/04/2025    MCH 29.0 08/04/2020    MCHC 33.1 06/04/2025    MCHC 32.6 08/04/2020    RDW 13.4 06/04/2025    RDW 13.8 08/04/2020     06/04/2025     08/04/2020     Sodium   Date Value Ref Range Status   06/04/2025 141 135 - 145 mmol/L Final   08/04/2020 143 133 - 144 mmol/L Final     Potassium   Date Value Ref Range Status   06/04/2025 3.8 3.4 - 5.3 mmol/L Final   01/16/2023 4.3 3.4 - 5.3 mmol/L Final   08/04/2020 3.2 (L) 3.4 - 5.3 mmol/L Final     Chloride   Date Value Ref Range Status   06/04/2025 101 98 - 107 mmol/L Final   01/16/2023 106 94 - 109 mmol/L Final   08/04/2020 108 94 - 109 mmol/L Final     Carbon Dioxide   Date Value Ref Range Status   08/04/2020 28 20 - 32 mmol/L Final     Carbon Dioxide (CO2)   Date Value Ref Range Status   06/04/2025 29 22 - 29 mmol/L Final   01/16/2023 30 20 - 32 mmol/L Final     Anion Gap   Date Value Ref Range Status   06/04/2025 11 7 - 15 mmol/L Final    01/16/2023 6 3 - 14 mmol/L Final   08/04/2020 7 3 - 14 mmol/L Final     Glucose   Date Value Ref Range Status   06/04/2025 94 70 - 99 mg/dL Final   01/16/2023 89 70 - 99 mg/dL Final   08/04/2020 106 (H) 70 - 99 mg/dL Final     GLUCOSE BY METER POCT   Date Value Ref Range Status   09/27/2023 105 (H) 70 - 99 mg/dL Final     Urea Nitrogen   Date Value Ref Range Status   06/04/2025 28.6 (H) 8.0 - 23.0 mg/dL Final   01/16/2023 13 7 - 30 mg/dL Final   08/04/2020 18 7 - 30 mg/dL Final     Creatinine   Date Value Ref Range Status   06/04/2025 1.06 0.67 - 1.17 mg/dL Final   08/04/2020 0.92 0.66 - 1.25 mg/dL Final     GFR Estimate   Date Value Ref Range Status   06/04/2025 75 >60 mL/min/1.73m2 Final     Comment:     eGFR calculated using 2021 CKD-EPI equation.   08/04/2020 85 >60 mL/min/[1.73_m2] Final     Comment:     Non  GFR Calc  Starting 12/18/2018, serum creatinine based estimated GFR (eGFR) will be   calculated using the Chronic Kidney Disease Epidemiology Collaboration   (CKD-EPI) equation.       GFR, ESTIMATED POCT   Date Value Ref Range Status   08/01/2024 >60 >60 mL/min/1.73m2 Final     Calcium   Date Value Ref Range Status   06/04/2025 9.4 8.8 - 10.4 mg/dL Final   08/04/2020 8.6 8.5 - 10.1 mg/dL Final     Bilirubin Total   Date Value Ref Range Status   04/14/2025 0.8 <=1.2 mg/dL Final   07/02/2018 0.6 0.2 - 1.3 mg/dL Final     Alkaline Phosphatase   Date Value Ref Range Status   04/14/2025 90 40 - 150 U/L Final   07/02/2018 66 40 - 150 U/L Final     ALT   Date Value Ref Range Status   04/14/2025 26 0 - 70 U/L Final   07/02/2018 23 0 - 70 U/L Final     AST   Date Value Ref Range Status   04/14/2025 31 0 - 45 U/L Final   07/02/2018 15 0 - 45 U/L Final     Recent Labs   Lab Test 07/02/18  0636   CHOL 92   HDL 32*   LDL <1   TRIG 297*

## 2025-06-19 NOTE — PATIENT INSTRUCTIONS
June 19, 2025    Thank you for allowing our Cardiology team to participate in your care.     Please note the following changes to your heart treatment plan:     Medication changes:   - decrease chlorthalidone to 25 mg daily  - change to potassium supplement 10 meq daily  - stop furosemide  - continue aspirin, atorvastatin, lisinopril    - monitor BPs at home, goal BP <130/80 mmHg    Tests to be done:  - labs in 2 weeks  - ZioPatch monitor     Follow up:  - Follow up in 2 weeks for RN BP check and labs      For scheduling, please call [phone]      Please contact our team through [facility] or our Nurse Team Voicemail service [phone] for questions or concerns.     General Clinic [phone]     If you are having a medical emergency, please call [phone].     Sincerely,    Kevin Garcia MD, [facility]  Cardiology    [facility] - [facility]

## 2025-06-19 NOTE — LETTER
6/19/2025    Luis Alberto Aponte MD  [facility] [address] [place] [address]    RE: Federico Schneiderman       Dear Colleague,     I had the pleasure of seeing Federico Chamberlain in the [facility].      Cardiology Clinic Progress Note:    June 19, 2025   Patient Name: Federico Chamberlain  Patient MRN: [id number]     Consult indication: HTN    HPI:    As you know, patient is a pleasant 74-year-old male with a past medical history significant for hypertension, dyslipidemia, diabetes, first-degree block with right bundle branch block and left anterior fascicular block, nonobstructive CAD, lymphoma, who presents for follow-up.    Patient recently underwent right TKA.  Since then has had issues with variable blood pressures.  He does note orthostatic dizziness/lightheadedness.    Patient is following with our colleague Dr. Alvarez with Vascular medicine for lower extremity venous insufficiency.    Last TTE reviewed 9/20/2024 LVEF 50 to 55%, normal RV function, no significant valvular abnormalities.  Ascending aorta 4.3 cm, at upper limits of normal for age, sex, BSA of 2.6 m .    BP today in clinic 88/62 mmHg.      Assessment and Plan/Recommendations:    # Hypotension, orthostatic symptoms  # Baseline conduction disease, prior cardiac monitor no high grade AV block  # Non obstructive CAD  # HL  # DM    - Will decrease chlorthalidone 50 mg daily to 25 mg daily, stop furosemide, decrease potassium supplement to 10 mEq daily  - For now, continue lisinopril 40 mg daily  - Advised patient to monitor blood pressures at home, contact our team if having issues with hypertension or hypotension  - RN BP check with BMP in 2 weeks  - Metoprolol succinate was discontinued by PCP due to concern for bradycardia, will reassess with a Zio patch monitor since patient did have frequent PVCs noted on telemetry while hospitalized in the past  - Continue aspirin, atorvastatin  - Recommend follow-up TTE every 2 to 3 years  for assessment of the ascending aorta through PCP  - Follow-up in cardiology clinic as needed    Thank you for allowing our team to participate in the care of Federico Chamberlain.  Please do not hesitate to call or page me with any questions or concerns.    Sincerely,     Kevin Garcia MD, [facility]  Cardiology  June 19, 2025      Voice recognition software utilized.     Total time spent on this encounter today: Greater than 40 minutes, providing care in this encounter including, but not limited to, reviewing prior medical records, laboratory data, imaging studies, diagnostic studies, procedure notes, formulating an assessment and plan, recommendations, discussion and counseling with patient face to face, dictation.    Past Medical History:     Past Medical History:   Diagnosis Date     Hypercholesterolemia      Hypertension         Past Surgical History:   Past Surgical History:   Procedure Laterality Date     APPENDECTOMY       BACK SURGERY       CV CORONARY ANGIOGRAM N/A 2/17/2023    Procedure: Coronary Angiogram RADIAL ACCESS;  Surgeon: Malou Mazariegos MD;  Location:  HEART CARDIAC CATH LAB     CV FRACTIONAL FLOW RATIO WIRE N/A 2/17/2023    Procedure: Fractional Flow Ratio Wire;  Surgeon: Malou Mazariegos MD;  Location:  HEART CARDIAC CATH LAB     CV INSTANTANEOUS WAVE-FREE RATIO N/A 2/17/2023    Procedure: Instantaneous Wave-Free Ratio;  Surgeon: Malou Mazariegos MD;  Location:  HEART CARDIAC CATH LAB     CV LEFT HEART CATH N/A 2/17/2023    Procedure: Left Heart Catheterization;  Surgeon: Malou Mazariegos MD;  Location: [facility]ART CARDIAC CATH LAB     IR LUMBAR PUNCTURE  9/13/2019     IR LUMBAR PUNCTURE  10/25/2019     IR LUMBAR PUNCTURE  9/22/2022     IR LUMBAR PUNCTURE  11/3/2022     ORTHOPEDIC SURGERY         Medications (outpatient):  Current Outpatient Medications   Medication Sig Dispense Refill     ibuprofen (ADVIL/MOTRIN) 800 MG tablet Take 800 mg by mouth every 8 hours as needed for  moderate pain.       acetaminophen (TYLENOL) 325 MG tablet Take 325-650 mg by mouth every 6 hours as needed for mild pain.       acyclovir (ZOVIRAX) 400 MG tablet Take 1 tablet by mouth 2 times daily.       aspirin 81 MG EC tablet Take 81 mg by mouth daily       atorvastatin (LIPITOR) 40 MG tablet Take 40 mg by mouth daily       chlorthalidone (HYGROTON) 50 MG tablet Take 1 tablet (50 mg) by mouth daily at 2 pm. 90 tablet 3     diclofenac (VOLTAREN) 1 % topical gel Apply 2 g topically every 6 hours as needed for moderate pain Relabel for home  ise 50 g 0     furosemide (LASIX) 20 MG tablet Take 1 tablet (20 mg) by mouth three times a week. 40 tablet 3     gabapentin (NEURONTIN) 800 MG tablet Take 800 mg by mouth 3 times daily       lisinopril (ZESTRIL) 40 MG tablet Take 1 tablet (40 mg) by mouth daily 90 tablet 3     magnesium oxide (MAG-OX) 400 MG tablet Take 1 tablet (400 mg) by mouth daily 90 tablet 3     metFORMIN (GLUCOPHAGE) 500 MG tablet Take 500 mg by mouth daily       metoprolol succinate ER (TOPROL XL) 50 MG 24 hr tablet Take 1 tablet (50 mg) by mouth 2 times daily. (Patient not taking: Reported on 6/19/2025) 180 tablet 3     nitroGLYcerin (NITROSTAT) 0.4 MG sublingual tablet For chest pain place 1 tablet under the tongue every 5 minutes for 3 doses. If symptoms persist 5 minutes after 1st dose call 911. 25 tablet 3     potassium chloride ER (K-TAB/KLOR-CON) 10 MEQ CR tablet Take 2 tablets (20 mEq) by mouth daily. (Patient taking differently: Take 20 mEq by mouth. 3 times a week) 180 tablet 3     pramipexole (MIRAPEX) 1 MG tablet Take 3 mg by mouth At Bedtime       prochlorperazine (COMPAZINE) 5 MG tablet as needed.       semaglutide (OZEMPIC, 1 MG/DOSE,) 2 MG/1.5ML pen Inject 0.5 mg Subcutaneous every 7 days On Fridays       sulfamethoxazole-trimethoprim (BACTRIM DS) 800-160 MG tablet Take 1 tablet by mouth Every Mon, Wed, Fri Morning.       triamcinolone (KENALOG) 0.1 % external cream Apply topically as  needed.       VITAMIN D, CHOLECALCIFEROL, PO Take 4,000 Units by mouth daily         Allergies:  No Known Allergies    Social History:   History   Drug Use No      History   Smoking Status     Former     Types: Cigarettes   Smokeless Tobacco     Never     Social History    Substance and Sexual Activity      Alcohol use: Yes        Comment: Occ, 1-2 beers a month       Family History:  No family history on file.      Objective & Physical Exam:  There were no vitals taken for this visit.  Wt Readings from Last 2 Encounters:   06/04/25 123.4 kg (272 lb)   04/28/25 120.7 kg (266 lb)     There is no height or weight on file to calculate BMI.   There is no height or weight on file to calculate BSA.    Constitutional: appears stated age, in no apparent distress, appears to be well nourished  Pulmonary: clear to auscultation bilaterally, no wheezes, no rales, no increased work of breathing  Cardiovascular: regular rate, regular rhythm, no murmur appreciated, no lower extremity edema    Data reviewed:  Lab Results   Component Value Date    WBC 5.0 06/04/2025    WBC 8.1 08/04/2020    RBC 4.44 06/04/2025    RBC 5.04 08/04/2020    HGB 12.7 (L) 06/04/2025    HGB 14.6 08/04/2020    HCT 38.4 (L) 06/04/2025    HCT 44.8 08/04/2020    MCV 87 06/04/2025    MCV 89 08/04/2020    MCH 28.6 06/04/2025    MCH 29.0 08/04/2020    MCHC 33.1 06/04/2025    MCHC 32.6 08/04/2020    RDW 13.4 06/04/2025    RDW 13.8 08/04/2020     06/04/2025     08/04/2020     Sodium   Date Value Ref Range Status   06/04/2025 141 135 - 145 mmol/L Final   08/04/2020 143 133 - 144 mmol/L Final     Potassium   Date Value Ref Range Status   06/04/2025 3.8 3.4 - 5.3 mmol/L Final   01/16/2023 4.3 3.4 - 5.3 mmol/L Final   08/04/2020 3.2 (L) 3.4 - 5.3 mmol/L Final     Chloride   Date Value Ref Range Status   06/04/2025 101 98 - 107 mmol/L Final   01/16/2023 106 94 - 109 mmol/L Final   08/04/2020 108 94 - 109 mmol/L Final     Carbon Dioxide   Date Value Ref  Range Status   08/04/2020 28 20 - 32 mmol/L Final     Carbon Dioxide (CO2)   Date Value Ref Range Status   06/04/2025 29 22 - 29 mmol/L Final   01/16/2023 30 20 - 32 mmol/L Final     Anion Gap   Date Value Ref Range Status   06/04/2025 11 7 - 15 mmol/L Final   01/16/2023 6 3 - 14 mmol/L Final   08/04/2020 7 3 - 14 mmol/L Final     Glucose   Date Value Ref Range Status   06/04/2025 94 70 - 99 mg/dL Final   01/16/2023 89 70 - 99 mg/dL Final   08/04/2020 106 (H) 70 - 99 mg/dL Final     GLUCOSE BY METER POCT   Date Value Ref Range Status   09/27/2023 105 (H) 70 - 99 mg/dL Final     Urea Nitrogen   Date Value Ref Range Status   06/04/2025 28.6 (H) 8.0 - 23.0 mg/dL Final   01/16/2023 13 7 - 30 mg/dL Final   08/04/2020 18 7 - 30 mg/dL Final     Creatinine   Date Value Ref Range Status   06/04/2025 1.06 0.67 - 1.17 mg/dL Final   08/04/2020 0.92 0.66 - 1.25 mg/dL Final     GFR Estimate   Date Value Ref Range Status   06/04/2025 75 >60 mL/min/1.73m2 Final     Comment:     eGFR calculated using 2021 CKD-EPI equation.   08/04/2020 85 >60 mL/min/[1.73_m2] Final     Comment:     Non  GFR Calc  Starting 12/18/2018, serum creatinine based estimated GFR (eGFR) will be   calculated using the Chronic Kidney Disease Epidemiology Collaboration   (CKD-EPI) equation.       GFR, ESTIMATED POCT   Date Value Ref Range Status   08/01/2024 >60 >60 mL/min/1.73m2 Final     Calcium   Date Value Ref Range Status   06/04/2025 9.4 8.8 - 10.4 mg/dL Final   08/04/2020 8.6 8.5 - 10.1 mg/dL Final     Bilirubin Total   Date Value Ref Range Status   04/14/2025 0.8 <=1.2 mg/dL Final   07/02/2018 0.6 0.2 - 1.3 mg/dL Final     Alkaline Phosphatase   Date Value Ref Range Status   04/14/2025 90 40 - 150 U/L Final   07/02/2018 66 40 - 150 U/L Final     ALT   Date Value Ref Range Status   04/14/2025 26 0 - 70 U/L Final   07/02/2018 23 0 - 70 U/L Final     AST   Date Value Ref Range Status   04/14/2025 31 0 - 45 U/L Final   07/02/2018 15 0 - 45  U/L Final     Recent Labs   Lab Test 07/02/18  0636   CHOL 92   HDL 32*   LDL <1   TRIG 297*        Thank you for allowing me to participate in the care of your patient.      Sincerely,     Kevin Garcia MD     [facility]  cc:   Luis Alberto Aponte MD  [facility]  [address] [place] [address]

## 2025-06-20 ENCOUNTER — ORDERS ONLY (AUTO-RELEASED) (OUTPATIENT)
Dept: CARDIOLOGY | Facility: CLINIC | Age: 75
End: 2025-06-20
Payer: COMMERCIAL

## 2025-06-20 DIAGNOSIS — I49.3 PVC'S (PREMATURE VENTRICULAR CONTRACTIONS): ICD-10-CM

## 2025-06-21 ENCOUNTER — HEALTH MAINTENANCE LETTER (OUTPATIENT)
Age: 75
End: 2025-06-21

## 2025-07-01 ENCOUNTER — RESULTS FOLLOW-UP (OUTPATIENT)
Dept: CARDIOLOGY | Facility: CLINIC | Age: 75
End: 2025-07-01

## 2025-07-01 ENCOUNTER — LAB (OUTPATIENT)
Dept: LAB | Facility: CLINIC | Age: 75
End: 2025-07-01
Payer: COMMERCIAL

## 2025-07-01 DIAGNOSIS — I10 ESSENTIAL HYPERTENSION: ICD-10-CM

## 2025-07-01 LAB
ANION GAP SERPL CALCULATED.3IONS-SCNC: 11 MMOL/L (ref 7–15)
BUN SERPL-MCNC: 25.3 MG/DL (ref 8–23)
CALCIUM SERPL-MCNC: 8.7 MG/DL (ref 8.8–10.4)
CHLORIDE SERPL-SCNC: 105 MMOL/L (ref 98–107)
CREAT SERPL-MCNC: 0.84 MG/DL (ref 0.67–1.17)
EGFRCR SERPLBLD CKD-EPI 2021: >90 ML/MIN/1.73M2
GLUCOSE SERPL-MCNC: 98 MG/DL (ref 70–99)
HCO3 SERPL-SCNC: 26 MMOL/L (ref 22–29)
POTASSIUM SERPL-SCNC: 3.4 MMOL/L (ref 3.4–5.3)
SODIUM SERPL-SCNC: 142 MMOL/L (ref 135–145)

## 2025-07-02 ENCOUNTER — ALLIED HEALTH/NURSE VISIT (OUTPATIENT)
Dept: CARDIOLOGY | Facility: CLINIC | Age: 75
End: 2025-07-02
Payer: COMMERCIAL

## 2025-07-02 VITALS
HEART RATE: 62 BPM | SYSTOLIC BLOOD PRESSURE: 140 MMHG | BODY MASS INDEX: 31.9 KG/M2 | HEIGHT: 77 IN | DIASTOLIC BLOOD PRESSURE: 80 MMHG | OXYGEN SATURATION: 98 %

## 2025-07-02 DIAGNOSIS — I10 ESSENTIAL HYPERTENSION: ICD-10-CM

## 2025-07-02 NOTE — PROGRESS NOTES
[facility] BLOOD PRESSURE CHECK     Last office visit: 6/19/25    Previous blood pressure: 88/62 mm Hg  Previous heart rate: 62 bpm      Time of visit: 1000    Morning medications were taken at: 0930 (Gabapentin, Atorvastatin, Lisinopril, Chlorthalidone, Potassium)     Today's blood pressure: 140/80 mm Hg  Today's heart rate: 62 bpm   SP02: 98%    Home monitor blood pressure: n/a mmHg  Home monitor heart rate: n/a bpm      Additional Comments: n/a      Results routed to: [facility] Heart Team 2      Ordering Provider: Dr. Garcia  In clinic Provider: Dr. Joaquin Russ
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
## 2025-07-07 ENCOUNTER — TELEPHONE (OUTPATIENT)
Dept: CARDIOLOGY | Facility: CLINIC | Age: 75
End: 2025-07-07
Payer: COMMERCIAL

## 2025-07-07 DIAGNOSIS — I25.10 CORONARY ARTERY DISEASE INVOLVING NATIVE CORONARY ARTERY OF NATIVE HEART WITHOUT ANGINA PECTORIS: ICD-10-CM

## 2025-07-07 DIAGNOSIS — I10 ESSENTIAL HYPERTENSION: Primary | ICD-10-CM

## 2025-07-07 NOTE — TELEPHONE ENCOUNTER
Kevin Garcia MD to [facility] Heart Team 2  (Selected Message)    7/7/25  7:48 AM  Note      BP mildly elevated, recommend changing lisinopril 40 mg daily to olmesartan 40 mg daily with a BMP in 2 weeks thanks           LVM to call back.

## 2025-07-09 RX ORDER — OLMESARTAN MEDOXOMIL 40 MG/1
40 TABLET ORAL DAILY
Qty: 90 TABLET | Refills: 1 | Status: SHIPPED | OUTPATIENT
Start: 2025-07-09

## 2025-07-09 NOTE — TELEPHONE ENCOUNTER
Called patient to review Dr. Garcia's recommendations. Patient states he also discussed this with his PCP, but was waiting for cardiology to call and make the changes.  Rx escripted for olmesartan 40mg daily to replace lisinopril.  Bmp ordered for 2 week check. Patient will call the [facility] to schedule this.

## 2025-07-24 ENCOUNTER — LAB (OUTPATIENT)
Dept: LAB | Facility: CLINIC | Age: 75
End: 2025-07-24
Payer: COMMERCIAL

## 2025-07-24 DIAGNOSIS — I25.10 CORONARY ARTERY DISEASE INVOLVING NATIVE CORONARY ARTERY OF NATIVE HEART WITHOUT ANGINA PECTORIS: ICD-10-CM

## 2025-07-24 DIAGNOSIS — I10 ESSENTIAL HYPERTENSION: ICD-10-CM

## 2025-07-24 LAB
ANION GAP SERPL CALCULATED.3IONS-SCNC: 11 MMOL/L (ref 7–15)
BUN SERPL-MCNC: 17.9 MG/DL (ref 8–23)
CALCIUM SERPL-MCNC: 8.9 MG/DL (ref 8.8–10.4)
CHLORIDE SERPL-SCNC: 106 MMOL/L (ref 98–107)
CREAT SERPL-MCNC: 0.89 MG/DL (ref 0.67–1.17)
EGFRCR SERPLBLD CKD-EPI 2021: >90 ML/MIN/1.73M2
GLUCOSE SERPL-MCNC: 87 MG/DL (ref 70–99)
HCO3 SERPL-SCNC: 26 MMOL/L (ref 22–29)
POTASSIUM SERPL-SCNC: 3.6 MMOL/L (ref 3.4–5.3)
SODIUM SERPL-SCNC: 143 MMOL/L (ref 135–145)

## 2025-07-28 ENCOUNTER — TELEPHONE (OUTPATIENT)
Dept: CARDIOLOGY | Facility: CLINIC | Age: 75
End: 2025-07-28
Payer: COMMERCIAL

## 2025-07-28 NOTE — TELEPHONE ENCOUNTER
Message from Dr. Garcia re: labs  Results reviewed, please let the patient know that overall findings are reassuring, stable electrolytes and renal function.   Follow up as previously planned   ========  Patient to see SONNY Bobbi Cabello on 9/15/2025.    My chart update sent to patient:    Ester, Mr. Chamberlain,    Dr. Garcia reviewed your lab results after switching to the olmesartan. He said: the overall findings are reassuring, stable electrolytes and renal function.   Follow up as previously planned    Your next visit is at our [place] [facility] with SONNY Bobbi Cabello on 9/15/2025 @ 10:40 AM (10:30 AM check-in).    Thank you  Team 2 R.N.s  [phone]

## 2025-08-02 ENCOUNTER — HEALTH MAINTENANCE LETTER (OUTPATIENT)
Age: 75
End: 2025-08-02

## 2025-08-20 ENCOUNTER — APPOINTMENT (OUTPATIENT)
Dept: GENERAL RADIOLOGY | Facility: CLINIC | Age: 75
End: 2025-08-20
Attending: EMERGENCY MEDICINE
Payer: COMMERCIAL

## 2025-08-20 ENCOUNTER — APPOINTMENT (OUTPATIENT)
Dept: ULTRASOUND IMAGING | Facility: CLINIC | Age: 75
End: 2025-08-20
Attending: EMERGENCY MEDICINE
Payer: COMMERCIAL

## 2025-08-20 ENCOUNTER — HOSPITAL ENCOUNTER (OUTPATIENT)
Facility: CLINIC | Age: 75
Setting detail: OBSERVATION
Discharge: HOME | End: 2025-08-21
Attending: EMERGENCY MEDICINE | Admitting: INTERNAL MEDICINE
Payer: COMMERCIAL

## 2025-08-20 DIAGNOSIS — M21.372 LEFT FOOT DROP: ICD-10-CM

## 2025-08-20 DIAGNOSIS — R53.1 GENERALIZED WEAKNESS: ICD-10-CM

## 2025-08-20 DIAGNOSIS — K59.00 CONSTIPATION, UNSPECIFIED CONSTIPATION TYPE: ICD-10-CM

## 2025-08-20 DIAGNOSIS — L03.115 CELLULITIS OF RIGHT LEG: Primary | ICD-10-CM
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
## 2025-08-20 DIAGNOSIS — R29.898 LEFT LEG WEAKNESS: ICD-10-CM

## 2025-08-20 LAB
ANION GAP SERPL CALCULATED.3IONS-SCNC: 15 MMOL/L (ref 7–15)
ATRIAL RATE - MUSE: 69 BPM
BASOPHILS # BLD AUTO: 0.03 10E3/UL (ref 0–0.2)
BASOPHILS NFR BLD AUTO: 0.5 %
BUN SERPL-MCNC: 15.6 MG/DL (ref 8–23)
CALCIUM SERPL-MCNC: 8.3 MG/DL (ref 8.8–10.4)
CHLORIDE SERPL-SCNC: 102 MMOL/L (ref 98–107)
CREAT SERPL-MCNC: 0.72 MG/DL (ref 0.67–1.17)
DIASTOLIC BLOOD PRESSURE - MUSE: NORMAL MMHG
EGFRCR SERPLBLD CKD-EPI 2021: >90 ML/MIN/1.73M2
EOSINOPHIL # BLD AUTO: 0.2 10E3/UL (ref 0–0.7)
EOSINOPHIL NFR BLD AUTO: 3.1 %
ERYTHROCYTE [DISTWIDTH] IN BLOOD BY AUTOMATED COUNT: 14.7 % (ref 10–15)
EST. AVERAGE GLUCOSE BLD GHB EST-MCNC: 114 MG/DL
GLUCOSE BLDC GLUCOMTR-MCNC: 102 MG/DL (ref 70–99)
GLUCOSE BLDC GLUCOMTR-MCNC: 134 MG/DL (ref 70–99)
GLUCOSE BLDC GLUCOMTR-MCNC: 93 MG/DL (ref 70–99)
GLUCOSE BLDC GLUCOMTR-MCNC: 99 MG/DL (ref 70–99)
GLUCOSE SERPL-MCNC: 139 MG/DL (ref 70–99)
HBA1C MFR BLD: 5.6 %
HCO3 SERPL-SCNC: 24 MMOL/L (ref 22–29)
HCT VFR BLD AUTO: 37.2 % (ref 40–53)
HGB BLD-MCNC: 12 G/DL (ref 13.3–17.7)
HGB BLD-MCNC: 12.4 G/DL (ref 13.3–17.7)
IMM GRANULOCYTES # BLD: 0.09 10E3/UL
IMM GRANULOCYTES NFR BLD: 1.4 %
INTERPRETATION ECG - MUSE: NORMAL
LYMPHOCYTES # BLD AUTO: 0.27 10E3/UL (ref 0.8–5.3)
LYMPHOCYTES NFR BLD AUTO: 4.2 %
MCH RBC QN AUTO: 29 PG (ref 26.5–33)
MCHC RBC AUTO-ENTMCNC: 33.3 G/DL (ref 31.5–36.5)
MCV RBC AUTO: 87.1 FL (ref 78–100)
MCV RBC AUTO: 87.6 FL (ref 78–100)
MONOCYTES # BLD AUTO: 0.8 10E3/UL (ref 0–1.3)
MONOCYTES NFR BLD AUTO: 12.3 %
NEUTROPHILS # BLD AUTO: 5.11 10E3/UL (ref 1.6–8.3)
NEUTROPHILS NFR BLD AUTO: 78.5 %
NRBC # BLD AUTO: <0.03 10E3/UL
NRBC BLD AUTO-RTO: 0 /100
P AXIS - MUSE: 66 DEGREES
PLATELET # BLD AUTO: 119 10E3/UL (ref 150–450)
POTASSIUM SERPL-SCNC: 2.9 MMOL/L (ref 3.4–5.3)
POTASSIUM SERPL-SCNC: 3.2 MMOL/L (ref 3.4–5.3)
POTASSIUM SERPL-SCNC: 3.2 MMOL/L (ref 3.4–5.3)
POTASSIUM SERPL-SCNC: 3.5 MMOL/L (ref 3.4–5.3)
PR INTERVAL - MUSE: 194 MS
QRS DURATION - MUSE: 190 MS
QT - MUSE: 478 MS
QTC - MUSE: 512 MS
R AXIS - MUSE: -68 DEGREES
RBC # BLD AUTO: 4.27 10E6/UL (ref 4.4–5.9)
SODIUM SERPL-SCNC: 141 MMOL/L (ref 135–145)
SYSTOLIC BLOOD PRESSURE - MUSE: NORMAL MMHG
T AXIS - MUSE: 50 DEGREES
VENTRICULAR RATE- MUSE: 69 BPM
WBC # BLD AUTO: 6.5 10E3/UL (ref 4–11)

## 2025-08-20 PROCEDURE — 36415 COLL VENOUS BLD VENIPUNCTURE: CPT | Performed by: INTERNAL MEDICINE

## 2025-08-20 PROCEDURE — 82962 GLUCOSE BLOOD TEST: CPT

## 2025-08-20 PROCEDURE — 85004 AUTOMATED DIFF WBC COUNT: CPT | Performed by: EMERGENCY MEDICINE

## 2025-08-20 PROCEDURE — 29130 APPL FINGER SPLINT STATIC: CPT | Mod: LT

## 2025-08-20 PROCEDURE — 80048 BASIC METABOLIC PNL TOTAL CA: CPT | Performed by: EMERGENCY MEDICINE

## 2025-08-20 PROCEDURE — 96376 TX/PRO/DX INJ SAME DRUG ADON: CPT

## 2025-08-20 PROCEDURE — 99214 OFFICE O/P EST MOD 30 MIN: CPT | Performed by: INTERNAL MEDICINE

## 2025-08-20 PROCEDURE — 93971 EXTREMITY STUDY: CPT | Mod: RT

## 2025-08-20 PROCEDURE — 258N000003 HC RX IP 258 OP 636: Performed by: EMERGENCY MEDICINE

## 2025-08-20 PROCEDURE — 84132 ASSAY OF SERUM POTASSIUM: CPT | Performed by: INTERNAL MEDICINE

## 2025-08-20 PROCEDURE — 36415 COLL VENOUS BLD VENIPUNCTURE: CPT | Performed by: EMERGENCY MEDICINE

## 2025-08-20 PROCEDURE — 96375 TX/PRO/DX INJ NEW DRUG ADDON: CPT | Mod: 59

## 2025-08-20 PROCEDURE — 73080 X-RAY EXAM OF ELBOW: CPT | Mod: LT

## 2025-08-20 PROCEDURE — 96375 TX/PRO/DX INJ NEW DRUG ADDON: CPT

## 2025-08-20 PROCEDURE — 96376 TX/PRO/DX INJ SAME DRUG ADON: CPT | Mod: 59

## 2025-08-20 PROCEDURE — 96361 HYDRATE IV INFUSION ADD-ON: CPT

## 2025-08-20 PROCEDURE — 93005 ELECTROCARDIOGRAM TRACING: CPT

## 2025-08-20 PROCEDURE — 83036 HEMOGLOBIN GLYCOSYLATED A1C: CPT | Performed by: INTERNAL MEDICINE

## 2025-08-20 PROCEDURE — G0378 HOSPITAL OBSERVATION PER HR: HCPCS

## 2025-08-20 PROCEDURE — 250N000013 HC RX MED GY IP 250 OP 250 PS 637: Performed by: INTERNAL MEDICINE

## 2025-08-20 PROCEDURE — 73110 X-RAY EXAM OF WRIST: CPT | Mod: LT

## 2025-08-20 PROCEDURE — 250N000011 HC RX IP 250 OP 636: Performed by: EMERGENCY MEDICINE

## 2025-08-20 PROCEDURE — 73060 X-RAY EXAM OF HUMERUS: CPT | Mod: LT

## 2025-08-20 PROCEDURE — 250N000011 HC RX IP 250 OP 636: Performed by: INTERNAL MEDICINE

## 2025-08-20 PROCEDURE — 96374 THER/PROPH/DIAG INJ IV PUSH: CPT | Mod: 59

## 2025-08-20 PROCEDURE — 99285 EMERGENCY DEPT VISIT HI MDM: CPT | Mod: 25 | Performed by: EMERGENCY MEDICINE

## 2025-08-20 PROCEDURE — 85018 HEMOGLOBIN: CPT | Performed by: INTERNAL MEDICINE

## 2025-08-20 RX ORDER — ACETAMINOPHEN 325 MG/1
325-650 TABLET ORAL EVERY 6 HOURS PRN
Status: DISCONTINUED | OUTPATIENT
Start: 2025-08-20 | End: 2025-08-21 | Stop reason: HOSPADM

## 2025-08-20 RX ORDER — AMOXICILLIN 250 MG
2 CAPSULE ORAL 2 TIMES DAILY PRN
Status: DISCONTINUED | OUTPATIENT
Start: 2025-08-20 | End: 2025-08-21 | Stop reason: HOSPADM

## 2025-08-20 RX ORDER — CEFTRIAXONE 2 G/1
2 INJECTION, POWDER, FOR SOLUTION INTRAMUSCULAR; INTRAVENOUS ONCE
Status: COMPLETED | OUTPATIENT
Start: 2025-08-20 | End: 2025-08-20

## 2025-08-20 RX ORDER — HYDROCODONE BITARTRATE AND ACETAMINOPHEN 5; 325 MG/1; MG/1
1-2 TABLET ORAL EVERY 6 HOURS PRN
COMMUNITY
Start: 2025-08-18

## 2025-08-20 RX ORDER — DEXTROSE MONOHYDRATE 25 G/50ML
25-50 INJECTION, SOLUTION INTRAVENOUS
Status: DISCONTINUED | OUTPATIENT
Start: 2025-08-20 | End: 2025-08-21 | Stop reason: HOSPADM

## 2025-08-20 RX ORDER — ONDANSETRON 2 MG/ML
4 INJECTION INTRAMUSCULAR; INTRAVENOUS EVERY 6 HOURS PRN
Status: DISCONTINUED | OUTPATIENT
Start: 2025-08-20 | End: 2025-08-21 | Stop reason: HOSPADM

## 2025-08-20 RX ORDER — PROCHLORPERAZINE MALEATE 5 MG/1
5 TABLET ORAL EVERY 6 HOURS PRN
Status: DISCONTINUED | OUTPATIENT
Start: 2025-08-20 | End: 2025-08-21 | Stop reason: HOSPADM

## 2025-08-20 RX ORDER — ATORVASTATIN CALCIUM 40 MG/1
40 TABLET, FILM COATED ORAL DAILY
Status: DISCONTINUED | OUTPATIENT
Start: 2025-08-20 | End: 2025-08-21 | Stop reason: HOSPADM

## 2025-08-20 RX ORDER — LOSARTAN POTASSIUM 100 MG/1
100 TABLET ORAL DAILY
Status: DISCONTINUED | OUTPATIENT
Start: 2025-08-20 | End: 2025-08-21 | Stop reason: HOSPADM

## 2025-08-20 RX ORDER — NICOTINE POLACRILEX 4 MG
15-30 LOZENGE BUCCAL
Status: DISCONTINUED | OUTPATIENT
Start: 2025-08-20 | End: 2025-08-21 | Stop reason: HOSPADM

## 2025-08-20 RX ORDER — HYDROMORPHONE HYDROCHLORIDE 1 MG/ML
0.5 INJECTION, SOLUTION INTRAMUSCULAR; INTRAVENOUS; SUBCUTANEOUS EVERY 30 MIN PRN
Refills: 0 | Status: COMPLETED | OUTPATIENT
Start: 2025-08-20 | End: 2025-08-20

## 2025-08-20 RX ORDER — MAGNESIUM OXIDE 400 MG/1
400 TABLET ORAL DAILY
Status: DISCONTINUED | OUTPATIENT
Start: 2025-08-20 | End: 2025-08-21 | Stop reason: HOSPADM

## 2025-08-20 RX ORDER — ASPIRIN 81 MG/1
81 TABLET ORAL DAILY
Status: DISCONTINUED | OUTPATIENT
Start: 2025-08-20 | End: 2025-08-21 | Stop reason: HOSPADM

## 2025-08-20 RX ORDER — ONDANSETRON 4 MG/1
4 TABLET, ORALLY DISINTEGRATING ORAL EVERY 6 HOURS PRN
Status: DISCONTINUED | OUTPATIENT
Start: 2025-08-20 | End: 2025-08-21 | Stop reason: HOSPADM

## 2025-08-20 RX ORDER — KETOROLAC TROMETHAMINE 15 MG/ML
15 INJECTION, SOLUTION INTRAMUSCULAR; INTRAVENOUS EVERY 6 HOURS PRN
Status: DISCONTINUED | OUTPATIENT
Start: 2025-08-20 | End: 2025-08-21 | Stop reason: HOSPADM

## 2025-08-20 RX ORDER — PRAMIPEXOLE DIHYDROCHLORIDE 1 MG/1
3 TABLET ORAL AT BEDTIME
COMMUNITY

## 2025-08-20 RX ORDER — PRAMIPEXOLE DIHYDROCHLORIDE 1 MG/1
3 TABLET ORAL AT BEDTIME
Status: DISCONTINUED | OUTPATIENT
Start: 2025-08-20 | End: 2025-08-21 | Stop reason: HOSPADM

## 2025-08-20 RX ORDER — GABAPENTIN 400 MG/1
800 CAPSULE ORAL 3 TIMES DAILY
Status: DISCONTINUED | OUTPATIENT
Start: 2025-08-20 | End: 2025-08-21 | Stop reason: HOSPADM

## 2025-08-20 RX ORDER — CHLORTHALIDONE 25 MG/1
25 TABLET ORAL DAILY
Status: DISCONTINUED | OUTPATIENT
Start: 2025-08-20 | End: 2025-08-21 | Stop reason: HOSPADM

## 2025-08-20 RX ORDER — POTASSIUM CHLORIDE 1500 MG/1
20 TABLET, EXTENDED RELEASE ORAL ONCE
Status: COMPLETED | OUTPATIENT
Start: 2025-08-20 | End: 2025-08-20

## 2025-08-20 RX ORDER — ACETAMINOPHEN 325 MG/1
325-650 TABLET ORAL EVERY 6 HOURS PRN
COMMUNITY

## 2025-08-20 RX ORDER — AMOXICILLIN 250 MG
1 CAPSULE ORAL 2 TIMES DAILY PRN
Status: DISCONTINUED | OUTPATIENT
Start: 2025-08-20 | End: 2025-08-21 | Stop reason: HOSPADM

## 2025-08-20 RX ORDER — POTASSIUM CHLORIDE 750 MG/1
10 TABLET, EXTENDED RELEASE ORAL DAILY
Status: DISCONTINUED | OUTPATIENT
Start: 2025-08-20 | End: 2025-08-21 | Stop reason: HOSPADM

## 2025-08-20 RX ORDER — POTASSIUM CHLORIDE 1500 MG/1
40 TABLET, EXTENDED RELEASE ORAL ONCE
Status: COMPLETED | OUTPATIENT
Start: 2025-08-20 | End: 2025-08-20

## 2025-08-20 RX ORDER — CEFTRIAXONE 2 G/1
2 INJECTION, POWDER, FOR SOLUTION INTRAMUSCULAR; INTRAVENOUS EVERY 24 HOURS
Status: DISCONTINUED | OUTPATIENT
Start: 2025-08-21 | End: 2025-08-21

## 2025-08-20 RX ADMIN — CEFTRIAXONE 2 G: 2 INJECTION, POWDER, FOR SOLUTION INTRAMUSCULAR; INTRAVENOUS at 06:28

## 2025-08-20 RX ADMIN — ATORVASTATIN CALCIUM 40 MG: 40 TABLET, FILM COATED ORAL at 12:21

## 2025-08-20 RX ADMIN — KETOROLAC TROMETHAMINE 15 MG: 15 INJECTION, SOLUTION INTRAMUSCULAR; INTRAVENOUS at 10:32

## 2025-08-20 RX ADMIN — POTASSIUM CHLORIDE 40 MEQ: 1500 TABLET, EXTENDED RELEASE ORAL at 11:42

## 2025-08-20 RX ADMIN — ASPIRIN 81 MG: 81 TABLET, DELAYED RELEASE ORAL at 12:20

## 2025-08-20 RX ADMIN — GABAPENTIN 800 MG: 400 CAPSULE ORAL at 12:22

## 2025-08-20 RX ADMIN — GABAPENTIN 800 MG: 400 CAPSULE ORAL at 21:29

## 2025-08-20 RX ADMIN — POTASSIUM CHLORIDE 20 MEQ: 20 TABLET, EXTENDED RELEASE ORAL at 13:45

## 2025-08-20 RX ADMIN — POTASSIUM CHLORIDE 10 MEQ: 750 TABLET, FILM COATED, EXTENDED RELEASE ORAL at 13:45

## 2025-08-20 RX ADMIN — CHLORTHALIDONE 25 MG: 25 TABLET ORAL at 12:21

## 2025-08-20 RX ADMIN — HYDROMORPHONE HYDROCHLORIDE 0.5 MG: 1 INJECTION, SOLUTION INTRAMUSCULAR; INTRAVENOUS; SUBCUTANEOUS at 11:42

## 2025-08-20 RX ADMIN — KETOROLAC TROMETHAMINE 15 MG: 15 INJECTION, SOLUTION INTRAMUSCULAR; INTRAVENOUS at 21:28

## 2025-08-20 RX ADMIN — LOSARTAN POTASSIUM 100 MG: 100 TABLET, FILM COATED ORAL at 12:22

## 2025-08-20 RX ADMIN — HYDROMORPHONE HYDROCHLORIDE 0.5 MG: 1 INJECTION, SOLUTION INTRAMUSCULAR; INTRAVENOUS; SUBCUTANEOUS at 03:06

## 2025-08-20 RX ADMIN — Medication 400 MG: at 12:21

## 2025-08-20 RX ADMIN — POTASSIUM CHLORIDE 10 MEQ: 750 TABLET, FILM COATED, EXTENDED RELEASE ORAL at 13:47

## 2025-08-20 RX ADMIN — POTASSIUM CHLORIDE 40 MEQ: 1500 TABLET, EXTENDED RELEASE ORAL at 18:26

## 2025-08-20 RX ADMIN — HYDROMORPHONE HYDROCHLORIDE 0.5 MG: 1 INJECTION, SOLUTION INTRAMUSCULAR; INTRAVENOUS; SUBCUTANEOUS at 01:59

## 2025-08-20 RX ADMIN — PRAMIPEXOLE DIHYDROCHLORIDE 3 MG: 1 TABLET ORAL at 21:57

## 2025-08-20 RX ADMIN — SODIUM CHLORIDE 1000 ML: 0.9 INJECTION, SOLUTION INTRAVENOUS at 06:27

## 2025-08-20 ASSESSMENT — ACTIVITIES OF DAILY LIVING (ADL)
ADLS_ACUITY_SCORE: 58
ADLS_ACUITY_SCORE: 44
ADLS_ACUITY_SCORE: 61
ADLS_ACUITY_SCORE: 58
ADLS_ACUITY_SCORE: 44
ADLS_ACUITY_SCORE: 58
ADLS_ACUITY_SCORE: 61
ADLS_ACUITY_SCORE: 44
ADLS_ACUITY_SCORE: 58
DEPENDENT_IADLS:: INDEPENDENT
ADLS_ACUITY_SCORE: 62
ADLS_ACUITY_SCORE: 44
ADLS_ACUITY_SCORE: 44
ADLS_ACUITY_SCORE: 61
ADLS_ACUITY_SCORE: 58
ADLS_ACUITY_SCORE: 58
ADLS_ACUITY_SCORE: 61
ADLS_ACUITY_SCORE: 62

## 2025-08-21 ENCOUNTER — APPOINTMENT (OUTPATIENT)
Dept: PHYSICAL THERAPY | Facility: CLINIC | Age: 75
End: 2025-08-21
Attending: INTERNAL MEDICINE
Payer: COMMERCIAL

## 2025-08-21 VITALS
OXYGEN SATURATION: 98 % | SYSTOLIC BLOOD PRESSURE: 164 MMHG | RESPIRATION RATE: 18 BRPM | HEART RATE: 65 BPM | HEIGHT: 77 IN | BODY MASS INDEX: 30.94 KG/M2 | WEIGHT: 262 LBS | TEMPERATURE: 98.4 F | DIASTOLIC BLOOD PRESSURE: 87 MMHG

## 2025-08-21 LAB
GLUCOSE BLDC GLUCOMTR-MCNC: 129 MG/DL (ref 70–99)
GLUCOSE BLDC GLUCOMTR-MCNC: 132 MG/DL (ref 70–99)
POTASSIUM SERPL-SCNC: 3.7 MMOL/L (ref 3.4–5.3)

## 2025-08-21 PROCEDURE — 250N000013 HC RX MED GY IP 250 OP 250 PS 637: Performed by: INTERNAL MEDICINE

## 2025-08-21 PROCEDURE — 84132 ASSAY OF SERUM POTASSIUM: CPT | Performed by: INTERNAL MEDICINE

## 2025-08-21 PROCEDURE — 97530 THERAPEUTIC ACTIVITIES: CPT | Mod: GP | Performed by: PHYSICAL THERAPIST

## 2025-08-21 PROCEDURE — 97116 GAIT TRAINING THERAPY: CPT | Mod: GP | Performed by: PHYSICAL THERAPIST

## 2025-08-21 PROCEDURE — G0378 HOSPITAL OBSERVATION PER HR: HCPCS

## 2025-08-21 PROCEDURE — 96376 TX/PRO/DX INJ SAME DRUG ADON: CPT

## 2025-08-21 PROCEDURE — 36415 COLL VENOUS BLD VENIPUNCTURE: CPT | Performed by: INTERNAL MEDICINE

## 2025-08-21 PROCEDURE — 99239 HOSP IP/OBS DSCHRG MGMT >30: CPT | Performed by: INTERNAL MEDICINE

## 2025-08-21 PROCEDURE — 250N000011 HC RX IP 250 OP 636: Performed by: INTERNAL MEDICINE

## 2025-08-21 PROCEDURE — 82962 GLUCOSE BLOOD TEST: CPT

## 2025-08-21 PROCEDURE — 97161 PT EVAL LOW COMPLEX 20 MIN: CPT | Mod: GP | Performed by: PHYSICAL THERAPIST

## 2025-08-21 RX ORDER — CEPHALEXIN 500 MG/1
500 CAPSULE ORAL 4 TIMES DAILY
Qty: 20 CAPSULE | Refills: 0 | Status: SHIPPED | OUTPATIENT
Start: 2025-08-21 | End: 2025-08-26

## 2025-08-21 RX ORDER — SENNA AND DOCUSATE SODIUM 50; 8.6 MG/1; MG/1
1 TABLET, FILM COATED ORAL DAILY PRN
Qty: 10 TABLET | Refills: 0 | Status: SHIPPED | OUTPATIENT
Start: 2025-08-21

## 2025-08-21 RX ADMIN — LOSARTAN POTASSIUM 100 MG: 100 TABLET, FILM COATED ORAL at 08:31

## 2025-08-21 RX ADMIN — ATORVASTATIN CALCIUM 40 MG: 40 TABLET, FILM COATED ORAL at 08:07

## 2025-08-21 RX ADMIN — POTASSIUM CHLORIDE 10 MEQ: 750 TABLET, FILM COATED, EXTENDED RELEASE ORAL at 08:07

## 2025-08-21 RX ADMIN — GABAPENTIN 800 MG: 400 CAPSULE ORAL at 08:07

## 2025-08-21 RX ADMIN — ASPIRIN 81 MG: 81 TABLET, DELAYED RELEASE ORAL at 08:07

## 2025-08-21 RX ADMIN — CHLORTHALIDONE 25 MG: 25 TABLET ORAL at 08:31

## 2025-08-21 RX ADMIN — CEFTRIAXONE 2 G: 2 INJECTION, POWDER, FOR SOLUTION INTRAMUSCULAR; INTRAVENOUS at 07:00

## 2025-08-21 RX ADMIN — Medication 400 MG: at 08:07

## 2025-08-21 RX ADMIN — SENNOSIDES AND DOCUSATE SODIUM 1 TABLET: 50; 8.6 TABLET ORAL at 10:54

## 2025-08-21 ASSESSMENT — ACTIVITIES OF DAILY LIVING (ADL)
ADLS_ACUITY_SCORE: 44
ADLS_ACUITY_SCORE: 43
ADLS_ACUITY_SCORE: 44
ADLS_ACUITY_SCORE: 43
ADLS_ACUITY_SCORE: 44
ADLS_ACUITY_SCORE: 44

## 2025-08-23 ENCOUNTER — PATIENT OUTREACH (OUTPATIENT)
Dept: CARE COORDINATION | Facility: CLINIC | Age: 75
End: 2025-08-23
Payer: COMMERCIAL

## (undated) DEVICE — SLEEVE TR BAND RADIAL COMPRESSION DEVICE 29CM XX-RF06L

## (undated) DEVICE — Device

## (undated) DEVICE — INTRO GLIDESHEATH SLENDER 6FR 10X45CM 60-1060

## (undated) DEVICE — DEFIB PRO-PADZ LVP LQD GEL ADULT 8900-2105-01

## (undated) DEVICE — KIT HAND CONTROL ANGIOTOUCH ACIST 65CM AT-P65

## (undated) DEVICE — CATH ANGIO INFINITI JR4 4FRX100CM 538421

## (undated) DEVICE — 5FR X 100CM INFINITI TL DIAGNOSTIC CATHETER, JUDKINS LEFT CORONARY, JL 3.5, FEMORAL SELECTIVE THRULUMEN, SMALL, 0.038IN MAX GUIDEWIRE (EA/1)

## (undated) DEVICE — CATH LAUNCHER 6FR EBU 3.5 LA6EBU35

## (undated) DEVICE — MANIFOLD KIT ANGIO AUTOMATED 014613

## (undated) DEVICE — GW VASC OMNIWIRE J L185CM PRESSURE 89185J

## (undated) DEVICE — GUIDEWIRE J TIP 1.5MM 210CM

## (undated) DEVICE — KIT LG BORE TOUHY ACCESS PLUS MAP152

## (undated) DEVICE — WATCHDOG HEMOSTASIS VALVE

## (undated) RX ORDER — REGADENOSON 0.08 MG/ML
INJECTION, SOLUTION INTRAVENOUS
Status: DISPENSED
Start: 2023-02-02